# Patient Record
Sex: MALE | Race: BLACK OR AFRICAN AMERICAN
[De-identification: names, ages, dates, MRNs, and addresses within clinical notes are randomized per-mention and may not be internally consistent; named-entity substitution may affect disease eponyms.]

---

## 2020-10-04 ENCOUNTER — HOSPITAL ENCOUNTER (EMERGENCY)
Dept: HOSPITAL 97 - ER | Age: 75
Discharge: HOME | End: 2020-10-04
Payer: COMMERCIAL

## 2020-10-04 VITALS — OXYGEN SATURATION: 99 % | DIASTOLIC BLOOD PRESSURE: 120 MMHG | SYSTOLIC BLOOD PRESSURE: 169 MMHG

## 2020-10-04 VITALS — TEMPERATURE: 98.3 F
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
## 2020-10-04 DIAGNOSIS — I48.91: ICD-10-CM

## 2020-10-04 DIAGNOSIS — R42: Primary | ICD-10-CM
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
## 2020-10-04 DIAGNOSIS — I10: ICD-10-CM

## 2020-10-04 DIAGNOSIS — Z79.82: ICD-10-CM
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
## 2020-10-04 LAB
ALBUMIN SERPL BCP-MCNC: 3.5 G/DL (ref 3.4–5)
ALP SERPL-CCNC: 51 U/L (ref 45–117)
ALT SERPL W P-5'-P-CCNC: 22 U/L (ref 12–78)
AST SERPL W P-5'-P-CCNC: 19 U/L (ref 15–37)
BUN BLD-MCNC: 29 MG/DL (ref 7–18)
GLUCOSE SERPLBLD-MCNC: 115 MG/DL (ref 74–106)
HCT VFR BLD CALC: 33.2 % (ref 39.6–49)
INR BLD: 0.98
LYMPHOCYTES # SPEC AUTO: 1.1 K/UL (ref 0.7–4.9)
MAGNESIUM SERPL-MCNC: 2.1 MG/DL (ref 1.8–2.4)
NT-PROBNP SERPL-MCNC: 292 PG/ML (ref ?–125)
PMV BLD: 10.4 FL (ref 7.6–11.3)
POTASSIUM SERPL-SCNC: 4.1 MMOL/L (ref 3.5–5.1)
RBC # BLD: 3.55 M/UL (ref 4.33–5.43)
TROPONIN (EMERG DEPT USE ONLY): < 0.02 NG/ML (ref 0–0.04)

## 2020-10-04 PROCEDURE — 81003 URINALYSIS AUTO W/O SCOPE: CPT

## 2020-10-04 PROCEDURE — 80048 BASIC METABOLIC PNL TOTAL CA: CPT

## 2020-10-04 PROCEDURE — 83880 ASSAY OF NATRIURETIC PEPTIDE: CPT

## 2020-10-04 PROCEDURE — 85025 COMPLETE CBC W/AUTO DIFF WBC: CPT

## 2020-10-04 PROCEDURE — 83735 ASSAY OF MAGNESIUM: CPT

## 2020-10-04 PROCEDURE — 85610 PROTHROMBIN TIME: CPT

## 2020-10-04 PROCEDURE — 99284 EMERGENCY DEPT VISIT MOD MDM: CPT

## 2020-10-04 PROCEDURE — 96360 HYDRATION IV INFUSION INIT: CPT

## 2020-10-04 PROCEDURE — 93880 EXTRACRANIAL BILAT STUDY: CPT

## 2020-10-04 PROCEDURE — 36415 COLL VENOUS BLD VENIPUNCTURE: CPT

## 2020-10-04 PROCEDURE — 80076 HEPATIC FUNCTION PANEL: CPT

## 2020-10-04 PROCEDURE — 71045 X-RAY EXAM CHEST 1 VIEW: CPT

## 2020-10-04 PROCEDURE — 84484 ASSAY OF TROPONIN QUANT: CPT

## 2020-10-04 PROCEDURE — 70450 CT HEAD/BRAIN W/O DYE: CPT

## 2020-10-04 PROCEDURE — 93005 ELECTROCARDIOGRAM TRACING: CPT

## 2020-10-04 NOTE — RAD REPORT
EXAM DESCRIPTION:  CT - Head Brain Wo Cont - 10/4/2020 2:16 pm

 

CLINICAL HISTORY:  DIZZINESS

Headache, drowsiness, fall with head injury

 

COMPARISON:  No comparisons

 

TECHNIQUE:  All CT scans are performed using dose optimization technique as appropriate and may inclu
de automated exposure control or mA/KV adjustment according to patient size.

 

FINDINGS:  No intracranial hemorrhage, hydrocephalus or extra-axial fluid collection.Mild frontal lob
e atrophy.No areas of brain edema or evidence of midline shift.

 

The paranasal sinuses and mastoids are clear. The calvarium is intact.

 

IMPRESSION:  No acute intracranial abnormality.

## 2020-10-04 NOTE — EDPHYS
Physician Documentation                                                                           

 [facility]                                                                 

Name: Brendan Murray                                                                             

Age: 75 yrs                                                                                       

Sex: Male                                                                                         

: 1945                                                                                   

MRN: [id number]                                                                                   

Arrival Date: 10/04/2020                                                                          

Time: 12:47                                                                                       

Account#: [id number]                                                                            

Bed 4                                                                                             

Private MD: Julio Mckeon ED Physician Chris Azar                                                                      

HPI:                                                                                              

10/04                                                                                             

13:39 This 75 yrs old Black Male presents to ER via Wheelchair with complaints of Dizziness,  pm1 

      Fall Injury.                                                                                

13:39 The patient presents with sense of spinning, vertigo. Onset: The symptoms/episode       pm1 

      began/occurred 1 hour(s) ago. Context: occurred at home, occurred while the patient was     

      standing, just prior to the episode the patient experienced no apparent symptoms.           

      Modifying factors: The symptoms are alleviated by nothing, the symptoms are aggravated      

      by nothing. Associated signs and symptoms: Pertinent negatives: abdominal pain, chest       

      pain, focal weakness, head injury, headache, nausea, numbness, palpitations, shortness      

      of breath, vomiting. Severity of symptoms: in the emergency department the symptoms         

      have resolved Pain is currently a 0 / 10. Patient's baseline: Neuro: alert and fully        

      oriented, Motor: no deficits, Ambulation: walks without assistance, Speech: normal. The     

      patient has not experienced similar symptoms in the past. Patient was standing in the       

      kitchen with his hands on the counter and then he started feeling the room spinning. He     

      was able to control is fall down to the floor first landing on his knees. His head hit      

      his wife's knee on the way down. He had no difficulty getting up and he no longer           

      experiencing any dizziness. No headache, focal weakness, neck pain, LOC.                    

                                                                                                  

Historical:                                                                                       

- Allergies:                                                                                      

13:00 No Known Allergies;                                                                     jd3 

- Home Meds:                                                                                      

15:56 Benicar HCT 40-12.5 mg Oral tab 1 tab once daily [Active]; Toprol XL 50 mg Oral Tb24 1  jl7 

      tab once daily [Active]; allopurinol 300 mg Oral tab 1 tab once daily [Active]; Lasix       

      40 mg Oral tab [Active]; doxazosin 4 mg oral tab 1 tab once daily [Active]; omeprazole      

      40 mg Oral cpDR 1 cap once daily [Active]; flecainide 50 mg Oral tab 1 tab every 12         

      hours [Active]; aspirin 325 mg Oral tab 1 tab once daily [Active]; CoQ-10 100 mg Oral       

      cap daily [Active]; Centrum Silver Oral daily [Active]; Vitamin B-12 1,000 mcg Oral tab     

      daily [Active]; Vitamin C 1,000 mg Oral tab daily [Active];                                 

- PMHx:                                                                                           

13:00 GERD; Gout; Atrial Fib; Hypertension;                                                   jd3 

- PSHx:                                                                                           

13:00 Carpal Tunnel Repair;                                                                   jd3 

                                                                                                  

- Immunization history:: Adult Immunizations up to date.                                          

- Social history:: Smoking status: Patient denies any tobacco usage or history of.                

                                                                                                  

                                                                                                  

ROS:                                                                                              

13:39 Constitutional: Negative for fever, chills, and weight loss, Eyes: Negative for injury, pm1 

      pain, redness, and discharge, ENT: Negative for injury, pain, and discharge, Neck:          

      Negative for injury, pain, and swelling, Cardiovascular: Negative for chest pain,           

      palpitations, and edema, Respiratory: Negative for shortness of breath, cough,              

      wheezing, and pleuritic chest pain, Abdomen/GI: Negative for abdominal pain, nausea,        

      vomiting, diarrhea, and constipation, Back: Negative for injury and pain, MS/Extremity:     

      Negative for injury and deformity, Skin: Negative for injury, rash, and discoloration.      

13:39 Neuro: Positive for dizziness, Negative for numbness, tingling, weakness.                   

                                                                                                  

Exam:                                                                                             

13:39 Constitutional:  This is a well developed, well nourished patient who is awake, alert,  pm1 

      and in no acute distress. Head/Face:  Normocephalic, atraumatic. Eyes:  Pupils equal        

      round and reactive to light, extra-ocular motions intact.  Lids and lashes normal.          

      Conjunctiva and sclera are non-icteric and not injected.  Cornea within normal limits.      

      Periorbital areas with no swelling, redness, or edema. ENT:  Nares patent. No nasal         

      discharge, no septal abnormalities noted.  Tympanic membranes are normal and external       

      auditory canals are clear.  Oropharynx with no redness, swelling, or masses, exudates,      

      or evidence of obstruction, uvula midline.  Mucous membranes moist. Neck:  Trachea          

      midline, no thyromegaly or masses palpated, and no cervical lymphadenopathy.  Supple,       

      full range of motion without nuchal rigidity, or vertebral point tenderness.  No            

      Meningismus.                                                                                

13:39 Back:  No spinal tenderness.  No costovertebral tenderness.  Full range of motion.          

      Skin:  Warm, dry with normal turgor.  Normal color with no rashes, no lesions, and no       

      evidence of cellulitis. MS/ Extremity:  Pulses equal, no cyanosis.  Neurovascular           

      intact.  Full, normal range of motion.                                                      

13:39 Cardiovascular: Exam negative for  acute changes, Rate: normal, Rhythm: regular,            

      Pulses: no pulse deficits are appreciated.                                                  

13:39 Respiratory: Exam negative for  acute changes, respiratory distress, shortness of           

      breath.                                                                                     

13:39 Abdomen/GI: Exam negative for acute changes, Inspection: abdomen appears normal,            

      Palpation: abdomen is soft and non-tender, in all quadrants, mass, is not appreciated,      

      rebound tenderness, is not appreciated.                                                     

13:39 Neuro: Exam negative for acute changes, Orientation: is normal, Mentation: is normal,       

      Cranial nerves: CN II- XII are normal as tested, Cerebellar function: normal finger to      

      nose testing, Motor: is normal.                                                             

                                                                                                  

Vital Signs:                                                                                      

13:00  / 98; Pulse 74; Resp 17 S; Temp 98.3(O); Pulse Ox 98% on R/A; Weight 112.49 kg   jd3 

      (R); Height 6 ft. 1 in. (185.42 cm) (R); Pain 0/10;                                         

15:45  / 112; Pulse 59; Resp 17; Pulse Ox 100% ;                                        rb1 

15:51  / 120; Pulse 57; Resp 15; Pulse Ox 99% ; Pain 0/10;                              jl7 

13:00 Body Mass Index 32.72 (112.49 kg, 185.42 cm)                                            jd3 

                                                                                                  

MDM:                                                                                              

13:26 Patient medically screened.                                                             pm1 

16:35 Data reviewed: vital signs. Data interpreted: Pulse oximetry: on room air is 99 %.      pm1 

      Interpretation: normal. Counseling: I had a detailed discussion with the patient and/or     

      guardian regarding: the historical points, exam findings, and any diagnostic results        

      supporting the discharge/admit diagnosis, lab results, radiology results, the need for      

      outpatient follow up, Dr. Mckeon. Weight loss and blood pressure management, to return     

      to the emergency department if symptoms worsen or persist or if there are any questions     

      or concerns that arise at home.                                                             

                                                                                                  

10/04                                                                                             

13:38 Order name: Basic Metabolic Panel; Complete Time: 14:36                                 pm1 

10/04                                                                                             

13:38 Order name: CBC with Diff; Complete Time: 14:36                                         pm1 

10/04                                                                                             

13:38 Order name: LFT's; Complete Time: 14:36                                                 pm1 

10/04                                                                                             

13:38 Order name: Magnesium; Complete Time: 14:36                                             pm1 

10/04                                                                                             

13:38 Order name: NT PRO-BNP; Complete Time: 14:36                                            pm1 

10/04                                                                                             

13:38 Order name: PT-INR; Complete Time: 14:36                                                pm1 

10/04                                                                                             

13:38 Order name: Troponin (emerg Dept Use Only); Complete Time: 14:36                        pm1 

10/04                                                                                             

13:38 Order name: XRAY Chest (1 view); Complete Time: 14:47                                   pm1 

10/04                                                                                             

13:38 Order name: EKG; Complete Time: 13:39                                                   pm1 

10/04                                                                                             

13:38 Order name: Cardiac monitoring; Complete Time: 14:33                                    pm1 

10/04                                                                                             

13:38 Order name: CT Head Brain wo Cont; Complete Time: 14:36                                 pm1 

10/04                                                                                             

13:38 Order name: Carotid Artery Bilateral US; Complete Time: 15:16                           pm1 

10/04                                                                                             

14:16 Order name: Urine Dipstick--Ancillary (enter results); Complete Time: 14:36             eb  

10/04                                                                                             

13:38 Order name: EKG - Nurse/Tech; Complete Time: 14:33                                      pm1 

10/04                                                                                             

13:38 Order name: IV Saline Lock; Complete Time: 14:03                                        pm1 

10/04                                                                                             

13:38 Order name: Labs collected and sent; Complete Time: 14:03                               pm1 

10/04                                                                                             

13:38 Order name: O2 Per Protocol; Complete Time: 14:06                                       pm1 

10/04                                                                                             

13:38 Order name: O2 Sat Monitoring; Complete Time: 14:06                                     pm1 

10/04                                                                                             

13:38 Order name: Urine Dipstick-Ancillary (obtain specimen); Complete Time: 15:08            pm1 

                                                                                                  

Administered Medications:                                                                         

14:03 Drug: NS 0.9% 500 ml Route: IV; Rate: bolus; Site: right antecubital;                   rb1 

14:45 Follow up: Response: No adverse reaction; IV Status: Completed infusion; IV Intake:     jl7 

      500ml                                                                                       

                                                                                                  

                                                                                                  

Disposition:                                                                                      

10/05                                                                                             

07:14 Co-signature as Attending Physician, Chris Azar MD I agree with the assessment and  ramirez 

      plan of care.                                                                               

                                                                                                  

Disposition:                                                                                      

10/04/20 16:36 Discharged to Home. Impression: Dizziness and giddiness.                           

- Condition is Stable.                                                                            

- Discharge Instructions: Dizziness, Vertigo.                                                     

                                                                                                  

- Medication Reconciliation Form, Thank You Letter, Antibiotic Education, Prescription            

  Opioid Use form.                                                                                

- Follow up: Emergency Department; When: As needed; Reason: Worsening of condition.               

  Follow up: Julio Mckeon MD; When: 2 - 3 days; Reason: Recheck today's complaints,           

  Continuance of care, Re-evaluation by your physician.                                           

- Problem is new.                                                                                 

- Symptoms are resolved.                                                                          

                                                                                                  

                                                                                                  

                                                                                                  

Signatures:                                                                                       

Dispatcher [employer]                           EDMS                                                 

Chris Azar MD MD cha Barber, Rebecca, RN                     RN   rb1                                                  

Edward Pennington NP                    NP   pm1                                                  

Nir Thibodeaux RN                        RN   jl7                                                  

Bebeto Alicea RN                    RN   jd3                                                  

                                                                                                  

Corrections: (The following items were deleted from the chart)                                    

10/04                                                                                             

17:05 16:36 10/04/2020 16:36 Discharged to Home. Impression: Dizziness and giddiness.         rb1 

      Condition is Stable. Forms are Medication Reconciliation Form, Thank You Letter,            

      Antibiotic Education, Prescription Opioid Use. Follow up: Emergency Department; When:       

      As needed; Reason: Worsening of condition. Follow up: Julio Mckeon; When: 2 - 3 days;     

      Reason: Recheck today's complaints, Continuance of care, Re-evaluation by your              

      physician. Problem is new. Symptoms are resolved. pm1                                       

                                                                                                  

**************************************************************************************************

## 2020-10-04 NOTE — ER
Nurse's Notes                                                                                     

 [facility]                                                                 

Name: Brendan Murray                                                                             

Age: 75 yrs                                                                                       

Sex: Male                                                                                         

: 1945                                                                                   

MRN: [id number]                                                                                   

Arrival Date: 10/04/2020                                                                          

Time: 12:47                                                                                       

Account#: [id number]                                                                            

Bed 4                                                                                             

Private MD: Julio Mckeon                                                                       

Diagnosis: Dizziness and giddiness                                                                

                                                                                                  

Presentation:                                                                                     

10/04                                                                                             

12:57 Chief complaint: Spouse and/or significant other states: "he was standing in the        [facility] 

      kitchen, he all of a sudden, he felt dizzy then fell. he denies pain now.". Coronavirus     

      screen: At this time, the client does not indicate any symptoms associated with             

      coronavirus-19. Ebola Screen: Patient negative for fever greater than or equal to 101.5     

      degrees Fahrenheit, and additional compatible Ebola Virus Disease symptoms. Initial         

      Sepsis Screen: Does the patient meet any 2 criteria? No. Patient's initial sepsis           

      screen is negative. Does the patient have a suspected source of infection? No.              

      Patient's initial sepsis screen is negative. Risk Assessment: Do you want to hurt           

      yourself or someone else? Patient reports no desire to harm self or others. Onset of        

      symptoms was 2020.                                                              

12:57 Method Of Arrival: Wheelchair                                                           jd3 

12:57 Acuity: RAVEN 3                                                                           jd3 

                                                                                                  

Historical:                                                                                       

- Allergies:                                                                                      

13:00 No Known Allergies;                                                                     j 

- Home Meds:                                                                                      

15:56 Benicar HCT 40-12.5 mg Oral tab 1 tab once daily [Active]; Toprol XL 50 mg Oral Tb24 1  jl7 

      tab once daily [Active]; allopurinol 300 mg Oral tab 1 tab once daily [Active]; Lasix       

      40 mg Oral tab [Active]; doxazosin 4 mg oral tab 1 tab once daily [Active]; omeprazole      

      40 mg Oral cpDR 1 cap once daily [Active]; flecainide 50 mg Oral tab 1 tab every 12         

      hours [Active]; aspirin 325 mg Oral tab 1 tab once daily [Active]; CoQ-10 100 mg Oral       

      cap daily [Active]; Centrum Silver Oral daily [Active]; Vitamin B-12 1,000 mcg Oral tab     

      daily [Active]; Vitamin C 1,000 mg Oral tab daily [Active];                                 

- PMHx:                                                                                           

13:00 GERD; Gout; Atrial Fib; Hypertension;                                                   jd3 

- PSHx:                                                                                           

13:00 Carpal Tunnel Repair;                                                                   jd3 

                                                                                                  

- Immunization history:: Adult Immunizations up to date.                                          

- Social history:: Smoking status: Patient denies any tobacco usage or history of.                

                                                                                                  

                                                                                                  

Screenin:15 Abuse screen: Denies threats or abuse. Nutritional screening: No deficits noted.        rb1 

      Tuberculosis screening: No symptoms or risk factors identified. Fall Risk Fall in past      

      12 months (25 points). No secondary diagnosis (0 pts). IV access (20 points).               

      Ambulatory Aid- None/Bed Rest/Nurse Assist (0 pts). Gait- Normal/Bed Rest/Wheelchair (0     

      pts) Mental Status- Oriented to own ability (0 pts). Total Mckinnon Fall Scale indicates       

      High Risk Score (45 or more points). Fall prevention measures have been instituted.         

      Side Rails Up X 2 Placed Close to Nursing Station 1:1 Attendant Assigned Frequent           

      Obs/Assessments Occuring Family Present and informed to notify staff if the need to         

      leave the bedside As available patient and family educated on Fall Prevention Program       

      and Strategies.                                                                             

                                                                                                  

Assessment:                                                                                       

13:15 General: Appears in no apparent distress. comfortable, Behavior is calm, cooperative.   rb1 

      Pain: Denies pain. Neuro: Level of Consciousness is awake, alert, obeys commands,           

      Oriented to person, place, time, situation, Denies dizziness, LOC. Cardiovascular:          

      Capillary refill < 3 seconds. Respiratory: Airway is patent Respiratory effort is even,     

      unlabored, Respiratory pattern is regular, symmetrical. GI: No signs and/or symptoms        

      were reported involving the gastrointestinal system. : No signs and/or symptoms were      

      reported regarding the genitourinary system. Derm: Skin is dry, Skin is normal, Skin        

      temperature is warm.                                                                        

15:45 Reassessment: Patient appears in no apparent distress at this time. No changes from     jl7 

      previously documented assessment. Patient is alert, oriented x 3, equal unlabored           

      respirations, skin warm/dry/pink. ERP notified of BP, will be to bedside momentarily.       

      Patient denies pain at this time.                                                           

16:44 Reassessment: Patient appears in no apparent distress at this time. No changes from     rb1 

      previously documented assessment.                                                           

                                                                                                  

Vital Signs:                                                                                      

13:00  / 98; Pulse 74; Resp 17 S; Temp 98.3(O); Pulse Ox 98% on R/A; Weight 112.49 kg   jd3 

      (R); Height 6 ft. 1 in. (185.42 cm) (R); Pain 0/10;                                         

15:45  / 112; Pulse 59; Resp 17; Pulse Ox 100% ;                                        rb1 

15:51  / 120; Pulse 57; Resp 15; Pulse Ox 99% ; Pain 0/10;                              jl7 

13:00 Body Mass Index 32.72 (112.49 kg, 185.42 cm)                                            jd3 

                                                                                                  

ED Course:                                                                                        

12:47 Patient arrived in ED.                                                                  ag5 

12:47 Julio Mckeon MD is Private Physician.                                               ag5 

12:59 Triage completed.                                                                       jd3 

13:00 Arm band placed on.                                                                     jd3 

13:15 Patient has correct armband on for positive identification. Bed in low position. Call   rb1 

      light in reach. Side rails up X 1. Cardiac monitor on. Pulse ox on. NIBP on. Warm           

      blanket given.                                                                              

13:23 Edward Pennington, DHARMESH is PHCP.                                                           pm1 

13:24 Chris Azar MD is Attending Physician.                                             pm1 

13:35 Linda Bertrand, RN is Primary Nurse.                                                   rb1 

14:04 Initial lab(s) drawn, by me, sent to lab. Inserted saline lock: 20 gauge in right       em1 

      antecubital area, using aseptic technique. Blood collected.                                 

14:16 CT Head Brain wo Cont In Process Unspecified.                                           EDMS

14:29 EKG done, by ED staff, reviewed by Chris Azar MD.                                   dh4 

14:30 XRAY Chest (1 view) In Process Unspecified.                                             EDMS

14:58 Carotid Artery Bilateral US In Process Unspecified.                                     EDMS

14:59 Ultrasound completed. Patient tolerated well. Notified NP/EZEKIEL cotto.                   sg3 

16:36 Julio Mckeon MD is Referral Physician.                                              pm1 

17:04 No provider procedures requiring assistance completed. IV discontinued, intact,         rb1 

      bleeding controlled, No redness/swelling at site. Pressure dressing applied.                

                                                                                                  

Administered Medications:                                                                         

14:03 Drug: NS 0.9% 500 ml Route: IV; Rate: bolus; Site: right antecubital;                   rb1 

14:45 Follow up: Response: No adverse reaction; IV Status: Completed infusion; IV Intake:     jl7 

      500ml                                                                                       

                                                                                                  

                                                                                                  

Intake:                                                                                           

14:45 IV: 500ml; Total: 500ml.                                                                jl7 

                                                                                                  

Outcome:                                                                                          

16:36 Discharge ordered by MD.                                                                pm1 

17:04 Discharged to home ambulatory, with family.                                             rb1 

17:04 Condition: stable                                                                           

17:04 Discharge instructions given to patient, Instructed on discharge instructions, follow       

      up and referral plans. Demonstrated understanding of instructions, follow-up care,          

      Prescriptions given X none                                                                  

17:05 Patient left the ED.                                                                    rb1 

                                                                                                  

Signatures:                                                                                       

Dispatcher [employer]                           Lam Mccullough                               em1                                                  

Linda Bertrand, RN                     RN   rb1                                                  

Edward Pennington, DHARMESH                    NP   pm1                                                  

Nir Thibodeaux RN                        RN   jl7                                                  

Bebeto Alicea RN                    RN   [email or web address]                                                  

Susan Barrera                               3                                                  

Sameer Abdalla                                [facility]                                                  

Raheem Myers                                 4                                                  

                                                                                                  

**************************************************************************************************

## 2020-10-04 NOTE — RAD REPORT
EXAM DESCRIPTION:  RAD - Chest Single View - 10/4/2020 2:30 pm

 

CLINICAL HISTORY:  Dizziness

Chest pain.

 

COMPARISON:  Chest Pa And Lat (2 Views) dated 6/13/2019; Chest Pa And Lat (2 Views) dated 12/21/2018;
 CHEST PA AND LAT 2 VIEW dated 11/22/2011; CHEST SINGLE VIEW dated 11/1/2010

 

FINDINGS:  Portable technique limits examination quality.

 

Small calcified granuloma is seen right lung base. The lungs are otherwise clear. The heart is mildly
 prominent size with a tortuous thoracic aorta. No displaced fractures.

 

IMPRESSION:  No acute intrathoracic process suspected.

## 2020-10-04 NOTE — RAD REPORT
EXAM DESCRIPTION:   - CP - 10/4/2020 2:58 pm

 

CLINICAL HISTORY:  DIZZINESS

Headache, drowsiness

 

COMPARISON:  No comparisons

 

TECHNIQUE:  Real-time sonographic evaluation of both carotid systems was performed. Doppler interroga
tion was performed with waveform tracing bilaterally.

 

FINDINGS:  Normal high resistance waveforms are noted in both external carotid arteries. The common c
arotid arteries and internal carotid arteries show normal low resistance waveforms.

 

No significant plaque formation is seen. Peak systolic and end diastolic velocity values and the ICA/
CCA ratios are in the non-hemodynamically significant range.

 

Antegrade flow seen in both vertebral arteries.

 

IMPRESSION:  No significant atherosclerotic changes noted.

 

No evidence of a hemodynamically significant stenosis.

## 2020-10-08 NOTE — XMS REPORT
Continuity of Care Document

                           Created on:2020



Patient:BRENDAN FISHER

Sex:Male

:1945

External Reference #:[id number]





Demographics







                          Address                   [address]



                                                    [place] [address]

 

                          Home Phone                [phone]

 

                          Work Phone                [phone]

 

                          Mobile Phone              [phone]

 

                          Email Address             [email or web address]

 

                          Preferred Language        English

 

                          Marital Status            Unknown

 

                          Yarsanism Affiliation     Unknown

 

                          Race                      Unknown

 

                          Additional Race(s)        Black or 



                                                    Unavailable



                                                    Unavailable



                                                    White

 

                          Ethnic Group              Unknown









Author







                          Organization              [facility]

t

 

                          Address                   [address] [place] [address] Dr. Parikh [address] [place] [address]

 

                          Phone                     [phone]









Support







                Name            Relationship    Address         Phone

 

                Natalia         Spouse          [address] [phone]



                                                [place] [address] 

 

                Natalia         Son             Unavailable     [phone]

 

                NATALIA,  (POA) Unavailable     [address]K[place]          [phone]



                                                [place] [address] 

 

                NATALIA         Unavailable     [address] [place] [address]  [phone]



                                                [place] [address] 

 

                SPIVEY       Other           [address] [facility] [phone]



                                                [place] [address] 

 

                NATALIA         Unavailable     [address]     [phone]



                                                [place] [address] 

 

                (MPOA) Guajardo Child           Unavailable     [phone]









Care Team Providers







                    Name                Role                Phone

 

                    Linda DANG          Primary Care Physician [phone]

 

                    HAI Boyd DO        Attending Clinician [phone]

 

                    Valerie DANG,  Fatuma        Attending Clinician [phone]

 

                    Dante DANG,  N.   Attending Clinician [phone]

 

                    Trevin DANG,  Florentino     Attending Clinician [phone]

 

                    Ai DANG,  Casi Attending Clinician [phone]

 

                    Rafael DANG,  Casimiro  Attending Clinician [phone]

 

                    ZULEIMA Ramos DO    Attending Clinician [phone]

 

                    Noemi PAVON     Attending Clinician Unavailable

 

                    FELY Márquez    Attending Clinician [phone]

 

                    Jae Barbosa MD    Attending Clinician [phone]

 

                    JAE BARBOSA       Attending Clinician Unavailable

 

                    Joss PAVON          Attending Clinician Unavailable

 

                    Anmol PAVON           Attending Clinician Unavailable

 

                    Gamaliel BARROSO,  Mackenzie Attending Clinician [phone]

 

                    Ayleen DANG,  Jay Attending Clinician [phone]

 

                    Sameer DANG,  SCora      Attending Clinician [phone]

 

                    Bruce DANG,  Jose      Attending Clinician [phone]

 

                    Shai DANG,  Amanda Skelton Attending Clinician [phone]

[phone]

 

                    Linda DANG          Attending Clinician [phone]

 

                    Sujit Elizabeth PA-C Attending Clinician [phone]

 

                    VALERIE                 Admitting Clinician Unavailable

 

                    DANTE           Admitting Clinician Unavailable









Payers







           Payer Name Policy Type Policy     Effective  Expiration Source



                                 Number     Date       Date       

 

           [employer]                 [id number]   2020            [facility]                       00:00:00              Methodi

[facility]                                             



           [id number]                                             



           esentHMO                                               

 

           AET[employer] - MEDICARE MGD            xxxxxxxx                         [facility] 

[facility]



           CAREAETNA MEDICARE HMO                                             - 

Medical



           [facility]                                                    Center



           [phone]                                             



           [address] [address]                                             







Problems







       Condition Condition Condition Status Onset  Resolution Last   Treating Co

mments 

Source



       Name   Details Category        Date   Date   Treatment Clinician        



                                                 Date                 

 

       Acute  Acute  Disease Active                              [facility]



       upper GI upper GI                                              Method

i



       bleed  bleed                00:00:                             st



                                   00                                 

 

       Coffee Coffee Disease Active                       Overview: [place]

n



       ground ground                                       Added  Methodi



       emesis emesis               00:00:                      automatic st



                                   00                          ally from 



                                                               request 



                                                               for    



                                                               surgery 



                                                               [id number] 

 

       Lactic Lactic Disease Active                              [facility]



       acidosis acidosis               7-02                               Method

i



                                   00:00:                             st



                                   00                                 

 

       Syncope Syncope Disease Active                              [facility]



                                   2-                               Methodi



                                   00:00:                             st



                                   00                                 

 

       Chest pain Chest pain Disease Active                              [facility]

[place]



                                   1-06                               Methodi



                                   00:00:                             st



                                   00                                 

 

       Liver  Liver  Disease Active 2018                      Last   [facility]



       lesion lesion               2-06                        [facility]



                                   00:00:                      t & Plan: Medical



                                   00                          [facility]



                                                               non-contr 



                                                               ast MRI 



                                                               liver  



                                                               showed 



                                                               7x7 cm 



                                                               lesion on 



                                                               right  



                                                               lobe. We 



                                                               will   



                                                               review 



                                                               the    



                                                               imaging. 



                                                               He may 



                                                               need   



                                                               resection 



                                                               or US  



                                                               guided 



                                                               biopsy to 



                                                               confirm 



                                                               the    



                                                               diagnosis 



                                                               . Serum 



                                                               AFP 1.25. 

 

       Chronic Chronic Disease Active 2018                      Last   [facility]



       viral  viral                2-                        [facility] -



       hepatitis hepatitis               00:00:                      t & Plan: M

edical



       B without B without               00                          Diagnosed C

enter



       delta  delta                                            >30 yr 



       agent and agent and                                           ago,   



       without without                                           HBsAg  



       coma   coma                                             positive, 



                                                               HBeAg  



                                                               negative 



                                                               with low 



                                                               level  



                                                               viremia. 



                                                               Unclear 



                                                               mode of 



                                                               transmiss 



                                                               ion. He 



                                                               was never 



                                                               treated. 



                                                               No     



                                                               previous 



                                                               imaging 



                                                               for HCC 



                                                               surveilla 



                                                               nce. No 



                                                               evidence 



                                                               of     



                                                               advance 



                                                               fibrosis/ 



                                                               decompens 



                                                               ation.We 



                                                               will   



                                                               re-check 



                                                               HBV DNA, 



                                                               and will 



                                                               hold off 



                                                               anti-HBV 



                                                               therapy. 



                                                               Discussed 



                                                               the risk 



                                                               of     



                                                               re-activa 



                                                               tion on 



                                                               immunosup 



                                                               pression, 



                                                               screening 



                                                               of family 



                                                               members 



                                                               for HBV, 



                                                               and if 



                                                               negative, 



                                                               needs  



                                                               vaccinati 



                                                               on.    

 

       Obesity Obesity Disease Active 2018                      [facility]



                                   2                        [facility]



                                   00:00:                      t & Plan: Medical



                                   00                          Discussed Center



                                                               the need 



                                                               for    



                                                               weight 



                                                               loss with 



                                                               a target 



                                                               of 10% 



                                                               weight 



                                                               loss in 



                                                               next 6 



                                                               months 



                                                               through 



                                                               life-styl 



                                                               e      



                                                               modificat 



                                                               ion.   

 

       Stage 3 Stage 3 Disease Active 2018                             [facility]



       chronic chronic               -                               [facility]



       kidney kidney               00:00:                             Medical



       disease disease               00                                 Center

 

       Screening Screening Disease Active 2018                             [facility]



       for    for                                                 [facility]



       endocrine, endocrine,               00:00:                             Me

dical



       metabolic metabolic               00                                 Cent

er



       and    and                                                     



       immunity immunity                                                  



       disorder disorder                                                  

 

       COPD   COPD   Disease Active                              [facility]



       (chronic (chronic               9-15                               Method

i



       obstructiv obstructiv               00:00:                             st



       e      e                    00                                 



       pulmonary pulmonary                                                  



       disease) disease)                                                  

 

       Alcohol Alcohol Disease Active                              [facility]



       abuse  abuse                15                               Methodi



                                   00:00:                             st



                                   00                                 

 

       Acute  Acute  Disease Active                              [facility]



       pancreatit pancreatit               -13                               Me

thodi



       is     is                   00:00:                             st



                                   00                                 

 

       Disturbanc        Diagnosis Active               2020              

 [facility]



       e of skin                                    02:45:10               l



       sensation                                                         Fran



              Disturbanc                                                  



              e of skin                                                  



              sensation                                                  



                                                                      



                                                                      



              Active                                                  



                                                                      



                                                                      



                                                                      



                                                                      



              Diagnosis                                                  



                                                                      



                                                                      



              2020                                                  



                                                                      



                                                                      



                                                                      



                                                                      



                                                                      



              [id number]                                                  



              .[id number]                                                  



              [id number]                                                  



              4                                                       



                                                                      

 

       Gait          Diagnosis Active               2020               Mem

oria



       disorder                                    02:45:10               l



                Gait                                                  [place]



              disorder                                                  



                                                                      



                                                                      



              Active                                                  



                                                                      



                                                                      



                                                                      



                                                                      



              Diagnosis                                                  



                                                                      



                                                                      



              2020                                                  



                                                                      



                                                                      



                                                                      



                                                                      



                                                                      



              [id number]                                                  



              .[id number]                                                  



              [id number]                                                  



              4                                                       



                                                                      

 

       Essential        Problem Active               2020               Me

moria



       hypertensi                                    02:45:10               l



       on                                                             [place]



              Essential                                                  



              hypertensi                                                  



              on                                                      



                                                                      



                  Active                                                  



                                                                      



                                                                      



                                                                      



                                                                      



                 Problem                                                  



                                                                      



                                                                      



                                                                 



              0                                                       



                                                                      



                                                                      



                                                                      



                                                                      



              [id number]                                                  



              .[id number].                                                  



              [id number]                                                  



              4                                                       



                                                                      

 

       Lumbosacra        Problem Active               2020               M

emoria



       l                                         02:45:10               l



       radiculopa                                                         Thom

n



       thy    Lumbosacra                                                  



              l                                                       



              radiculopa                                                  



              thy                                                     



                                                                      



                                                                      



              Active                                                  



                                                                      



                                                                      



                                                                      



                                                                      



              Problem                                                  



                                                                      



                                                                      



              2020                                                  



                                                                      



                                                                      



                                                                      



                                                                      



                                                                      



              [id number]                                                  



              [id number]                                                  



              [id number]                                                  



              4                                                       



                                                                      

 

       CIDP          Problem Active               2020               [facility]

ia



       (chronic                                    02:45:10               l



       inflammato   CIDP                                                  Thom

n



       ry     (chronic                                                  



       demyelinat inflammato                                                  



       ing    ry                                                      



       polyneurop demyelinat                                                  



       athy)  ing                                                     



              polyneurop                                                  



              athy)                                                   



                                                                      



                                                                      



              Active                                                  



                                                                      



                                                                      



                                                                      



                                                                      



              Problem                                                  



                                                                      



                                                                      



              2020                                                  



                                                                      



                                                                      



                                                                      



                                                                      



                                                                      



              [id number]                                                  



              [id number]                                                  



              [id number]                                                  



              4                                                       



                                                                      

 

       Weakness        Problem Active               2020               [facility]

[facility]



                                                 02:45:10               l



                Weakness                                                  Thom

n



                                                                      



                                                                      



               Active                                                  



                                                                      



                                                                      



                                                                      



                                                                      



              Problem                                                  



                                                                      



                                                                      



              2020                                                  



                                                                      



                                                                      



                                                                      



                                                                      



                                                                      



              [id number]                                                  



              [id number]                                                  



              [id number]                                                  



              4                                                       



                                                                      

 

       Essential        Problem Active               2020               Me

[facility]ia



       tremor                                    02:45:10               l



                                                                      Fran



              Essential                                                  



              tremor                                                  



                                                                      



                                                                      



              Active                                                  



                                                                      



                                                                      



                                                                      



                                                                      



              Problem                                                  



                                                                      



                                                                      



              2020                                                  



                                                                      



                                                                      



                                                                      



                                                                      



                                                                      



              [id number]                                                  



              [id number]                                                  



              [id number]                                                  



              4                                                       



                                                                      







Allergies, Adverse Reactions, Alerts







       Allergy Allergy Status Severity Reaction(s) Onset  Inactive Treating Comm

ents 

Source



       Name   Type                        Date   Date   Clinician        

 

       N.K.D.A. N.K.D.A. Active        Info Not 2019-                      Mehul

sailaja



                                   Available 6-14                        l



                                          00:00:                      [place]



                                          00                          

 

       No Known DA     Active U                                   [facility]



       Drug                               2-26                        [place]



       Allergie                             00:00:                      e



       s                                                            [facility]

 

       No Known DA     Active U                                   [facility]



       Drug                               2-13                        [place]



       Allergie                             00:00:                      d



       s                                                            [facility]

 

       No Known DA     Active U             2018                      [facility]



       Allergie                             1-04                        [place]



       s                                  00:00:                      [place]



                                          00                          [facility]







Family History







           Family Member Diagnosis  Comments   Start Date Stop Date  Source

 

           Natural mother Hypertension                                  [facility]

 

           Natural mother Kidney disease                                  [facility]







Social History







           Social Habit Start Date Stop Date  Quantity   Comments   Source

 

           History of tobacco                       Cigarette Smoker            

[facility]



           use                                                    [facility]

 

           Sex Assigned At                                             [facility]



           Birth                                                  [facility]

 

           Cigarettes smoked 2020                       [facility]



           current (pack per 00:00:00   00:00:00                         [facility]

st



           day) - Reported                                             

 

           Tobacco use and 2020 Never used            [facility]



           exposure   00:00:00   00:00:00                         [facility]

 

           Alcohol intake 2020 Ex-drinker            [facility]



                      00:00:00   00:00:00   (finding)             [facility]

 

           Alcohol Comment 2020 3 years ago            [facility]



                      00:00:00   00:00:00                         [facility]









                Smoking Status  Start Date      Stop Date       Source

 

                Current every day smoker 2020 00:00:00                 [facility]

 

                Never smoker                                    [facility]







Medications







       Ordered Filled Start  Stop   Current Ordering Indication Dosage Frequency

 [facility]

                    Comments            Components          Source



     Medication Medication Date Date Medication? Clinician                (SIG) 

          



     Name Name                                                   

 

     aspirin      - 2020- No             81mg QD   Take 81 mg           [facility]

tressa



     (ECOTRIN)                                by mouth           [facility]

di



     81 MG      22:44: 00:00                          daily.           st



     enteric      54   :00                                          



     coated                                                        



     tablet                                                        

 

     pantoprazol      2020- No             40mg QD   Take 40 mg          

 [facility]



     e                                   by mouth           [facility]



     (PROTONIX)      22:44: 00:00                          daily.           st



     40 MG EC      54   :00                                          



     tablet                                                        

 

     albuterol      - 2020- No             2{puff} Q6H  Inhale 2           

[facility]



     (PROAIR                                puffs           [facility]



     HFA) 90      22:44: 00:00                          every 6           st



     mcg/actuati      54   :00                           (six)           



     on inhaler                                         hours as           



                                                  needed for           



                                                  wheezing.           

 

     metoprolol      - 2020- No             25mg Q.5D Take 25 mg           

[facility]



     tartrate                                by mouth 2           [facility]

[facility]



     (LOPRESSOR)      22:44: 00:00                          (two)           st



     25 mg      54   :00                           times a           



     tablet                                         day.           

 

     nitroglycer      -0      Yes            .4mg      Place 0.4           H

[place]



     in                                       mg under           [facility]



     (NITROSTAT)      22:44:                               the tongue           

st



     0.4 MG SL      52                                 every 5           



     tablet                                         (five)           



                                                  minutes as           



                                                  needed for           



                                                  chest           



                                                  pain.           

 

     atorvastati      -0      Yes            20mg QD   Take 20 mg           

[facility]



     n (LIPITOR)                                     by mouth           [facility]

[facility]



     20 mg      22:44:                               nightly.           st



     tablet      52                                 Default OP           



                                                  ins            

 

     citalopram      -0      Yes            20mg QD   Take 20 mg           [facility]

[place]



     (CeleXA) 20      -14                               by mouth           [facility]

[place]



     MG tablet      22:44:                               daily.           st



               52                                                

 

     thiamine       2020- Yes            100mg QD   1 tablet           [facility]

[facility]



     mononitrate      -14 10-14                          (100 mg           [facility]

[facility]



     , vit B1,      00:00: 23:59                          total) by           st



     (B-1) 100      00   :00                           Nasogastri           



     mg tablet                                         c route           



                                                  daily for           



                                                  30 days.           

 

     spironolact       2020- Yes            12.5mg QD   Take 0.5          

 [facility]



     one       -14 10-14                          tablets           [facility]



     (ALDACTONE)      00:00: 23:59                          (12.5 mg           s

t



     25 MG      00   :00                           total) by           



     tablet                                         mouth           



                                                  daily for           



                                                  30 days.           

 

     sodium       2020- Yes       Aspiration 4mL  Q.25D Take 4 mL         

  [facility]



     chloride 3      - 10-14           pneumonia,           by             Me

[facility]



     % nebulizer      00:00: 23:59           unspecified           nebulizati   

        st



     solution      00   :00            aspiration           on every 6          

 



                                   pneumonia           (six)           



                                   type,           hours for           



                                   unspecified           30 days.           



                                   laterality,                          



                                   unspecified                          



                                   part of                          



                                   lung (HCC)                          

 

     senna       2020- Yes            1{tbl} QD   Take 1           [facility]



     (SENOKOT)      - 10-14                          tablet by           [facility]

[facility]



     8.6 mg      00:00: 23:59                          mouth           st



     tablet      00   :00                           daily for           



                                                  30 days.           

 

     pantoprazol       2020- Yes            40mg Q.5D Infuse 10           

[facility]



     e 4 mg/mL      - 10-14                          mL (40 mg           [facility]

[facility]



     in sodium      00:00: 23:59                          total)           st



     chloride      00   :00                           into a           



     injection                                         venous           



                                                  catheter 2           



                                                  (two)           



                                                  times a           



                                                  day before           



                                                  meals for           



                                                  30 days.           

 

     naloxone       2020- Yes            .2mg      Infuse 0.5           [facility]

[place]



     (NARCAN)      -14 10-14                          mL (0.2 mg           [facility]

[place]



     0.4 mg/mL      00:00: 23:59                          total)           st



     injection      00   :00                           into a           



                                                  venous           



                                                  catheter           



                                                  once as           



                                                  needed for           



                                                  opioid           



                                                  reversal           



                                                  or             



                                                  respirator           



                                                  y              



                                                  depression           



                                                  (as needed           



                                                  for            



                                                  respirator           



                                                  y rate 8           



                                                  per minute           



                                                  or less OR           



                                                  patient           



                                                  sommnolent           



                                                  and            



                                                  difficult           



                                                  to arouse           



                                                  (POSS           



                                                  GREATER           



                                                  than 3).)           



                                                  for up to           



                                                  30 days.           

 

     multivitami      -0 2020- Yes            1{tbl} QD   Take 1           H

[place]



     n         -14 10-14                          tablet by           [facility]



     (THERAGRAN)      00:00: 23:59                          mouth           st



     tablet      00   :00                           daily for           



                                                  30 days.           

 

     losartan      - 2020- Yes            25mg QD   Take 1           [facility]

n



     (COZAAR) 25      9-14 10-                          tablet (25           M

[facility]



     MG tablet      00:00: 23:59                          mg total)           st



               00   :00                           by mouth           



                                                  daily for           



                                                  30 days.           

 

     ipratropium      2020- Yes            3mL  Q6H  Take 3 mL           

[facility]



     -albuteroL      9-14 10-14                          by             [facility]



     (DUO-NEB)      00:00: 23:59                          nebulizati           s

t



     0.5-2.5      00   :00                           on every 6           



     mg/3 mL                                         (six)           



     nebulizer                                         hours as           



                                                  needed for           



                                                  wheezing           



                                                  for up to           



                                                  30 days.           

 

     insulin      2020- Yes            0U   Q4H  Inject           [facility]



     lispro      9-14 10-14                          0-12 Units           Method

i



     (ADMELOG)      00:00: 23:59                          under the           st



     100 unit/mL      00   :00                           skin every           



     injection                                         4 (four)           



                                                  hours for           



                                                  30 days.           

 

     glucagon 1      2020- Yes            1mg       Inject 1           [facility]

[place]



     mg/mL recon       10                          mg into           [facility]

[facility]



     soln      00:00: 23:59                          the            st



               00   :00                           shoulder,           



                                                  thigh, or           



                                                  buttocks           



                                                  every 15           



                                                  (fifteen)           



                                                  minutes as           



                                                  needed (if           



                                                  patient           



                                                  NPO,           



                                                  unable to           



                                                  swallow           



                                                  safely           



                                                  with no IV           



                                                  access.)           



                                                  for up to           



                                                  30 days.           

 

     furosemide      2020- Yes            40mg QD   1 tablet           [facility]

[place]



     (LASIX) 40      9-14 10-14                          (40 mg           Method

i



     mg tablet      00:00: 23:59                          total) by           st



               00   :00                           Nasogastri           



                                                  c route           



                                                  daily for           



                                                  30 days.           

 

     folic acid      - 2020- Yes            1mg  QD   Take 1           [facility]

[place]



     (FOLVITE) 1      9-14 10-14                          tablet (1           Me

[facility]



     MG tablet      00:00: 23:59                          mg total)           st



               00   :00                           by mouth           



                                                  daily for           



                                                  30 days.           

 

     dextrose      - 2020- Yes            25g       Infuse 50           [facility]

[facility]



     50% syringe      9-14 10-14                          mL (25 g           Met

[facility]



               00:00: 23:59                          total)           st



               00   :00                           into a           



                                                  venous           



                                                  catheter           



                                                  every 20           



                                                  (twenty)           



                                                  minutes as           



                                                  needed (If           



                                                  blood           



                                                  glucose is           



                                                  40 mg/dL           



                                                  or LESS)           



                                                  for up to           



                                                  30 days.           

 

     dextrose      2020-0 2020- Yes            12.5g      Infuse 25           [facility]

[place]



     50% syringe      9-14 10-14                          mL (12.5 g           M

ethodi



               00:00: 23:59                          total)           st



               00   :00                           into a           



                                                  venous           



                                                  catheter           



                                                  every 20           



                                                  (twenty)           



                                                  minutes as           



                                                  needed (If           



                                                  blood           



                                                  glucose is           



                                                  between           



                                                  41-69           



                                                  mg/dL) for           



                                                  up to 30           



                                                  days.           

 

     dextrose 10      2020- Yes            40mL/h      Infuse 40         

  [facility]



     % infusion      9-14 10-14                          mL/hr into           Me

[facility]



               00:00: 23:59                          a venous           st



               00   :00                           catheter           



                                                  continuous           



                                                  ly as           



                                                  needed           



                                                  (for           



                                                  interrupti           



                                                  on in TPN           



                                                  or tube           



                                                  feeds) for           



                                                  up to 30           



                                                  days.           

 

     dextrose 10      2020- Yes            40mL/h      Infuse 40         

  [place]



     % infusion      9-14 10-14                          mL/hr into           Me

[facility]



               00:00: 23:59                          a venous           st



               00   :00                           catheter           



                                                  continuous           



                                                  ly as           



                                                  needed           



                                                  (For           



                                                  bedside           



                                                  glucose           



                                                  LESS than           



                                                  70 mg/dL)           



                                                  for up to           



                                                  30 days.           

 

     carvediloL      2020- Yes            3.125mg Q.5D Take 1           [facility]

[place]



     (COREG)      9-14 10-14                          tablet           [facility]



     3.125 MG      00:00: 23:59                          (3.125 mg           st



     tablet      00   :00                           total) by           



                                                  mouth 2           



                                                  (two)           



                                                  times a           



                                                  day for 30           



                                                  days.           

 

     amIODarone      2020- Yes            200mg Q.5D Take 1           [facility]

[facility]



     (PACERONE)      9-14 10-14                          tablet           Method

i



     200 MG      00:00: 23:59                          (200 mg           st



     tablet      00   :00                           total) by           



                                                  mouth 2           



                                                  (two)           



                                                  times a           



                                                  day for 30           



                                                  days.           

 

     acetaminoph      2020- Yes            650mg Q4H  20.3 mL           [facility]

[place]



     en        9-14 10-14                          (650 mg           [facility]



     (TYLENOL)      00:00: 23:59                          total) by           st



     160 mg/5 mL      00   :00                           Nasogastri           



     (5 mL)                                         c route           



     solution                                         every 4           



                                                  (four)           



                                                  hours as           



                                                  needed           



                                                  (Fever,           



                                                  mild pain           



                                                  or             



                                                  headache)           



                                                  for up to           



                                                  30 days.           

 

     traMADoL      2020- No        acute pain 25mg Q6H  0.5            [facility]

[place]



     (ULTRAM) 50       09-21                          tablets           [facility]

[place]



     mg tablet      00:00: 23:59                          (25 mg           st



               00   :00                           total) by           



                                                  Nasogastri           



                                                  c route           



                                                  every 6           



                                                  (six)           



                                                  hours as           



                                                  needed for           



                                                  moderate           



                                                  pain for           



                                                  up to 7           



                                                  days           



                                                  .acute           



                                                  pain.           

 

     sodium      2020-0 2020- No             500mL      Irrigate           [facility]

[facility]



     chloride                                with 500           [facility]

i



     0.9% for      00:00: 23:59                          mL as           st



     IRRIGATION      00   :00                           directed           



     (NS) 0.9 %                                         once for 1           



     irrigation                                         dose.           

 

     levoFLOXaci      2020-0 2020- No             500mg QD   Take 1           [facility]

[facility]



     n                                   tablet           [facility]



     (Levaquin)      00:00: 23:59                          (500 mg           st



     500 MG      00   :00                           total) by           



     tablet                                         mouth           



                                                  daily for           



                                                  8 days.           

 

     metroNIDAZO      2020-0 2020- No             500mg [id number] Take 1       

    [facility]



     LE (FlagyL)                           [id number] tablet          

 [facility]



     500 MG      00:00: 23:59                     3D   (500 mg           st



     tablet      00   :00                           total) by           



                                                  mouth 3           



                                                  (three)           



                                                  times a           



                                                  day for 8           



                                                  days.           

 

     codeine-gua      2020-0 2020- No        acute pain 5mL  Q.25D Take 5 mL    

       [facility]



     ifenesin                                by mouth 4           [facility]

[place]



     (GUAIFENESI      00:00: 23:59                          (four)           st



     N AC)      00   :00                           times a           



      mg/5                                         day as           



     mL liquid                                         needed for           



                                                  cough for           



                                                  up to 4           



                                                  days           



                                                  .acute           



                                                  pain.           

 

     Flecainide      2020-0      Yes  Amy                1 tablet           [facility]

[facility]



     Acetate      5-05           [place]                               l



               02:45:                                              [facility]



               10                                                

 

     Zantac      2020-0      Yes  Amy                1 tablet           [facility]



               5-05           [place]                at bedtime           l



               02:45:                                              [place]



               10                                                

 

     Sathish      2020-0      Yes  Amy                1 tablet           [facility]



     Aspirin      5-05           [place]                               l



               02:45:                                              [place]



               10                                                

 

     Metoprolol      2020-0      Yes  Amy                1 capsule           [facility]

[facility]



     Succinate      5-05           [place]                               l



               02:45:                                              [place]



               10                                                

 

     fluticasone      2020-0 2020- No                  QD   Inhale 1           [facility]

[place]



     furoate-rajeev      -10                          inhalation           M

ethodi



     anterol      00:00: 23:59                          s once           st



     (BREO      00   :00                           daily for           



     ELLIPTA)                                         30 days.           



     100-25                                                        



     mcg/dose                                                        



     blister                                                        



     with device                                                        



     powder for                                                        



     inhalation                                                        

 

     montelukast      2020-0 2020- No             10mg QD   Take 1           [facility]

[place]



     (SINGULAIR)                                tablet (10           M

[facility]



     10 mg      00:00: 23:59                          mg total)           st



     tablet      00   :00                           by mouth           



                                                  nightly           



                                                  for 30           



                                                  days.           

 

     atorvastati      -0 2020- No             20mg QD   Take 20 mg          

 [place]



     n (LIPITOR)                                by mouth           [facility]

[facility]



     20 MG      16:39: 00:00                          nightly.           st



     tablet      32   :00                           Default OP           



                                                  ins            

 

     fluticasone      - 2020- No                  QD   Inhale 1           [facility]

[place]



     -vilanterol                                inhalation           M

[facility]i



     (BREO      18:45: 00:00                          s once           st



     ELLIPTA)      58   :00                           daily.           



     200-25                                                        



     mcg/dose                                                        



     blister                                                        



     with device                                                        



     powder for                                                        



     inhalation                                                        

 

     albuterol      - 2020- No             2{puff} [id number] Inhale 2     

      [place]



     (PROAIR                           [id number] puffs 3           Me

[facility]



     HFA,PROVENT      18:45: 00:00                     3D   (three)           st



     IL        49   :00                           times a           



     HFA,VENTOLI                                         day.           



     N HFA) 90                                                        



     mcg/actuati                                                        



     on inhaler                                                        

 

     pantoprazol      2020- No             40mg QD   Take 1           [facility]

[place]



     e                                   tablet (40           [facility]



     (PROTONIX)      00:00: 00:00                          mg total)           s

t



     40 MG EC      00   :00                           by mouth           



     tablet                                         daily for           



                                                  30 days.           

 

     atorvastati      -0 2020- No             20mg QD   Take 1           [facility]

[facility]



     n (LIPITOR)                                tablet (20           M

[facility]



     20 MG      00:00: 00:00                          mg total)           st



     tablet      00   :00                           by mouth           



                                                  nightly           



                                                  for 30           



                                                  days.           

 

     metoprolol      0 - No             25mg Q.5D Take 1           [facility]

[place]



     tartrate                                tablet (25           [facility]

[place]



     (LOPRESSOR)      00:00: 00:00                          mg total)           

st



     25 mg      00   :00                           by mouth 2           



     tablet                                         (two)           



                                                  times a           



                                                  day for 30           



                                                  days.           

 

     nitroglycer      - 2020- No             .4mg      Place 1           [facility]

[place]



     in                                  tablet           [facility]



     (NITROSTAT)      00:00: 00:00                          (0.4 mg           st



     0.4 MG SL      00   :00                           total)           



     tablet                                         under the           



                                                  tongue           



                                                  every 5           



                                                  (five)           



                                                  minutes as           



                                                  needed for           



                                                  chest pain           



                                                  for up to           



                                                  30 days.           

 

     citalopram       No             10mg QD   Take 10 mg           

[place]



     (CeleXA) 10      07                          by mouth           [facility]

[facility]



     MG tablet      01:47: 00:00                          daily.           st



               06   :00                                          

 

     amLODIPine      2020- No             10mg QD   Take 10 mg           

[place]



     (NORVASC)                                by mouth           [facility]

[facility]



     10 mg      01:47: 00:00                          daily.           st



     tablet      04   :00                                          

 

     ranitidine      2019- No             150mg QD   Take 150           C

[facility]



     (ZANTAC)      2-06 12-06                          mg by           [facility]



     150 MG      09:25: 00:00                          mouth           [facility]



     tablet      40   :00                           nightly.           [place]

 

     ARNUITY      2019      Yes            1{puff} QD   Inhale 1           [facility]



     ELLIPTA 200      1-02                               puff by           [facility]

 -



     mcg/actuati      00:00:                               mouth via           [facility]



     on DsDv      00                                 inhaler           [facility]



                                                  daily.           

 

     olmesartan      2019      Yes            40mg QD   Take 40 mg           [facility]

[facility]



     (BENICAR)      0-26                               by mouth           [facility] 

-



     40 MG      00:00:                               daily.           [facility]



     tablet      00                                                [place]

 

     omeprazole      2019      Yes            40mg QD   Take 40 mg           [facility]

[facility]



     (PRILOSEC)      0-15                               by mouth           [facility]

 -



     40 MG      00:00:                               daily.           [facility]



     capsule      00                                                [place]

 

     doxazosin      2019      Yes            4mg  QD   Take 4 mg           [facility]



     (CARDURA) 4      0-07                               by mouth           [facility]

s -



     MG tablet      00:00:                               daily.           [facility]

l



               00                                                [place]

 

     furosemide      2019      Yes            40mg      Take 40 mg           [facility]

[facility]



     (LASIX) 40      0-07                               by mouth           [facility]

 -



     MG tablet      00:00:                               daily as           [facility]

[facility]



               00                                 needed.           [place]

 

     metoprolol      2019      Yes            50mg QD   Take 50 mg           [facility]

[facility]



     (TOPROL-XL)      0-05                               by mouth           [facility]

s -



     50 MG 24 hr      00:00:                               daily.           [facility]

[facility]



     tablet      00                                                [place]

 

     calcitriol      2018      Yes            .25ug QD   Take 0.25           [facility]

[facility]



     (ROCALTROL)      2-06                               mcg by           [facility] 

-



     0.25 MCG      09:11:                               mouth           [facility]



     capsule      14                                 daily.           [place]

 

     IRON,      2018      Yes            65mg QD   Take 65 mg           [facility]



     FERROUS      2-06                               by mouth           [facility]



     SULFATE,      09:10:                               daily .           [facility]

l



     ORAL      10                                                [place]

 

     aspirin      2018      Yes            325mg      Take 325           [facility]

t



     (SATHISH      2-06                               mg by           [facility] -



     ASPIRIN)      09:09:                               mouth.           [facility]



     325 MG      23                                                [facility]



     tablet                                                        

 

     red yeast      2018      Yes            2{tbl}      Take 2           [facility]



     rice 600 mg      2-06                               tablets by           [facility]

[facility] -



     Cap       09:09:                               mouth.           [facility]



               23                                                [place]

 

     OMEGA-3      2018      Yes                      Take by           [facility]



     FATTY      2-06                               mouth.           [facility]



     ACIDS-FISH      09:09:                                              [facility]



     OIL ORAL      23                                                [place]

 

     allopurinol      2018      Yes            300mg      Take 300           [facility]

[facility]



     (ZYLOPRIM)      2-06                               mg by           [facility]



     300 MG      09:03:                               mouth.           [facility]



     tablet      02                                                [place]

 

     olmesartan-            Yes            1{tbl} QD   Take 1           [facility]



     hydrochloro      2-22                               tablet by           [facility]

es -



     thiazide      12:04:                               mouth           [facility]



     (BENICAR      50                                 daily.           [place]



     H[facility])                                                        



     40-12.5 mg                                                        



     per tablet                                                        

 

     flecainide            Yes            50mg Q.5D Take 50 mg           [facility]

[facility]



     (TAMBOCOR)      2-22                               by mouth 2           [facility]

es -



     50 MG      12:04:                               (two)           [facility]



     tablet      50                                 times           Center



                                                  daily.           

 

     cyanocobala            Yes            1000ug QD   Take 1,000         

  [facility]



     min       2-22                               mcg by           [place] -



     (VITAMIN      12:04:                               mouth           [facility]



     B-12) 1000      50                                 daily.           [facility]



     [facility] tablet                                                        

 

     FOLIC            Yes                      Take by           [facility]



     ACID/MULTIV      2-22                               mouth.           [facility] 

-



     IT-MIN/LUTE      12:04:                                              [facility]

l



     IN (CENTRUM      50                                                Center



     SILVER                                                        



     ORAL)                                                        







Immunizations







           Ordered Immunization Filled Immunization Date       Status     Commen

ts   Source



           Name       Name                                        

 

           FLUCELVAX QUAD PF            2018-09-15 Completed             [facility]



                                 00:00:00                         [facility]

 

           Pneumococcal            2018-09-15 Completed             [facility]



           Conjugate 13-Valent            00:00:00                         [facility]

dist







Vital Signs







             Vital Name   Observation Time Observation Value Comments     Source

 

             Heart rate   2020 21:20:00 66 /min                   [facility] Marcel[facility]

 

             Respiratory rate 2020 21:20:00 18 /min                   Ev Marcelino

 

             Oxygen saturation in 2020 20:57:00 98 /min                   

Mike Marcelino



             Arterial blood by                                        



             Pulse oximetry                                        

 

             Systolic blood 2020 19:25:14 112 mm[Hg]                Macie

n [facility]



             pressure                                            

 

             Diastolic blood 2020 19:25:14 55 mm[Hg]                 Dashawn

on [facility]



             pressure                                            

 

             Body temperature 2020 19:25:14 36.44 Jacklyn                 Ev herrera [facility]

 

             Body weight  2020 05:34:00 62 kg                     Mike 

[facility]

 

             BMI          2020 05:34:00 20.18 kg/m2               Mike 

[facility]

 

             Body height  2020 01:43:00 175.3 cm                  [facility] 

[facility]

 

             Systolic blood 2019 09:11:00 149 mm[Hg]                [facility]

 

             Diastolic blood 2019 09:11:00 102 mm[Hg]                [facility]

 

             Heart rate   2019 09:10:00 62 /min                   [facility]

 

             Body temperature 2019 09:10:00 36.56 Jacklyn                 [facility]

 

             Respiratory rate 2019 09:10:00 16 /min                   [facility]

 

             Body height  2019 09:10:00 176.5 cm                  [facility]

 

             Body weight Measured 2019 09:10:00 112.628 kg                

[facility]

 

             BMI          2019 09:10:00 36.14 kg/m2               [facility]

 

             Oxygen saturation in 2019 09:10:00 97 /min                   

[facility]



             Arterial blood by                                        Medical Ce

nter



             Pulse oximetry                                        

 

             [facility]       2019 14:30:00                           [facility]

 [place]

 

             Height       2019 14:30:00                           [facility]







Procedures







                Procedure       Date / Time     Performing Clinician Source



                                Performed                       

 

                POC GLUCOSE     2020 16:02:00 Dex Howell

 

                POC GLUCOSE     2020 11:48:00 Dex Howell [facility]

 

                POC GLUCOSE     2020 09:15:00 Dex Howell [facility]

 

                POC GLUCOSE     2020 05:09:00 Dex Howell [facility]

 

                POC GLUCOSE     2020 01:18:00 Dex Howell [facility]

 

                POC GLUCOSE     2020 21:02:00 Dex Howell [facility]

 

                POC GLUCOSE     2020 16:03:00 Dex Howell [facility]

 

                POC GLUCOSE     2020 12:54:00 Dex Howell [facility]

 

                POC GLUCOSE     2020 08:27:00 Dex Howell [facility]

 

                CBC HEMOGRAM    2020 05:45:00 Christine Estrella Met

hodist

 

                PARTIAL THROMBOPLASTIN 2020 05:45:00 Regulo Ramos Ho

[place] [facility]



                TIME (PTT)                                      

 

                POC GLUCOSE     2020 04:39:00 Dex Howell [facility]

 

                POC GLUCOSE     2020 00:01:00 Dex Howell [facility]

 

                PARTIAL THROMBOPLASTIN 2020 22:15:00 Uppalapati, Tru Ho

[place] [facility]



                TIME (PTT)                                      

 

                POC GLUCOSE     2020 20:19:00 Dex Howell [facility]

 

                POC GLUCOSE     2020 15:27:00 Dex Howell [facility]

 

                PARTIAL THROMBOPLASTIN 2020 14:30:00 Dex Howell 

[facility]



                TIME (PTT)                                      

 

                POC GLUCOSE     2020 12:04:00 Dex Howell [facility]

 

                POC GLUCOSE     2020 08:46:00 Dex Howell [facility]

 

                CBC HEMOGRAM    2020 07:35:00 Christine Estrella Met

hodist

 

                PARTIAL THROMBOPLASTIN 2020 06:54:00 Regulo Ramos Ho

[place] [facility]



                TIME (PTT)                                      

 

                POC GLUCOSE     2020 04:15:00 Dex Howell [facility]

 

                POC GLUCOSE     2020 00:26:00 ShardaianDex [facility]

 

                PARTIAL THROMBOPLASTIN 2020 22:50:00 Uppalapati, Tru Ho

[place] [facility]



                TIME (PTT)                                      

 

                POC GLUCOSE     2020 20:46:00 Dex Howell [facility]

 

                POC GLUCOSE     2020 15:50:00 Dex Howell [facility]

 

                PARTIAL THROMBOPLASTIN 2020 15:15:00 Tru Mendoza [facility]



                TIME (PTT)                                      

 

                POC GLUCOSE     2020 11:35:00 Dxe Howell [facility]

 

                PARTIAL THROMBOPLASTIN 2020 07:45:00 Christine Estrella [facility]



                TIME (PTT)                                      

 

                POC GLUCOSE     2020 07:43:00 Dex Howell [facility]

 

                POC GLUCOSE     2020 04:39:00 Dex Howell [facility]

 

                BASIC METABOLIC PANEL 2020 04:30:00 Eduar Mckinley [facility]

 

                CBC WITH PLATELET AND 2020 04:30:00 Eduar Mckinley [facility]



                DIFFERENTIAL                                    

 

                MAGNESIUM LEVEL 2020 04:30:00 Eduar Mckinley Me

thodist

 

                PHOSPHORUS LEVEL 2020 04:30:00 Eduar Mckinley M

ethodist

 

                IONIZED CALCIUM 2020 04:30:00 Eduar Mckinley Me

thodist

 

                ESTIMATED GFR   2020 04:30:00 Christine Estrella Met

hodist

 

                MANUAL DIFFERENTIAL 2020 04:30:00 Christine Estrella

 [facility]

 

                POC GLUCOSE     2020 03:59:00 Dex Howell [facility]

 

                POC GLUCOSE     2020 00:25:00 Dex Howell [facility]

 

                POTASSIUM LEVEL 2020-09-10 22:22:00 Mannie Corrigan Meth

odist

 

                PARTIAL THROMBOPLASTIN 2020-09-10 22:22:00 Christine Estrella [facility]



                TIME (PTT)                                      

 

                POC GLUCOSE     2020-09-10 20:58:00 Dex Howell [facility]

 

                IONIZED CALCIUM 2020-09-10 17:30:00 Mannie Corrigan Meth

odist

 

                POC GLUCOSE     2020-09-10 16:10:00 Dex Howell [facility]

 

                PARTIAL THROMBOPLASTIN 2020-09-10 13:00:00 Dex Howell 

[facility]



                TIME (PTT)                                      

 

                POC GLUCOSE     2020-09-10 11:27:00 Dex Howell [facility]

 

                POC GLUCOSE     2020-09-10 08:07:00 Dex Howell [facility]

 

                XR CHEST 1 VW PORTABLE 2020-09-10 06:26:06 Ann Marie Rocha Fazal bustillos [facility]

 

                CBC WITH PLATELET AND 2020-09-10 05:00:00 Ann Marie Rocha Ev

ton [facility]



                DIFFERENTIAL                                    

 

                COMPREHENSIVE METABOLIC 2020-09-10 05:00:00 Ann Marie Rocha Ho

[place] [facility]



                PANEL                                           

 

                PHOSPHORUS LEVEL 2020-09-10 05:00:00 Ann Marie Rocha M

ethodist

 

                IONIZED CALCIUM 2020-09-10 05:00:00 Ann Marie Rocha Me

thodist

 

                ESTIMATED GFR   2020-09-10 05:00:00 Gloria Rochaa Sailaja Mckeon Me

thodist

 

                MAGNESIUM LEVEL 2020-09-10 05:00:00 Gloria Rochaa Sailaja Mckeon Me

thodist

 

                PARTIAL THROMBOPLASTIN 2020-09-10 05:00:00 Regulo Ramoston [facility]



                TIME (PTT)                                      

 

                MANUAL DIFFERENTIAL 2020-09-10 05:00:00 Ann Marie Rocha Sailaja lovett [facility]

 

                ARTERIAL BLOOD GAS 2020-09-10 04:17:00 Ann Marie Rocha

 [facility]

 

                POC GLUCOSE     2020-09-10 00:50:00 Dex Howell [facility]

 

                PARTIAL THROMBOPLASTIN 2020 22:40:00 Dex Howell 

[facility]



                TIME (PTT)                                      

 

                POC GLUCOSE     2020 20:19:00 Dex Howell [facility]

 

                IONIZED CALCIUM 2020 16:40:00 Jason Pandeygeovanny Gómezrachell Mckeon 

[facility]

 

                POC GLUCOSE     2020 15:45:00 Dex Howell [facility]

 

                XR CHEST 1 VW PORTABLE 2020 14:45:15 Tru Mendozaton [facility]

 

                HC CVL PICC INSERT 5 YRS 2020 14:28:50 Kristin Adrian [facility]



                OR > W/RS&I AND IMG GUID                                 

 

                HC CATH DUAL LUMEN PICC # 2020 14:28:50 Kristin Adrian

[place] [facility]



                [id number]                                          

 

                BRONCHOSCOPY    2020 12:20:56 Jose Alejandrojesus Tru Mckeon NILSA

ethodist

 

                XR CHEST 1 VW PORTABLE 2020 12:08:27 Randolph Shell

on [facility]

 

                POC GLUCOSE     2020 11:52:00 Dex Howell [facility]

 

                RESPIRATORY CULTURE 2020 11:05:00 Tru Mendoza

on [facility]

 

                GRAM STAIN      2020 11:05:00 Kelly Tru Mckeon NILSA

ethodist

 

                POC GLUCOSE     2020 09:10:00 Dex Howell

 

                PROTHROMBIN TIME WITH INR 2020 07:42:00 Tru Mendoza

 

                PARTIAL THROMBOPLASTIN 2020 07:42:00 Tru Mendozaton [facility]



                TIME (PTT)                                      

 

                POC GLUCOSE     2020 07:42:00 Dex Howell [facility]

 

                XR CHEST 1 VW PORTABLE 2020 06:17:07 Mena Pandey

 

                ARTERIAL BLOOD GAS 2020 04:34:00 Mena Pandey

on [facility]

 

                CBC HEMOGRAM    2020 04:15:00 Jose Alejandrodawnhayleysalvador Tru ASH

[facility]ist

 

                PARTIAL THROMBOPLASTIN 2020 04:15:00 Dex Howell



                TIME (PTT)                                      

 

                COMPREHENSIVE METABOLIC 2020 04:15:00 Mena Pandey



                PANEL                                           

 

                MAGNESIUM LEVEL 2020 04:15:00 Mena Pandey

 

                PHOSPHORUS LEVEL 2020 04:15:00 Mena Pandey

 

                ESTIMATED GFR   2020 04:15:00 Mena Pandey

 

                IONIZED CALCIUM 2020 04:15:00 Mena Pandey

 

                POC GLUCOSE     2020 01:03:00 Dex Howell [facility]

 

                POC GLUCOSE     2020 17:05:00 Dex Howell [facility]

 

                CBC WITH PLATELET AND 2020 17:00:00 Tru Mendoza

[place] [facility]



                DIFFERENTIAL                                    

 

                IONIZED CALCIUM 2020 17:00:00 Ev Corrigansam Mckeon Meth

odist

 

                MANUAL DIFFERENTIAL 2020 17:00:00 Tru Mendozat

on [facility]

 

                POC GLUCOSE     2020 15:51:00 Dex Howell [facility]

 

                POC GLUCOSE     2020 12:14:00 Dex Howell [facility]

 

                POC GLUCOSE     2020 08:29:00 Dex Howell [facility]

 

                PROTHROMBIN TIME WITH INR 2020 04:55:00 Clifford Hallman 

[facility]

 

                POC GLUCOSE     2020 04:15:00 Dex Howell [facility]

 

                PARTIAL THROMBOPLASTIN 2020 04:10:00 Dex Howell 

[facility]



                TIME (PTT)                                      

 

                CBC WITH PLATELET AND 2020 04:10:00 Gerard Escobar

on [facility]



                DIFFERENTIAL                    Saman          

 

                BASIC METABOLIC PANEL 2020 04:10:00 Gerard Escobar

on [facility]



                                                Saman          

 

                MAGNESIUM LEVEL 2020 04:10:00 Gerard Escobar Met

hodshanique



                                                Saman          

 

                ESTIMATED GFR   2020 04:10:00 Gerard Escobar Met

hodist



                                                Saman          

 

                IONIZED CALCIUM 2020 04:10:00 Mannie Corrigan Meth

odist

 

                MANUAL DIFFERENTIAL 2020 04:10:00 Gerard Escobar

 [facility]



                                                Saman          

 

                POC GLUCOSE     2020 20:08:00 Dex Howellto

rachell [facility]

 

                POC GLUCOSE     2020 16:08:00 Dex Howell [facility]

 

                POC GLUCOSE     2020 12:35:00 Dex Howell [facility]

 

                POC GLUCOSE     2020 09:57:00 Dex Howell [facility]

 

                POC GLUCOSE     2020 07:44:00 Dex Howell [facility]

 

                POC GLUCOSE     2020 05:43:00 Dex Howell [facility]

 

                ARTERIAL BLOOD GAS 2020 04:01:00 Ann Marie Rocha Sailaja Mckeon

 [facility]

 

                CBC HEMOGRAM    2020 03:45:00 Tru Mendoza M

ethodist

 

                PHOSPHORUS LEVEL 2020 03:45:00 Randolph Shell Met

hodist

 

                MAGNESIUM LEVEL 2020 03:45:00 PierreNell badillo 

[facility]



                                                Lenet           

 

                PARTIAL THROMBOPLASTIN 2020 03:45:00 Jadegondanahalli, Jai Houston 

[facility]



                TIME (PTT)                      Trey Flores    

 

                POTASSIUM LEVEL 2020 03:45:00 PierreNell talleyist



                                                [place]           

 

                IONIZED CALCIUM 2020 03:42:00 PierreNell talley 

[facility]



                                                [place]           

 

                POC GLUCOSE     2020 00:01:00 Dex Howell [facility]

 

                POC GLUCOSE     2020 20:33:00 Dex Howell [facility]

 

                PARTIAL THROMBOPLASTIN 2020 18:24:00 Tru Mendoza [facility]



                TIME (PTT)                                      

 

                POC GLUCOSE     2020 16:32:00 Dex Howell [facility]

 

                IONIZED CALCIUM 2020 15:20:00 Randolph Shell Meth

odist

 

                BASIC METABOLIC PANEL 2020 15:20:00 Nell Jay [facility]



                                                [place]           

 

                MAGNESIUM LEVEL 2020 15:20:00 PierreNell talley



                                                [place]           

 

                ESTIMATED GFR   2020 15:20:00 Nell Jay



                                                [place]           

 

                HC COMPLETE BLD COUNT 2020 14:23:00 Nell Jay [facility]



                W/AUTO DIFF                     [facility]           

 

                SMEAR REVIEW    2020 14:23:00 Nell Jay 

[facility]



                                                [place]           

 

                B NATRIURETIC PEPTIDE 2020 14:23:00 Sidney Ramos

on [facility]

 

                POC GLUCOSE     2020 13:21:00 Dex Howell [facility]

 

                POC GLUCOSE     2020 12:05:00 Dex Howell [facility]

 

                PARTIAL THROMBOPLASTIN 2020 10:05:00 Tru Mendoza [facility]



                TIME (PTT)                                      

 

                POC GLUCOSE     2020 09:06:00 Dex Howell [facility]

 

                TRANSFUSE RED BLOOD CELLS 2020 08:17:50 Aj Ann Marie Sailaja Mckeon 

[facility]

 

                HC COMPLETE BLD COUNT 2020 06:52:00 Nell Jay [facility]



                W/AUTO DIFF                     [facility]           

 

                SMEAR REVIEW    2020 06:52:00 Nell Jay



                                                [place]           

 

                XR CHEST 1 VW PORTABLE 2020 06:30:38 MicahriAnn Marie sauceda Fazal

[place] [facility]

 

                ARTERIAL BLOOD GAS 2020 04:40:00 Aj Ann Marie Menard Mckeon

 [facility]

 

                CBC HEMOGRAM    2020 03:15:00 Tru Mendoza

ethodist

 

                PARTIAL THROMBOPLASTIN 2020 03:15:00 Tru Mendoza [facility]



                TIME (PTT)                                      

 

                ANTI XA, UNFRACTIONATED 2020 03:15:00 Tru Mendoza [facility]

 

                COMPREHENSIVE METABOLIC 2020 03:15:00 Aj Ann Marie Sailaja tyler [facility]



                PANEL                                           

 

                MAGNESIUM LEVEL 2020 03:15:00 Aj Ann Marie Mckeon Me

thodist

 

                PHOSPHORUS LEVEL 2020 03:15:00 Aj Ann Marie Mckeon M

ethodist

 

                IONIZED CALCIUM 2020 03:15:00 MicahsailajaAnn Marie Mckeon Me

thodist

 

                ESTIMATED GFR   2020 03:15:00 Ann Marie oRcha Me

thodist

 

                MANUAL DIFFERENTIAL 2020 03:15:00 Tru Mendoza

on [facility]

 

                POC GLUCOSE     2020 01:03:00 Dex Howell [facility]

 

                HC COMPLETE BLD COUNT 2020 22:20:00 PierreNell talley

javier [facility]



                W/AUTO DIFF                     [facility]           

 

                SMEAR REVIEW    2020 22:20:00 Nell Jay 

[facility]



                                                Lenet           

 

                POC GLUCOSE     2020 20:45:00 Dex Howell [facility]

 

                ANTI XA, UNFRACTIONATED 2020 18:25:00 Tru Mendoza [facility]

 

                POTASSIUM LEVEL 2020 17:32:00 Vu Gil Meth

odist

 

                IONIZED CALCIUM 2020 17:32:00 Vu Gil Meth

odist

 

                POC GLUCOSE     2020 16:14:00 Dex Howell [facility]

 

                MRSA SCREEN CULTURE 2020 15:51:00 UcheZhane Mike

 [facility]



                                                Jolene           

 

                HC COMPLETE BLD COUNT 2020 14:10:00 PierreNell Ankit tyler [facility]



                W/AUTO DIFF                     [facility]           

 

                SMEAR REVIEW    2020 14:10:00 PierreNell 

[facility]



                                                Lenet           

 

                OCCULT BLOOD, STOOL 2020 11:09:00 Tru Mendoza [facility]

 

                POC GLUCOSE     2020 11:09:00 Dex Howell [facility]

 

                ANTI XA, UNFRACTIONATED 2020 09:45:00 Tru Mendoza [facility]

 

                AMMONIA LEVEL   2020 09:00:00 Tru Mendoza M

ethodist

 

                LACTIC ACID LEVEL 2020 09:00:00 Tru Mendoza

 [facility]

 

                POC GLUCOSE     2020 08:56:00 Dex Howell [facility]

 

                POC GLUCOSE     2020 08:18:00 Dex Howell [facility]

 

                TRANSFUSE RED BLOOD CELLS 2020 08:01:35 Aj Ann Marie Mckeon 

[facility]

 

                XR CHEST 1 VW PORTABLE 2020 06:42:15 JewelsAnn Marie sauceda Sailaja Dielh

storachell [facility]

 

                XR ABDOMEN 1 VW 2020 06:40:33 Jadegondanahalli, Jai Housto n [facility]



                                                Trey Oronau    

 

                TYPE AND SCREEN 2020 04:33:00 Ann Marie Rocha Me

thodist

 

                PREPARE RBC     2020 04:33:00 Ann Marie Rocha Me

thodist

 

                ARTERIAL BLOOD GAS 2020 03:56:00 Ann Marie Rocha

 [facility]

 

                CBC WITH PLATELET AND 2020 03:07:00 Ann Marie Rocha

[place] [facility]



                DIFFERENTIAL                                    

 

                COMPREHENSIVE METABOLIC 2020 03:07:00 Ann Marie Rocha

[place] [facility]



                PANEL                                           

 

                IONIZED CALCIUM 2020 03:07:00 Ann Marie Rocha Me

thodist

 

                MAGNESIUM LEVEL 2020 03:07:00 Ann Marie Rocha Me

thodist

 

                PHOSPHORUS LEVEL 2020 03:07:00 Ann Marie Rocha M

ethodist

 

                BILIRUBIN DIRECT 2020 03:07:00 Masoud Zhong 

[facility]

 

                ESTIMATED GFR   2020 03:07:00 Masoud Zhong M

ethodist

 

                MANUAL DIFFERENTIAL 2020 03:07:00 Masoud Zhong

on [facility]

 

                POC GLUCOSE     2020 02:13:00 Dex Howell [facility]

 

                ANTI XA, UNFRACTIONATED 2020 02:04:00 Tru Mendoza [facility]

 

                POC GLUCOSE     2020 22:44:00 Dex Howell [facility]

 

                ARTERIAL BLOOD GAS 2020 21:54:00 Ann Marie Rocha

 [facility]

 

                XR CHEST 1 VW PORTABLE 2020 21:33:04 Ann Marie Rocha Fazal

[place] [facility]

 

                BRONCHOSCOPY    2020 21:15:18 Chayo Calloway

 [facility]

 

                INTUBATION      2020 21:13:47 Chayo Calloway

 [facility]

 

                ANTI XA, UNFRACTIONATED 2020 18:30:00 Tru Mendoza [facility]

 

                POTASSIUM LEVEL 2020 16:48:00 Randolph Shell   [facility]

odist

 

                IONIZED CALCIUM 2020 16:48:00 Suleman [facility]

odist

 

                POC GLUCOSE     2020 16:04:00 Dex Howell [facility]

 

                POC GLUCOSE     2020 11:51:00 Dex Howell [facility]

 

                ANTI XA, UNFRACTIONATED 2020 10:10:00 Tru Mendoza

 

                PROTHROMBIN TIME WITH INR 2020 10:10:00 Tru Mendoza

 

                PARTIAL THROMBOPLASTIN 2020 10:10:00 Tru Mendoza [facility]



                TIME (PTT)                                      

 

                POC GLUCOSE     2020 07:48:00 Dex Howell [facility]

 

                POC GLUCOSE     2020 06:03:00 Dex Howell

 

                XR CHEST 1 VW PORTABLE 2020 05:37:21 Lagrikomal, Ayesa Sailaja Fazal

[place] [facility]

 

                POC GLUCOSE     2020 03:52:00 Dex Howell [facility]

 

                HC COMPLETE BLD COUNT 2020 03:49:00 Micahria, Ayesa Sailaja Ev herrera [facility]



                W/AUTO DIFF                                     

 

                COMPREHENSIVE METABOLIC 2020 03:49:00 Lagria, Ayesa Sailaja Ho

[place] [facility]



                PANEL                                           

 

                IONIZED CALCIUM 2020 03:49:00 Micahria, Ayesa [facility] Me

thodist

 

                MAGNESIUM LEVEL 2020 03:49:00 Lagria, Ayesa Sailaja [facility] Me

thodist

 

                PHOSPHORUS LEVEL 2020 03:49:00 Micahria, Gloriaa Sailaja [facility]

ethodist

 

                BILIRUBIN DIRECT 2020 03:49:00 Masoud Zhong

 

                ESTIMATED GFR   2020 03:49:00 Masoud Zhong M

ethodist

 

                SMEAR REVIEW    2020 03:49:00 Masoud Zhong M

ethodist

 

                POC GLUCOSE     2020 02:22:00 Dex Howell [facility]

 

                POC GLUCOSE     2020 22:48:00 Dex Howell [facility]

 

                POC GLUCOSE     2020 20:21:00 Dex Howell [facility]

 

                POC GLUCOSE     2020 18:36:00 Dex Howell [facility]

 

                IONIZED CALCIUM 2020 18:35:00 Jadegondanahalli, Jai Housto n [facility]



                                                Trey Flores    

 

                BASIC METABOLIC PANEL 2020 15:40:00 Tammy Mendozaa Fazal

[place] [facility]

 

                CBC WITH PLATELET AND 2020 15:40:00 Tammy Mendozaa Fazal

[place] [facility]



                DIFFERENTIAL                                    

 

                ESTIMATED GFR   2020 15:40:00 Tru Mendoza

ethodist

 

                HEPARIN PF4 ANTIBODY 2020 15:40:00 Tru Mendoza [facility]



                (IGG)                                           

 

                MANUAL DIFFERENTIAL 2020 15:40:00 Tru Mendoza

on [facility]

 

                ARTERIAL BLOOD GAS 2020 15:30:00 Tru Mendoza [facility]

 

                POC GLUCOSE     2020 15:19:00 Dex Howell [facility]

 

                BRONCHOSCOPY    2020 15:18:37 Tru Mendoza

ethodist

 

                POC GLUCOSE     2020 11:34:00 Dex Howell [facility]

 

                POC GLUCOSE     2020 09:30:00 Dex Howell [facility]

 

                RESPIRATORY CULTURE 2020 09:11:00 Tru Mendoza

on [facility]

 

                GRAM STAIN      2020 09:11:00 Tru Mendoza

ethodist

 

                RESPIRATORY CULTURE 2020 09:10:00 Tru Mendoza

on [facility]

 

                GRAM STAIN      2020 09:10:00 Tru Mendoza

ethodist

 

                XR CHEST 1 VW PORTABLE 2020 06:41:33 Ann Marie Rochau

[place] [facility]

 

                POC GLUCOSE     2020 06:27:00 Dex Howell [facility]

 

                POC GLUCOSE     2020 03:43:00 Dex Howell [facility]

 

                HEPATIC FUNCTION PANEL 2020 03:40:00 Masoud Zhong Ho

[place] [facility]

 

                PROTHROMBIN TIME WITH INR 2020 03:40:00 Masoud Zhong

 [place] 

[facility]

 

                CBC WITH PLATELET AND 2020 03:40:00 Lagria, Ayesa Sailaja Hous

[place] [facility]



                DIFFERENTIAL                                    

 

                COMPREHENSIVE METABOLIC 2020 03:40:00 Lagria, Ayesa Sailaja Ho

[place] [facility]



                PANEL                                           

 

                IONIZED CALCIUM 2020 03:40:00 Lagria, Ayesa Sailaja Mckeon Me

thodist

 

                MAGNESIUM LEVEL 2020 03:40:00 Lagria, Ayesa Sailaja Mckeon Me

thodist

 

                PHOSPHORUS LEVEL 2020 03:40:00 Lagria, Ayesa Sailaja Caliente M

ethodist

 

                ESTIMATED GFR   2020 03:40:00 Lagria, Ayesa Sailaja Mckeon Me

thodist

 

                ARTERIAL BLOOD GAS 2020 03:40:00 Lagria, Ayesa Sailaja Mckeon

 Taoist

 

                MANUAL DIFFERENTIAL 2020 03:40:00 Lagria, Ayesa Sailaja Evto

n Taoist

 

                POC GLUCOSE     2020 02:13:00 Dex Howell [facility]

 

                POC GLUCOSE     2020 22:22:00 Dex Howell [facility]

 

                POC GLUCOSE     2020 19:26:00 Dex Howell [facility]

 

                POTASSIUM LEVEL 2020 18:40:00 MorromicahMirza 

T[facility]ist

 

                POC GLUCOSE     2020 17:07:00 Dex Howell [facility]

 

                POC GLUCOSE     2020 15:16:00 Dex Howell [facility]

 

                MRI BRAIN WO CONTRAST 2020 15:05:28 Tru Mendoza [facility]

 

                POC GLUCOSE     2020 13:17:00 Dex Howell [facility]

 

                POC GLUCOSE     2020 12:09:00 Dex Howell [facility]

 

                HEPATIC FUNCTION PANEL 2020 10:43:00 Masoud Zhong

 

                PHOSPHORUS LEVEL 2020 10:43:00 Masoud Zhong 

[facility]

 

                POC GLUCOSE     2020 10:17:00 Dex Howell [facility]

 

                POC GLUCOSE     2020 09:26:00 Dex Howell [facility]

 

                VENOUS BLOOD GAS 2020 09:21:00 Tru Mendoza 

[facility]

 

                XR CHEST 1 VW PORTABLE 2020 08:16:25 Blew, Mizra andres [facility]

 

                POC GLUCOSE     2020 08:08:00 Dex Howell [facility]

 

                POC GLUCOSE     2020 04:21:00 Dex Howell [facility]

 

                ARTERIAL BLOOD GAS 2020 04:20:00 Chayo Calloway [facility]

 

                BASIC METABOLIC PANEL 2020 04:20:00 Blew, Mirza Marcelino

 

                CBC WITH PLATELET AND 2020 04:20:00 BlewMirza



                DIFFERENTIAL                                    

 

                MAGNESIUM LEVEL 2020 04:20:00 BlewMirza

 

                PHOSPHORUS LEVEL 2020 04:20:00 BlewMirza

 

                IONIZED CALCIUM 2020 04:20:00 MorrowMirza

 

                ESTIMATED GFR   2020 04:20:00 BlewMirza

 

                MANUAL DIFFERENTIAL 2020 04:20:00 MorrowMirza

 

                POC GLUCOSE     2020 02:03:00 Dex Howellto

rachell Taoist

 

                POC GLUCOSE     2020 22:04:00 Dex Howell [facility]

 

                POC GLUCOSE     2020 18:10:00 Dex Howellto

rachell Taoist

 

                POC GLUCOSE     2020 14:59:00 Dex Howell [facility]

 

                BASIC METABOLIC PANEL 2020 14:46:00 Tru Mendoza [facility]

 

                MAGNESIUM LEVEL 2020 14:46:00 Tru Mendoza

ethodist

 

                ESTIMATED GFR   2020 14:46:00 Tur Mendoza

ethodist

 

                ECG 12-LEAD     2020 09:53:49 Tru Mendoza

ethodist

 

                POC GLUCOSE     2020 09:23:00 Dex Howell [facility]

 

                VENOUS BLOOD GAS 2020 09:18:00 Tru Mendoza 

[facility]

 

                XR CHEST 1 VW PORTABLE 2020 06:32:31 Florentino Del Real [facility]



                                                Fadhil          

 

                CBC WITH PLATELET AND 2020 04:34:00 Florentino Del Real

on Taoist



                DIFFERENTIAL                    Fadhil          

 

                COMPREHENSIVE METABOLIC 2020 04:34:00 Florentino Del Real [facility]



                PANEL                           Fadh[place]          

 

                PROTHROMBIN TIME WITH INR 2020 04:34:00 Florentino Del Real [facility]



                                                Fadhil          

 

                MAGNESIUM LEVEL 2020 04:34:00 Chayo Calloway

 Taoist

 

                PHOSPHORUS LEVEL 2020 04:34:00 Chayo Calloway [facility]

 

                ESTIMATED GFR   2020 04:34:00 Chayo Claloway

 Taoist

 

                MANUAL DIFFERENTIAL 2020 04:34:00 Chayo Calloway [facility]

 

                ARTERIAL BLOOD GAS 2020 04:28:00 Chayo Calloway [facility]

 

                IONIZED CALCIUM 2020 04:28:00 Gerard Escobar Met

katy Davison          

 

                POC GLUCOSE     2020 04:26:00 Dex Howell [facility]

 

                POC GLUCOSE     2020 00:08:00 Dex Howell [facility]

 

                IONIZED CALCIUM 2020 22:04:00 Jadegondanahalli, Jai Housto n Taoist



                                                Trey Babu    

 

                POTASSIUM LEVEL 2020 22:04:00 Jadegondanahalli, Jai Housto n Taoist



                                                Trey Babu    

 

                POC GLUCOSE     2020 21:09:00 Dex Howell [facility]

 

                ECG 12-LEAD     2020 17:39:41 Christine Estrella SCora Mckeon Met

hodist

 

                POC GLUCOSE     2020 16:44:00 Dex Howell [facility]

 

                BASIC METABOLIC PANEL 2020 15:29:00 Florentino Del Real

on Taoist



                                                Fadhil          

 

                MAGNESIUM LEVEL 2020 15:29:00 Florentino Del Real Met

hodist



                                                Fadhil          

 

                PHOSPHORUS LEVEL 2020 15:29:00 Florentino Del Real Me

thodist



                                                Fadhil          

 

                ESTIMATED GFR   2020 15:29:00 Florentino Del Real Met

hodist



                                                Fadhil          

 

                US ABDOMEN COMPLETE 2020 09:56:21 Masoud Zhong

on Taoist

 

                POTASSIUM LEVEL 2020 08:54:00 Jadegondanahalli, Jai Housto n Taoist



                                                Trey Babu    

 

                IONIZED CALCIUM 2020 08:54:00 Jadegondanahalli, Jai Housto n Taoist



                                                Trey Babu    

 

                TTE COMPLETE, WO 2020 08:30:00 Chayo Calloway [facility]



                CONTRAST, W DOPPLER                                 



                ([id number])                                         

 

                POC GLUCOSE     2020 08:18:00 Dex Howell [facility]

 

                XR CHEST 1 VW PORTABLE 2020 06:13:49 Florentino Del Real [facility]



                                                Fadhil          

 

                CBC WITH PLATELET AND 2020 04:41:00 Florentino Del Real

on Taoist



                DIFFERENTIAL                    Fadhil          

 

                COMPREHENSIVE METABOLIC 2020 04:41:00 Florentino Del Real [facility]



                PANEL                           Fadhil          

 

                PROTHROMBIN TIME WITH INR 2020 04:41:00 Florentino Del Real Tao[facility]



                                                Fadhil          

 

                MAGNESIUM LEVEL 2020 04:41:00 Chayo Calloway

 Taoist

 

                PHOSPHORUS LEVEL 2020 04:41:00 Chayo Callowaychan lovett Taoist

 

                ARTERIAL BLOOD GAS 2020 04:41:00 Chayo Calloway Ev herrera Tao[facility]

 

                ESTIMATED GFR   2020 04:41:00 Florentino Del Real Met

hodist



                                                Fadhil          

 

                TROPONIN        2020 04:41:00 Chayo Calloway

 Taoist

 

                MANUAL DIFFERENTIAL 2020 04:41:00 Florentino Del Real

 Taoist



                                                Fadhil          

 

                POC GLUCOSE     2020 04:37:00 Dex Howell [facility]

 

                POC GLUCOSE     2020 00:40:00 Dex Howell [facility]

 

                TROPONIN        2020 00:22:00 Chayo Calloway [place]

 [facility]

 

                CBC WITH PLATELET AND 2020 00:22:00 Chayo Calloway

[place] [facility]



                DIFFERENTIAL                                    

 

                COMPREHENSIVE METABOLIC 2020 00:22:00 Chayo Calloway

 [place] 

[facility]



                PANEL                                           

 

                MAGNESIUM LEVEL 2020 00:22:00 Chayo Calloway [place]

 Tao[facility]

 

                PHOSPHORUS LEVEL 2020 00:22:00 Chayo Callowaychan lovett [facility]

 

                ESTIMATED GFR   2020 00:22:00 Chayo Calloway Mike

 Taoist

 

                MANUAL DIFFERENTIAL 2020 00:22:00 Chayo Calloway Fazal

[place] [facility]

 

                POC GLUCOSE     2020 22:07:00 Dex Howell [facility]

 

                TROPONIN        2020 21:07:00 Chayo Calloway Mike

 Taoist

 

                POC GLUCOSE     2020 21:05:00 Dex Howell [facility]

 

                POC GLUCOSE     2020 16:41:00 Dex Howell [facility]

 

                XR CHEST 1 VW PORTABLE 2020 15:54:03 Elli Chayo Perrychan [facility] 

[facility]

 

                HEART/LUNG RESUSCITATION 2020 15:52:26 Maj Ellichristina Bella Marcelino



                (CPR)                                           

 

                ECG 12-LEAD     2020 15:48:10 Chayo Callowaychan Mckeon

 Taoist

 

                COMPREHENSIVE METABOLIC 2020 15:38:00 Chayo Calloway

 Mike 

T[facility]ist



                PANEL                                           

 

                ESTIMATED GFR   2020 15:38:00 Chayo Calloway Mike

 Taoist

 

                THYROID STIMULATING 2020 15:38:00 Chayo Callowaychan bustillos Taoist



                HORMONE                                         

 

                BILIRUBIN DIRECT 2020 15:38:00 Chayo Calloway Wilmer lovett Tao[facility]

 

                MAGNESIUM LEVEL 2020 15:38:00 Chayo Callowaychan Mckeon

 Taoist

 

                PHOSPHORUS LEVEL 2020 15:38:00 Chayo Calloway Wilmer lovett [facility]

 

                ARTERIAL BLOOD GAS 2020 15:37:00 Chayo Calloway Wilmer herrera Tao[facility]

 

                CBC WITH PLATELET AND 2020 15:35:00 Chayo Calloway Wilmer andres Taoist



                DIFFERENTIAL                                    

 

                MANUAL DIFFERENTIAL 2020 15:35:00 Chayo Callowaychan bustillos Taoist

 

                POC GLUCOSE     2020 11:31:00 Dex Howell [facility]

 

                ARTERIAL BLOOD GAS 2020 10:39:00 Chayo Callowaychan herrera [facility]

 

                POC GLUCOSE     2020 08:17:00 Dex Howellto

rachell Tao[facility]

 

                XR CHEST 1 VW PORTABLE 2020 06:59:26 Florentino Del Real [facility]



                                                Fadhil          

 

                PHOSPHORUS LEVEL 2020 04:09:00 Eduar Mckinley M

ethodist

 

                CBC WITH PLATELET AND 2020 04:09:00 Florentino Del Real

on Taoist



                DIFFERENTIAL                    Fadhil          

 

                COMPREHENSIVE METABOLIC 2020 04:09:00 Florentino Del Real [facility]



                PANEL                           Fadhil          

 

                MAGNESIUM LEVEL 2020 04:09:00 Florentino Del Real Met

hodist



                                                Fadhil          

 

                PROTHROMBIN TIME WITH INR 2020 04:09:00 Florentino Del Real [facility]



                                                Fadhil          

 

                LIPASE LEVEL    2020 04:09:00 Florentino Del Real Met

hodist



                                                Fadhil          

 

                ESTIMATED GFR   2020 04:09:00 Eduar Mciknley Me

thodist

 

                IONIZED CALCIUM 2020 04:09:00 Eduar Mckinley Me

thodist

 

                LACTIC ACID LEVEL 2020 04:09:00 Eduar Mckinley 

T[facility]ist

 

                MANUAL DIFFERENTIAL 2020 04:09:00 Eduar Mckinley [facility]

 

                ARTERIAL BLOOD GAS 2020 04:08:00 Eduar Mckinley

 Tao[facility]

 

                POC GLUCOSE     2020 04:08:00 Dex Howell [facility]

 

                POC GLUCOSE     2020 00:16:00 Dex Howell [facility]

 

                LACTIC ACID LEVEL 2020 22:05:00 Eduar Mckinley 

T[facility]ist

 

                POC GLUCOSE     2020 20:53:00 Dex Howell [facility]

 

                LACTIC ACID LEVEL 2020 18:56:00 Florentino Del Real

ethodist



                                                Fadhil          

 

                BASIC METABOLIC PANEL 2020 18:56:00 Florentino Del Real

on Taoist



                                                Fadhil          

 

                HEMOGLOBIN & HEMATOCRIT 2020 18:56:00 Florentino Del Real [facility]



                                                Fadhil          

 

                ESTIMATED GFR   2020 18:56:00 Florentino Del Real Met

hodist



                                                Fadhil          

 

                VENOUS BLOOD GAS 2020 18:45:00 Florentino Del Real Me

thodist



                                                Fadhil          

 

                POC GLUCOSE     2020 18:41:00 Dex Howell [facility]

 

                XR ABDOMEN 1 VW PORTABLE 2020 15:14:46 Masoud Zhong 

Taoist

 

                LACTIC ACID LEVEL 2020 12:38:00 Florentino Del Real M

ethodist



                                                Fadhil          

 

                BASIC METABOLIC PANEL 2020 12:38:00 Florentino Del Real

on Taoist



                                                Fadhil          

 

                HEMOGLOBIN & HEMATOCRIT 2020 12:38:00 Florentino Del Real Fazal bustillos Taoist



                                                Fadhil          

 

                ESTIMATED GFR   2020 12:38:00 Florentino Del Real Met

hodist



                                                Fadhil          

 

                VENOUS BLOOD GAS 2020 12:35:00 Florentino Del Real Me

thodist



                                                Fadhil          

 

                SURGICAL PATHOLOGY 2020 12:14:00 Dex Howell [facility]



                REQUEST                                         

 

                EGD WITH BIOPSY 2020 11:35:00 ZhongMasoud pandya M

ethodist

 

                VENOUS BLOOD GAS 2020 10:00:00 Florentino Del Real Me

thodist



                                                Fadhil          

 

                LACTIC ACID LEVEL, SEPSIS 2020 08:25:00 Netta Boyd [facility]



                - NOW AND REPEAT 2X EVERY                                 



                3 HOURS                                         

 

                AMMONIA LEVEL   2020 08:25:00 Kimjuanitawi Florentino Mckeon Met

[facility]          

 

                POC GLUCOSE     2020 08:06:00 Dex Howell [facility]

 

                SD INSERT       2020 07:57:17 Eduar Mckinley Me

thodist



                CATH,ART,PERCUT,SHORTTERM                                 

 

                BASIC METABOLIC PANEL 2020 06:27:00 Eduar Mckinley [facility]

 

                CBC WITH PLATELET AND 2020 06:27:00 Eduar Mckinley [facility]



                DIFFERENTIAL                                    

 

                MAGNESIUM LEVEL 2020 06:27:00 Eduar Mckinley Me

thodist

 

                PHOSPHORUS LEVEL 2020 06:27:00 Eduar Mckinley 

ethodist

 

                IONIZED CALCIUM 2020 06:27:00 Eduar Mckinley Me

thodist

 

                ESTIMATED GFR   2020 06:27:00 Eduar Mckinley Me

thodist

 

                HEPATIC FUNCTION PANEL 2020 06:27:00 Eduar Mckinley [facility]

 

                MANUAL DIFFERENTIAL 2020 06:27:00 Eduar Mckinley [facility]

 

                SPUTUM CULTURE  2020 05:34:00 Eduar Mckinley Me

thodist

 

                GRAM STAIN      2020 05:34:00 Eduar Mckinley Me

thodist

 

                ARTERIAL BLOOD GAS 2020 04:35:00 Eduar Mckinley

 

                URINE CULTURE   2020 03:24:00 Nell Jay



                                                [place]           

 

                COVID-19 QUALITATIVE PCR 2020 03:05:00 Netta Boyd

 

                URINALYSIS SCREEN AND 2020 03:05:00 Nell Jay



                MICROSCOPY, WITH REFLEX                 [facility]           



                TO CULTURE                                      

 

                CONSULT TO SEPSIS 2020 02:55:55 Nell Jay



                RESPONSE TEAM                   [facility]           

 

                LACTIC ACID LEVEL, SEPSIS 2020 01:59:00 Netta Boyd



                - NOW AND REPEAT 2X EVERY                                 



                3 HOURS                                         

 

                BLOOD CULTURE, AEROBIC & 2020 01:54:00 Netta Boyd



                ANAEROBIC                                       

 

                TYPE AND SCREEN 2020 01:54:00 Netta Boyd

 

                CBC WITH PLATELET AND 2020 01:54:00 Netta Boyd



                DIFFERENTIAL                                    

 

                PROTHROMBIN TIME WITH INR 2020 01:54:00 Netta Boyd

 

                PARTIAL THROMBOPLASTIN 2020 01:54:00 Netta Boyd

on Taoist



                TIME (PTT)                                      

 

                LACTIC ACID LEVEL, SEPSIS 2020 01:54:00 Netta Boyd



                - NOW AND REPEAT 2X EVERY                                 



                3 HOURS                                         

 

                COMPREHENSIVE METABOLIC 2020 01:54:00 Netta Boyd [facility]



                PANEL                                           

 

                LIPASE LEVEL    2020 01:54:00 Netta Boyd Meth

odist

 

                ESTIMATED GFR   2020 01:54:00 Netta Boyd

odshanique

 

                ALCOHOL LEVEL, BLOOD 2020 01:54:00 Netta Boyd

 

                MANUAL DIFFERENTIAL 2020 01:54:00 Netta Boyd

 

                PREPARE RBC     2020 01:54:00 Netta Boyd Meth

odshanique

 

                XR CHEST 1 VW   2020 01:45:17 Netta Boyd Meth

odist

 

                SD CRITICAL CARE, E/M 2020 01:37:13 Netta Boyd

n Taoist



                30-74 MINUTES                                   

 

                INTUBATION      2020 01:37:13 Netta Boyd Meth

odist

 

                HC CVL NON-TUNNELED 2020 01:37:13 Netta Boyd



                INSERT 5YRS OR >                                 

 

                ECG ED PRELIMINARY 2020 01:37:13 Netta Boyd

ethodist



                INTERPRETATION                                  

 

                ARTERIAL BLOOD GAS 2020 01:15:00 Netta Boyd

ethodist

 

                BLOOD CULTURE, AEROBIC & 2020 01:10:00 Netta Boyd

storachell Marcelino



                ANAEROBIC                                       

 

                ECG 12-LEAD     2020 00:54:56 Netta Boyd Meth

odist

 

                LACTIC ACID LEVEL 2020 08:51:00 Dex Howell [facility]

 

                LACTIC ACID LEVEL 2020 17:05:00 Mofor, Rosaycemilia Tamez

on Taoist

 

                LACTIC ACID LEVEL 2020 13:43:00 Mofor, Rosayce Nailala Evt

on Taoist

 

                CT ANGIOGRAM ABDOMEN 2020 12:56:45 Mofor, Pk tyler [facility]ist



                PELVIS W AND OR WO                                 



                CONTRAST                                        

 

                ENTERIC VIRAL PANEL 2020 11:43:00 Dex Howell [facility]

 

                COMPREHENSIVE METABOLIC 2020 09:44:00 Mofor, Pk Mckeon 

Taoist



                PANEL                                           

 

                ESTIMATED GFR   2020 09:44:00 Mofor, Loyce Josef Mckeon

 Taoist

 

                LACTIC ACID LEVEL 2020 08:50:00 Dex Howell [facility]

 

                TTE COMPLETE, WO 2020 07:20:00 Dex Howell

on Taoist



                CONTRAST, W DOPPLER                                 



                ([id number])                                         

 

                LACTIC ACID LEVEL, SEPSIS 2020 01:29:00 Bari Ramos



                - NOW AND REPEAT 2X EVERY                 B.              



                3 HOURS                                         

 

                XR CHEST 1 VW PORTABLE 2020 00:59:54 Best Ramos              

 

                ECG 12-LEAD     2020 00:38:33 Best Ramos              

 

                CT ABDOMEN PELVIS W 2020 21:56:42 Best Ramos



                CONTRAST                        B.              

 

                SD CRITICAL CARE, E/M 2020 21:30:02 Best Ramos



                30-74 MINUTES                   B.              

 

                ECG ED PRELIMINARY 2020 21:30:02 Best Ramos

on Taoist



                INTERPRETATION                  B.              

 

                XR CHEST 1 VW PORTABLE 2020 20:49:04 Best Ramos              

 

                ECG 12-LEAD     2020 20:45:37 Best Ramos              

 

                HC COMPLETE BLD COUNT 2020 20:12:00 Best Ramos



                W/AUTO DIFF                     B.              

 

                COMPREHENSIVE METABOLIC 2020 20:12:00 Best Ramos



                PANEL                           B.              

 

                LACTIC ACID LEVEL, SEPSIS 2020 20:12:00 Bari Ramos



                - NOW AND REPEAT 2X EVERY                 B.              



                3 HOURS                                         

 

                LIPASE LEVEL    2020 20:12:00 Best Ramos              

 

                ESTIMATED GFR   2020 20:12:00 Best Ramos              

 

                SMEAR REVIEW    2020 20:12:00 Best Ramos              

 

                COVID-19 QUALITATIVE PCR 2020 20:09:00 Best Ramos              

 

                BASIC METABOLIC PANEL (7) 2020 09:51:00 Quoc Barbosa [facility]

 

                HEPATIC FUNCTION PANEL 2020 09:51:00 Quoc Barbosa

[facility]

 

                ALPHA FETOPROTEIN (AFP), 2020 09:51:00 Quoc Barbosa

 [facility]



                TUMOR MARKER                                    [facility]

 

                HEPATITIS B PCR, 2020 09:51:00 Quoc Barbosa [facility]

 

                CBC W/PLT COUNT & AUTO 2020 09:51:00 Quoc Barbosa

[facility]

 

                MR ABDOMEN WITH & WITHOUT 2020 09:12:00 Quoc Barbosa [facility]

 

                POCT-CREATININE 2020 08:29:00 Quoc Barbosa [facility]

 

                CV LEFT HEART CATH LV 2020 15:25:25 Christine Estrella

on Taoist



                GRAM WITH CORS                                  

 

                MAGNESIUM LEVEL 2020 08:50:00 Justyn Carranza Meth

odist



                                                Jonas         

 

                TROPONIN        2020 21:40:00 Ayleen Clifford Mike Mitchell

thodist Thompson            

 

                TROPONIN        2020 18:41:00 Ayleen Clifford Mckeon Me

andrey Thompson            

 

                XR RIBS W PA CHEST LEFT 2020 16:45:01 Clifford Abbasi            

 

                HC COMPLETE BLD COUNT 2020 15:43:00 Clifford Abbasi [facility]



                W/AUTO DIFF                     Jay            

 

                COMPREHENSIVE METABOLIC 2020 15:43:00 Clifford Abbasi [facility]



                PANEL                           Jay            

 

                TROPONIN        2020 15:43:00 Ayleen Clifford [place] Me

andrey Thompson            

 

                B NATRIURETIC PEPTIDE 2020 15:43:00 Clifford Abbasi            

 

                ESTIMATED GFR   2020 15:43:00 Ayleen Clifford Mckeon Me

andrey Thompson            

 

                ECG ED PRELIMINARY 2020 15:33:00 Clifford Abbasi

 Taoist



                INTERPRETATION                  Jay            

 

                ECG 12-LEAD     2020 15:30:28 Clifford Abbasi Me

andrey Thompson            

 

                CT ANGIOGRAM PE CHEST 2020 13:22:49 Justyn Carranza

n Chari Mcdaniel         

 

                US ABDOMEN COMPLETE 2020 09:30:00 Quoc Barbosa [facility]

 

                TROPONIN        2020 11:27:00 Ann Marie Rocha Me

thodist

 

                TTE COMPLETE, WO 2020 10:11:38 Keyana Garcia

 Taoist



                CONTRAST, W JOSHUA Newsome       



                ([id number])                                         

 

                TROPONIN        2020 05:48:00 Ann Marie Rocha Me

thodist

 

                LIPID PANEL     2020 05:48:00 Keyana Garcia Mike 

Taoist



                                                Mercedez       

 

                TROPONIN        2020 03:35:00 Ann Marie Rocha Me

thodist

 

                BASIC METABOLIC PANEL 2020 03:35:00 Ann Marie Rocha

ton Taoist

 

                IONIZED CALCIUM 2020 03:35:00 Ann Marie Rocha [place] [facility]

[facility]

 

                MAGNESIUM LEVEL 2020 03:35:00 Ann Marie Rocha [place] [facility]

thodist

 

                PHOSPHORUS LEVEL 2020 03:35:00 Ann Marie Rocha [place] [facility]

ethodist

 

                ESTIMATED GFR   2020 03:35:00 Ann Marie Rocha [place] [facility]

thodist

 

                ECG 12-LEAD     2020 03:29:25 Ann Marie Rocha [place] [facility]

thodist

 

                TROPONIN        2020 22:19:00 Jamar Cooper [facility]



                                                [place]             

 

                TROPONIN        2020 19:41:00 Jamar Cooper [facility]



                                                [place]             

 

                XR CHEST 2 VW   2020 18:37:26 Bruce Ljrobyn [facility]

odist

 

                ECG ED PRELIMINARY 2020 18:13:39 BruceLj felipe M

ethodist



                INTERPRETATION                                  

 

                HC COMPLETE BLD COUNT 2020 18:08:00 Jamar Cooper



                W/AUTO DIFF                     [place]             

 

                COMPREHENSIVE METABOLIC 2020 18:08:00 Yefri Cooper



                PANEL                           [place]             

 

                TROPONIN        2020 18:08:00 Jamar Cooper



                                                [place]             

 

                B NATRIURETIC PEPTIDE 2020 18:08:00 Jamar Cooper



                                                [place]             

 

                ESTIMATED GFR   2020 18:08:00 Jamar Cooper



                                                [place]             

 

                LIPASE LEVEL    2020 18:08:00 Lj Barrera Meth

odist

 

                ECG 12-LEAD     2020 17:05:18 Jamar Cooper             

 

                BASIC METABOLIC PANEL (7) 2019 10:06:00 Quoc Barbosa [facility]

 

                HEPATIC FUNCTION PANEL 2019 10:06:00 Quoc Barbosa

[facility]

 

                PROTHROMBIN TIME/INR 2019 10:06:00 [facility]oc Barbosa [facility]

 [facility]

 

                ALPHA FETOPROTEIN (AFP), 2019 10:06:00 Quoc Barbosa [facility]



                TUMOR MARKER                                    [facility]

 

                HEPATITIS B SURFACE 2019 10:06:00 Quoc Barbosa [facility] 

[facility]



                ANTIGEN                                         [facility]

 

                HEPATITIS B E ANTIGEN 2019 10:06:00 Quoc Barbosa [facility]

[facility]

 

                HEPATITIS B E ANTIBODY 2019 10:06:00 Quoc Barbosa

[facility]

 

                HEPATITIS B PCR, 2019 10:06:00 Quoc Barbosa [facility]

 

                HBSAG CONFIRMATION 2019 10:06:00 Quoc Barbosa [facility]

 

                CBC W/PLT COUNT & AUTO 2019 10:06:00 Quoc Barbosa

[facility]







Plan of Care







             Planned Activity Planned Date Details      Comments     Source

 

             Future Scheduled 2020   INFLUENZA VACCINE (#1)              C

[facility] -



             Test         00:00:00     [code = INFLUENZA              Medical Ce

nter



                                       VACCINE (#1)]              

 

             Future Scheduled 2020   INFLUENZA VACCINE              [place]

n [facility]



             Test         00:00:00     [code = INFLUENZA              



                                       VACCINE]                  

 

             Future Scheduled 2019-09-15   65+ PNEUMOCOCCAL              [place]

 [facility]



             Test         00:00:00     VACCINE (2 of 2 -              



                                       PPSV23) [code = 65+              



                                       PNEUMOCOCCAL VACCINE              



                                       (2 of 2 - PPSV23)]              

 

             Future Scheduled 2018   MEDICARE ANNUAL              [facility]

[facility] -



             Test         00:00:00     WELLNESS (YEAR 2 or              Medical 

Center



                                       FIRST YEAR if no IPPE)              



                                       [code = MEDICARE              



                                       ANNUAL WELLNESS (YEAR              



                                       2 or FIRST YEAR if no              



                                       IPPE)]                    

 

             Future Scheduled 2012   PNEUMOCOCCAL 65+              [facility]



             Test         00:00:00     LOW/MEDIUM RISK (1 of              Medica

 Center



                                       2 - PCV13) [code =              



                                       PNEUMOCOCCAL 65+              



                                       LOW/MEDIUM RISK (1 of              



                                       2 - PCV13)]               

 

             Future Scheduled 1997   COLONOSCOPY SCREENING              Ho

nayeli Tao[facility]



             Test         00:00:00     [code = COLONOSCOPY              



                                       SCREENING]                

 

             Future Scheduled 1997   SHINGLES VACCINES (#1)              H

dmitry Tao[facility]



             Test         00:00:00     [code = SHINGLES              



                                       VACCINES (#1)]              

 

             Future Scheduled 1947   Screening for              [facility]

es -



             Test         00:00:00     malignant neoplasm of              Medica

l Center



                                       colon (procedure)              



                                       [code = 691620566]              







Encounters







        Start   End     Encounter Admission Attending Care    Care    Encounter 

Source



        Date/Time Date/Time Type    Type    Clinicians Facility Department ID   

   

 

        2020 Inpatient         DAN[facility], [facility]     063     [id number]

[address] [place]



        00:00:00 00:00:00                 DEX                 424     Method

i



                                                                        

 

        2020 Office          ESTRELLA Hall     [id number]

[id number] 



        08:08:44 08:18:44 Visit           Chet   AMBULATOR 350.1.13.21         



                                        Tae-Jin Y       [id number]         



                                                        [phone]         



                                                        [facility] [place]             

 

        2020 Inpatient         DANTE, [facility]     064     [id number]

[address] [place]



        00:00:00 00:00:00                 DEX                 753     Method

i



                                                                        

 

        2020 Office          ESTRELLA Hall     [id number]

[id number] 



        08:50:00 09:54:02 Visit           Chet   AMBULATOR 350.1.13.21         



                                        Tae-Jin Y       [id number]         



                                                        [phone]         



                                                        [facility] [place]             

 

        2020 Outpatient         [facility]     [address]

[id number] [place]



        00:00:00 00:00:00                 DEX                 139     Method

i



                                                                        

 

        2020 Outpatient         [facility]     2100

[id number] [place]



        00:00:00 00:00:00                 DEX                 938     Method

i



                                                                        

 

        2019 Outpatient                 Neurology Neurology 300

461  [facility]



        09:30:00 09:30:00                         [facility]         

Annabelle tran [facility]            



                                                P[facility]                    







Results







           Test Description Test Time  Test Comments Results    Result Comments 

Source









                    POC glucose         2020 16:03:13 









                      Test Item  Value      Reference Range Interpretation Comme

nts









             POC glucose (test code = 94879-4) 112 mg/dL           H      

       Name: Steven IbarraDevice ID

: [id number]

 

             Lab Interpretation (test code = Abnormal                           

    



             24314-9)                                            



[facility], [id number] 06:42:25





             Test Item    Value        Reference Range Interpretation Comments

 

             PTT (test code = 53.3         23.0- 36.0 sec H            PTT thera

peutic range



             3173-2)                                             for unfractiona

geovanna



                                                                 heparin is61.0-

112.0



                                                                 seconds which



                                                                 corresponds to



                                                                 Anti-Xa0.3-0.7 

U/ml.

 

             Lab Interpretation Abnormal                               



             (test code = 95360-2)                                        



[facility] [id number] 06:25:26





             Test Item    Value        Reference Range Interpretation Comments

 

             WBC (test code = 88427-0) 14.2         4.2- 11.0 k/uL H            

 

             RBC (test code = 22914-2) 2.43 m/uL    4.04-5.86    L            

 

             HGB (test code = 718-7) 7.3 g/dL     13-17.3      L            

 

             HCT (test code = 4544-3) 23.2 %       34-45        L            

 

             MCV (test code = 787-2) 95.5 fL      80-98                     

 

             MCH (test code = 785-6) 30.0 pg      27-34                     

 

             MCHC (test code = 786-4) 31.5 g/dL    31.5-36.5                 

 

             RDW - SD (test code = 29564-6) 82.4 fL      37-51        H         

   

 

             MPV (test code = 69610-8) 12.3 fL      7.4-10.4     H            

 

             Platelet count (test code = 419          150- 400 k/uL H           

 



             34357-4)                                            

 

             Nucleated RBC (test code = 47577-0) 0.00         /100 WBC          

        

 

             Lab Interpretation (test code = Abnormal                           

    



             63621-4)                                            



[facility] [id number] 23:43:22Gram stain isolateOccasional WBC's
No organisms seen Comment: Specimen InformationSpecimen Source: Bronchial 
WashingSpecimen Site: Right and left lobes [facility] with platelet and lvcfkszhwlfp4869-85-94 06:45:10





             Test Item    Value        Reference Range Interpretation Comments

 

             WBC (test code = 47896-6) 13.4         4.2- 11.0 k/uL H            

 

             RBC (test code = 70951-9) 2.45 m/uL    4.04-5.86    L            

 

             HGB (test code = 718-7) 7.2 g/dL     13-17.3      L            

 

             HCT (test code = 4544-3) 23.5 %       34-45        L            

 

             MCV (test code = 787-2) 95.9 fL      80-98                     

 

             MCH (test code = 785-6) 29.4 pg      27-34                     

 

             MCHC (test code = 786-4) 30.6 g/dL    31.5-36.5    L            

 

             RDW - SD (test code = 35410-2) 84.7 fL      37-51        H         

   

 

             MPV (test code = 97476-0) 12.1 fL      7.4-10.4     H            

 

             Platelet count (test code = 345          150- 400 k/uL             

 



             56684-8)                                            

 

             Nucleated RBC (test code = 40773-4) 0.00         /100 WBC          

        

 

             Neutrophils (test code = 66248-1) 86.0 %       36-66        H      

      

 

             Lymphocytes (test code = 94163-9) 6.0 %        24-44        L      

      

 

             Monocytes (test code = 26485-3) 4.0 %        0-6                   

    

 

             Eosinophils (test code = 90557-3) 0.0 %        0-6                 

      

 

             Basophils (test code = 94828-4) 1.0 %        0-1.2                 

    

 

             Lab Interpretation (test code = Abnormal                           

    



             89420-5)                                            



[place] [facility] [id number] 06:45:10





             Test Item    Value        Reference Range Interpretation Comments

 

             Manual differential (test code = PERFORMED                         

     



             08629-4)                                            

 

             Neutrophils (test code = 86.0 %       36-66        H            



             80544-5)                                            

 

             Lymphocytes (test code = 6.0 %        24-44        L            



             31591-5)                                            

 

             Monocytes (test code = 26485-3) 4.0 %        0-6                   

    

 

             Eosinophils (test code = 0.0 %        0-6                       



             83376-6)                                            

 

             Basophils (test code = 67447-6) 1.0 %        0-1.2                 

    

 

             Metamyelocytes (test code = 0 %          0-1                       



             740-1)                                              

 

             Myelocytes (test code = 749-2) 1 %          0-1                    

   

 

             Promyelocytes (test code = 0 %          0-1                       



             783-1)                                              

 

             Reactive lymphocytes (test code 2.0                                

    



             = 733-6)                                            

 

             Platelet slide review (test code Zac[place] adequate                      

     



             = 62537-7)                                          

 

             Toxic granulation (test code = Slight                              

   



             803-7)                                              

 

             Neutrophils, vacuolated (test Slight                               

  



             code = 29344-9)                                        

 

             Anisocytosis (test code = 702-1) Moderate                          

     

 

             Polychromasia (test code = slight                                 



             11981-4)                                            

 

             Tear drop cells (test code = Occasional                            

 



             7791-7)                                             

 

             Schistocytes (test code = 800-3) Occasional                        

     

 

             Target cells (test code = few                                    



             03013-3)                                            

 

             Spherocytes (test code = 802-9) Occasional                         

    

 

             Ovalocytes (test code = 774-0) few                                 

   

 

             [place] cells (test code = 7790-9) Occasional                         

    

 

             Enlarged platelets (test code = Occasional                         

    



             07455-4)                                            

 

             Giant platelets (test code = Occasional                            

 



             5908-9)                                             

 

             Stomatocytes (test code = Occasional                             



             64175-1)                                            

 

             Lab Interpretation (test code = Abnormal                           

    



             29691-6)                                            



[place] [facility]Basic metabolic [id number] 05:22:30





             Test Item    Value        Reference Range Interpretation Comments

 

             Sodium (test code = 2951-2) 137          135- 150 mEq/L            

  

 

             Potassium (test code = 2823-3) 3.9          3.5- 5.0 mEq/L         

     

 

             Chloride (test code = 2075-0) 100          98- 112 mEq/L           

   

 

             CO2 (test code = -) 29 mmol/L    24-31                     

 

             Anion gap (test code = 33037-3) 8@ANIO       7- 15 mEq/L           

    

 

             BUN (test code = 3094-0) 17 mg/dL     7-18                      

 

             Creatinine (test code = 2160-0) 0.60 mg/dL   0.7-1.2      L        

    

 

             Glucose (test code = 2345-7) 148 mg/dL           H           

 

 

             Calcium (test code = 48188-5) 8.8 mg/dL    8.8-10.2                

  

 

             Lab Interpretation (test code = Abnormal                           

    



             60679-7)                                            



[place] MethodistMagnesium [id number] 05:22:30





             Test Item    Value        Reference Range Interpretation Comments

 

             Magnesium (test code = 90963-4) 2.20 mg/dL   1.6-2.4               

    



[place] MethodistPhosphorus [id number] 05:22:30





             Test Item    Value        Reference Range Interpretation Comments

 

             Phosphorus (test code = 2777-1) 2.6 mg/dL    2.4-4.5               

    



[place] MethodistEstimated [id number] 05:22:30





             Test Item    Value        Reference Range Interpretation Comments

 

             Estimated GFR (test >=90         mL/min/1.73 m2              Jaz sotelo  Units



             code = 5488)                                        InterpretationG

1



                                                                 >=90     Normal

 or highG2



                                                                       60-89    

Mildly



                                                                 xwjgjnufzS8v   

     45-59



                                                                  Mildly to mode

rately



                                                                 xtrghmiicZ1i   

     30-44



                                                                  Moderately to 

severely



                                                                 decreasedG4    

     15-29



                                                                  Severely decre

asedG5



                                                                   <15      Kidn

ey



                                                                 failureThe eGFR

 was



                                                                 calculated sohail melton the



                                                                 Chronic Kidney 

Disease



                                                                 Epidemiology Co

llaboration



                                                                 (CKD-EPI) equat

ion.



                                                                 Interpretation 

is based on



                                                                 recommendations

 of the



                                                                 National Kidney



                                                                 Foundation-Kidn

ey Disease



                                                                 Outcomes Qualit

y Initiative



                                                                 (NKF-KDOQI) pub

lished in



                                                                 2014.



[place] MethodistIonized [id number] 05:08:15





             Test Item    Value        Reference Range Interpretation Comments

 

             pH (test code = 2753-2) 7.45                                   

 

             Ionized calcium (test code = 1.23 mmol/L  1.11-1.32                

 



             )                                             



[place] MethodistPotassium level2020-09-10 23:02:36





             Test Item    Value        Reference Range Interpretation Comments

 

             Potassium (test code = 2823-3) 3.7          3.5- 5.0 mEq/L         

     



[place] MethodistECG 12 lead2020-09-10 14:19:11





             Test Item    Value        Reference Range Interpretation Comments

 

             Ventricular rate (test 91                                     



             code = 253)                                         

 

             Atrial rate (test code 91                                     



             = 255)                                              

 

             SD interval (test code 150                                    



             = 266)                                              

 

             QRSD interval (test 68                                     



             code = 260)                                         

 

             QT interval (test code 404                                    



             = 264)                                              

 

             QTC interval (test 496                                    



             code = 265)                                         

 

             P axis 1 (test code = 62                                     



             267)                                                

 

             QRS axis 1 (test code 73                                     



             = 268)                                              

 

             T wave axis (test code 72                                     



             = 270)                                              

 

             EKG impression (test Normal sinus                           



             code = 273)  rhythm-Normal ECG-In                           



                          automated comparison                           



                          with ECG of 31-AUG-2020                           



                          17:39,-Sinus rhythm has                           



                          replaced Atrial                           



                          fibrillation-Criteria                           



                          for Septal infarct are                           



                          no longer                              



                          present-Nonspecific T                           



                          wave abnormality,                           



                          improved in                            



                          Anterolateral                           



                          leads-Electronically                           



                          Signed By Christine Estrella MD ([id number]) on                           



                          9/10/2020 2:19:05 PM                           



Mike MarcelinoXR Chest 1 Vw Portable2020-09-10 07:39:21 Interface, 
Radiology Results  - 09/10/2020  7:42 AM CDTSINGLE VIEW CHEST, 
9/10/2020Clinical History: Stable ICU patient.Technique: Single, portable AP 
view chest.Comparison: 2020Impression:1.Tracheostomy, Dobbhoff and right 
PICC are unchanged.2.Decreasing left basilar predominant atelectasis. No new 
focal airspace disease. No cavitation. Lungs are otherwise clear.3.Normal heart 
size and mediastinal contour for technique.4.Normal pulmonary 
vasculature.5.Slightly decreased trace left pleural effusion. No right effusion.
No pneumothorax.6.Intact skeleton.[facility] MethodistComprehensive metabolic panel
2020-09-10 05:37:22





             Test Item    Value        Reference Range Interpretation Comments

 

             Sodium (test code = 2951-2) 142          135- 150 mEq/L            

  

 

             Potassium (test code = 2823-3) 3.4          3.5- 5.0 mEq/L L       

     

 

             Chloride (test code = 2075-0) 103          98- 112 mEq/L           

   

 

             CO2 (test code = -) 31 mmol/L    24-31                     

 

             Anion gap (test code = 33037-3) 8@ANIO       7- 15 mEq/L           

    

 

             BUN (test code = 3094-0) 18 mg/dL     7-18                      

 

             Creatinine (test code = 2160-0) 0.70 mg/dL   0.7-1.2               

    

 

             Glucose (test code = 2345-7) 128 mg/dL           H           

 

 

             Calcium (test code = 56910-7) 8.2 mg/dL    8.8-10.2     L          

  

 

             Protein (test code = 2885-2) 5.6 g/dL     6.3-8.3      L           

 

 

             Albumin (test code = 1751-7) 1.8 g/dL     3.5-5        L           

 

 

             A/G ratio (test code = 1759-0) 0.5          0.7-3.8      L         

   

 

             Alkaline phosphatase (test code = 89 U/L       0-129               

      



             6768-6)                                             

 

             AST (test code = 1920-8) 28 U/L       10-50                     

 

             ALT (test code = 1742-6) 20 U/L       5-50                      

 

             Total bilirubin (test code = 0.5 mg/dL    0.2-1.2                  

 



             -)                                             

 

             Lab Interpretation (test code = Abnormal                           

    



             01518-8)                                            



[facility] MethodistArterial blood gas2020-09-10 04:39:35





             Test Item    Value        Reference    Interpretation Comments



                                       Range                     

 

             pH, arterial (test 7.545        7.350- 7.450 H            



             code = 2744-1)              units                     

 

             pCO2, arterial (test 36.2         35.0- 45.0                



             code = 2019-8)              mmHg                      

 

             pO2, arterial (test 162.0        80.0- 90.0   H            



             code = 2703-7)              mmHg                      

 

             O2 saturation, >100.0                           



             arterial (test code =                                        



             93511-0)                                            

 

             Base excess, arterial 8.9          mEq/L                     



             (test code = 1925-7)                                        

 

             Bicarbonate (test code 31.3         21.0- 28.0   H            



             = 349)                    mEq/L                     

 

             O2 content (test code 11.1         VOL%                      



             = 1828)                                             

 

             FiO2, inspired O2% 40.0 %                                 



             (test code = 1389)                                        

 

             Carboxyhemoglobin 1.4 %        0-1.4                     Reference 

Ranges:



             (test code = 20563-3)                                        Carbox

yhemoglobinNon



                                                                 smoker:   0.0 -



                                                                 2.0%Smoker:    

   2.1 -



                                                                 5.0%Heavy smoke

r: 5.1 -



                                                                 9%

 

             Methemoglobin (test 0.4 %        0-1                       



             code = 2613-8)                                        

 

             Hemoglobin, blood gas 7.8 g/dL     14-18        LL           



             (test code = 37498-2)                                        

 

             pO2, A-a (test code = 84.2         mmHg                      



             )                                               

 

             Lab Interpretation Abnormal                               



             (test code = 42711-1)                                        



[facility] [id number] 14:28:50Kristin Adrian RN     2020
 2:38 PMPICC insertionDate/Time: 2020 2:29 PMPerformed by: Kristin Adrian, 
RNAuthorized by: Dex Howell MD  Consent:   Consent obtained:  Verbal
 Consent given by:  Healthcare agent  Risks discussed:  Arterial puncture, 
incorrect placement, bleeding, infection, superficial thrombus and deep vein 
thrombus  Alternatives discussed:  Delayed treatment and alternative 
treatmentUniversal protocol:   Procedure explained and questions answered to 
patient or proxy's satisfaction: yes    Relevant documents present and verified:
yes    Test results available and properly labeled: yes    Imaging studies 
available: yes    Required blood products, implants, devices, and special 
equipment available: yes    Site/side marked: yes    Immediately prior to 
procedure, a time out was called: yes    Patient identity confirmed:  Verbally 
with patient, arm band and hospital-assigned identification numberPre-procedure 
details:   Hand hygiene: Hand hygiene performed prior to insertion    Sterile 
barrier technique: All elements of maximal sterile technique followed    Skin 
preparation:  ChloraPrep  Skin preparation agent: Completely dried prior to 
procedure  Anesthesia (see MAR for exact dosages):   Anesthesia method:  Local 
infiltration  Local anesthetic:  Lidocaine 1% w/o epi  Route administered:  
SubcutaneousPICC Line Placement Details:   Extremity Circumference Upper (cm):  
29  Extremity Circumference Forearm (cm): 25  Extremity Circumference Site:  27 
Patient position:  Flat  Vessel Size (mm):  5  Indication:  Known long term IV 
therapy and vesicants  Location:  Right basilic  Site selection rationale:  
Largest vein in dominant arm   Device Type:  Valved  Catheter Lumens:  Triple 
lumen  Catheter size:  5 Fr  Catheter to vein ratio:  11%PICC Characteristics:  
Catheter Brand:  [employer] POWER PICC SOLO HF CATHETER WITH SHERLOCK 3 CG TIP 
POSITIONING SYSTEM  External Catheter Length (cm):  0  Internal Catheter Length 
(cm):  42  Total Catheter Length (cm):  42  Catheter Lot Number:  [id number]  
Catheter Expiration Date:  2021  Micro-Introducer Lot Number:  [id number]  M
icro-Introducer Expiration Date:  1Procedure details:   Landmarks 
identified: yes    Ultrasound guidance: yes    Sterile ultrasound techniques: 
Sterile gel and sterile probe covers were usedNumber of attempts:  1  Number of 
PICC kits used during procedure:  1  Extra guide wire required?: No    Purpose 
of procedure:  PICC Placement  Successful PICC Placement: yes    
Patency/Placement:  Flushes without difficulty, flushed with 10 mL normal 
saline, positive blood return, x-ray placement verified and ultrasound placement
verified  Dressing/Securement:  Catheter securement device and antimicrobial 
dressing applied  Blood Loss Amount:  Less than 20 mLPost-procedure details:   
Post-procedure: Dressing applied  Tip placement confirmed by: X-Ray    Name of 
provider who confirmed tip placement::  RADIOLOGIST. SEE CXR.   Patient 
tolerance of procedure:  Tolerated well, no immediate complicationsComments:    
Report to Aida Azar RN, primary ICU nurse.[facility] MethodistBronchoscopy
2020 12:20:56Tru Mendoza MD     2020 12:23 
PMBronchoscopyDate/Time: 2020 12:21 PMPerformed by: Tru Mendoza MDAuthorized by: Tru Mendoza MD  Consent:   Consent obtained:  Verbal  
Consent given by:  Healthcare agent  Alternatives discussed:  Referral, 
observation, alternative treatment, delayed treatment and no treatmentUniversal 
protocol:   Procedure explained and questions answeredto patient or proxy's 
satisfaction: yes    Relevant documents present and verified: yes    Test resul
ts available and properly labeled: yes    Imaging studies available: yes    
Required blood products,implants, devices, and special equipment available: yes 
  Site/side marked: yes    Immediately prior to procedure, a time out was 
called: yes    Patient identity confirmed:  Arm band and hospital-assigned 
identification numberPre-procedure details:   Indication:   Percutaneous 
traacheotomy , fever/ sepsis  Scope type:  Flexible bronchoscope  Airway:  
Intubated  Monitoring devices:  ECG, NIPB, arterial line and pulse 
oximetrySedation:   Sedation Type:  Anxiolysis and Narcotic  Anxiolysis used::  
Versed  Narcotic(s) used::  FentanylWashings:   Washings:  Right and left  
Sample sent for:  Gram stainPost-procedure details:   Patient tolerance of 
procedure: Patient tolerated the procedure well with noimmediate complications  
Comments:    Patient was sedated with Versed, fentanyl, rocuronium.  Broncho
scopy was inserted and bilateral secretions cleared, patient had significant 
secretions on the left lower lobe, suctioned and sent for cultures, subsequently
endotracheal tube was slowly withdrawn up to around 1718 cm, and assisted with 
percutaneous tracheostomy,  after trach inserted bronchoscopy wasinserted into 
the trachea through the tracheostomy and confirmed positionCaliente Taoist
Prothrombin time with [id number] 07:57:13





             Test Item    Value        Reference Range Interpretation Comments

 

             Prothrombin time 14.7         11.5- 14.5 sec H            



             (test code = 5902-2)                                        

 

             INR (test code = 1.15                                   For patient

s on



             72684-3)                                            anticoagulant t

herapy,



                                                                 reference range

s



                                                                 below:Indicatio

n:



                                                                                

     INR



                                                                 ValueTreatment 

of Venous



                                                                 Thrombosis,



                                                                 2.0-3.0pulmonar

y emboli,



                                                                 or prophylaxiso

f a venous



                                                                 thrombosis, or 

systemic



                                                                 emboli.High dos

e, high



                                                                 risk patients



                                                                 3.0-4.5with mec

hanical



                                                                 valves.NOTE:  I

NR values



                                                                 over 3.0 are so

metimes



                                                                 associated



                                                                 withgastrointes

tinal



                                                                 hemorrhage, khadar

ecially



                                                                 values over 4.0

.

 

             Lab Interpretation Abnormal                               



             (test code =                                        



             68754-3)                                            



[facility] TaoistMRSA screen [id number] 18:17:55





             Test Item    Value        Reference    Interpretation Comments



                                       Range                     

 

             MRSA screen  No Methicillin                           Specimen



             culture      Resistant                              InformationSpec

imen



             isolate (test Staphylococcus                           Source: [place]

sSpecimen



             code = 1754) aureus isolated.                           Site: Left



[facility] Centershanique natriuretic [id number] 17:50:46





             Test Item    Value        Reference Range Interpretation Comments

 

             BNP (test code = 80732-4) 463 pg/mL    0-100        H            

 

             Lab Interpretation (test code = Abnormal                           

    



             28796-0)                                            



[facility] Reddmear [id number] 15:09:51





             Test Item    Value        Reference Range Interpretation Comments

 

             Platelet slide review (test code Zacarias adequate                      

     



             = 23899-4)                                          

 

             Anisocytosis (test code = 702-1) Moderate                          

     

 

             Polychromasia (test code = SLIGHT                                 



             48974-2)                                            

 

             Tear drop cells (test code = OCCASIONAL                            

 



             7791-7)                                             

 

             Schistocytes (test code = 800-3) OCCASIONAL                        

     

 

             Target cells (test code = FEW                                    



             87035-2)                                            

 

             Spherocytes (test code = 802-9) OCCASIONAL                         

    

 

             Ovalocytes (test code = 774-0) FEW                                 

   

 

             Enlarged platelets (test code = FEW                                

    



             08140-7)                                            

 

             Toxic granulation (test code = SLIGHT                              

   



             803-7)                                              



[facility] TaoistPrepare RBC, 1 [id number] 04:41:00





             Test Item    Value        Reference Range Interpretation Comments

 

             Product name (test code Red Blood Cells AS-1,                      

     



             = 25)        Leukored                               

 

             Unit number (test code [id number]                           



             = 1902103)                                          

 

             Product code (test code X0732B93                               



             = 3092)                                             

 

             Dispense status (test Transfused                             



             code = 24)                                          

 

             Blood expiration date                            



             (test code = 302)                                        

 

             Blood type code (test 5100                                   



             code = 308)                                         

 

             Blood type (test code = O POSITIVE                             



             1314)                                               

 

             Compatibility (test Compatible                             



             code = 6400)                                        



[facility] MethodistAnti Xa, [id number] 03:46:08





             Test Item    Value        Reference Range Interpretation Comments

 

             Anti Xa, unfractionated 0.12 U/mL    0.3-0.7      L            Ther

apeutic Range:



             (test code = 3274-8)                                        0.30 - 

0.70 U/mL

 

             Lab Interpretation (test Abnormal                               



             code = 63810-4)                                        



[facility] TaoistOccult blood, [id number] 13:17:48





             Test Item    Value        Reference    Interpretation Comments



                                       Range                     

 

             Occult blood, stool Positive for              A            Specimen



             (test code = Occult blood                           InformationSpec

imen



             2334-1)                                             Source: StoolSp

ecimen



                                                                 Site: Nonpreser

denise

 

             Lab Interpretation Abnormal                               



             (test code =                                        



             17504-9)                                            



[facility] MethodistAmmonia [id number] 09:28:28





             Test Item    Value        Reference Range Interpretation Comments

 

             Ammonia (test code = 1841-6) 25 umol/L    16-60                    

 



[facility] MethodistLactic acid [id number] 09:09:50





             Test Item    Value        Reference Range Interpretation Comments

 

             Lactic acid (test code = 18995-2) 0.8 mmol/L   0.5-2.2             

      



[facility] MethodistXR Abdomen 1 [id number] 06:45:56Hm Interface, Radiology 
Results 2020  6:49 AM CDTEXAMINATION:  XR ABDOMEN 1 VWCLINICAL 
HISTORY:  DobbhoffCOMPARISON:  8/10/2020 IMPRESSION:Dobbhoff tube has its tip in
the distal thirdof the stomach.  Bowel gas pattern is nonobstructive. There is 
no gross intra-abdominal free air. There is bilateral perihilar infiltrate or 
edema as well as left basilar opacities and volume loss and possible effusion. 
[facility] MethodistType and [id number] 05:30:00





             Test Item    Value        Reference Range Interpretation Comments

 

             ABO grouping (test code = 883-9) O                                 

     

 

             Rh type (test code = 16965-7) POS                                  

  

 

             Antibody screen (gel) (test code = NEG                             

       



             890-4)                                              



[facility] MethodistBilirubin [id number] 03:39:31





             Test Item    Value        Reference Range Interpretation Comments

 

             Bilirubin direct (test code = 0.6 mg/dL    0-0.4        H          

  



             1968)                                             

 

             Lab Interpretation (test code = Abnormal                           

    



             40958-0)                                            



Mike CrygkybsjOnfyghvjnayu8901-03-13 21:15:18SafChayo slater MD     
2020  9:18 PMBronchoscopyDate/Time: 2020 9:15 PMPerformed by: Chayo Calloway MDAuthorized by: Chayo Calloway MD  Consent:   Consent 
obtained:  Writtenand verbal  Consent given by:  Guardian (Consent been obtained
from the patient daughter on the phone as the patient has altered mental status 
unable to give consent at this time)Universal protocol:   Procedure explained 
and questions answered to patient or proxy's satisfaction: yes    Relevant 
documents present and verified: yes    Test results available and properly 
labeled: yes    Imaging studies available: yes    Required blood products, 
implants, devices, and special equipment available: yesSite/side marked: yes    
Immediately prior to procedure, a time out was called: yes    Patient identity 
confirmed:  Verbally with patient, provided demographic data and arm bandPre-
procedure details:Indication:  Acute hypoxemic respiratory failure, copious 
secretion, possible mucous plugging  Scopetype:  Flexible bronchoscope  Airway: 
Intubated  Monitoring devices:  ECG, pulse oximetry and CVPSedation:   Sedation 
Type:  Systemic  Systemic used::  PrecidexFindings:   Findings:  Erythema 
(Extensive amount of copious secretions mainly in the left bronchus with mucous 
plugging of the left lower lobe and lingula)  Secretion consistency:  Thick  
Secretion amount:  LargePost-procedure details:   Patient tolerance of 
procedure: Patient tolerated the procedure well with no immediate complications 
Comments:    Immediately after intubating the patient and connected to 
mechanical ventilator I went through the ET tube using flexible bronchoscopy and
once to trachea which has some erythematous changes and then went to left main 
stem and left upper lobe had mucous secretions with some subsegmental plugging 
which I did suction out and then went to  right mainstem went down to bronchus 
intermedius and left right middle lobe right lower lobe has thick secretions 
which I suctioned out no mucous plugging. I went to the trachea again and then 
went to the left mainstem which has thick mucus secretions that I suctioned out 
also it looks purulent secretions and went down to left lower lobe which had 
mucousplugging that I suctioned out also went down to left upper lobe and 
lingula also had thick secretions but no plugging.[facility] MethodistIntubation
2020 21:13:47SaChayo cantor MD     2020  9:15 
PMIntubationDate/Time: 2020 9:13 PMPerformed by: Chayo Calloway MDAuthorized by: Chayo Calloway MD  Consent:   Consent obtained:  Verbal 
Consent given by:  Guardian (Sent was taken from the patient daughter on the 
phone as the patient is confused and not able to give consent at this time.)  
Risks discussed:  Aspiration, bleeding, death, brain injury, dental trauma, 
hypoxia, laryngeal injury and pneumothoraxUniversal protocol:   Procedure
explained and questions answered to patient or proxy's satisfaction: yes    
Relevant documents present and verified: yes    Test results available and 
properly labeled: yes    Imaging studies available: yes    Required blood 
products, implants, devices, and special equipment available: yes    Site/side 
marked: yes    Immediately prior to procedure, a time out was called: yes    
Patient identity confirmed:  Verbally with patient, arm band and provided 
demographic dataPre-procedure details:   Patient status:  Altered mental status 
Mallampati score:  1  Induction:  Etomidate  Paralytics:  RocuroniumProcedure 
details:   Preoxygenation:  BiPAP  Intubation method:  Oral  Technique:  Video 
laryngoscopyLaryngoscope blade:  Mac 4  Grade view:  2  Difficult airway?: No   
Tube size (mm):  8.0  Tube type:  Cuffed  Number of attempts:  1  Tube 
visualized through cords: yes  Placement assessment:   ETT tolip:  23  ETT to 
teeth:  22  Tube secured with:  Adhesive tape  Breath sounds:  Equal  Placement 
verification: chest rise, direct visualization, equal breath sounds, ETCO2 
detector and fiberoptic scope   CXR findings:  ETT in proper placePost-procedure
details:   Patient tolerance of procedure:  Tolerated well, no immediate 
complicationsComments:    Indication of procedure acute hypoxemic respiratory 
failure, copious secretions, mucous pluggingCaliente MethodistHeparin PF4 
antibody (IgG)2020 23:23:37





             Test Item    Value        Reference Range Interpretation Comments

 

             Heparin PF4 Ab OD reading (test code 0.084        0.000-0.399      

         



             = 1500)                                             

 

             Heparin PF4 Ab, IgG (test code = Negative     Negative             

     



             48335-3)                                            



[facility] OkrnvxvrbWmjoalhpvvik4783-62-84 15:18:37Tru Mendoza MD     
9/3/2020  3:20 PMBronchoscopyDate/Time: 9/3/2020 3:18 PMPerformed by: Tru García MDAuthorized by: Tru Mendoza MD  Consent:   Consent 
obtained:  Verbal  Consent given by:  Healthcare agent  Alternatives discussed: 
Referral, observation, alternative treatment, delayed treatment and no 
treatmentUniversal protocol:   Procedure explained and questions answered to 
patient or proxy's satisfaction: yes    Relevant documents present and verified:
yes    Test results available and properly labeled: yes    Imaging studies 
available: yes    Required blood products, implants, devices, and special 
equipment available: yes    Site/side marked: yes    Immediately priorto 
procedure, a time out was called: yes    Patient identity confirmed:  Arm band 
and hospital-assigned identification numberPre-procedure details:   Indication: 
Acute Hypoxemic Respiratory Failure, Atelectasis, Sepsis  Scope type:  Flexible 
bronchoscope  Airway:  Intubated  Monitoring devices:  ECG,NIPB, arterial line 
and pulse oximetrySedation:   Sedation Type:  Anxiolysis and Narcotic  
Anxiolysis used::  Versed  Narcotic(s) used::  FentanylFindings:   Location of 
endotracheal tube:  Pushed et tube by 2 cm   Secretion consistency:  Thick  
Secretion amount:  ModerateWashings:   Washings:  Right and left  Sample sent 
for:  Gram stainLavage:   Bronchoscopy Lavage: Laterality: rml.  Sample sent for
:  Gram stain and routine culturePost-procedure details:   Patient tolerance of 
procedure: Patient tolerated the procedure well with no immediate complications 
Comments:    Thick copious  mucous secretions , mucous plus bilaterally , 
mucosal edema and inflammation with friability right side- Bronch, bal - rml 
done and [facility], [facility] &amp; anaerobic
2020 09:33:04





             Test Item    Value        Reference Range Interpretation Comments

 

             Blood culture No growth                              Specimen



             isolate (test after 5 days                           InformationSpe

cimen



             code = 600-7) of                                     Source: BloodS

pecimen



                          incubation.                            [facility] [id number] 04:03:20





             Test Item    Value        Reference Range Interpretation Comments

 

             Albumin (test code = 1751-7) 1.8 g/dL     3.5-5        L           

 

 

             Total bilirubin (test code = 2.8 mg/dL    0.2-1.2      H           

 



             -2)                                             

 

             Bilirubin direct (test code = 2.0 mg/dL    0-0.4        H          

  



             -7)                                             

 

             Alkaline phosphatase (test code = 150 U/L      0-129        H      

      



             6768-6)                                             

 

             Protein (test code = 2885-2) 4.6 g/dL     6.3-8.3      L           

 

 

             ALT (test code = 1742-6) 96 U/L       5-50         H            

 

             AST (test code = 1920-8) 127 U/L      10-50        H            

 

             Lab Interpretation (test code = Abnormal                           

    



             85303-6)                                            



[id number]13:12 Interface, Radiology
Results Incoming - 2020  3:16 PM CDTEXAMINATION:  MRI BRAIN WO CONTRAST
CLINICAL HISTORY:  Altered level of consciousness (LOC)  unexplainedCOMPARISON: 
None.Findings:Mild diffusion restriction in the right posterior cerebellum 
measures 1.9 x 1 cm compatible with recent ischemia. No hemorrhage or mass 
effect. Questionable punctate focus of diffusion restriction in the left 
posterior temporal lobe.No intracranial hemorrhage, extra-axial fluid 
collections or parenchymal mass lesions. No hydrocephalus. No suspicious focal 
bone marrow lesions.Mild chronic ischemic small vessel white matter changes. 
Major flow voids are maintained. Mild chronic ischemic small vessel white matter
changes. Small chronic insult in the right occipital lobe.IMPRESSION:Small acute
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 Questionable punctate focus of recent 
ischemia in the left posterior temporal lobe.Findings were discussed with and 
acknowledged by REINALDO Dee at 2020 3:10 PM. [facility]-[id number] Taoist
Venous blood [id number] 09:29:11





             Test Item    Value        Reference    Interpretation Comments



                                       Range                     

 

             pH, venous (test code 7.428        7.320- 7.420 H            



             = 2746-6)                 units                     

 

             pCO2, venous (test 39.4         45.0- 51.0   L            



             code = 1-4)              mmHg                      

 

             pO2, venous (test code 42.4         25.0- 40.0   H            



             = 2705-2)                 mmHg                      

 

             O2 saturation, venous 77.5 %       40-70        H            



             (test code = 70391-6)                                        

 

             Base excess, venous 1.7          -2.0 - 2.0                



             (test code = 1927-3)              mEq-L                     

 

             Bicarbonate (test code 26.0         21.0- 28.0                



             = 349)                    mEq/L                     

 

             O2 content (test code 7.5          VOL%                      



             = 1828)                                             

 

             FiO2, inspired O2% 30.0 %                                 



             (test code = 1389)                                        

 

             Carboxyhemoglobin 1.7 %        0-1.4        H            Reference 

Ranges:



             (test code = 20563-3)                                        Carbox

yhemoglobinNon



                                                                 smoker:   0.0 -



                                                                 2.0%Smoker:    

   2.1 -



                                                                 5.0%Heavy smoke

r: 5.1 -



                                                                 9%

 

             Methemoglobin (test 1.3 %        0-1          H            



             code = 2613-8)                                        

 

             Hemoglobin, blood gas 7.0 g/dL     14-18        LL           NOT CR

ITICAL



             (test code = 49243-9)                                        

 

             Lab Interpretation Abnormal                               



             (test code = 62553-0)                                        



[facility] [id number] 14:33:23





             Test Item    Value        Reference Range Interpretation Comments

 

             Case number (test code = [id number]                           



             [id number])                                            

 

             Surgical pathology See link below for                           



             report (test code = PDF Lab Report                           



             6100)                                               

 

             Result status (test code This is Final Report                      

     



             = 7241184)   for [id number]                           



[facility], (w Contrast, Strain and 
3D if needed)2020 17:39:17





             Test Item    Value        Reference Range Interpretation Comments

 

             Ao Root Diameter (test 3.32 cm                                



             code = 6875391385)                                        

 

             AoV Area, Vmax (test 3.14 cm2                               



             code = 4951679854)                                        

 

             AoV Area, VTI (test 3.97 cm2                               



             code = 3604059658)                                        

 

             AoV Mean PG (test code 0.91         mmHg                      



             = 5660227829)                                        

 

             AoV Peak PG (test code 1.44         mmHg                      



             = 0085834840)                                        

 

             AoV Vmax (test code = 0.60 m/s                               



             8612703417)                                         

 

             AoV VTI (test code = 0.10 m                                 



             5226074184)                                         

 

             BSA Lorenzo (test code = 1.72 m2                                



             3760398723)                                         

 

             BSA (test code = 1.75 m2                                



             1265085145)                                         

 

             IVS,d (test code = 0.72 cm                                



             8857854331)                                         

 

             IVS/LVPW,2D (test code 0.90                                   



             = 5662812527)                                        

 

             Left Atrium Dimension 2.75 cm                                



             Anterior (test code =                                        



             5187609185)                                         

 

             LV,d (test code = 4.23 cm                                



             6981225842)                                         

 

             LV EF,2D (test code = 29.91 %                                



             4614756858)                                         

 

             LV EF,A4C (test code = 19.80 %                                



             3581395714)                                         

 

             Lobo Axis,d A4C (test 6.98 cm                                



             code = 4090310905)                                        

 

             Lobo Axis,s A4C (test 6.72 cm                                



             code = 4385432443)                                        

 

             LV,s (test code = 3.76 cm                                



             5471250295)                                         

 

             LV SV,A4C (test code = 15.49 %                                



             8663696631)                                         

 

             LV Vol,d A4C (test code 78.26 ml                               



             = 8667467518)                                        

 

             LV Vol,s A4C (test code 62.76 ml                               



             = 8617333027)                                        

 

             LVOT area (test code = 3.56 cm2                               



             9498518522)                                         

 

             LVOT Diam,S (test code 2.13 cm                                



             = 8217492504)                                        

 

             LVOT Vmax (test code = 0.53 m/s                               



             8866740836)                                         

 

             LVOT VTI (test code = 0.11 m                                 



             5833431199)                                         

 

             LVPWD,d (test code = 0.80 cm                                



             1615778260)                                         

 

             PV Pk Grad (test code = 0.71         mmHg                      



             2083415721)                                         

 

             PV VMAX (test code = 0.42 m/s                               



             5455073914)                                         

 

             MV E A ratio (test code 3.03                                   



             = 5494203975)                                        

 

             AoV area i VTI BSA 2.27 cm2/m2                            



             [place] (test code =                                        



             1709863009)                                         

 

             BMI (test code = 19.94 kg/m2                            



             4692978386)                                         

 

             E wave decelartion time 166.88       msec                      



             (test code =                                        



             4646411438)                                         

 

             MV Peak A Mark (test 0.24 m/s                               



             code = 5309889730)                                        

 

             MV valve area p 1/2 4.55 cm2                               



             method (test code =                                        



             6672798854)                                         

 

             MV Peak E Mark (test 0.74 m/s                               



             code = 1362811095)                                        

 

             MV stenosis pressure 48.40 ms                               



             1/2 time (test code =                                        



             3245839757)                                         

 

             AV LVOT peak gradient 1.13         mmHg                      



             (test code =                                        



             2997475853)                                         

 

             Ascending aorta (test 3.23 cm                                



             code = 8584214108)                                        

 

             Ao Root Diameter (test 3.32 cm                                



             code = 5000807101)                                        

 

             LV SYS VOL (test code = 60.31 ml                               



             3323267477)                                         

 

             LV RANGEL VOL (test code 79.91 ml                               



             = 1374510962)                                        

 

             LA area s A4C (test 15.46 cm2                              



             code = 3919943116)                                        

 

             LV SI Teich 2D (test 11.22 ml/m2                            



             code = 2150623733)                                        

 

             LV SV Teich 2D (test 19.60 ml                               



             code = 6601293934)                                        

 

             LV Vol s Teich PSAX 60.31 ml                               



             (test code =                                        



             5645486043)                                         

 

             LVOT CI (test code = 1.65 l/min/m2                           



             2252954969)                                         

 

             LVOT CO (test code = 2.88 l/min                             



             7144217275)                                         

 

             LVOT HR for LVOT CO 75.88        bpm                       



             (test code =                                        



             5674011775)                                         

 

             LVOT SI (test code = 21.72 ml/m2                            



             1710551017)                                         

 

             BSA [place] (test code 1.72 m2                                



             = 2809107972)                                        

 

             AoV Vmn (test code = 0.46                                   



             4688092246)                                         

 

             IVS s 2D (test code = 0.92                                   



             6978422640)                                         

 

             LV FS Teich 2D (test 11.17                                  



             code = 2549510947)                                        

 

             MV AE ratio (test code 0.33                                   



             = 5776117959)                                        

 

             LV FS Cube 2D (test 11.17                                  



             code = 2179411326)                                        

 

             LVOT Vmn (test code = 0.34                                   



             6682920450)                                         

 

             Pt Size (test code = 175.26                                 



             5328055258)                                         

 

             Pt Wt (test code = 61.23                                  



             0261695683)                                         

 

             Aov area Vmn (test code 2.61 cm2                               



             = 0185062657)                                        

 

             LA A_P score P (test 0.17                                   



             code = 9109627479)                                        

 

             LVOT mean grad (test 0.53         mmHg                      



             code = 3546292948)                                        

 

             MAX Pred HR (test code 147.03                                 



             = 2384493393)                                        

 

             85 of MPHR (test code = 124.98                                 



             3318294002)                                         

 

             AoV area I VMN bsa 1.49 cm2/m2                            



             (test code =                                        



             4881467546)                                         

 

             Calc MPHR (test code = 147.03       bpm                       



             2144680263)                                         

 

             IVS pct thck PLAX (test 27.59 %                                



             code = 0345525364)                                        

 

             LV SI Cube 2D (test 12.95 ml/m2                            



             code = 0374058077)                                        

 

             LV SV Cube 2D (test 22.64 ml                               



             code = 3466421746)                                        

 

             LV vol d cube 2D (test 75.69 ml                               



             code = 1691610689)                                        

 

             LV vol s cube 2D (test 53.05 ml                               



             code = 7836733069)                                        

 

             LVPW pct thck PLAX 32.78 %                                



             (test code =                                        



             0945180324)                                         

 

             LVPW s PLAX (test code 1.06 cm                                



             = 5335876994)                                        

 

             MV Decel slope (test 4.41 m/s2                              



             code = 0690993295)                                        

 

             Pred Exer Dur R1 (test 6.72                                   



             code = 9689941659)                                        

 

             Pred METS R1 (test code 7.05                                   



             = 6890715271)                                        

 

             LA Vol MOD A4C (test 35.48 ml                               



             code = 4927863712)                                        

 

             Velocity Ratio (V1/V2) 0.88 m/s                               



             (test code = 4689)                                        

 

             EF (test code = 24.53 %                                



             5364301052)                                         

 

             E/A ratio (test code = 3.08                                   



             6218203669)                                         

 

             CARLOS (test code = CARLOS)  The left                            



                          ventricular chamber                           



                          size is mildly                           



                          enlarged. Left                           



                          ventricular systolic                           



                          function is severely                           



                          impaired. Left                           



                          Ventricular ejection                           



                          fraction is 25 - 30%.                           



                          There is mild to                           



                          moderate Severely                           



                          Hypokinetic in the                           



                          anterior septal                           



                          segment of the wall.                           



                          Right Ventricle:                           



Mike Villanueva Abdomen [id number] 10:42:26Hm Interface, Radiology 
Results 2020 10:45 AM CDTEXAM: US ABDOMEN COMPLETECLINICAL D
ALEX:  Assess for signs of cirrhosis  liver nodularity  splenomegaly  signs of 
portal hypertensionCOMPARISON: NONE.FINDINGS: PANCREAS: The  head and body of 
the pancreas are unremarkable. The tail of the  is not well seen on the current 
study.LIVER:  The liver demonstrates normal echogenicity without focal mass or 
intrahepatic biliary ductal dilatation.MPV:  Doppler evaluation of the portal 
vein demonstrates normal hepatopedal flow. CBD:  0.72 cm within normal 
limits.GALLBLADDER:  The gallbladder is without evidence of calculi. The 
gallbladder wall is not thickened thickened. There is some minimal pe
richolecystic fluid. Mild sludge is present within the gallbladder.RIGHT KIDNEY:
 The right kidney is normal in size and echogenicity. There is no evidence of 
mass, calculi, or hydronephrosis.  The right kidney measures 12.30 cm LEFT 
KIDNEY:  The left kidney is normal in size and echogenicity. There is no 
evidence of mass, calculi, or hydronephrosis. The left kidney measures  11.27 cm
 .SPLEEN:  The spleen is homogeneous and not enlarged measuring  8.54 cm AORTA: 
The visualized upper abdominal aorta demonstrates no evidence of ectasia or 
aneurysm.IVC:  The visualized portions of the inferior vena cava are 
unremarkable.IMPRESSION:1.  The gallbladder demonstrates sludge to be present. 
There is someminimal pericholecystic fluid. There are no stones identified.2. 
Common bile duct is not dilated.3. Hepatopedal flow is seen within the portal 
vein. The spleen is not enlarged.4.There are no signs of portal 
hypertension.[id number] SnkiodbhoZfekdqij0354-84-19 07:49:24





             Test Item    Value        Reference Range Interpretation Comments

 

             Troponin (test code = 0.453 ng/mL  0-0.04       H            In [facility]

ients



             02586-5)                                            suspected of ha

ving



                                                                 a myocardial



                                                                 infarction, neal

ng



                                                                 with all other



                                                                 appropriate cli

nical



                                                                 measures and ac

tions



                                                                 including ECG a

nd



                                                                 other diagnosti

cs as



                                                                 appropriate, me

asure



                                                                 Ultra TnI at 0 

hrs



                                                                 and at 3



                                                                 hrs.Myocardial



                                                                 infarction VERY



                                                                 LIKELYThe 0 hr 

TnI



                                                                 level is > 0.10



                                                                 ng/mL----------

-----



                                                                 ---------------

-----



                                                                 ---------------

-----



                                                                 ---------------

--Sam



                                                                 cardial infarct

ion



                                                                 LIKELYThe 0 hr 

TnI



                                                                 level is > 0.04



                                                                 ng/mL and 3 hr 

level



                                                                 is increased or



                                                                 decreased by at



                                                                 least 0.020 ng/

mL



                                                                 ---------------

-----



                                                                 ---------------

-----



                                                                 ---------------

-----



                                                                 ------------Sam

cardi



                                                                 al infarction V

RAISSA



                                                                 UNLIKELYBoth th

e 0



                                                                 hr and 3 hr TnI



                                                                 levels <= 0.04



                                                                 ng/mL(within no

rmal



                                                                 limits) OR 0 hr

 is >



                                                                 0.04 ng/mL and 

3 hr



                                                                 is increased OR



                                                                 decreased by le

ss



                                                                 than 0.020 ng/m

L

 

             Lab Interpretation Abnormal                               



             (test code = 65294-6)                                        



[place] MethodistThyroid stimulating [id number] 16:45:17





             Test Item    Value        Reference Range Interpretation Comments

 

             TSH (test code = 3016-3) 0.63         0.27- 4.20 uIU/mL            

  



[facility] MethodistHEART/LUNG RESUSCITATION (CPR)2020 15:52:26SChayo campos MD     2020  3:58 PM Date of Service: 2020 Code Leader 
(Attending Provider):  Chayo Calloway MDDiagnosis: Cardiopulmonary 
arrestProcedure: Cardiopulmonary resuscitation - CPT code: 69447 Initial events:
Brendan Fisher a 74 y.o. male was found pulseless at 15:29.Comments:  
Description of events: Compressions: Initiated (20 1529). 15:29Upon my 
arrival at the bedside, patient's rhythm was ventricular tachycardia.  The 
initial rhythm reported to me by the code narrator nurse was ventricular 
fibrillation.CPR Stop Time: Compressions: Stopped for Pulse Check (20 
1535). 15:34CPR Outcome: Code Termination Due to: Return of spontaneous 
circulation (20 1535). ROSC      Airway:  Already intubated  
Defibrillation/Cardioversion during CPR: Yes   Defibrilated @200JxDisposition: 
remained in ICU following ROSC.Comments: The patient had went into ventricular 
tachycardia at the ventricular fibrillation he was shocked twice given 1 amp of 
epinephrine CPR was started at 1529 we got a pulse back after giving him 1 amp 
of calcium chloride and 1 amp of bicarband re-defibrillated the patient second 
time.I did order electrolytes and give the patient 2 g of magnesium and also did
consult cardiology started on amiodarone we are going to do EKG which shows 
supraventricular tachycardia rate of 160s to 170.  Repeat EKG and will again 
order echocardiogram also wewill get a trend troponin and start on amiodarone 
drip.  We will get a try to replace electrolytes.Did update the patient sister 
on the phone Nusrat Spivey and I did try to update the daughter Beronica Guajardo on the phone but she was not available.  Electronically Signed By: 
Florentino Draper MD20 3:52 PMCaliente Taoist
Urine [id number] 09:57:23





             Test Item    Value        Reference Range Interpretation Comments

 

             Urine culture No growth                              Specimen



             isolate (test after 24                               InformationSpe

[facility]



             code = 59406-9) hours                                  Source: Urin

eSpecimen



                                                                 Site: Clean cat

[facility] MethodistLipase [id number] 05:52:01





             Test Item    Value        Reference Range Interpretation Comments

 

             Lipase (test code = 3040-3) 8 U/L        13-60        L            

 

             Lab Interpretation (test code = Abnormal                           

    



             69797-9)                                            



Mike MethodistHemoglobin &amp; brhhnxfjjr2213-89-51 19:02:45





             Test Item    Value        Reference Range Interpretation Comments

 

             HGB (test code = 718-7) 8.4 g/dL     13-17.3      L            

 

             HCT (test code = 4544-3) 25.0 %       34-45        L            

 

             Lab Interpretation (test code = Abnormal                           

    



             26607-8)                                            



[facility] MethodistXR Abdomen 1  [id number] 15:41:02Hm Interface, 
Radiology Results 2020  3:44 PM CDTEXAMINATION:  XR ABDOMEN 1 
 PORTABLECLINICAL HISTORY:  feeding tube insertionCOMPARISON:  None 
available.FINDINGS: A Dobbhoff tube terminates within the gastric 
body.Nonobstructive bowel gas patternNo acute osseous abnormality.IMPRESSI
ON:Consider advancement of Dobbhoff tube into the proximal small 
bowel.1D2RAD_PS01HouCranberry Specialty Hospital MethodistCOVID-19 qualitative [id number] 09:54:15





             Test Item    Value        Reference Range Interpretation Comments

 

             Interpretation (test Negative results do                           



             code = 7841655) not preclude                           



                          2019-nCoV infection                           



                          and should not be                           



                          used as the sole                           



                          basis for treatment                           



                          or other patient                           



                          management decisions.                           



                          Negative results must                           



                          be combined with                           



                          clinical                               



                          observations, patient                           



                          history, and                           



                          epidemiological                           



                          information.                           

 

             COVID-19 qualitative Not-Detected Not-Detected              



             PCR result (test                                        



             code = 45499-7)                                        

 

             COVID-19 qualitative See link below for                           C

ase Number:



             PCR (test code = PDF Lab Report                           [id number]

[id number]



             7070)                                               



[facility] MethodistLactic acid level, SEPSIS - Now and repeat 2x every 3 hours
2020 08:50:36





             Test Item    Value        Reference Range Interpretation Comments

 

             Lactic acid (test code = 4.7 mmol/L   0.5-2.2                 Res

ults called to



             35345-0)                                            and read back b

geovanny



                                                                 _ADRIANA FALL NP

 AND



                                                                 [address]

AL



                                                                 (name/location)

 at



                                                                 _ICU(date/time)

 by



                                                                 __LSL. 20

20



                                                                 08:48

 

             Lab Interpretation (test Abnormal                               



             code = 24965-7)                                        



[facility] MethodistArterial Line [id number] 07:57:17Eduar Mckinley NP
    2020  7:57 AMArterial Line InsertionDate/Time: 2020 7:57 AMPerfor
med by: Eduar Mckinley NPAuthorized by: Eduar Mckinley NP  Consent:   
Consent obtained:  Emergent situationIndications:   Indications: hemodynamic 
monitoring and multiple ABGs  Pre-procedure details:   Skin preparation:  2% 
Chlorhexidine  Preparation: Patient was prepped and draped in sterile fashion  
Anesthesia (see MAR for exact dosages):   Anesthesia method:  NoneProcedure 
details:   Location:  R radial  Needle gauge:  20 G  Ultrasound guidance: yes   
Number of attempts:  1  Transducer: waveform confirmed  Post-procedure details: 
 Post-procedure:  Secured with tape, sterile dressing applied, sutured and wrist
guard applied  CMS:  Normal  Patient tolerance of procedure:  Tolerated well, no
immediate complicationsCaliente MethodistUrinalysis screen and microscopy, with 
reflex to [id number] 03:29:01





             Test Item    Value        Reference Range Interpretation Comments

 

             Specimen site (test code = Clean catch                            



             [id number])                                            

 

             Color, UA (test code = Yellow                                 



             5778-6)                                             

 

             Appearance, UA (test code = Slightly-Cloudy                        

   



             5767-9)                                             

 

             Specific gravity, UA (test 1.015        1.001-1.035               



             code = 5811-5)                                        

 

             pH, UA (test code = 5803-2) 6.0          5.0-8.5                   

 

             Protein, UA (test code = 2+           Negative     A            



             66636-6)                                            

 

             Glucose, UA (test code = 1+           Negative     A            



             85034-8)                                            

 

             Ketones, UA (test code = 1+           Negative     A            



             2514-8)                                             

 

             Bilirubin, UA (test code = Negative     Negative                  



             5770-3)                                             

 

             Blood, UA (test code = Large        Negative     A            



             5794-3)                                             

 

             Nitrite, UA (test code = Negative     Negative                  



             5802-4)                                             

 

             Urobilinogen, UA (test code = Negative     <2.0                    

  



             48880-1)                                            

 

             Leukocyte esterase, UA (test Negative     Negative                 

 



             code = 5799-2)                                        

 

             Epithelial cells, UA (test Many         /HPF                      



             code = 5787-7)                                        

 

             WBC, UA (test code = 5821-4) 19           0- 1 /HPF    H           

 

 

             RBC, UA (test code = 01734-0) 10           0- 5 /HPF    A          

  

 

             Bacteria, UA (test code = Few          None seen                 



             96607-4)                                            

 

             Yeast, UA (test code = None seen                              



             49845-9)                                            

 

             Yeast with pseudohyphae, UA None seen                              



             (test code = 25882-8)                                        

 

             Hyaline casts, UA (test code 81           /LPF                     

 



             = 5796-8)                                           

 

             Lab Interpretation (test code Abnormal                             

  



             = 70855-6)                                          



[facility] MethodistAlcohol level, ckxrt7050-45-70 03:05:21





             Test Item    Value        Reference Range Interpretation Comments

 

             Alcohol percent 0.060 %                                Normal



             (test code = 5643-2)                                           None

 DetectedLegal



                                                                 Intoxication in

 [place]   80



                                                                 mg/dL (0.08%)To

xic



                                                                 Concentration



                                                                 200 mg/dL



                                                                 (0.2%)Potential

ly Fatal



                                                                          350-50

0 mg/dL



                                                                 (0.35%-0.5%)



[facility] MethodistSepsis Clinical [id number] 02:55:55FoNell pandya NP     2020  3:27 AM74 y.o. male with PMH of COPD, CAD, 
HTN, PE, pancreatitis, ETOH abuse who presented to the ED following consuming an
entire bottle of whiskey. Per records, pt became minimally responsive so family 
called EMS. Pt became unresponsive in route and subsequently intubated in ED. 
Lab called to report lactic acid 13.2. Sepsis score 4, SIRS criteria 2. CXR n
egative. Blood cx pending.  Received Rocephin in ED. Discussed with ED physician
-- reported that ptreceived 1L fluid bolus per EMS and an additional 1L fluid 
bolus in ED (adequate for 30mL/kg fluid resuscitation). MD states she will 
broaden abx coverage with Zosyn. Elevated lactic acid likely due inpart to ETOH 
abuse and GI bleed. Transfused 2 units PRBCs in ED. MD reports concern for 
active bleed/possible esophageal varices. UA/cx added. Trend lactic acid.  Pt 
being admitted to ICU. Further management per ICU team.   Sepsis Clinical 
AssessmentPerformed by: Nell Jay NPAuthorized by: Nell Jay NP  Sepsis Clinical Assessment  General Assessment 
InformationCurrent sepsis score:  4 On comfort care?: No If score does not 
worsen, snooze alerts until:  2020 14:56 CDT SIRS Criteria   Heart rate 
&gt; 90 bpm due to acute condition WBC &gt; 12 K/mcL due to acute condition   
Organ Dysfunction    Lactate &gt; 2.0 mmol/L due to acute condition     Sepsis 
AssessmentClinical suspicion of infection? YesTime of suspicion of infection:  
2020 2:56 AMClinical suspicion of sepsis?: YesSepsis staging:  Severe 
sepsisSevere Sepsis T0:  2020 2:56 AM Sepsis protocol started?  YesWhere 
did the protocol start?:  ED Started  Suspected Type/Source of 
InfectionSuspected Type of Infection: BacterialSuspected Source of Infection: 
Source unknown       Sepsis Related VitalsHeart rate: 130Temperature:  
Respiratory rate: 20Blood pressure: 130/75Altered mental status:  WBC (k/uL)    
Date                     Value               2020               27.6 (HH) 
         2020               9.2            ---------- Weight-Based Fluid 
Bolus CalculationThe recommended weight-based bolus volume: 1,842 mL (dosing 
weight)Please refer to the MAR for actual med/fluid administrations.[facility] 
MethodistXR Chest 1 [id number] 02:02:04Hm Interface, Radiology Results 
Incoming - 2020  2:05 AM CDTExamination:  XR CHEST 1 VWClinical History:  
ETTComparison: 2020Technique: Single frontal view of the chest is 
obtained.Findings:The lungs are free of infiltrate.The heart size is normal.No 
pleural effusion is seen.Tip of the left IJ line is at the mid to distal SVC. 
Tip of the ET tube is 4.8 cm above the antony. Mild bibasilar atelectasis is 
seen. No pneumothorax is seen.Impression:Mild bibasilar atelectasis but 
otherwise no acute infiltrate.1D2RAD_PS01HouThe Hospitals of [facility] ED Preliminary 
Interpretation - Not an [id number] 01:37:13





             Test Item    Value        Reference Range Interpretation Comments

 

             CARLOS (test code = CARLOS) Netta Boyd DO                           



                          2020 11:31 AM[facility] [place]                           



                          ED Preliminary                           



                          Interpretation - Not                           



                          an OrderPerformed by:                           



                          Netta Boyd                           



                          DOAuthorized by:                           



                          Netta Boyd DO                           



                          ECG reviewed by ED                           



                          Physician in the                           



                          absence of a                           



                          cardiologist: yes                           



                          Interpretation:                           



                          Interpretation:                           



                          abnormal  Rate:   ECG                           



                          rate:  131  ECG rate                           



                          assessment:                            



                          tachycardic  Rhythm:                           



                          Rhythm: sinus                           



                          tachycardia  Ectopy:                           



                          Ectopy: none  QRS:                           



                          QRS axis:  Normal  QRS                           



                          intervals:                             



                          NormalConduction:                           



                          Conduction: normal  ST                           



                          segments:   ST                           



                          segments:                              



                          Non-specific (changes                           



                          in lateral leads)T                           



                          waves:   T waves:                           



                          normal                                 

 

             Lab Interpretation Abnormal                               



             (test code = 45843-0)                                        



[facility] MethodistFairchild [id number] 01:37:13Netta Boyd DO     2020
11:31 AMCentral LinePerformed by: Netta Boyd DOAuthorized by: Netta Boyd DO  Consent:   Consent obtained:  Emergent situation  Alternatives discussed:
 No treatmentUniversal protocol:   Patient identity confirmed:  Hospital-
assigned identification number and arm bandPre-procedure details:   Hand 
hygiene: Hand hygiene performed prior to insertion    Sterile barrier technique:
All elements of maximal sterile technique followed    Skin preparation:  2% 
chlorhexidine  Skin preparation agent: Skin preparation agent completely dried 
prior to procedure  Sedation:  Sedation Type:  Systemic  Systemic used::  
PropofolProcedure details:   Catheter type:  Triple lumen  Catheter size:  7 Fr 
Catheter length (cm):  20  Catheter site: internal jugular vein    CatheterSite 
Laterality:  Left  Patient position:  Flat  Landmarks identified: yes    
Ultrasound guidance: yes    Sterile ultrasound techniques: Sterile gel and 
sterile probe covers were used    Number of attempts:  1  Successful placement: 
yes  Post-procedure details:   Post-procedure:  Dressing applied and line 
sutured  Assessment:  Blood return through all ports, no pneumothorax on x-ray, 
free fluid flow and placement verified by x-ray  Patient tolerance of procedure:
 Tolerated well, no immediate complicationsCaliente RirsqoqbdDsjtihjlsv0514-61-25
01:37:Netta Mckinley DO     2020 11:31 AMIntubationPerformed by: Netta Boyd DOAuthorized by: Netta Boyd DO  Consent:   Consent obtained:  
Emergent situation  Alternatives discussed:  No treatmentUniversal protocol:   
Patient identity confirmed:  Anonymous protocol, patient vented/unresponsivePre-
procedure details:   Patient status:  Unresponsive  Induction:  Etomidate  
Paralytics:  RocuroniumProcedure details:   Preoxygenation:  Bag valve mask  CPR
in progress: no    Intubation method:  Oral  Technique:  Video laryngoscopy  
Laryngoscope blade:  Mac 3  Grade view:  1  Difficult airway?: No   Tube size 
(mm):  7.5  Tube type:  Cuffed  Number of attempts:  1  Cricoid pressure: yes   
Tube visualized through cords: yes  Placement assessment:   ETT to teeth:  21  
Tube secured with:  ETT tripp  Breath sounds:  Equal and absent over the 
epigastrium  Placement verification: chest rise, condensation, CXR verification 
and ETCO2 detector    CXR findings:  ETT in proper placePost-procedure details: 
 Patient tolerance of procedure:  Tolerated well, no immediate complications
[facility] [id number] 01:37:Netta Mckinley DO     
2020 11:31 AMCritical CarePerformed by: Netta Boyd DOAuthorized by:
Netta Boyd DO  Critical care provider statement:   Critical care time 
(minutes):  60  Critical care time was exclusive of:  Separately billable 
procedures and treating other patients  Critical care was necessary to treat or 
prevent imminent or life-threatening deterioration of the following conditions: 
Respiratory failure, shock and hepatic failure  Critical care was time spent 
personally by meon the following activities:  Re-evaluation of patient's 
condition, review of old charts, discussions with consultants, evaluation of 
patient's response to treatment, examination of patient, gastric intubation, 
ordering and performing treatments and interventions, ordering and review of 
laboratory studies, ordering and review of radiographic studies, ventilator 
management and vascular access procedures  Gama 'yes' if you are taking over 
critical care for this patient from another provider.: [facility]
Enteric viral [id number] 12:56:29Enteric viral panelNegative for all 
pathogens tested:Negative for Adenovirus F 40/41Negative for AstrovirusNegative 
for Norovirus GI/GIINegative for Rotavirus ANegative for SapovirusThis real-time
PCR assay detects the presence of nucleic acids (RNA or DNA) for the 
gastrointestinal pathogens listed.A result of "Not-detected" does not exclude 
the possibility of the presence of one or more pathogens atconcentrations less 
than the detectable limits of the assay. Comment: Specimen InformationSpecimen 
Source: StoolSpecimen Site: NonpresMatagorda Regional Medical CenterTransthoracic Echocardiogram Complete, (w Contrast, Strain and 3D if 
needed)2020 17:49:16





             Test Item    Value        Reference Range Interpretation Comments

 

             Ao Root Diameter (test 3.54 cm                                



             code = [id number])                                        

 

             AoV Area, Vmax (test 4.53 cm2                               



             code = 4252744028)                                        

 

             AoV Area, VTI (test 5.02 cm2                               



             code = 7161626006)                                        

 

             AoV Mean PG (test code 2.03         mmHg                      



             = [id number])                                        

 

             AoV Peak PG (test code 3.13         mmHg                      



             = 1443931550)                                        

 

             AoV Vmax (test code = 0.89 m/s                               



             0516865212)                                         

 

             AoV VTI (test code = 0.13 m                                 



             7600685900)                                         

 

             BSA Lorenzo (test code = 1.69 m2                                



             4886970294)                                         

 

             BSA (test code = 1.74 m2                                



             0040398112)                                         

 

             IVS,d (test code = 1.09 cm                                



             1474064127)                                         

 

             IVS/LVPW,2D (test code 0.90                                   



             = 7902820108)                                        

 

             Left Atrium Dimension 3.78 cm                                



             Anterior (test code =                                        



             3814076794)                                         

 

             LV,d (test code = 3.63 cm                                



             1678353947)                                         

 

             LV EF,2D (test code = 62.15 %                                



             8211174566)                                         

 

             LV,s (test code = 2.63 cm                                



             8641018948)                                         

 

             LVOT area (test code = 4.79 cm2                               



             5862136175)                                         

 

             LVOT Diam,S (test code 2.47 cm                                



             = 1150252500)                                        

 

             LVOT Vmax (test code = 0.87 m/s                               



             4079374200)                                         

 

             LVOT VTI (test code = 0.15 m                                 



             1984196303)                                         

 

             LVPWD,d (test code = 1.22 cm                                



             1103070066)                                         

 

             PV Pk Grad (test code = 1.91         mmHg                      



             4343309844)                                         

 

             PV VMAX (test code = 0.69 m/s                               



             3032988223)                                         

 

             MV E A ratio (test code 0.69                                   



             = 1856196505)                                        

 

             AoV area i VTI BSA 2.89 cm2/m2                            



             [place] (test code =                                        



             5849992652)                                         

 

             BMI (test code = 18.65 kg/m2                            



             2956691595)                                         

 

             E wave decelartion time 219.04       msec                      



             (test code =                                        



             9449335130)                                         

 

             MV Peak A Mark (test 0.83 m/s                               



             code = 8615356177)                                        

 

             MV valve area p 1/2 3.46 cm2                               



             method (test code =                                        



             5320596859)                                         

 

             MV Peak E Mark (test 0.57 m/s                               



             code = 2908087946)                                        

 

             MV stenosis pressure 63.52 ms                               



             1/2 time (test code =                                        



             3403798770)                                         

 

             AV LVOT peak gradient 2.78         mmHg                      



             (test code =                                        



             3732448802)                                         

 

             Ascending aorta (test 3.32 cm                                



             code = 0837366566)                                        

 

             Ao Root Diameter (test 3.54 cm                                



             code = 2539537422)                                        

 

             LV SYS VOL (test code = 25.20 ml                               



             7697772384)                                         

 

             LV RANGEL VOL (test code 55.49 ml                               



             = 2257331966)                                        

 

             LA area s A4C (test 14.07 cm2                              



             code = 4618308901)                                        

 

             LV SI Teich 2D (test 17.43 ml/m2                            



             code = 7858795625)                                        

 

             LV SV Teich 2D (test 30.29 ml                               



             code = 0549560854)                                        

 

             LV Vol s Teich PSAX 25.20 ml                               



             (test code =                                        



             5213069065)                                         

 

             LVOT CI (test code = 4.37 l/min/m2                           



             7051259658)                                         

 

             LVOT CO (test code = 7.60 l/min                             



             1705163176)                                         

 

             LVOT HR for LVOT .49       bpm                       



             (test code =                                        



             8431161876)                                         

 

             LVOT SI (test code = 37.88 ml/m2                            



             0893412239)                                         

 

             BSA [place] (test code 1.69 m2                                



             = 4491683974)                                        

 

             AoV Vmn (test code = 0.68                                   



             5406678945)                                         

 

             IVS s 2D (test code = 1.24                                   



             4175804865)                                         

 

             LV FS Teich 2D (test 27.66                                  



             code = 1872219986)                                        

 

             MV AE ratio (test code 1.46                                   



             = 0949927262)                                        

 

             LV FS Cube 2D (test 27.66                                  



             code = 0361611742)                                        

 

             LVOT Vmn (test code = 0.62                                   



             9359965962)                                         

 

             Pt Size (test code = 177.80                                 



             6440337941)                                         

 

             Pt Wt (test code = 58.97                                  



             3248397594)                                         

 

             Aov area Vmn (test code 4.14 cm2                               



             = 5865154084)                                        

 

             LA A_P score P (test 2.47                                   



             code = 4321572696)                                        

 

             LVOT mean grad (test 1.67         mmHg                      



             code = 4349439179)                                        

 

             MAX Pred HR (test code 147.20                                 



             = 9425389623)                                        

 

             85 of MPHR (test code = 125.12                                 



             3173225160)                                         

 

             AoV area I VMN bsa 2.38 cm2/m2                            



             (test code =                                        



             4986397398)                                         

 

             Calc MPHR (test code = 147.20       bpm                       



             9030389005)                                         

 

             IVS pct thck PLAX (test 13.18 %                                



             code = 8277414389)                                        

 

             LV SI Cube 2D (test 17.09 ml/m2                            



             code = 6232222437)                                        

 

             LV SV Cube 2D (test 29.71 ml                               



             code = 9327285576)                                        

 

             LV vol d cube 2D (test 47.80 ml                               



             code = 6828475358)                                        

 

             LV vol s cube 2D (test 18.09 ml                               



             code = 7802808820)                                        

 

             LVPW pct thck PLAX -8.83 %                                



             (test code =                                        



             5744380137)                                         

 

             LVPW s PLAX (test code 1.11 cm                                



             = 0826139843)                                        

 

             MV Decel slope (test 2.61 m/s2                              



             code = 3012149237)                                        

 

             Pred Exer Dur R1 (test 6.74                                   



             code = 9813803149)                                        

 

             Pred METS R1 (test code 7.08                                   



             = 1314439071)                                        

 

             LA Vol MOD A4C (test 34.15 ml                               



             code = 7585901300)                                        

 

             Velocity Ratio (V1/V2) 0.98 m/s                               



             (test code = 4689)                                        

 

             EF (test code = 54.59 %                                



             9615988987)                                         

 

             E/A ratio (test code = 0.69                                   



             6419585694)                                         

 

             CARLOS (test code = CARLOS)  There is borderline                       

    



                          left ventricular                           



                          concentric                             



                          hypertrophy. Left                           



                          Ventricular ejection                           



                          fraction is 50 -                           



                          55%. Spectral                           



                          Doppler shows impaired                           



                          relaxation pattern of                           



                          left ventricular                           



                          diastolic filling.                           



                          Left atrium size is                           



                          mildly dilated.                           



[facility] Wo [id number] 16:29:21Hm 
Interface, Radiology Results 2020  4:32 PM CDTEXAMINATION:  CT 
ANGIOGRAM ABDOMENPELVIS W AND OR WO CONTRASTCLINICAL HISTORY:  lactic acidosis  
suspect ischemicTECHNIQUE: Multiple CT angiographic images of the abdomen and 
pelvis were obtained during intravenous administration of contrast. Multiple 
computerized reformatted images as well as 3-D volume rendered images were also 
obtained.CT imaging was performed with iterative reconstruction techniques 
and/or automated exposure control to reduce radiation dose.  Precontrast images 
of the abdomen were also obtained.COMPARISON:  None.
FINDINGS:FINDINGS:Vasculature:Abdominal aorta demonstrates a normal course and 
caliber. There are moderate atherosclerotic changes of the abdominal aorta. No 
evidence of aortic aneurysm, dissection or other acute aortic syndrome. Moderate
approximately 50% stenosis in the proximal celiac at the ostium. SMA is patent 
with minimal disease which is nonobstructive. Mild stenosis at the ostium of the
right renal artery with mixed calcified and noncalcified plaque, approximately 
25% stenosis. The left renal artery is patent. Patent GRAHAM.Aortic Measurements 
are as follows:Abdominal aorta at the diaphragmatic carisa: 25 mmAbdominal aorta 
at the renal arteries: 16 mmInfrarenal abdominal aorta: 20 mmAbdominal aorta 
just proximal to the bifurcation: 12.7 mmRight pelvic and runoff vessels:Mild 
disease throughout the right common iliac, external iliac and CFA, without 
significant stenosis.Left pelvic and runoffvessels:Mild disease throughout the 
left common iliac, external iliac and CFA, without significant st
enosis.Chest:Lung bases are unremarkable.Abdomen: Liver parenchyma is diffusely 
and markedly low density, compatible with severe steatosis. The spleen, 
pancreas, gallbladder, and adrenal glands are unremarkable. Small hiatal hernia 
stable from prior. The kidneys are unremarkable. Colonic diverticuli noted. 
Unchanged pancolonic wall thickening compared with prior study and mild 
surrounding inflammatory changes in the mesentery.Pelvis: Bladder is 
unremarkable. The orthopedic hardware in the sacrum is in 
place.IMPRESSION:1.Patent mesenteric vasculature.2.Unchanged pancolonic mild 
wall thickening, favored infectious or inflammatory.3.Severe hepatic 
steatosis.[facility]-[id number] and approved by radiology resident/fellow: 
STAN Silva, Arpan Corbin MD, personally reviewed the images and res
ident's/fellow's findings and agree with the final report.[facility] 
Abdomen Pelvis W [id number] 22:27:31Hm Interface, Radiology Results 
2020 10:30 PM CDTExamination:  CT ABDOMEN PELVIS W C
ONTRASTClinical History: Abd pain  gastroenteritis or colitis 
suspectedComparison: None.Findings:CT scans are performed using radiation dose 
reduction techniques.  Technical factors are evaluated and adjusted to ensure 
appropriate moderation of exposure.  Automated dose management technology is 
applied to adjust radiation exposure while achieving a diagnostic quality image.
CT imaging was performed with iterative reconstruction techniques and/or 
automated exposure control to reduce radiation dose.CTscan of the abdomen and 
pelvis was performed after intravenous contrast.The liver is diffusely low in 
density. The spleen, pancreas, gallbladder, and adrenal glands are 
unremarkable.The kidneys are within normal limits without hydronephrosis or 
urinary calculus.The appendix is nonvisualized. There is mild bowel wall 
prominence or thickening noted of the sigmoid colon but is not well-distended. 
No fatstranding is seen. Scattered colonic diverticuli are noted. No bowel 
dilatation is seen.No free air or fluid is seen. Urinary bladder is 
unremarkable.The visualized lung bases show small hiatal hernia.IMPRESSION:1. 
Fatty liver.2 small hiatal hernia.3. No bowel wall prominence with thickening 
noted of the sigmoid colon may represent incomplete distention versus mild 
inflammation.[facility]-[id number] MethodistCRITICAL [id number] 21:30:02
Best Ramos DO     2020  1:35 AMCritical CarePerformed by: 
Best Ramos, Authorized by: Best Ramos DO  Critical 
care provider statement:   Critical care time (minutes):  65  Critical care time
was exclusive of:  Separately billable procedures and treating otherpatients and
teaching time  Critical care was necessary to treat or prevent imminent or 
life-threatening deterioration of the following conditions:  Cardiac failure and
circulatory failure  Critical care was time spent personally by me on the 
following activities:  Blood draw for specimens, development of treatment plan 
with patient or surrogate, discussions with consultants, discussions with 
primaryprovider, evaluation of patient's response to treatment, examination of 
patient, interpretation of cardiac output measurements, obtaining history from 
patient or surrogate, vascular access procedures, re-evaluation of patient's 
condition, pulse oximetry, ordering and review of radiographic studies, ordering
and review of laboratory studies, ordering and performing treatments and 
interventions and review of old charts  Gama 'yes' if you are taking over 
critical care for this patient from another provider.: Indigo Marcelino
Hepatitis B Viral DNA, quant [id number] 13:49:00





             Test Item    Value        Reference Range Interpretation Comments

 

             HBV PCR, Quantitative 22           <20 IU/mL    H            



             (test code = 01283-3)                                        

 

             CARLOS (test code = CARLOS) This test uses a                           



                          Real-Time Polymerase                           



                          Chain Reaction                           



                          (RT-PCR) methodology                           



                          and was performed                           



                          using JEROD                           



                          AmpliPrep/JEROD                           



                          TaqMan HBV Test,                           



                          v2.0 (Roche [employer]                           



                          Systems, Inc.).                           



                          Reportable range for                           



                          this assay is 20 -                           



                          170,000,000 IU per mL                           



                          (1.30 - 8.23 Log                           



                          IU/mL).                                

 

             Lab Interpretation (test Abnormal                               



             code = 49200-2)                                        



[facility] CenterHEPATITIS [facility] PCR, [id number] 13:49:00





             Test Item    Value        Reference Range Interpretation Comments

 

             HBV NUMERIC RESULT (BEAKER) (test 22 IU/mL     <20          H      

      



             code = 2702)                                        



This test uses a Real-Time Polymerase Chain Reaction (RT-PCR) methodology and 
was performed using JEROD  AmpliPrep/JEROD  TaqMan  HBV Test, v2.0 (Roche 
[employer] Systems, Inc.).Reportable range for this assay is 20 - 170,000,000 IU 
per mL (1.30 - 8.23 Log IU/mL).MR, ABDOMEN, [id number] 13:54:00Referring: 
Dr. Mart GARCIA, assess for HCC, liver lesionHepatitis B, assess 
for HCC, liver lesionFINAL REPORT PATIENT ID:   [id number] TECHNIQUE: MRI of the 
abdomen WITHOUT and WITH intravenous contrast. INDICATION: Hepatitis B screening
for HCC. COMPARISON: 2019.  FINDINGS: LOWER THORAX: The heart is enlarged..
LIVER: No hepatic signal abnormality. Mixed T2 signal intensity lesion in the 
domeof the right hepatic lobe measuring 7.6 x 7 cm, not significant change since
2018. The lesion demonstrates intrinsic T1 hyperintense signal 
enhance on the postcontrast images. There are multiple additional T2 
hyperintense foci in the bilateral hepatic lobes which do not postcontrast 
images consistent with multiple simple cysts.. BILIARY: Gallbladder is 
unremarkable. No biliary ductal dilatation or filling defect.SPLEEN: No 
splenomegaly.PANCREAS: No focal masses or ductal dilatation. ADRENALS: No 
adrenal nodules.KIDNEYS/URETERS: No hydronephrosis or solid mass lesions. 
Multiple bilateral renal cysts. The largest measures 4.3 cm in the lower pole of
the right kidney. PERITONEUM/RETROPERITONEUM: No free fluid.LYMPH NODES: No 
lymphadenopathy.VESSELS: Unremarkable. GI TRACT: No distentionor wall 
thickening. There is colonic diverticulosis. BONES AND SOFT TISSUES: Multilevel 
degenerativechanges in the spine..  IMPRESSION:Stable hemorrhagic cyst in the 
dome of the right hepatic lobe.  No suspicious hepatic lesions. Signed: 
Kaila Horne MDReport Verified Date/Time:  202013:54:01 Reading 
Location: [address] Radiology Reading Room   Electronically signed by: 
KAILA HORNE MD on 2020 01:54 PMMR abdomen with/without IV 
[id number] 13:54:00Interface, External Ris In - 2020  1:56 PM 
CDTFINAL REPORT PATIENT ID:   [id number] TECHNIQUE: MRI of the abdomen WITHOUT and
WITH intravenous contrast. INDICATION: Hepatitis B screening for HCC. [place]
MPARISON: 2019.  FINDINGS: LOWER THORAX: The heart is enlarged.. LIVER: No 
hepatic signal abnormality. Mixed T2 signal intensity lesion in the dome of the 
right hepatic lobe measuring 7.6 x 7 cm, not significant change since 2018. The lesion demonstrates intrinsic T1 hyperintense signal enhance on 
the postcontrast images. There are multiple additional T2 hyperintense foci in 
the bilateral hepatic lobes which do not postcontrast images consistent with 
multiple simple cysts.. BILIARY: Gallbladder is unremarkable. No biliary ductal 
dilatation or filling defect.SPLEEN: No splenomegaly.PANCREAS: No focal masses 
or ductal dilatation. ADRENALS: No adrenal nodules.KIDNEYS/URETERS: No hydr
onephrosis or solid mass lesions. Multiple bilateral renal cysts. The largest 
measures 4.3 cm in thelower pole of the right kidney. 
PERITONEUM/RETROPERITONEUM: No free fluid.LYMPH NODES: No lymphadenop
athy.VESSELS: Unremarkable. GI TRACT: No distention or wall thickening. There is
colonic diverticulosis. BONES AND SOFT TISSUES: Multilevel degenerative changes 
in the spine..  IMPRESSION:Stable hemorrhagic cyst in the dome of the right 
hepatic lobe.  No suspicious hepatic lesions. Signed: Kaila Horne 
MDReport Verified Date/Time:  2020 13:54:01 Reading Location: [address] Radiology Reading Room   Electronically signed by: KAILA HORNE MD
on 2020 01:54 [facility]Alpha [facility] 
tumor [id number] 10:52:00





             Test Item    Value        Reference Range Interpretation Comments

 

             Alpha-Fetoprotein (test <2.0         <10.0 ng/mL               



             code = 1834-1)                                        

 

             CARLOS (test code = CARLOS)  ID Cortney FRANCO                          

 



                          L                                      

 

             Lab Interpretation (test Normal                                 



             code = 04125-6)                                        



[facility] CenterALPHA FETOPROTEIN (AFP), TUMOR [id number] 
10:52:00





             Test Item    Value        Reference Range Interpretation Comments

 

             ALPHA-FETOPROTEIN (BEAKER) (test code < ng/mL      <10.0           

          



             = 1094)                                             



 ADALID SOLERasic Metabolic [id number] 10:43:00





             Test Item    Value        Reference Range Interpretation Comments

 

             Sodium (test code = 139 meq/L    136-145                   



             2951-2)                                             

 

             Potassium (test code = 4.9 meq/L    3.5-5.1                   



             2823-3)                                             

 

             Chloride (test code = 105 meq/L                        



             2075-0)                                             

 

             CO2 (test code = 28 meq/L     22-29                     



             2028-9)                                             

 

             BUN (test code = 30 mg/dL     7-21         H            



             3094-0)                                             

 

             Creatinine (test code 2.36 mg/dL   0.57-1.25    H            



             = 2160-0)                                           

 

             Glucose (test code = 90 mg/dL                         



             2345-7)                                             

 

             Calcium (test code = 9.6 mg/dL    8.4-10.2                  



             54512-3)                                            

 

             EGFR (test code = 33           mL/min/1.73 sq m              JORDAN SOARES GFR IS



             33914-3)                                            NOT AS ACCURATE



                                                                 AS CREATININE



                                                                 CLEARANCE IN



                                                                 PREDICTING



                                                                 GLOMERULAR



                                                                 FILTRATION RATE

.



                                                                 ESTIMATED GFR I

S



                                                                 NOT APPLICABLE



                                                                 FOR DIALYSIS



                                                                 PATIENTS.

 

             CARLOS (test code = CARLOS)  ID -                           



                          ARMIN C                                  

 

             Lab Interpretation Abnormal                               



             (test code = 15160-3)                                        



[id number] [facility]





             Test Item    Value        Reference Range Interpretation Comments

 

             Protein, Total (test code 7.4          6.0- 8.3 gm/dL              



             = 2885-2)                                           

 

             Albumin (test code = 3.8 g/dL     3.5-5                     



             40768-6)                                            

 

             Total Bilirubin (test code 0.4 mg/dL    0.2-1.2                   



             = 1975-2)                                           

 

             Bilirubin, Direct (test 0.1 mg/dL    0.1-0.5                   



             code = 1968-7)                                        

 

             Alkaline Phosphatase (test 48 U/L                           



             code = 6768-6)                                        

 

             AST (test code = 1920-8) 16 U/L       5-34                      

 

             ALT (test code = 1742-6) 14 U/L       6-55                      

 

             CARLOS (test code = CARLOS)  ID - ARMIN C                          

 

 

             Lab Interpretation (test Normal                                 



             code = 74756-1)                                        



[facility] [id number] 10:43:00





             Test Item    Value        Reference Range Interpretation Comments

 

             TOTAL PROTEIN (BEAKER) (test code = 7.4 gm/dL    6.0-8.3           

        



             770)                                                

 

             ALBUMIN (BEAKER) (test code = 1145) 3.8 g/dL     3.5-5.0           

        

 

             BILIRUBIN TOTAL (BEAKER) (test code 0.4 mg/dL    0.2-1.2           

        



             = 377)                                              

 

             BILIRUBIN DIRECT (BEAKER) (test 0.1 mg/dL    0.1-0.5               

    



             code = 706)                                         

 

             ALKALINE PHOSPHATASE (BEAKER) (test 48 U/L                   

        



             code = 346)                                         

 

             AST (SGOT) (BEAKER) (test code = 16 U/L       5-34                 

     



             353)                                                

 

             ALT (SGPT) (BEAKER) (test code = 14 U/L       6-55                 

     



             347)                                                



 ID -  CBASIC METABOLIC [id number] 10:43:00





             Test Item    Value        Reference Range Interpretation Comments

 

             SODIUM (BEAKER) 139 meq/L    136-145                   



             (test code = 381)                                        

 

             POTASSIUM (BEAKER) 4.9 meq/L    3.5-5.1                   



             (test code = 379)                                        

 

             CHLORIDE (BEAKER) 105 meq/L                        



             (test code = 382)                                        

 

             CO2 (BEAKER) (test 28 meq/L     22-29                     



             code = 355)                                         

 

             BLOOD UREA NITROGEN 30 mg/dL     7-21         H            



             (BEAKER) (test code                                        



             = 354)                                              

 

             CREATININE (BEAKER) 2.36 mg/dL   0.57-1.25    H            



             (test code = 358)                                        

 

             GLUCOSE RANDOM 90 mg/dL                         



             (BEAKER) (test code                                        



             = 652)                                              

 

             CALCIUM (BEAKER) 9.6 mg/dL    8.4-10.2                  



             (test code = 697)                                        

 

             EGFR (BEAKER) (test 33 mL/min/1.73                           ESTIMA

GEOVANNA GFR IS



             code = 1092) sq m                                   NOT AS ACCURATE

 AS



                                                                 CREATININE



                                                                 CLEARANCE IN



                                                                 PREDICTING



                                                                 GLOMERULAR



                                                                 FILTRATION RATE

.



                                                                 ESTIMATED GFR I

S



                                                                 NOT APPLICABLE 

FOR



                                                                 DIALYSIS PATIEN

TS.



 ID -  CCBC with platelet count + automated [id number] 10:15:00





             Test Item    Value        Reference Range Interpretation Comments

 

             WBC (test code = 6690-2) 4.7          3.5- 10.5 K/L              

 

             RBC (test code = 789-8) 3.60         4.63- 6.08 M/L L            

 

             MCHC (test code = 786-4) 32.1         32.3- 36.5 GM/DL L           

 

 

             Hematocrit (test code = 4544-3) 34.3 %       40.1-51      L        

    

 

             MCV (test code = 787-2) 95.3 fL      79-92.2      H            

 

             MCH (test code = 785-6) 30.6 pg      25.7-32.2                 

 

             RDW (test code = 788-0) 14.9 %       11.6-14.4    H            

 

             Platelets (test code = 777-3) 150          150- 450 K/CU MM        

      

 

             MPV (test code = 53609-3) 11.0 fL      9.4-12.4                  

 

             nRBC (test code = 413) 0            0- 0 /100 WBC              

 

             % Neutros (test code = 429) 52 %                                   

 

             % Lymphs (test code = 430) 32 %                                   

 

             % Monos (test code = 431) 10 %                                   

 

             % Eos (test code = 432) 5 %                                    

 

             % Baso (test code = 437) 1 %                                    

 

             # Neutros (test code = 670) 2.44         1.78- 5.38 K/L          

    

 

             # Lymphs (test code = 414) 1.52         1.32- 3.57 K/L           

   

 

             # Monos (test code = 415) 0.47         0.30- 0.82 K/L            

  

 

             # Eos (test code = 416) 0.21         0.04- 0.54 K/L              

 

             # Baso (test code = 417) 0.05         0.01- 0.08 K/L             

 

 

             Immature Granulocytes-Relative 0 %          0-1                    

   



             (test code = 2801)                                        

 

             Lab Interpretation (test code = Abnormal                           

    



             87312-2)                                            



[facility] W/PLT COUNT &amp; AUTO [id number] 
10:15:00





             Test Item    Value        Reference Range Interpretation Comments

 

             WHITE BLOOD CELL COUNT (BEAKER) 4.7 K/ L     3.5-10.5              

    



             (test code = 775)                                        

 

             RED BLOOD CELL COUNT (BEAKER) 3.60 M/ L    4.63-6.08    L          

  



             (test code = 761)                                        

 

             HEMOGLOBIN (BEAKER) (test code = 11.0 GM/DL   13.7-17.5    L       

     



             410)                                                

 

             HEMATOCRIT (BEAKER) (test code = 34.3 %       40.1-51.0    L       

     



             411)                                                

 

             MEAN CORPUSCULAR VOLUME (BEAKER) 95.3 fL      79.0-92.2    H       

     



             (test code = 753)                                        

 

             MEAN CORPUSCULAR HEMOGLOBIN 30.6 pg      25.7-32.2                 



             (BEAKER) (test code = 751)                                        

 

             MEAN CORPUSCULAR HEMOGLOBIN CONC 32.1 GM/DL   32.3-36.5    L       

     



             (BEAKER) (test code = 752)                                        

 

             RED CELL DISTRIBUTION WIDTH 14.9 %       11.6-14.4    H            



             (BEAKER) (test code = 412)                                        

 

             PLATELET COUNT (BEAKER) (test 150 K/CU MM  150-450                 

  



             code = 756)                                         

 

             MEAN PLATELET VOLUME (BEAKER) 11.0 fL      9.4-12.4                

  



             (test code = 754)                                        

 

             NUCLEATED RED BLOOD CELLS 0 /100 WBC   0-0                       



             (BEAKER) (test code = 413)                                        

 

             NEUTROPHILS RELATIVE PERCENT 52 %                                  

 



             (BEAKER) (test code = 429)                                        

 

             LYMPHOCYTES RELATIVE PERCENT 32 %                                  

 



             (BEAKER) (test code = 430)                                        

 

             MONOCYTES RELATIVE PERCENT 10 %                                   



             (BEAKER) (test code = 431)                                        

 

             EOSINOPHILS RELATIVE PERCENT 5 %                                   

 



             (BEAKER) (test code = 432)                                        

 

             BASOPHILS RELATIVE PERCENT 1 %                                    



             (BEAKER) (test code = 437)                                        

 

             NEUTROPHILS ABSOLUTE COUNT 2.44 K/ L    1.78-5.38                 



             (BEAKER) (test code = 670)                                        

 

             LYMPHOCYTES ABSOLUTE COUNT 1.52 K/ L    1.32-3.57                 



             (BEAKER) (test code = 414)                                        

 

             MONOCYTES ABSOLUTE COUNT (BEAKER) 0.47 K/ L    0.30-0.82           

      



             (test code = 415)                                        

 

             EOSINOPHILS ABSOLUTE COUNT 0.21 K/ L    0.04-0.54                 



             (BEAKER) (test code = 416)                                        

 

             BASOPHILS ABSOLUTE COUNT (BEAKER) 0.05 K/ L    0.01-0.08           

      



             (test code = 417)                                        

 

             IMMATURE GRANULOCYTES-RELATIVE 0 %          0-1                    

   



             PERCENT (BEAKER) (test code =                                      

  



             2801)                                               



[id number] 08:43:00





             Test Item    Value        Reference Range Interpretation Comments

 

             POC-Creatinine (test code 2.4 mg/dL    0.6-1.3      H            : 

TESTED AT [facility]



             = 1859)                                             [address] [facility] [place], [address]

30:



                                                                 /Techni

kathy



                                                                 ID = [id number] for



                                                                 Chary Fuentes

 

             POC-EGFR (test code = 32           mL/min/1.73M2              



             1860)                                               

 

             Lab Interpretation (test Abnormal                               



             code = 59039-5)                                        



[id number] 08:43:00





             Test Item    Value        Reference Range Interpretation Comments

 

             POC-CREATININE 2.4 mg/dL    0.6-1.3      H            : TESTED AT [facility]



             (BEAKER) (test                                        [address] [place] [address]



             code = 1859)                                        [place], [address]:



                                                                 /Techni

kathy



                                                                 ID = [id number] for



                                                                 Izzy-Daniel S

yaima

 

             POC-EGFR (BEAKER) 32 mL/min/1.73M2                           



             (test code =                                        



             1860)                                               



COMPREHENSIVE METABOLIC [id number] 04:32:00





             Test Item    Value        Reference Range Interpretation Comments

 

             SODIUM (test code = 133 mmol/L   136-145      L            



             NA)                                                 

 

             POTASSIUM (test code = 3.6 mmol/L   3.5-5.1      N            



             K)                                                  

 

             CHLORIDE (test code = 102.0 mmol/L        N            



             CL)                                                 

 

             CARBON DIOXIDE (test 23.0 mmol/L  21-32        N            



             code = CO2)                                         

 

             ANION GAP (test code = 11.6         10-20        N            



             GAP)                                                

 

             GLUCOSE (test code = 95 mg/dL            N            



             GLU)                                                

 

             BLOOD UREA NITROGEN 7 mg/dL      7-18         N            



             (test code = BUN)                                        

 

             GLOMERULAR FILTRATION > 60 mL/min  >=60                      Estima

geovanna GFR by



             RATE (test code = GFR)                                        using

 Modified MDRD



                                                                 formula.Chronic



                                                                 kidney disease 

is



                                                                 defined as [facility]

er



                                                                 kidney damageor

 GFR



                                                                 <60 mL/min/1.73

 m2



                                                                 for >3 months.

 

             CREATININE (test code 0.60 mg/dL   0.7-1.3      L            



             = CREAT)                                            

 

             BUN/CREATININE RATIO 11.7         10-20        N            



             (test code = BUN/CREA)                                        

 

             TOTAL PROTEIN (test 5.5 gram/dL  6.4-8.2      L            



             code = PROT)                                        

 

             ALBUMIN (test code = 2.4 g/dL     3.4-5.0      L            



             ALB)                                                

 

             GLOBULIN (test code = 3.1 gram/dL  2.7-4.2      N            



             GLOB)                                               

 

             ALBUMIN/GLOBULIN RATIO 0.8          0.75-1.50    N            



             (test code = A/G)                                        

 

             CALCIUM (test code = 8.0 mg/dL    8.5-10.1     L            



             CA)                                                 

 

             BILIRUBIN TOTAL (test 0.90 mg/dL   0.0-1.0      N            



             code = BILT)                                        

 

             SGOT/AST (test code = 60 IUnit/L   15-37        H            



             AST)                                                

 

             SGPT/ALT (test code = 68 IUnit/L   12-78        N            



             ALT)                                                

 

             ALKALINE PHOSPHATASE 121 IUnit/L         H            **Note 

change in



             TOTAL (test code =                                        reference

 range due



             ALKP)                                               to change in



                                                                 reagent.**



[id number] 04:32:00





             Test Item    Value        Reference Range Interpretation Comments

 

             LIPASE (test code = LIP) 1170 U/L     73.0-393.0   H            



CBC W/AUTO [id number] 04:29:00





             Test Item    Value        Reference Range Interpretation Comments

 

             WHITE BLOOD CELL (test 11.4 K/mm3   4.5-12.5     N            



             code = WBC)                                         

 

             RED BLOOD CELL (test code 3.88 mill/mm3 4.0-5.8      L            



             = RBC)                                              

 

             HEMOGLOBIN (test code = 10.1 gram/dL 13.0-17.5    L            



             HGB)                                                

 

             HEMATOCRIT (test code = 32.8 %       42.0-52.0    L            



             HCT)                                                

 

             MEAN CELL VOLUME (test 84.5 fL      80-98        N            



             code = MCV)                                         

 

             MEAN CELL HGB (test code 26.0 picogram 27.0-33.0    L            



             = MCH)                                              

 

             MEAN CELL HGB 30.8 gram/dL 33.0-36.0    L            



             CONCETRATION (test code =                                        



             MCHC)                                               

 

             RED CELL DISTRIBUTION 24.0 %       11.6-16.2    H            



             WIDTH (test code = RDW)                                        

 

             RED CELL DISTRIBUTION 64.3 fL      37.0-51.0    H            



             WIDTH SD (test code =                                        



             RDW-SD)                                             

 

             PLATELET COUNT (test code 268 K/mm3    150-450      N            



             = PLT)                                              

 

             MEAN PLATELET VOLUME 10.4 fL      6.7-11.0     N            



             (test code = MPV)                                        

 

             NEUTROPHIL % (test code = 76.9 %       39.0-69.0    H            



             NT%)                                                

 

             IMMATURE GRANULOCYTE % 1.9 %        0.0-5.0      N            



             (test code = IG%)                                        

 

             LYMPHOCYTE % (test code = 8.3 %        25.0-55.0    L            



             LY%)                                                

 

             MONOCYTE % (test code = 11.8 %       0.0-10.0     H            



             MO%)                                                

 

             EOSINOPHIL % (test code = 0.6 %        0.0-5.0      N            



             EO%)                                                

 

             BASOPHIL % (test code = 0.5 %        0.0-1.0      N            



             BA%)                                                

 

             NUCLEATED RBC % (test 0.0 %        0-0          N            



             code = NRBC%)                                        

 

             NEUTROPHIL # (test code = 8.78 K/mm3   1.8-7.7      H            



             NT#)                                                

 

             IMMATURE GRANULOCYTE # 0.22 x10 3/uL 0-0.03       H            



             (test code = IG#)                                        

 

             LYMPHOCYTE # (test code = 0.95 K/mm3   1.0-5.0      L            



             LY#)                                                

 

             MONOCYTE # (test code = 1.35 K/mm3   0-0.8        H            



             MO#)                                                

 

             EOSINOPHIL # (test code = 0.07 K/mm3   0.0-0.5      N            



             EO#)                                                

 

             BASOPHIL # (test code = 0.06 K/mm3   0.0-0.2      N            



             BA#)                                                

 

             NUCLEATED RBC # (test 0.00 K/mm3   0.0-0.1      N            



             code = NRBC#)                                        

 

             MANUAL DIFF REQUIRED NO, ONLY SCAN NEEDED                          

 



             (test code = MDIFF)                                        



DIFFERENTIAL [id number] 04:29:00





             Test Item    Value        Reference Range Interpretation Comments

 

             STAIN ACCEPTABILITY (test STAIN ACCEPTABLE                         

  



             code = STN ACCEPTABLE)                                        

 

             POLYCHROMASIA (test code = 1+                                     



             POLC)                                               

 

             POIKILOCYTOSIS (test code 2+                                     



             = POIK)                                             

 

             ANISOCYTOSIS (test code = 2+                                     



             ANISO)                                              

 

             MICROCYTOSIS (test code = 1+                                     



             MICR)                                               

 

             TARGET CELLS (test code = 1+                                     



             TGT)                                                

 

             AMELIA CELLS (test code = 1+           NONE                      



             AMELIA)                                               

 

             ACANTHOCYTES (test code = 1+           NONE                      



             ACAN)                                               

 

             MORPHOLOGY COMMENT (test TEST NOT PERFORMED                        

   



             code = MOC)                                         

 

             PLATELET ESTIMATE (test ADEQUATE                               



             code = PLTEST)                                        

 

             PLATELET MORPHOLOGY (test NORMAL                                 



             code = PLTMORPH)                                        



COMPREHENSIVE METABOLIC [id number] 04:24:00





             Test Item    Value        Reference Range Interpretation Comments

 

             SODIUM (test code = NA) 133 mmol/L   136-145      L            

 

             POTASSIUM (test code = K) 3.6 mmol/L   3.5-5.1      N            

 

             CHLORIDE (test code = CL) 102.0 mmol/L        N            

 

             CARBON DIOXIDE (test code = CO2)  mmol/L      21-32                

     

 

             ANION GAP (test code = GAP)              10-20                     

 

             GLUCOSE (test code = GLU)  mg/dL                           

 

             BLOOD UREA NITROGEN (test code =  mg/dL       7-18                 

     



             BUN)                                                

 

             GLOMERULAR FILTRATION RATE (test  mL/min      >=60                 

     



             code = GFR)                                         

 

             CREATININE (test code = CREAT)  mg/dL       0.7-1.3                

   

 

             BUN/CREATININE RATIO (test code              10-20                 

    



             = BUN/CREA)                                         

 

             TOTAL PROTEIN (test code = PROT)  gram/dL     6.4-8.2              

     

 

             ALBUMIN (test code = ALB)  g/dL        3.4-5.0                   

 

             GLOBULIN (test code = GLOB)  gram/dL     2.7-4.2                   

 

             ALBUMIN/GLOBULIN RATIO (test              0.75-1.50                

 



             code = A/G)                                         

 

             CALCIUM (test code = CA)  mg/dL       8.5-10.1                  

 

             BILIRUBIN TOTAL (test code =  mg/dL       0.0-1.0                  

 



             BILT)                                               

 

             SGOT/AST (test code = AST)  IUnit/L     15-37                     

 

             SGPT/ALT (test code = ALT)  IUnit/L     12-78                     

 

             ALKALINE PHOSPHATASE TOTAL (test  IUnit/L                    

     



             code = ALKP)                                        



[id number] 04:24:00





             Test Item    Value        Reference Range Interpretation Comments

 

             LIPASE (test code = LIP)  U/L         73.0-393.0                



CBC W/AUTO [id number] 04:06:00





             Test Item    Value        Reference Range Interpretation Comments

 

             WHITE BLOOD CELL (test 11.4 K/mm3   4.5-12.5     N            



             code = WBC)                                         

 

             RED BLOOD CELL (test code 3.88 mill/mm3 4.0-5.8      L            



             = RBC)                                              

 

             HEMOGLOBIN (test code = 10.1 gram/dL 13.0-17.5    L            



             HGB)                                                

 

             HEMATOCRIT (test code = 32.8 %       42.0-52.0    L            



             HCT)                                                

 

             MEAN CELL VOLUME (test 84.5 fL      80-98        N            



             code = MCV)                                         

 

             MEAN CELL HGB (test code 26.0 picogram 27.0-33.0    L            



             = MCH)                                              

 

             MEAN CELL HGB 30.8 gram/dL 33.0-36.0    L            



             CONCETRATION (test code =                                        



             MCHC)                                               

 

             RED CELL DISTRIBUTION 24.0 %       11.6-16.2    H            



             WIDTH (test code = RDW)                                        

 

             RED CELL DISTRIBUTION 64.3 fL      37.0-51.0    H            



             WIDTH SD (test code =                                        



             RDW-SD)                                             

 

             PLATELET COUNT (test code 268 K/mm3    150-450      N            



             = PLT)                                              

 

             MEAN PLATELET VOLUME 10.4 fL      6.7-11.0     N            



             (test code = MPV)                                        

 

             NEUTROPHIL % (test code = 76.9 %       39.0-69.0    H            



             NT%)                                                

 

             IMMATURE GRANULOCYTE % 1.9 %        0.0-5.0      N            



             (test code = IG%)                                        

 

             LYMPHOCYTE % (test code = 8.3 %        25.0-55.0    L            



             LY%)                                                

 

             MONOCYTE % (test code = 11.8 %       0.0-10.0     H            



             MO%)                                                

 

             EOSINOPHIL % (test code = 0.6 %        0.0-5.0      N            



             EO%)                                                

 

             BASOPHIL % (test code = 0.5 %        0.0-1.0      N            



             BA%)                                                

 

             NUCLEATED RBC % (test 0.0 %        0-0          N            



             code = NRBC%)                                        

 

             NEUTROPHIL # (test code = 8.78 K/mm3   1.8-7.7      H            



             NT#)                                                

 

             IMMATURE GRANULOCYTE # 0.22 x10 3/uL 0-0.03       H            



             (test code = IG#)                                        

 

             LYMPHOCYTE # (test code = 0.95 K/mm3   1.0-5.0      L            



             LY#)                                                

 

             MONOCYTE # (test code = 1.35 K/mm3   0-0.8        H            



             MO#)                                                

 

             EOSINOPHIL # (test code = 0.07 K/mm3   0.0-0.5      N            



             EO#)                                                

 

             BASOPHIL # (test code = 0.06 K/mm3   0.0-0.2      N            



             BA#)                                                

 

             NUCLEATED RBC # (test 0.00 K/mm3   0.0-0.1      N            



             code = NRBC#)                                        

 

             MANUAL DIFF REQUIRED NO, ONLY SCAN NEEDED                          

 



             (test code = MDIFF)                                        



DIFFERENTIAL [id number] 04:06:00





             Test Item    Value        Reference Range Interpretation Comments

 

             STAIN ACCEPTABILITY (test code = STN                               

         



             ACCEPTABLE)                                         

 

             CABOT RINGS (test code = CAB)                                      

  

 

             MORPHOLOGY COMMENT (test code = MOC)                               

         

 

             PLATELET ESTIMATE (test code = PLTEST)                             

           

 

             PLATELET MORPHOLOGY (test code =                                   

     



             PLTMORPH)                                           



CBC W/AUTO [id number] 04:06:00





             Test Item    Value        Reference Range Interpretation Comments

 

             WHITE BLOOD CELL (test 11.4 K/mm3   4.5-12.5     N            



             code = WBC)                                         

 

             RED BLOOD CELL (test code 3.88 mill/mm3 4.0-5.8      L            



             = RBC)                                              

 

             HEMOGLOBIN (test code = 10.1 gram/dL 13.0-17.5    L            



             HGB)                                                

 

             HEMATOCRIT (test code = 32.8 %       42.0-52.0    L            



             HCT)                                                

 

             MEAN CELL VOLUME (test 84.5 fL      80-98        N            



             code = MCV)                                         

 

             MEAN CELL HGB (test code 26.0 picogram 27.0-33.0    L            



             = MCH)                                              

 

             MEAN CELL HGB 30.8 gram/dL 33.0-36.0    L            



             CONCETRATION (test code =                                        



             MCHC)                                               

 

             RED CELL DISTRIBUTION 24.0 %       11.6-16.2    H            



             WIDTH (test code = RDW)                                        

 

             RED CELL DISTRIBUTION 64.3 fL      37.0-51.0    H            



             WIDTH SD (test code =                                        



             RDW-SD)                                             

 

             PLATELET COUNT (test code 268 K/mm3    150-450      N            



             = PLT)                                              

 

             MEAN PLATELET VOLUME 10.4 fL      6.7-11.0     N            



             (test code = MPV)                                        

 

             NEUTROPHIL % (test code = 76.9 %       39.0-69.0    H            



             NT%)                                                

 

             IMMATURE GRANULOCYTE % 1.9 %        0.0-5.0      N            



             (test code = IG%)                                        

 

             LYMPHOCYTE % (test code = 8.3 %        25.0-55.0    L            



             LY%)                                                

 

             MONOCYTE % (test code = 11.8 %       0.0-10.0     H            



             MO%)                                                

 

             EOSINOPHIL % (test code = 0.6 %        0.0-5.0      N            



             EO%)                                                

 

             BASOPHIL % (test code = 0.5 %        0.0-1.0      N            



             BA%)                                                

 

             NUCLEATED RBC % (test 0.0 %        0-0          N            



             code = NRBC%)                                        

 

             NEUTROPHIL # (test code = 8.78 K/mm3   1.8-7.7      H            



             NT#)                                                

 

             IMMATURE GRANULOCYTE # 0.22 x10 3/uL 0-0.03       H            



             (test code = IG#)                                        

 

             LYMPHOCYTE # (test code = 0.95 K/mm3   1.0-5.0      L            



             LY#)                                                

 

             MONOCYTE # (test code = 1.35 K/mm3   0-0.8        H            



             MO#)                                                

 

             EOSINOPHIL # (test code = 0.07 K/mm3   0.0-0.5      N            



             EO#)                                                

 

             BASOPHIL # (test code = 0.06 K/mm3   0.0-0.2      N            



             BA#)                                                

 

             NUCLEATED RBC # (test 0.00 K/mm3   0.0-0.1      N            



             code = NRBC#)                                        

 

             MANUAL DIFF REQUIRED NO, ONLY SCAN NEEDED                          

 



             (test code = MDIFF)                                        



DIFFERENTIAL [id number] 04:06:00





             Test Item    Value        Reference Range Interpretation Comments

 

             STAIN ACCEPTABILITY (test code = STN                               

         



             ACCEPTABLE)                                         

 

             CABOT RINGS (test code = CAB)                                      

  

 

             MORPHOLOGY COMMENT (test code = MOC)                               

         

 

             PLATELET ESTIMATE (test code = PLTEST)                             

           

 

             PLATELET MORPHOLOGY (test code =                                   

     



             PLTMORPH)                                           



CBC W/AUTO [id number] 04:06:00





             Test Item    Value        Reference Range Interpretation Comments

 

             WHITE BLOOD CELL (test 11.4 K/mm3   4.5-12.5     N            



             code = WBC)                                         

 

             RED BLOOD CELL (test code 3.88 mill/mm3 4.0-5.8      L            



             = RBC)                                              

 

             HEMOGLOBIN (test code = 10.1 gram/dL 13.0-17.5    L            



             HGB)                                                

 

             HEMATOCRIT (test code = 32.8 %       42.0-52.0    L            



             HCT)                                                

 

             MEAN CELL VOLUME (test 84.5 fL      80-98        N            



             code = MCV)                                         

 

             MEAN CELL HGB (test code 26.0 picogram 27.0-33.0    L            



             = MCH)                                              

 

             MEAN CELL HGB 30.8 gram/dL 33.0-36.0    L            



             CONCETRATION (test code =                                        



             MCHC)                                               

 

             RED CELL DISTRIBUTION 24.0 %       11.6-16.2    H            



             WIDTH (test code = RDW)                                        

 

             RED CELL DISTRIBUTION 64.3 fL      37.0-51.0    H            



             WIDTH SD (test code =                                        



             RDW-SD)                                             

 

             PLATELET COUNT (test code 268 K/mm3    150-450      N            



             = PLT)                                              

 

             MEAN PLATELET VOLUME 10.4 fL      6.7-11.0     N            



             (test code = MPV)                                        

 

             NEUTROPHIL % (test code = 76.9 %       39.0-69.0    H            



             NT%)                                                

 

             IMMATURE GRANULOCYTE % 1.9 %        0.0-5.0      N            



             (test code = IG%)                                        

 

             LYMPHOCYTE % (test code = 8.3 %        25.0-55.0    L            



             LY%)                                                

 

             MONOCYTE % (test code = 11.8 %       0.0-10.0     H            



             MO%)                                                

 

             EOSINOPHIL % (test code = 0.6 %        0.0-5.0      N            



             EO%)                                                

 

             BASOPHIL % (test code = 0.5 %        0.0-1.0      N            



             BA%)                                                

 

             NUCLEATED RBC % (test 0.0 %        0-0          N            



             code = NRBC%)                                        

 

             NEUTROPHIL # (test code = 8.78 K/mm3   1.8-7.7      H            



             NT#)                                                

 

             IMMATURE GRANULOCYTE # 0.22 x10 3/uL 0-0.03       H            



             (test code = IG#)                                        

 

             LYMPHOCYTE # (test code = 0.95 K/mm3   1.0-5.0      L            



             LY#)                                                

 

             MONOCYTE # (test code = 1.35 K/mm3   0-0.8        H            



             MO#)                                                

 

             EOSINOPHIL # (test code = 0.07 K/mm3   0.0-0.5      N            



             EO#)                                                

 

             BASOPHIL # (test code = 0.06 K/mm3   0.0-0.2      N            



             BA#)                                                

 

             NUCLEATED RBC # (test 0.00 K/mm3   0.0-0.1      N            



             code = NRBC#)                                        

 

             MANUAL DIFF REQUIRED NO, ONLY SCAN NEEDED                          

 



             (test code = MDIFF)                                        



DIFFERENTIAL [id number] 04:06:00





             Test Item    Value        Reference Range Interpretation Comments

 

             STAIN ACCEPTABILITY (test code = STN                               

         



             ACCEPTABLE)                                         

 

             MORPHOLOGY COMMENT (test code = MOC)                               

         

 

             PLATELET ESTIMATE (test code = PLTEST)                             

           

 

             PLATELET MORPHOLOGY (test code =                                   

     



             PLTMORPH)                                           



CBC W/AUTO [id number] 04:06:00





             Test Item    Value        Reference Range Interpretation Comments

 

             WHITE BLOOD CELL (test 11.4 K/mm3   4.5-12.5     N            



             code = WBC)                                         

 

             RED BLOOD CELL (test code 3.88 mill/mm3 4.0-5.8      L            



             = RBC)                                              

 

             HEMOGLOBIN (test code = 10.1 gram/dL 13.0-17.5    L            



             HGB)                                                

 

             HEMATOCRIT (test code = 32.8 %       42.0-52.0    L            



             HCT)                                                

 

             MEAN CELL VOLUME (test 84.5 fL      80-98        N            



             code = MCV)                                         

 

             MEAN CELL HGB (test code 26.0 picogram 27.0-33.0    L            



             = MCH)                                              

 

             MEAN CELL HGB 30.8 gram/dL 33.0-36.0    L            



             CONCETRATION (test code =                                        



             MCHC)                                               

 

             RED CELL DISTRIBUTION 24.0 %       11.6-16.2    H            



             WIDTH (test code = RDW)                                        

 

             RED CELL DISTRIBUTION 64.3 fL      37.0-51.0    H            



             WIDTH SD (test code =                                        



             RDW-SD)                                             

 

             PLATELET COUNT (test code 268 K/mm3    150-450      N            



             = PLT)                                              

 

             MEAN PLATELET VOLUME 10.4 fL      6.7-11.0     N            



             (test code = MPV)                                        

 

             NEUTROPHIL % (test code = 76.9 %       39.0-69.0    H            



             NT%)                                                

 

             IMMATURE GRANULOCYTE % 1.9 %        0.0-5.0      N            



             (test code = IG%)                                        

 

             LYMPHOCYTE % (test code = 8.3 %        25.0-55.0    L            



             LY%)                                                

 

             MONOCYTE % (test code = 11.8 %       0.0-10.0     H            



             MO%)                                                

 

             EOSINOPHIL % (test code = 0.6 %        0.0-5.0      N            



             EO%)                                                

 

             BASOPHIL % (test code = 0.5 %        0.0-1.0      N            



             BA%)                                                

 

             NUCLEATED RBC % (test 0.0 %        0-0          N            



             code = NRBC%)                                        

 

             NEUTROPHIL # (test code = 8.78 K/mm3   1.8-7.7      H            



             NT#)                                                

 

             IMMATURE GRANULOCYTE # 0.22 x10 3/uL 0-0.03       H            



             (test code = IG#)                                        

 

             LYMPHOCYTE # (test code = 0.95 K/mm3   1.0-5.0      L            



             LY#)                                                

 

             MONOCYTE # (test code = 1.35 K/mm3   0-0.8        H            



             MO#)                                                

 

             EOSINOPHIL # (test code = 0.07 K/mm3   0.0-0.5      N            



             EO#)                                                

 

             BASOPHIL # (test code = 0.06 K/mm3   0.0-0.2      N            



             BA#)                                                

 

             NUCLEATED RBC # (test 0.00 K/mm3   0.0-0.1      N            



             code = NRBC#)                                        

 

             MANUAL DIFF REQUIRED NO, ONLY SCAN NEEDED                          

 



             (test code = MDIFF)                                        



DIFFERENTIAL [id number] 04:06:00





             Test Item    Value        Reference Range Interpretation Comments

 

             STAIN ACCEPTABILITY (test code = STN                               

         



             ACCEPTABLE)                                         

 

             CABOT RINGS (test code = CAB)                                      

  

 

             MORPHOLOGY COMMENT (test code = MOC)                               

         

 

             PLATELET ESTIMATE (test code = PLTEST)                             

           

 

             PLATELET MORPHOLOGY (test code =                                   

     



             PLTMORPH)                                           



[id number] 12:04:00
--------------------------------------------------------------------------------

------------RUN DATE: 20                         [facility]           
              PAGE 1   RUN TIME: 1205                        Specimen Inquiry   
                RUN USER: INTERFACE                                     
----------------------------------------------------------------
----------------------------PATIENT: BRENDAN FISHER               ACCT #: 
[id number] LOC:  MARCO A    U #: [id number]                                     
 AGE/SX: 74/M         ROOM:      [id number] DR:  Earl Woods MD   
         :    49     BED:  B          DIS:                             
      STATUS: ADM IN       TLOC:           ----------------------------
---------------------------------------------------------------- SPEC #: 
[id number]     RECD:      STATUS:  KEYLA MALDONADO #: 
[id number]                           DOROTEO: 20-    SUBM DR: Rod Alexandra MD   
             ENTERED:      SP TYPE: STOMACH        OTHR DR:        
                      ORDERED:  GROSS                         PROCEDURES: GROSS 
(20-) TISSUES:           1. DUODENUM, NOS - BX       2. GASTRIC CORPUS
- BX         CLINICAL HISTORY    COLLECTION DATE: 20       SEVERE ANEMIA, 
MELENA         FINAL DIAGNOSIS    Duodenal bulb, biopsy:        DUODENAL MUCOSA,
NO PATHOLOGIC ALTERATION           Gastric biopsy:        FOCAL REACTIVE 
REGENERATIVE EPITHELIAL CHANGES WITH ASSOCIATED PATCHY          MILD CHRONIC 
INFLAMMATION        NO INTESTINAL METAPLASIA SEEN        NEGATIVE FOR
HELICOBACTER PYLORI BY GIEMSA STAIN        NEGATIVE FOR MALIGNANCY           
DMW/sm   D   [id number], [id number]           MACROSCOPIC    The first specimen is 
received in formalin, labeled with the patient's name,   and identified as 
"duodenal bulb bx", and consists of light pink-tan biopsy   tissue measuring 0.3
cm in aggregate, submitted as (1).       The second specimen is received in 
formalin, labeled with the patient's name,   and identified as "gastric", and 
consists of tan-light pink biopsy tissuemeasuring 0.3 cm in aggregate, submitted
as (2).               GROSS PERFORMED AT [facility]       
                        ** CONTINUED ON NEXT PAGE ** ---------------------------
-----------------------------------------------------------------RUN DATE: 
20       [facility]                          PAGE 2   RUN TIME: 1205  
                         Specimen Inquiry                    RUN USER: INTERFACE
         
--------------------------------------------------------------------------------

------------SPEC #: [id number]    PATIENT: BRENDAN FISHER                
#[id number]  (Continued)--------
--------------------------------------------------------------------------------

----           MACROSCOPIC             (Continued)    [facility]   [address] [place] [address] [phone])[phone]       
   MICROSCOPIC    All of the stains, including any controls performed, stain   
appropriately.       MICROSCOPIC PERFORMED AT [facility]   [address] [place] [address] (p)[phone]
        PERFORMING SITE    Diagnosis performed at:        [facility]        [address] [place] [address] [phone]--------------------------
------------------------------------------------------------------ Signed 
SIGNATURE ON FILE              Renetta Mejia MD 20 1204 
----------------------------------------------------
----------------------------------------                                        
              ** END OF REPORT **COMPREHENSIVE METABOLIC [id number] 
05:21:00





             Test Item    Value        Reference Range Interpretation Comments

 

             SODIUM (test code = 133 mmol/L   136-145      L            



             NA)                                                 

 

             POTASSIUM (test code = 3.6 mmol/L   3.5-5.1      N            



             K)                                                  

 

             CHLORIDE (test code = 102.0 mmol/L        N            



             CL)                                                 

 

             CARBON DIOXIDE (test 21.0 mmol/L  21-32        N            



             code = CO2)                                         

 

             ANION GAP (test code = 13.6         10-20        N            



             GAP)                                                

 

             GLUCOSE (test code = 82 mg/dL            N            



             GLU)                                                

 

             BLOOD UREA NITROGEN 5 mg/dL      7-18         L            



             (test code = BUN)                                        

 

             GLOMERULAR FILTRATION > 60 mL/min  >=60                      Estima

geovanna GFR by



             RATE (test code = GFR)                                        using

 Modified MDRD



                                                                 formula.Chronic



                                                                 kidney disease 

is



                                                                 defined as [facility]

er



                                                                 kidney damageor

 GFR



                                                                 <60 mL/min/1.73

 m2



                                                                 for >3 months.

 

             CREATININE (test code 0.60 mg/dL   0.7-1.3      L            



             = CREAT)                                            

 

             BUN/CREATININE RATIO 8.3          10-20        L            



             (test code = BUN/CREA)                                        

 

             TOTAL PROTEIN (test 6.0 gram/dL  6.4-8.2      L            



             code = PROT)                                        

 

             ALBUMIN (test code = 2.5 g/dL     3.4-5.0      L            



             ALB)                                                

 

             GLOBULIN (test code = 3.5 gram/dL  2.7-4.2      N            



             GLOB)                                               

 

             ALBUMIN/GLOBULIN RATIO 0.7          0.75-1.50    L            



             (test code = A/G)                                        

 

             CALCIUM (test code = 8.0 mg/dL    8.5-10.1     L            



             CA)                                                 

 

             BILIRUBIN TOTAL (test 1.00 mg/dL   0.0-1.0      N            



             code = BILT)                                        

 

             SGOT/AST (test code = 89 IUnit/L   15-37        H            



             AST)                                                

 

             SGPT/ALT (test code = 83 IUnit/L   12-78        H            



             ALT)                                                

 

             ALKALINE PHOSPHATASE 127 IUnit/L         H            **Note 

change in



             TOTAL (test code =                                        reference

 range due



             ALKP)                                               to change in



                                                                 reagent.**



CBC W/AUTO [id number] 05:15:00





             Test Item    Value        Reference Range Interpretation Comments

 

             WHITE BLOOD CELL (test 14.1 K/mm3   4.5-12.5     H            



             code = WBC)                                         

 

             RED BLOOD CELL (test code 3.52 mill/mm3 4.0-5.8      L            



             = RBC)                                              

 

             HEMOGLOBIN (test code = 9.2 gram/dL  13.0-17.5    L            



             HGB)                                                

 

             HEMATOCRIT (test code = 29.3 %       42.0-52.0    L            



             HCT)                                                

 

             MEAN CELL VOLUME (test 83.2 fL      80-98        N            



             code = MCV)                                         

 

             MEAN CELL HGB (test code 26.1 picogram 27.0-33.0    L            



             = MCH)                                              

 

             MEAN CELL HGB 31.4 gram/dL 33.0-36.0    L            



             CONCETRATION (test code =                                        



             MCHC)                                               

 

             RED CELL DISTRIBUTION 22.5 %       11.6-16.2    H            



             WIDTH (test code = RDW)                                        

 

             RED CELL DISTRIBUTION 60.1 fL      37.0-51.0    H            



             WIDTH SD (test code =                                        



             RDW-SD)                                             

 

             PLATELET COUNT (test code 256 K/mm3    150-450      N            



             = PLT)                                              

 

             MEAN PLATELET VOLUME 10.4 fL      6.7-11.0     N            



             (test code = MPV)                                        

 

             NEUTROPHIL % (test code = 86.7 %       39.0-69.0    H            



             NT%)                                                

 

             IMMATURE GRANULOCYTE % 1.9 %        0.0-5.0      N            



             (test code = IG%)                                        

 

             LYMPHOCYTE % (test code = 4.5 %        25.0-55.0    L            



             LY%)                                                

 

             MONOCYTE % (test code = 6.4 %        0.0-10.0     N            



             MO%)                                                

 

             EOSINOPHIL % (test code = 0.1 %        0.0-5.0      N            



             EO%)                                                

 

             BASOPHIL % (test code = 0.4 %        0.0-1.0      N            



             BA%)                                                

 

             NUCLEATED RBC % (test 0.0 %        0-0          N            



             code = NRBC%)                                        

 

             NEUTROPHIL # (test code = 12.23 K/mm3  1.8-7.7      H            



             NT#)                                                

 

             IMMATURE GRANULOCYTE # 0.27 x10 3/uL 0-0.03       H            



             (test code = IG#)                                        

 

             LYMPHOCYTE # (test code = 0.64 K/mm3   1.0-5.0      L            



             LY#)                                                

 

             MONOCYTE # (test code = 0.90 K/mm3   0-0.8        H            



             MO#)                                                

 

             EOSINOPHIL # (test code = 0.01 K/mm3   0.0-0.5      N            



             EO#)                                                

 

             BASOPHIL # (test code = 0.05 K/mm3   0.0-0.2      N            



             BA#)                                                

 

             NUCLEATED RBC # (test 0.00 K/mm3   0.0-0.1      N            



             code = NRBC#)                                        

 

             MANUAL DIFF REQUIRED NO, ONLY SCAN NEEDED                          

 



             (test code = MDIFF)                                        



DIFFERENTIAL [id number] 05:15:00





             Test Item    Value        Reference Range Interpretation Comments

 

             STAIN ACCEPTABILITY (test STAIN ACCEPTABLE                         

  



             code = STN ACCEPTABLE)                                        

 

             POLYCHROMASIA (test code = 2+                                     



             POLC)                                               

 

             POIKILOCYTOSIS (test code 2+                                     



             = POIK)                                             

 

             ANISOCYTOSIS (test code = 2+                                     



             ANISO)                                              

 

             MACROCYTOSIS (test code = 1+                                     



             MACR)                                               

 

             TARGET CELLS (test code = 1+                                     



             TGT)                                                

 

             AMELIA CELLS (test code = 1+           NONE                      



             AMELIA)                                               

 

             OVALOCYTES (test code = 1+                                     



             OVAL)                                               

 

             MORPHOLOGY COMMENT (test TEST NOT PERFORMED                        

   



             code = MOC)                                         

 

             PLATELET ESTIMATE (test ADEQUATE                               



             code = PLTEST)                                        

 

             PLATELET MORPHOLOGY (test NORMAL                                 



             code = PLTMORPH)                                        



COMPREHENSIVE METABOLIC [id number] 05:11:00





             Test Item    Value        Reference Range Interpretation Comments

 

             SODIUM (test code = NA) 133 mmol/L   136-145      L            

 

             POTASSIUM (test code = K) 3.6 mmol/L   3.5-5.1      N            

 

             CHLORIDE (test code = CL) 102.0 mmol/L        N            

 

             CARBON DIOXIDE (test code = CO2)  mmol/L      21-32                

     

 

             ANION GAP (test code = GAP)              10-20                     

 

             GLUCOSE (test code = GLU)  mg/dL                           

 

             BLOOD UREA NITROGEN (test code =  mg/dL       7-18                 

     



             BUN)                                                

 

             GLOMERULAR FILTRATION RATE (test  mL/min      >=60                 

     



             code = GFR)                                         

 

             CREATININE (test code = CREAT)  mg/dL       0.7-1.3                

   

 

             BUN/CREATININE RATIO (test code              10-20                 

    



             = BUN/CREA)                                         

 

             TOTAL PROTEIN (test code = PROT)  gram/dL     6.4-8.2              

     

 

             ALBUMIN (test code = ALB)  g/dL        3.4-5.0                   

 

             GLOBULIN (test code = GLOB)  gram/dL     2.7-4.2                   

 

             ALBUMIN/GLOBULIN RATIO (test              0.75-1.50                

 



             code = A/G)                                         

 

             CALCIUM (test code = CA)  mg/dL       8.5-10.1                  

 

             BILIRUBIN TOTAL (test code =  mg/dL       0.0-1.0                  

 



             BILT)                                               

 

             SGOT/AST (test code = AST)  IUnit/L     15-37                     

 

             SGPT/ALT (test code = ALT)  IUnit/L     12-78                     

 

             ALKALINE PHOSPHATASE TOTAL (test  IUnit/L                    

     



             code = ALKP)                                        



CBC W/AUTO [id number] 04:43:00





             Test Item    Value        Reference Range Interpretation Comments

 

             WHITE BLOOD CELL (test 14.1 K/mm3   4.5-12.5     H            



             code = WBC)                                         

 

             RED BLOOD CELL (test code 3.52 mill/mm3 4.0-5.8      L            



             = RBC)                                              

 

             HEMOGLOBIN (test code = 9.2 gram/dL  13.0-17.5    L            



             HGB)                                                

 

             HEMATOCRIT (test code = 29.3 %       42.0-52.0    L            



             HCT)                                                

 

             MEAN CELL VOLUME (test 83.2 fL      80-98        N            



             code = MCV)                                         

 

             MEAN CELL HGB (test code 26.1 picogram 27.0-33.0    L            



             = MCH)                                              

 

             MEAN CELL HGB 31.4 gram/dL 33.0-36.0    L            



             CONCETRATION (test code =                                        



             MCHC)                                               

 

             RED CELL DISTRIBUTION 22.5 %       11.6-16.2    H            



             WIDTH (test code = RDW)                                        

 

             RED CELL DISTRIBUTION 60.1 fL      37.0-51.0    H            



             WIDTH SD (test code =                                        



             RDW-SD)                                             

 

             PLATELET COUNT (test code 256 K/mm3    150-450      N            



             = PLT)                                              

 

             MEAN PLATELET VOLUME 10.4 fL      6.7-11.0     N            



             (test code = MPV)                                        

 

             NEUTROPHIL % (test code = 86.7 %       39.0-69.0    H            



             NT%)                                                

 

             IMMATURE GRANULOCYTE % 1.9 %        0.0-5.0      N            



             (test code = IG%)                                        

 

             LYMPHOCYTE % (test code = 4.5 %        25.0-55.0    L            



             LY%)                                                

 

             MONOCYTE % (test code = 6.4 %        0.0-10.0     N            



             MO%)                                                

 

             EOSINOPHIL % (test code = 0.1 %        0.0-5.0      N            



             EO%)                                                

 

             BASOPHIL % (test code = 0.4 %        0.0-1.0      N            



             BA%)                                                

 

             NUCLEATED RBC % (test 0.0 %        0-0          N            



             code = NRBC%)                                        

 

             NEUTROPHIL # (test code = 12.23 K/mm3  1.8-7.7      H            



             NT#)                                                

 

             IMMATURE GRANULOCYTE # 0.27 x10 3/uL 0-0.03       H            



             (test code = IG#)                                        

 

             LYMPHOCYTE # (test code = 0.64 K/mm3   1.0-5.0      L            



             LY#)                                                

 

             MONOCYTE # (test code = 0.90 K/mm3   0-0.8        H            



             MO#)                                                

 

             EOSINOPHIL # (test code = 0.01 K/mm3   0.0-0.5      N            



             EO#)                                                

 

             BASOPHIL # (test code = 0.05 K/mm3   0.0-0.2      N            



             BA#)                                                

 

             NUCLEATED RBC # (test 0.00 K/mm3   0.0-0.1      N            



             code = NRBC#)                                        

 

             MANUAL DIFF REQUIRED NO, ONLY SCAN NEEDED                          

 



             (test code = MDIFF)                                        



DIFFERENTIAL [id number] 04:43:00





             Test Item    Value        Reference Range Interpretation Comments

 

             STAIN ACCEPTABILITY (test code = STN                               

         



             ACCEPTABLE)                                         

 

             CABOT RINGS (test code = CAB)                                      

  

 

             MORPHOLOGY COMMENT (test code = MOC)                               

         

 

             PLATELET ESTIMATE (test code = PLTEST)                             

           

 

             PLATELET MORPHOLOGY (test code =                                   

     



             PLTMORPH)                                           



CBC W/AUTO [id number] 04:43:00





             Test Item    Value        Reference Range Interpretation Comments

 

             WHITE BLOOD CELL (test 14.1 K/mm3   4.5-12.5     H            



             code = WBC)                                         

 

             RED BLOOD CELL (test code 3.52 mill/mm3 4.0-5.8      L            



             = RBC)                                              

 

             HEMOGLOBIN (test code = 9.2 gram/dL  13.0-17.5    L            



             HGB)                                                

 

             HEMATOCRIT (test code = 29.3 %       42.0-52.0    L            



             HCT)                                                

 

             MEAN CELL VOLUME (test 83.2 fL      80-98        N            



             code = MCV)                                         

 

             MEAN CELL HGB (test code 26.1 picogram 27.0-33.0    L            



             = MCH)                                              

 

             MEAN CELL HGB 31.4 gram/dL 33.0-36.0    L            



             CONCETRATION (test code =                                        



             MCHC)                                               

 

             RED CELL DISTRIBUTION 22.5 %       11.6-16.2    H            



             WIDTH (test code = RDW)                                        

 

             RED CELL DISTRIBUTION 60.1 fL      37.0-51.0    H            



             WIDTH SD (test code =                                        



             RDW-SD)                                             

 

             PLATELET COUNT (test code 256 K/mm3    150-450      N            



             = PLT)                                              

 

             MEAN PLATELET VOLUME 10.4 fL      6.7-11.0     N            



             (test code = MPV)                                        

 

             NEUTROPHIL % (test code = 86.7 %       39.0-69.0    H            



             NT%)                                                

 

             IMMATURE GRANULOCYTE % 1.9 %        0.0-5.0      N            



             (test code = IG%)                                        

 

             LYMPHOCYTE % (test code = 4.5 %        25.0-55.0    L            



             LY%)                                                

 

             MONOCYTE % (test code = 6.4 %        0.0-10.0     N            



             MO%)                                                

 

             EOSINOPHIL % (test code = 0.1 %        0.0-5.0      N            



             EO%)                                                

 

             BASOPHIL % (test code = 0.4 %        0.0-1.0      N            



             BA%)                                                

 

             NUCLEATED RBC % (test 0.0 %        0-0          N            



             code = NRBC%)                                        

 

             NEUTROPHIL # (test code = 12.23 K/mm3  1.8-7.7      H            



             NT#)                                                

 

             IMMATURE GRANULOCYTE # 0.27 x10 3/uL 0-0.03       H            



             (test code = IG#)                                        

 

             LYMPHOCYTE # (test code = 0.64 K/mm3   1.0-5.0      L            



             LY#)                                                

 

             MONOCYTE # (test code = 0.90 K/mm3   0-0.8        H            



             MO#)                                                

 

             EOSINOPHIL # (test code = 0.01 K/mm3   0.0-0.5      N            



             EO#)                                                

 

             BASOPHIL # (test code = 0.05 K/mm3   0.0-0.2      N            



             BA#)                                                

 

             NUCLEATED RBC # (test 0.00 K/mm3   0.0-0.1      N            



             code = NRBC#)                                        

 

             MANUAL DIFF REQUIRED NO, ONLY SCAN NEEDED                          

 



             (test code = MDIFF)                                        



DIFFERENTIAL [id number] 04:43:00





             Test Item    Value        Reference Range Interpretation Comments

 

             STAIN ACCEPTABILITY (test code = STN                               

         



             ACCEPTABLE)                                         

 

             MORPHOLOGY COMMENT (test code = MOC)                               

         

 

             PLATELET ESTIMATE (test code = PLTEST)                             

           

 

             PLATELET MORPHOLOGY (test code =                                   

     



             PLTMORPH)                                           



CBC W/AUTO [id number] 04:42:00





             Test Item    Value        Reference Range Interpretation Comments

 

             WHITE BLOOD CELL (test 14.1 K/mm3   4.5-12.5     H            



             code = WBC)                                         

 

             RED BLOOD CELL (test code 3.52 mill/mm3 4.0-5.8      L            



             = RBC)                                              

 

             HEMOGLOBIN (test code = 9.2 gram/dL  13.0-17.5    L            



             HGB)                                                

 

             HEMATOCRIT (test code = 29.3 %       42.0-52.0    L            



             HCT)                                                

 

             MEAN CELL VOLUME (test 83.2 fL      80-98        N            



             code = MCV)                                         

 

             MEAN CELL HGB (test code 26.1 picogram 27.0-33.0    L            



             = MCH)                                              

 

             MEAN CELL HGB 31.4 gram/dL 33.0-36.0    L            



             CONCETRATION (test code =                                        



             MCHC)                                               

 

             RED CELL DISTRIBUTION 22.5 %       11.6-16.2    H            



             WIDTH (test code = RDW)                                        

 

             RED CELL DISTRIBUTION 60.1 fL      37.0-51.0    H            



             WIDTH SD (test code =                                        



             RDW-SD)                                             

 

             PLATELET COUNT (test code 256 K/mm3    150-450      N            



             = PLT)                                              

 

             MEAN PLATELET VOLUME 10.4 fL      6.7-11.0     N            



             (test code = MPV)                                        

 

             NEUTROPHIL % (test code = 86.7 %       39.0-69.0    H            



             NT%)                                                

 

             IMMATURE GRANULOCYTE % 1.9 %        0.0-5.0      N            



             (test code = IG%)                                        

 

             LYMPHOCYTE % (test code = 4.5 %        25.0-55.0    L            



             LY%)                                                

 

             MONOCYTE % (test code = 6.4 %        0.0-10.0     N            



             MO%)                                                

 

             EOSINOPHIL % (test code = 0.1 %        0.0-5.0      N            



             EO%)                                                

 

             BASOPHIL % (test code = 0.4 %        0.0-1.0      N            



             BA%)                                                

 

             NUCLEATED RBC % (test 0.0 %        0-0          N            



             code = NRBC%)                                        

 

             NEUTROPHIL # (test code = 12.23 K/mm3  1.8-7.7      H            



             NT#)                                                

 

             IMMATURE GRANULOCYTE # 0.27 x10 3/uL 0-0.03       H            



             (test code = IG#)                                        

 

             LYMPHOCYTE # (test code = 0.64 K/mm3   1.0-5.0      L            



             LY#)                                                

 

             MONOCYTE # (test code = 0.90 K/mm3   0-0.8        H            



             MO#)                                                

 

             EOSINOPHIL # (test code = 0.01 K/mm3   0.0-0.5      N            



             EO#)                                                

 

             BASOPHIL # (test code = 0.05 K/mm3   0.0-0.2      N            



             BA#)                                                

 

             NUCLEATED RBC # (test 0.00 K/mm3   0.0-0.1      N            



             code = NRBC#)                                        

 

             MANUAL DIFF REQUIRED NO, ONLY SCAN NEEDED                          

 



             (test code = MDIFF)                                        



DIFFERENTIAL [id number] 04:42:00





             Test Item    Value        Reference Range Interpretation Comments

 

             STAIN ACCEPTABILITY (test code = STN                               

         



             ACCEPTABLE)                                         

 

             CABOT RINGS (test code = CAB)                                      

  

 

             MORPHOLOGY COMMENT (test code = MOC)                               

         

 

             PLATELET ESTIMATE (test code = PLTEST)                             

           

 

             PLATELET MORPHOLOGY (test code =                                   

     



             PLTMORPH)                                           



CBC W/AUTO [id number] 04:42:00





             Test Item    Value        Reference Range Interpretation Comments

 

             WHITE BLOOD CELL (test 14.1 K/mm3   4.5-12.5     H            



             code = WBC)                                         

 

             RED BLOOD CELL (test code 3.52 mill/mm3 4.0-5.8      L            



             = RBC)                                              

 

             HEMOGLOBIN (test code = 9.2 gram/dL  13.0-17.5    L            



             HGB)                                                

 

             HEMATOCRIT (test code = 29.3 %       42.0-52.0    L            



             HCT)                                                

 

             MEAN CELL VOLUME (test 83.2 fL      80-98        N            



             code = MCV)                                         

 

             MEAN CELL HGB (test code 26.1 picogram 27.0-33.0    L            



             = MCH)                                              

 

             MEAN CELL HGB 31.4 gram/dL 33.0-36.0    L            



             CONCETRATION (test code =                                        



             MCHC)                                               

 

             RED CELL DISTRIBUTION 22.5 %       11.6-16.2    H            



             WIDTH (test code = RDW)                                        

 

             RED CELL DISTRIBUTION 60.1 fL      37.0-51.0    H            



             WIDTH SD (test code =                                        



             RDW-SD)                                             

 

             PLATELET COUNT (test code 256 K/mm3    150-450      N            



             = PLT)                                              

 

             MEAN PLATELET VOLUME 10.4 fL      6.7-11.0     N            



             (test code = MPV)                                        

 

             NEUTROPHIL % (test code = 86.7 %       39.0-69.0    H            



             NT%)                                                

 

             IMMATURE GRANULOCYTE % 1.9 %        0.0-5.0      N            



             (test code = IG%)                                        

 

             LYMPHOCYTE % (test code = 4.5 %        25.0-55.0    L            



             LY%)                                                

 

             MONOCYTE % (test code = 6.4 %        0.0-10.0     N            



             MO%)                                                

 

             EOSINOPHIL % (test code = 0.1 %        0.0-5.0      N            



             EO%)                                                

 

             BASOPHIL % (test code = 0.4 %        0.0-1.0      N            



             BA%)                                                

 

             NUCLEATED RBC % (test 0.0 %        0-0          N            



             code = NRBC%)                                        

 

             NEUTROPHIL # (test code = 12.23 K/mm3  1.8-7.7      H            



             NT#)                                                

 

             IMMATURE GRANULOCYTE # 0.27 x10 3/uL 0-0.03       H            



             (test code = IG#)                                        

 

             LYMPHOCYTE # (test code = 0.64 K/mm3   1.0-5.0      L            



             LY#)                                                

 

             MONOCYTE # (test code = 0.90 K/mm3   0-0.8        H            



             MO#)                                                

 

             EOSINOPHIL # (test code = 0.01 K/mm3   0.0-0.5      N            



             EO#)                                                

 

             BASOPHIL # (test code = 0.05 K/mm3   0.0-0.2      N            



             BA#)                                                

 

             NUCLEATED RBC # (test 0.00 K/mm3   0.0-0.1      N            



             code = NRBC#)                                        

 

             MANUAL DIFF REQUIRED NO, ONLY SCAN NEEDED                          

 



             (test code = MDIFF)                                        



DIFFERENTIAL [id number] 04:42:00





             Test Item    Value        Reference Range Interpretation Comments

 

             STAIN ACCEPTABILITY (test code = STN                               

         



             ACCEPTABLE)                                         

 

             CABOT RINGS (test code = CAB)                                      

  

 

             MORPHOLOGY COMMENT (test code = MOC)                               

         

 

             PLATELET ESTIMATE (test code = PLTEST)                             

           

 

             PLATELET MORPHOLOGY (test code =                                   

     



             PLTMORPH)                                           



- MRI ABDOMEN W/O [id number] 12:37:00 FAX:         Earl Woods MD    
[phone]    [place]:    St: ADM-------------------------------
------------------------------------------------  Name:   BRENDAN FISHER       
          [facility]                     : 1945  Age/S: 74/M       
   [address] [facility]                Unit#: [id number]      Loc: [address]       
[place]  [address]              Phys: Earl Woods MD                       
                    Acct: [id number] Dis Date:               Status: ADM IN   
                             PHONE #: [phone]     Exam Date:     2020
    1229           FAX #: [phone]     Reason: elevated lipase, r/o 
obstruction                   EXAMS:                                            
 CPT CODE:      [id number] MRI ABDOMEN W/O CONT             16140                
   HISTORY: Elevated lipase.               COMPARISON: Ultrasoundfrom previous 
day and CT scan from April 3,       2020.               Location: [facility].          
    MRCP: 3-D images.               CBD is normal at 5.7 mm. Smooth taper into 
the ampulla. Study slightly      limited due to motion. No filling defects to 
suggest CBD stones.       Common hepatic and the right and left hepatic ducts 
are normal as       well. Well-distended gallbladder. Gallstones. Pancreatic 
duct is not       dilated. No ascites.                 IMPRESSION:              
    Normal CBD at 5.7 mm without filling defects. Gallstones.          ** 
Electronically Signed by LEI Mcleod 2020 at 1237 **              
       Reported and signed by: Bill Blackburn M.D.         CC: Earl Woods MD 
                                              Technologist: COMFORT BERRIOS,RT - 
MRI                Trnscrd Date/Time/By: 2020 (0302) : By: [email or web address]      
    Orig Print D/T: S:2020 (5691)                         PAGE  1         
             Signed ReportACUTE HEPATITIS [id number] 07:09:00





             Test Item    Value        Reference Range Interpretation Comments

 

             AB HEPATITIS A IGM Negative     Negative                  



             (test code = HAVMAB)                                        

 

             AG HEPAT B SURF (test Negative     Negative                  



             code = HBSAG)                                        

 

             HEPATITIS B CORE Negative     Negative                  



             ANTIBODY,IGM (test                                        



             code = HBCMAB)                                        

 

             AB HEPATITIS C (test <0.1         0.0-0.9                   INFCE R

esult Units: s/co



             code = HCVAB)                                        ratio



                                                                               N

egative:



                                                                    < 0.8



                                                                 



                                                                 Indeterminate: 

0.8 - 0.9



                                                                 



                                                                         Positiv

e:     >



                                                                 0.9 The CDC rec

ommends



                                                                 that a positive

 HCV



                                                                 antibody result

 be



                                                                 followed up wit

h a HCV



                                                                 Nucleic Acid



                                                                 Amplification t

est



                                                                 (188005).Perfor

med At:



                                                                  [facility]

[id number]



                                                                 [place], [place] [id number]

richard ZUNIGA MD Ph:[phone]

8



COMPREHENSIVE METABOLIC [id number] 05:01:00





             Test Item    Value        Reference Range Interpretation Comments

 

             SODIUM (test code = 130 mmol/L   136-145      L            



             NA)                                                 

 

             POTASSIUM (test code = 4.5 mmol/L   3.5-5.1      N            



             K)                                                  

 

             CHLORIDE (test code = 100.0 mmol/L        N            



             CL)                                                 

 

             CARBON DIOXIDE (test 23.0 mmol/L  21-32        N            



             code = CO2)                                         

 

             ANION GAP (test code = 11.5         10-20        N            



             GAP)                                                

 

             GLUCOSE (test code = 79 mg/dL            N            



             GLU)                                                

 

             BLOOD UREA NITROGEN 3 mg/dL      7-18         L            



             (test code = BUN)                                        

 

             GLOMERULAR FILTRATION > 60 mL/min  >=60                      Estima

geovanna GFR by



             RATE (test code = GFR)                                        using

 Modified MDRD



                                                                 formula.Chronic



                                                                 kidney disease 

is



                                                                 defined as eith

er



                                                                 kidney damageor

 GFR



                                                                 <60 mL/min/1.73

 m2



                                                                 for >3 months.

 

             CREATININE (test code 0.70 mg/dL   0.7-1.3      N            



             = CREAT)                                            

 

             BUN/CREATININE RATIO 4.3          10-20        L            



             (test code = BUN/CREA)                                        

 

             TOTAL PROTEIN (test 6.1 gram/dL  6.4-8.2      L            



             code = PROT)                                        

 

             ALBUMIN (test code = 2.9 g/dL     3.4-5.0      L            



             ALB)                                                

 

             GLOBULIN (test code = 3.2 gram/dL  2.7-4.2      N            



             GLOB)                                               

 

             ALBUMIN/GLOBULIN RATIO 0.9          0.75-1.50    N            



             (test code = A/G)                                        

 

             CALCIUM (test code = 8.2 mg/dL    8.5-10.1     L            



             CA)                                                 

 

             BILIRUBIN TOTAL (test 1.40 mg/dL   0.0-1.0      H            



             code = BILT)                                        

 

             SGOT/AST (test code = 154 IUnit/L  15-37        H            



             AST)                                                

 

             SGPT/ALT (test code = 99 IUnit/L   12-78        H            



             ALT)                                                

 

             ALKALINE PHOSPHATASE 147 IUnit/L         H            **Note 

change in



             TOTAL (test code =                                        reference

 range due



             ALKP)                                               to change in



                                                                 reagent.**



CBC W/AUTO [id number] 04:56:00





             Test Item    Value        Reference Range Interpretation Comments

 

             WHITE BLOOD CELL (test 13.0 K/mm3   4.5-12.5     H            



             code = WBC)                                         

 

             RED BLOOD CELL (test code 3.87 mill/mm3 4.0-5.8      L            



             = RBC)                                              

 

             HEMOGLOBIN (test code = 9.9 gram/dL  13.0-17.5    L            



             HGB)                                                

 

             HEMATOCRIT (test code = 31.6 %       42.0-52.0    L            



             HCT)                                                

 

             MEAN CELL VOLUME (test 81.7 fL      80-98        N            



             code = MCV)                                         

 

             MEAN CELL HGB (test code 25.6 picogram 27.0-33.0    L            



             = MCH)                                              

 

             MEAN CELL HGB 31.3 gram/dL 33.0-36.0    L            



             CONCETRATION (test code =                                        



             MCHC)                                               

 

             RED CELL DISTRIBUTION 21.4 %       11.6-16.2    H            



             WIDTH (test code = RDW)                                        

 

             RED CELL DISTRIBUTION 57.3 fL      37.0-51.0    H            



             WIDTH SD (test code =                                        



             RDW-SD)                                             

 

             PLATELET COUNT (test code 263 K/mm3    150-450      N            



             = PLT)                                              

 

             MEAN PLATELET VOLUME 10.3 fL      6.7-11.0     N            



             (test code = MPV)                                        

 

             NEUTROPHIL % (test code = 83.7 %       39.0-69.0    H            



             NT%)                                                

 

             IMMATURE GRANULOCYTE % 2.3 %        0.0-5.0      N            



             (test code = IG%)                                        

 

             LYMPHOCYTE % (test code = 7.1 %        25.0-55.0    L            



             LY%)                                                

 

             MONOCYTE % (test code = 6.1 %        0.0-10.0     N            



             MO%)                                                

 

             EOSINOPHIL % (test code = 0.4 %        0.0-5.0      N            



             EO%)                                                

 

             BASOPHIL % (test code = 0.4 %        0.0-1.0      N            



             BA%)                                                

 

             NUCLEATED RBC % (test 0.2 %        0-0          H            



             code = NRBC%)                                        

 

             NEUTROPHIL # (test code = 10.91 K/mm3  1.8-7.7      H            



             NT#)                                                

 

             IMMATURE GRANULOCYTE # 0.30 x10 3/uL 0-0.03       H            



             (test code = IG#)                                        

 

             LYMPHOCYTE # (test code = 0.92 K/mm3   1.0-5.0      L            



             LY#)                                                

 

             MONOCYTE # (test code = 0.79 K/mm3   0-0.8        N            



             MO#)                                                

 

             EOSINOPHIL # (test code = 0.05 K/mm3   0.0-0.5      N            



             EO#)                                                

 

             BASOPHIL # (test code = 0.05 K/mm3   0.0-0.2      N            



             BA#)                                                

 

             NUCLEATED RBC # (test 0.02 K/mm3   0.0-0.1      N            



             code = NRBC#)                                        

 

             MANUAL DIFF REQUIRED NO, ONLY SCAN NEEDED                          

 



             (test code = MDIFF)                                        



DIFFERENTIAL [id number] 04:56:00





             Test Item    Value        Reference Range Interpretation Comments

 

             STAIN ACCEPTABILITY (test STAIN ACCEPTABLE                         

  



             code = STN ACCEPTABLE)                                        

 

             POLYCHROMASIA (test code = 2+                                     



             POLC)                                               

 

             HYPOCHROMIA (test code = 1+                                     



             HYPO)                                               

 

             ANISOCYTOSIS (test code = 2+                                     



             ANISO)                                              

 

             MACROCYTOSIS (test code = 1+                                     



             MACR)                                               

 

             MORPHOLOGY COMMENT (test TEST NOT PERFORMED                        

   



             code = MOC)                                         

 

             PLATELET ESTIMATE (test ADEQUATE                               



             code = PLTEST)                                        

 

             PLATELET MORPHOLOGY (test NORMAL                                 



             code = PLTMORPH)                                        



COMPREHENSIVE METABOLIC [id number] 04:53:00





             Test Item    Value        Reference Range Interpretation Comments

 

             SODIUM (test code = NA) 130 mmol/L   136-145      L            

 

             POTASSIUM (test code = K) 4.5 mmol/L   3.5-5.1      N            

 

             CHLORIDE (test code = CL) 100.0 mmol/L        N            

 

             CARBON DIOXIDE (test code = CO2)  mmol/L      21-32                

     

 

             ANION GAP (test code = GAP)              10-20                     

 

             GLUCOSE (test code = GLU)  mg/dL                           

 

             BLOOD UREA NITROGEN (test code =  mg/dL       7-18                 

     



             BUN)                                                

 

             GLOMERULAR FILTRATION RATE (test  mL/min      >=60                 

     



             code = GFR)                                         

 

             CREATININE (test code = CREAT)  mg/dL       0.7-1.3                

   

 

             BUN/CREATININE RATIO (test code              10-20                 

    



             = BUN/CREA)                                         

 

             TOTAL PROTEIN (test code = PROT)  gram/dL     6.4-8.2              

     

 

             ALBUMIN (test code = ALB)  g/dL        3.4-5.0                   

 

             GLOBULIN (test code = GLOB)  gram/dL     2.7-4.2                   

 

             ALBUMIN/GLOBULIN RATIO (test              0.75-1.50                

 



             code = A/G)                                         

 

             CALCIUM (test code = CA)  mg/dL       8.5-10.1                  

 

             BILIRUBIN TOTAL (test code =  mg/dL       0.0-1.0                  

 



             BILT)                                               

 

             SGOT/AST (test code = AST)  IUnit/L     15-37                     

 

             SGPT/ALT (test code = ALT)  IUnit/L     12-78                     

 

             ALKALINE PHOSPHATASE TOTAL (test  IUnit/L                    

     



             code = ALKP)                                        



[id number] 04:53:00





             Test Item    Value        Reference Range Interpretation Comments

 

             LIPASE (test code = LIP) 1329 U/L     73.0-393.0   H            



CBC W/AUTO [id number] 04:33:00





             Test Item    Value        Reference Range Interpretation Comments

 

             WHITE BLOOD CELL (test 13.0 K/mm3   4.5-12.5     H            



             code = WBC)                                         

 

             RED BLOOD CELL (test code 3.87 mill/mm3 4.0-5.8      L            



             = RBC)                                              

 

             HEMOGLOBIN (test code = 9.9 gram/dL  13.0-17.5    L            



             HGB)                                                

 

             HEMATOCRIT (test code = 31.6 %       42.0-52.0    L            



             HCT)                                                

 

             MEAN CELL VOLUME (test 81.7 fL      80-98        N            



             code = MCV)                                         

 

             MEAN CELL HGB (test code 25.6 picogram 27.0-33.0    L            



             = MCH)                                              

 

             MEAN CELL HGB 31.3 gram/dL 33.0-36.0    L            



             CONCETRATION (test code =                                        



             MCHC)                                               

 

             RED CELL DISTRIBUTION 21.4 %       11.6-16.2    H            



             WIDTH (test code = RDW)                                        

 

             RED CELL DISTRIBUTION 57.3 fL      37.0-51.0    H            



             WIDTH SD (test code =                                        



             RDW-SD)                                             

 

             PLATELET COUNT (test code 263 K/mm3    150-450      N            



             = PLT)                                              

 

             MEAN PLATELET VOLUME 10.3 fL      6.7-11.0     N            



             (test code = MPV)                                        

 

             NEUTROPHIL % (test code = 83.7 %       39.0-69.0    H            



             NT%)                                                

 

             IMMATURE GRANULOCYTE % 2.3 %        0.0-5.0      N            



             (test code = IG%)                                        

 

             LYMPHOCYTE % (test code = 7.1 %        25.0-55.0    L            



             LY%)                                                

 

             MONOCYTE % (test code = 6.1 %        0.0-10.0     N            



             MO%)                                                

 

             EOSINOPHIL % (test code = 0.4 %        0.0-5.0      N            



             EO%)                                                

 

             BASOPHIL % (test code = 0.4 %        0.0-1.0      N            



             BA%)                                                

 

             NUCLEATED RBC % (test 0.2 %        0-0          H            



             code = NRBC%)                                        

 

             NEUTROPHIL # (test code = 10.91 K/mm3  1.8-7.7      H            



             NT#)                                                

 

             IMMATURE GRANULOCYTE # 0.30 x10 3/uL 0-0.03       H            



             (test code = IG#)                                        

 

             LYMPHOCYTE # (test code = 0.92 K/mm3   1.0-5.0      L            



             LY#)                                                

 

             MONOCYTE # (test code = 0.79 K/mm3   0-0.8        N            



             MO#)                                                

 

             EOSINOPHIL # (test code = 0.05 K/mm3   0.0-0.5      N            



             EO#)                                                

 

             BASOPHIL # (test code = 0.05 K/mm3   0.0-0.2      N            



             BA#)                                                

 

             NUCLEATED RBC # (test 0.02 K/mm3   0.0-0.1      N            



             code = NRBC#)                                        

 

             MANUAL DIFF REQUIRED NO, ONLY SCAN NEEDED                          

 



             (test code = MDIFF)                                        



DIFFERENTIAL [id number] 04:33:00





             Test Item    Value        Reference Range Interpretation Comments

 

             STAIN ACCEPTABILITY (test code = STN                               

         



             ACCEPTABLE)                                         

 

             CABOT RINGS (test code = CAB)                                      

  

 

             MORPHOLOGY COMMENT (test code = MOC)                               

         

 

             PLATELET ESTIMATE (test code = PLTEST)                             

           

 

             PLATELET MORPHOLOGY (test code =                                   

     



             PLTMORPH)                                           



CBC W/AUTO [id number] 04:33:00





             Test Item    Value        Reference Range Interpretation Comments

 

             WHITE BLOOD CELL (test 13.0 K/mm3   4.5-12.5     H            



             code = WBC)                                         

 

             RED BLOOD CELL (test code 3.87 mill/mm3 4.0-5.8      L            



             = RBC)                                              

 

             HEMOGLOBIN (test code = 9.9 gram/dL  13.0-17.5    L            



             HGB)                                                

 

             HEMATOCRIT (test code = 31.6 %       42.0-52.0    L            



             HCT)                                                

 

             MEAN CELL VOLUME (test 81.7 fL      80-98        N            



             code = MCV)                                         

 

             MEAN CELL HGB (test code 25.6 picogram 27.0-33.0    L            



             = MCH)                                              

 

             MEAN CELL HGB 31.3 gram/dL 33.0-36.0    L            



             CONCETRATION (test code =                                        



             MCHC)                                               

 

             RED CELL DISTRIBUTION 21.4 %       11.6-16.2    H            



             WIDTH (test code = RDW)                                        

 

             RED CELL DISTRIBUTION 57.3 fL      37.0-51.0    H            



             WIDTH SD (test code =                                        



             RDW-SD)                                             

 

             PLATELET COUNT (test code 263 K/mm3    150-450      N            



             = PLT)                                              

 

             MEAN PLATELET VOLUME 10.3 fL      6.7-11.0     N            



             (test code = MPV)                                        

 

             NEUTROPHIL % (test code = 83.7 %       39.0-69.0    H            



             NT%)                                                

 

             IMMATURE GRANULOCYTE % 2.3 %        0.0-5.0      N            



             (test code = IG%)                                        

 

             LYMPHOCYTE % (test code = 7.1 %        25.0-55.0    L            



             LY%)                                                

 

             MONOCYTE % (test code = 6.1 %        0.0-10.0     N            



             MO%)                                                

 

             EOSINOPHIL % (test code = 0.4 %        0.0-5.0      N            



             EO%)                                                

 

             BASOPHIL % (test code = 0.4 %        0.0-1.0      N            



             BA%)                                                

 

             NUCLEATED RBC % (test 0.2 %        0-0          H            



             code = NRBC%)                                        

 

             NEUTROPHIL # (test code = 10.91 K/mm3  1.8-7.7      H            



             NT#)                                                

 

             IMMATURE GRANULOCYTE # 0.30 x10 3/uL 0-0.03       H            



             (test code = IG#)                                        

 

             LYMPHOCYTE # (test code = 0.92 K/mm3   1.0-5.0      L            



             LY#)                                                

 

             MONOCYTE # (test code = 0.79 K/mm3   0-0.8        N            



             MO#)                                                

 

             EOSINOPHIL # (test code = 0.05 K/mm3   0.0-0.5      N            



             EO#)                                                

 

             BASOPHIL # (test code = 0.05 K/mm3   0.0-0.2      N            



             BA#)                                                

 

             NUCLEATED RBC # (test 0.02 K/mm3   0.0-0.1      N            



             code = NRBC#)                                        

 

             MANUAL DIFF REQUIRED NO, ONLY SCAN NEEDED                          

 



             (test code = MDIFF)                                        



DIFFERENTIAL [id number] 04:33:00





             Test Item    Value        Reference Range Interpretation Comments

 

             STAIN ACCEPTABILITY (test code = STN                               

         



             ACCEPTABLE)                                         

 

             MORPHOLOGY COMMENT (test code = MOC)                               

         

 

             PLATELET ESTIMATE (test code = PLTEST)                             

           

 

             PLATELET MORPHOLOGY (test code =                                   

     



             PLTMORPH)                                           



CBC W/AUTO [id number] 04:32:00





             Test Item    Value        Reference Range Interpretation Comments

 

             WHITE BLOOD CELL (test 13.0 K/mm3   4.5-12.5     H            



             code = WBC)                                         

 

             RED BLOOD CELL (test code 3.87 mill/mm3 4.0-5.8      L            



             = RBC)                                              

 

             HEMOGLOBIN (test code = 9.9 gram/dL  13.0-17.5    L            



             HGB)                                                

 

             HEMATOCRIT (test code = 31.6 %       42.0-52.0    L            



             HCT)                                                

 

             MEAN CELL VOLUME (test 81.7 fL      80-98        N            



             code = MCV)                                         

 

             MEAN CELL HGB (test code 25.6 picogram 27.0-33.0    L            



             = MCH)                                              

 

             MEAN CELL HGB 31.3 gram/dL 33.0-36.0    L            



             CONCETRATION (test code =                                        



             MCHC)                                               

 

             RED CELL DISTRIBUTION 21.4 %       11.6-16.2    H            



             WIDTH (test code = RDW)                                        

 

             RED CELL DISTRIBUTION 57.3 fL      37.0-51.0    H            



             WIDTH SD (test code =                                        



             RDW-SD)                                             

 

             PLATELET COUNT (test code 263 K/mm3    150-450      N            



             = PLT)                                              

 

             MEAN PLATELET VOLUME 10.3 fL      6.7-11.0     N            



             (test code = MPV)                                        

 

             NEUTROPHIL % (test code = 83.7 %       39.0-69.0    H            



             NT%)                                                

 

             IMMATURE GRANULOCYTE % 2.3 %        0.0-5.0      N            



             (test code = IG%)                                        

 

             LYMPHOCYTE % (test code = 7.1 %        25.0-55.0    L            



             LY%)                                                

 

             MONOCYTE % (test code = 6.1 %        0.0-10.0     N            



             MO%)                                                

 

             EOSINOPHIL % (test code = 0.4 %        0.0-5.0      N            



             EO%)                                                

 

             BASOPHIL % (test code = 0.4 %        0.0-1.0      N            



             BA%)                                                

 

             NUCLEATED RBC % (test 0.2 %        0-0          H            



             code = NRBC%)                                        

 

             NEUTROPHIL # (test code = 10.91 K/mm3  1.8-7.7      H            



             NT#)                                                

 

             IMMATURE GRANULOCYTE # 0.30 x10 3/uL 0-0.03       H            



             (test code = IG#)                                        

 

             LYMPHOCYTE # (test code = 0.92 K/mm3   1.0-5.0      L            



             LY#)                                                

 

             MONOCYTE # (test code = 0.79 K/mm3   0-0.8        N            



             MO#)                                                

 

             EOSINOPHIL # (test code = 0.05 K/mm3   0.0-0.5      N            



             EO#)                                                

 

             BASOPHIL # (test code = 0.05 K/mm3   0.0-0.2      N            



             BA#)                                                

 

             NUCLEATED RBC # (test 0.02 K/mm3   0.0-0.1      N            



             code = NRBC#)                                        

 

             MANUAL DIFF REQUIRED NO, ONLY SCAN NEEDED                          

 



             (test code = MDIFF)                                        



DIFFERENTIAL [id number] 04:32:00





             Test Item    Value        Reference Range Interpretation Comments

 

             STAIN ACCEPTABILITY (test code = STN                               

         



             ACCEPTABLE)                                         

 

             CABOT RINGS (test code = CAB)                                      

  

 

             MORPHOLOGY COMMENT (test code = MOC)                               

         

 

             PLATELET ESTIMATE (test code = PLTEST)                             

           

 

             PLATELET MORPHOLOGY (test code =                                   

     



             PLTMORPH)                                           



CBC W/AUTO [id number] 04:32:00





             Test Item    Value        Reference Range Interpretation Comments

 

             WHITE BLOOD CELL (test 13.0 K/mm3   4.5-12.5     H            



             code = WBC)                                         

 

             RED BLOOD CELL (test code 3.87 mill/mm3 4.0-5.8      L            



             = RBC)                                              

 

             HEMOGLOBIN (test code = 9.9 gram/dL  13.0-17.5    L            



             HGB)                                                

 

             HEMATOCRIT (test code = 31.6 %       42.0-52.0    L            



             HCT)                                                

 

             MEAN CELL VOLUME (test 81.7 fL      80-98        N            



             code = MCV)                                         

 

             MEAN CELL HGB (test code 25.6 picogram 27.0-33.0    L            



             = MCH)                                              

 

             MEAN CELL HGB 31.3 gram/dL 33.0-36.0    L            



             CONCETRATION (test code =                                        



             MCHC)                                               

 

             RED CELL DISTRIBUTION 21.4 %       11.6-16.2    H            



             WIDTH (test code = RDW)                                        

 

             RED CELL DISTRIBUTION 57.3 fL      37.0-51.0    H            



             WIDTH SD (test code =                                        



             RDW-SD)                                             

 

             PLATELET COUNT (test code 263 K/mm3    150-450      N            



             = PLT)                                              

 

             MEAN PLATELET VOLUME 10.3 fL      6.7-11.0     N            



             (test code = MPV)                                        

 

             NEUTROPHIL % (test code = 83.7 %       39.0-69.0    H            



             NT%)                                                

 

             IMMATURE GRANULOCYTE % 2.3 %        0.0-5.0      N            



             (test code = IG%)                                        

 

             LYMPHOCYTE % (test code = 7.1 %        25.0-55.0    L            



             LY%)                                                

 

             MONOCYTE % (test code = 6.1 %        0.0-10.0     N            



             MO%)                                                

 

             EOSINOPHIL % (test code = 0.4 %        0.0-5.0      N            



             EO%)                                                

 

             BASOPHIL % (test code = 0.4 %        0.0-1.0      N            



             BA%)                                                

 

             NUCLEATED RBC % (test 0.2 %        0-0          H            



             code = NRBC%)                                        

 

             NEUTROPHIL # (test code = 10.91 K/mm3  1.8-7.7      H            



             NT#)                                                

 

             IMMATURE GRANULOCYTE # 0.30 x10 3/uL 0-0.03       H            



             (test code = IG#)                                        

 

             LYMPHOCYTE # (test code = 0.92 K/mm3   1.0-5.0      L            



             LY#)                                                

 

             MONOCYTE # (test code = 0.79 K/mm3   0-0.8        N            



             MO#)                                                

 

             EOSINOPHIL # (test code = 0.05 K/mm3   0.0-0.5      N            



             EO#)                                                

 

             BASOPHIL # (test code = 0.05 K/mm3   0.0-0.2      N            



             BA#)                                                

 

             NUCLEATED RBC # (test 0.02 K/mm3   0.0-0.1      N            



             code = NRBC#)                                        

 

             MANUAL DIFF REQUIRED NO, ONLY SCAN NEEDED                          

 



             (test code = MDIFF)                                        



DIFFERENTIAL [id number] 04:32:00





             Test Item    Value        Reference Range Interpretation Comments

 

             STAIN ACCEPTABILITY (test code = STN                               

         



             ACCEPTABLE)                                         

 

             CABOT RINGS (test code = CAB)                                      

  

 

             MORPHOLOGY COMMENT (test code = MOC)                               

         

 

             PLATELET ESTIMATE (test code = PLTEST)                             

           

 

             PLATELET MORPHOLOGY (test code =                                   

     



             PLTMORPH)                                           



- US ABDOMEN [id number] 16:48:00  Name: BRENDAN FISHER                   
[facility]                     : 1945 Age/S: 74  / M         [address] [facility]                Unit #: [id number]     Loc:               [place]  [address]              Phys: Chen Spivey                               
                Acct: [id number]  Dis Date:               Status: ADM IN      
                           PHONE #: [phone]     Exam Date: 2020
                    FAX #: [phone]      Reason: ABN LFTS                   
                        EXAMS:                                               CPT
CODE:      [id number] US ABDOMEN LTD                             [id number]           
                REASON FOR EXAM: ABN LFTS               EXAM ORDER DATE: 
2020 2:40 PM               Attending MLEONILA: EZEKIEL Delgado             
 PROCEDURE:  - US ABDOMEN LTD               Technique: Grayscale and color 
Doppler images of the right-upper       quadrant of the abdomen.               
Comparison: CTof the abdomen and pelvis April 3, 2020               FINDINGS:   
            Aorta and IVC: Patentand grossly normal in caliber.               
Liver:        Size: 16.7 cm craniocaudally       Parenchyma and contour: 
Diffusely increased echogenicity of the       parenchyma       Cysts and/or 
masses:None.               Intrahepatic bile ducts: No intrahepatic biliary 
ductal dilation               Common bile duct: 3.5 mm in diameter.  No 
echogenic filling defects in       visualized duct.     Gallbladder:        
Stones/sludge: Small intraluminal stones are present       Wall: 1.3 mm in t
hickness.  No discontinuity.  No polyps.  No       pericholecystic fluid.  No 
hyperemia.       Sonographic Escalera's sign: Negative               Portal vein: 
Portal vein caliber is within normal limits.  Portal vein       is patent with 
hepatopetal flow.               Pancreas: Incompletely visualized. However the 
visualized portions are       grossly within normal limits.               Right 
kidney:      parenchyma echogenicity: Normal echogenicity       size: 11.4 x 5.3
x 5.1 cm        stones: none     PAGE  1                       Signed Report    
               (CONTINUED)   Name: BRENDAN FISHER                   [facility]                     : 1945 Age/S: 74  / M         [address]
[facility]                Unit #: [id number]     Loc:               [place]  
[address]  Phys: Chen Spivey                                               
Acct: [id number]  Dis Date:               Status: ADM IN                      
           PHONE #: [phone]     Exam Date: 2020  161                
    FAX #: [phone]      Reason: ABN LFTS                        EXAMS:     
                                         CPT CODE:      [id number] US ABDOMEN LTD
                            [id number]               &lt;Continued&gt;        
cysts/masses: none       hydronephrosis: none                Ascites/pleural 
effusions: None                 IMPRESSION:          Cholelithiasis without 
sonographic evidence of acute cholecystitis.         Hepaticsteatosis.          
        Location: [facility]          ** Electronically Signed by Victor Manuel Olivier MD on 2020 at 1648 **                      Reported and signed by: Victor Manuel Olivier MD  
   CC: Earl Woods MD; Chen Spivey                                  
          Technologist: Harriet Lyons RDMS             Trnscb Date/Time: 
2020 () [id number]                       Orig Print D/T: S:2020 
(084)     Probe:                       PAGE  2                       Signed 
ReportCBC W/AUTO [id number] 06:06:00





             Test Item    Value        Reference Range Interpretation Comments

 

             WHITE BLOOD CELL (test 9.7 K/mm3    4.5-12.5     N            



             code = WBC)                                         

 

             RED BLOOD CELL (test code 3.50 mill/mm3 4.0-5.8      L            



             = RBC)                                              

 

             HEMOGLOBIN (test code = 9.0 gram/dL  13.0-17.5    L            



             HGB)                                                

 

             HEMATOCRIT (test code = 28.6 %       42.0-52.0    L            



             HCT)                                                

 

             MEAN CELL VOLUME (test 81.7 fL      80-98        N            



             code = MCV)                                         

 

             MEAN CELL HGB (test code 25.7 picogram 27.0-33.0    L            



             = MCH)                                              

 

             MEAN CELL HGB 31.5 gram/dL 33.0-36.0    L            



             CONCETRATION (test code =                                        



             MCHC)                                               

 

             RED CELL DISTRIBUTION 20.6 %       11.6-16.2    H            



             WIDTH (test code = RDW)                                        

 

             RED CELL DISTRIBUTION 59.0 fL      37.0-51.0    H            



             WIDTH SD (test code =                                        



             RDW-SD)                                             

 

             PLATELET COUNT (test code 247 K/mm3    150-450      N            



             = PLT)                                              

 

             MEAN PLATELET VOLUME 10.6 fL      6.7-11.0     N            



             (test code = MPV)                                        

 

             NEUTROPHIL % (test code = 77.7 %       39.0-69.0    H            



             NT%)                                                

 

             IMMATURE GRANULOCYTE % 2.9 %        0.0-5.0      N            



             (test code = IG%)                                        

 

             LYMPHOCYTE % (test code = 10.6 %       25.0-55.0    L            



             LY%)                                                

 

             MONOCYTE % (test code = 8.3 %        0.0-10.0     N            



             MO%)                                                

 

             EOSINOPHIL % (test code = 0.3 %        0.0-5.0      N            



             EO%)                                                

 

             BASOPHIL % (test code = 0.2 %        0.0-1.0      N            



             BA%)                                                

 

             NUCLEATED RBC % (test 0.4 %        0-0          H            



             code = NRBC%)                                        

 

             NEUTROPHIL # (test code = 7.57 K/mm3   1.8-7.7      N            



             NT#)                                                

 

             IMMATURE GRANULOCYTE # 0.28 x10 3/uL 0-0.03       H            



             (test code = IG#)                                        

 

             LYMPHOCYTE # (test code = 1.03 K/mm3   1.0-5.0      N            



             LY#)                                                

 

             MONOCYTE # (test code = 0.81 K/mm3   0-0.8        H            



             MO#)                                                

 

             EOSINOPHIL # (test code = 0.03 K/mm3   0.0-0.5      N            



             EO#)                                                

 

             BASOPHIL # (test code = 0.02 K/mm3   0.0-0.2      N            



             BA#)                                                

 

             NUCLEATED RBC # (test 0.04 K/mm3   0.0-0.1      N            



             code = NRBC#)                                        

 

             MANUAL DIFF REQUIRED NO, ONLY SCAN NEEDED                          

 



             (test code = MDIFF)                                        



DIFFERENTIAL [id number] 06:06:00





             Test Item    Value        Reference Range Interpretation Comments

 

             STAIN ACCEPTABILITY (test STAIN ACCEPTABLE                         

  



             code = STN ACCEPTABLE)                                        

 

             POLYCHROMASIA (test code = 1+                                     



             POLC)                                               

 

             HYPOCHROMIA (test code = 1+                                     



             HYPO)                                               

 

             ANISOCYTOSIS (test code = 1+                                     



             ANISO)                                              

 

             MICROCYTOSIS (test code = 1+                                     



             MICR)                                               

 

             MORPHOLOGY COMMENT (test TEST NOT PERFORMED                        

   



             code = MOC)                                         

 

             PLATELET ESTIMATE (test ADEQUATE                               



             code = PLTEST)                                        

 

             PLATELET MORPHOLOGY (test NORMAL                                 



             code = PLTMORPH)                                        



COMPREHENSIVE METABOLIC [id number] 05:58:00





             Test Item    Value        Reference Range Interpretation Comments

 

             SODIUM (test code = 131 mmol/L   136-145      L            



             NA)                                                 

 

             POTASSIUM (test code = 3.2 mmol/L   3.5-5.1      L            



             K)                                                  

 

             CHLORIDE (test code = 98.0 mmol/L         N            



             CL)                                                 

 

             CARBON DIOXIDE (test 25.0 mmol/L  21-32        N            



             code = CO2)                                         

 

             ANION GAP (test code = 11.2         10-20        N            



             GAP)                                                

 

             GLUCOSE (test code = 95 mg/dL            N            



             GLU)                                                

 

             BLOOD UREA NITROGEN 4 mg/dL      7-18         L            



             (test code = BUN)                                        

 

             GLOMERULAR FILTRATION > 60 mL/min  >=60                      Estima

geovanna GFR by



             RATE (test code = GFR)                                        using

 Modified MDRD



                                                                 formula.Chronic



                                                                 kidney disease 

is



                                                                 defined as ei

er



                                                                 kidney damageor

 GFR



                                                                 <60 mL/min/1.73

 m2



                                                                 for >3 months.

 

             CREATININE (test code 0.60 mg/dL   0.7-1.3      L            



             = CREAT)                                            

 

             BUN/CREATININE RATIO 6.7          10-20        L            



             (test code = BUN/CREA)                                        

 

             TOTAL PROTEIN (test 5.9 gram/dL  6.4-8.2      L            



             code = PROT)                                        

 

             ALBUMIN (test code = 2.5 g/dL     3.4-5.0      L            



             ALB)                                                

 

             GLOBULIN (test code = 3.4 gram/dL  2.7-4.2      N            



             GLOB)                                               

 

             ALBUMIN/GLOBULIN RATIO 0.7          0.75-1.50    L            



             (test code = A/G)                                        

 

             CALCIUM (test code = 7.8 mg/dL    8.5-10.1     L            



             CA)                                                 

 

             BILIRUBIN TOTAL (test 1.20 mg/dL   0.0-1.0      H            



             code = BILT)                                        

 

             SGOT/AST (test code = 154 IUnit/L  15-37        H            



             AST)                                                

 

             SGPT/ALT (test code = 87 IUnit/L   12-78        H            



             ALT)                                                

 

             ALKALINE PHOSPHATASE 142 IUnit/L         H            **Note 

change in



             TOTAL (test code =                                        reference

 range due



             ALKP)                                               to change in



                                                                 reagent.**



[id number] 05:58:00





             Test Item    Value        Reference Range Interpretation Comments

 

             LIPASE (test code = LIP) 2477 U/L     73.0-393.0   H            



COMPREHENSIVE METABOLIC [id number] 05:43:00





             Test Item    Value        Reference Range Interpretation Comments

 

             SODIUM (test code = NA) 131 mmol/L   136-145      L            

 

             POTASSIUM (test code = K) 3.2 mmol/L   3.5-5.1      L            

 

             CHLORIDE (test code = CL) 98.0 mmol/L         N            

 

             CARBON DIOXIDE (test code = CO2)  mmol/L      21-32                

     

 

             ANION GAP (test code = GAP)              10-20                     

 

             GLUCOSE (test code = GLU)  mg/dL                           

 

             BLOOD UREA NITROGEN (test code =  mg/dL       7-18                 

     



             BUN)                                                

 

             GLOMERULAR FILTRATION RATE (test  mL/min      >=60                 

     



             code = GFR)                                         

 

             CREATININE (test code = CREAT)  mg/dL       0.7-1.3                

   

 

             BUN/CREATININE RATIO (test code =              10-20               

      



             BUN/CREA)                                           

 

             TOTAL PROTEIN (test code = PROT)  gram/dL     6.4-8.2              

     

 

             ALBUMIN (test code = ALB)  g/dL        3.4-5.0                   

 

             GLOBULIN (test code = GLOB)  gram/dL     2.7-4.2                   

 

             ALBUMIN/GLOBULIN RATIO (test code              0.75-1.50           

      



             = A/G)                                              

 

             CALCIUM (test code = CA)  mg/dL       8.5-10.1                  

 

             BILIRUBIN TOTAL (test code =  mg/dL       0.0-1.0                  

 



             BILT)                                               

 

             SGOT/AST (test code = AST)  IUnit/L     15-37                     

 

             SGPT/ALT (test code = ALT)  IUnit/L     12-78                     

 

             ALKALINE PHOSPHATASE TOTAL (test  IUnit/L                    

     



             code = ALKP)                                        



[id number] 05:43:00





             Test Item    Value        Reference Range Interpretation Comments

 

             LIPASE (test code = LIP)  U/L         73.0-393.0                



CBC W/AUTO [id number] 05:29:00





             Test Item    Value        Reference Range Interpretation Comments

 

             WHITE BLOOD CELL (test 9.7 K/mm3    4.5-12.5     N            



             code = WBC)                                         

 

             RED BLOOD CELL (test code 3.50 mill/mm3 4.0-5.8      L            



             = RBC)                                              

 

             HEMOGLOBIN (test code = 9.0 gram/dL  13.0-17.5    L            



             HGB)                                                

 

             HEMATOCRIT (test code = 28.6 %       42.0-52.0    L            



             HCT)                                                

 

             MEAN CELL VOLUME (test 81.7 fL      80-98        N            



             code = MCV)                                         

 

             MEAN CELL HGB (test code 25.7 picogram 27.0-33.0    L            



             = MCH)                                              

 

             MEAN CELL HGB 31.5 gram/dL 33.0-36.0    L            



             CONCETRATION (test code =                                        



             MCHC)                                               

 

             RED CELL DISTRIBUTION 20.6 %       11.6-16.2    H            



             WIDTH (test code = RDW)                                        

 

             RED CELL DISTRIBUTION 59.0 fL      37.0-51.0    H            



             WIDTH SD (test code =                                        



             RDW-SD)                                             

 

             PLATELET COUNT (test code 247 K/mm3    150-450      N            



             = PLT)                                              

 

             MEAN PLATELET VOLUME 10.6 fL      6.7-11.0     N            



             (test code = MPV)                                        

 

             NEUTROPHIL % (test code = 77.7 %       39.0-69.0    H            



             NT%)                                                

 

             IMMATURE GRANULOCYTE % 2.9 %        0.0-5.0      N            



             (test code = IG%)                                        

 

             LYMPHOCYTE % (test code = 10.6 %       25.0-55.0    L            



             LY%)                                                

 

             MONOCYTE % (test code = 8.3 %        0.0-10.0     N            



             MO%)                                                

 

             EOSINOPHIL % (test code = 0.3 %        0.0-5.0      N            



             EO%)                                                

 

             BASOPHIL % (test code = 0.2 %        0.0-1.0      N            



             BA%)                                                

 

             NUCLEATED RBC % (test 0.4 %        0-0          H            



             code = NRBC%)                                        

 

             NEUTROPHIL # (test code = 7.57 K/mm3   1.8-7.7      N            



             NT#)                                                

 

             IMMATURE GRANULOCYTE # 0.28 x10 3/uL 0-0.03       H            



             (test code = IG#)                                        

 

             LYMPHOCYTE # (test code = 1.03 K/mm3   1.0-5.0      N            



             LY#)                                                

 

             MONOCYTE # (test code = 0.81 K/mm3   0-0.8        H            



             MO#)                                                

 

             EOSINOPHIL # (test code = 0.03 K/mm3   0.0-0.5      N            



             EO#)                                                

 

             BASOPHIL # (test code = 0.02 K/mm3   0.0-0.2      N            



             BA#)                                                

 

             NUCLEATED RBC # (test 0.04 K/mm3   0.0-0.1      N            



             code = NRBC#)                                        

 

             MANUAL DIFF REQUIRED NO, ONLY SCAN NEEDED                          

 



             (test code = MDIFF)                                        



DIFFERENTIAL [id number] 05:29:00





             Test Item    Value        Reference Range Interpretation Comments

 

             STAIN ACCEPTABILITY (test code = STN                               

         



             ACCEPTABLE)                                         

 

             CABOT RINGS (test code = CAB)                                      

  

 

             MORPHOLOGY COMMENT (test code = MOC)                               

         

 

             PLATELET ESTIMATE (test code = PLTEST)                             

           

 

             PLATELET MORPHOLOGY (test code =                                   

     



             PLTMORPH)                                           



CBC W/AUTO [id number] 05:29:00





             Test Item    Value        Reference Range Interpretation Comments

 

             WHITE BLOOD CELL (test 9.7 K/mm3    4.5-12.5     N            



             code = WBC)                                         

 

             RED BLOOD CELL (test code 3.50 mill/mm3 4.0-5.8      L            



             = RBC)                                              

 

             HEMOGLOBIN (test code = 9.0 gram/dL  13.0-17.5    L            



             HGB)                                                

 

             HEMATOCRIT (test code = 28.6 %       42.0-52.0    L            



             HCT)                                                

 

             MEAN CELL VOLUME (test 81.7 fL      80-98        N            



             code = MCV)                                         

 

             MEAN CELL HGB (test code 25.7 picogram 27.0-33.0    L            



             = MCH)                                              

 

             MEAN CELL HGB 31.5 gram/dL 33.0-36.0    L            



             CONCETRATION (test code =                                        



             MCHC)                                               

 

             RED CELL DISTRIBUTION 20.6 %       11.6-16.2    H            



             WIDTH (test code = RDW)                                        

 

             RED CELL DISTRIBUTION 59.0 fL      37.0-51.0    H            



             WIDTH SD (test code =                                        



             RDW-SD)                                             

 

             PLATELET COUNT (test code 247 K/mm3    150-450      N            



             = PLT)                                              

 

             MEAN PLATELET VOLUME 10.6 fL      6.7-11.0     N            



             (test code = MPV)                                        

 

             NEUTROPHIL % (test code = 77.7 %       39.0-69.0    H            



             NT%)                                                

 

             IMMATURE GRANULOCYTE % 2.9 %        0.0-5.0      N            



             (test code = IG%)                                        

 

             LYMPHOCYTE % (test code = 10.6 %       25.0-55.0    L            



             LY%)                                                

 

             MONOCYTE % (test code = 8.3 %        0.0-10.0     N            



             MO%)                                                

 

             EOSINOPHIL % (test code = 0.3 %        0.0-5.0      N            



             EO%)                                                

 

             BASOPHIL % (test code = 0.2 %        0.0-1.0      N            



             BA%)                                                

 

             NUCLEATED RBC % (test 0.4 %        0-0          H            



             code = NRBC%)                                        

 

             NEUTROPHIL # (test code = 7.57 K/mm3   1.8-7.7      N            



             NT#)                                                

 

             IMMATURE GRANULOCYTE # 0.28 x10 3/uL 0-0.03       H            



             (test code = IG#)                                        

 

             LYMPHOCYTE # (test code = 1.03 K/mm3   1.0-5.0      N            



             LY#)                                                

 

             MONOCYTE # (test code = 0.81 K/mm3   0-0.8        H            



             MO#)                                                

 

             EOSINOPHIL # (test code = 0.03 K/mm3   0.0-0.5      N            



             EO#)                                                

 

             BASOPHIL # (test code = 0.02 K/mm3   0.0-0.2      N            



             BA#)                                                

 

             NUCLEATED RBC # (test 0.04 K/mm3   0.0-0.1      N            



             code = NRBC#)                                        

 

             MANUAL DIFF REQUIRED NO, ONLY SCAN NEEDED                          

 



             (test code = MDIFF)                                        



DIFFERENTIAL [id number] 05:29:00





             Test Item    Value        Reference Range Interpretation Comments

 

             STAIN ACCEPTABILITY (test code = STN                               

         



             ACCEPTABLE)                                         

 

             CABOT RINGS (test code = CAB)                                      

  

 

             MORPHOLOGY COMMENT (test code = MOC)                               

         

 

             PLATELET ESTIMATE (test code = PLTEST)                             

           

 

             PLATELET MORPHOLOGY (test code =                                   

     



             PLTMORPH)                                           



CBC W/AUTO [id number] 05:29:00





             Test Item    Value        Reference Range Interpretation Comments

 

             WHITE BLOOD CELL (test 9.7 K/mm3    4.5-12.5     N            



             code = WBC)                                         

 

             RED BLOOD CELL (test code 3.50 mill/mm3 4.0-5.8      L            



             = RBC)                                              

 

             HEMOGLOBIN (test code = 9.0 gram/dL  13.0-17.5    L            



             HGB)                                                

 

             HEMATOCRIT (test code = 28.6 %       42.0-52.0    L            



             HCT)                                                

 

             MEAN CELL VOLUME (test 81.7 fL      80-98        N            



             code = MCV)                                         

 

             MEAN CELL HGB (test code 25.7 picogram 27.0-33.0    L            



             = MCH)                                              

 

             MEAN CELL HGB 31.5 gram/dL 33.0-36.0    L            



             CONCETRATION (test code =                                        



             MCHC)                                               

 

             RED CELL DISTRIBUTION 20.6 %       11.6-16.2    H            



             WIDTH (test code = RDW)                                        

 

             RED CELL DISTRIBUTION 59.0 fL      37.0-51.0    H            



             WIDTH SD (test code =                                        



             RDW-SD)                                             

 

             PLATELET COUNT (test code 247 K/mm3    150-450      N            



             = PLT)                                              

 

             MEAN PLATELET VOLUME 10.6 fL      6.7-11.0     N            



             (test code = MPV)                                        

 

             NEUTROPHIL % (test code = 77.7 %       39.0-69.0    H            



             NT%)                                                

 

             IMMATURE GRANULOCYTE % 2.9 %        0.0-5.0      N            



             (test code = IG%)                                        

 

             LYMPHOCYTE % (test code = 10.6 %       25.0-55.0    L            



             LY%)                                                

 

             MONOCYTE % (test code = 8.3 %        0.0-10.0     N            



             MO%)                                                

 

             EOSINOPHIL % (test code = 0.3 %        0.0-5.0      N            



             EO%)                                                

 

             BASOPHIL % (test code = 0.2 %        0.0-1.0      N            



             BA%)                                                

 

             NUCLEATED RBC % (test 0.4 %        0-0          H            



             code = NRBC%)                                        

 

             NEUTROPHIL # (test code = 7.57 K/mm3   1.8-7.7      N            



             NT#)                                                

 

             IMMATURE GRANULOCYTE # 0.28 x10 3/uL 0-0.03       H            



             (test code = IG#)                                        

 

             LYMPHOCYTE # (test code = 1.03 K/mm3   1.0-5.0      N            



             LY#)                                                

 

             MONOCYTE # (test code = 0.81 K/mm3   0-0.8        H            



             MO#)                                                

 

             EOSINOPHIL # (test code = 0.03 K/mm3   0.0-0.5      N            



             EO#)                                                

 

             BASOPHIL # (test code = 0.02 K/mm3   0.0-0.2      N            



             BA#)                                                

 

             NUCLEATED RBC # (test 0.04 K/mm3   0.0-0.1      N            



             code = NRBC#)                                        

 

             MANUAL DIFF REQUIRED NO, ONLY SCAN NEEDED                          

 



             (test code = MDIFF)                                        



DIFFERENTIAL [id number] 05:29:00





             Test Item    Value        Reference Range Interpretation Comments

 

             STAIN ACCEPTABILITY (test code = STN                               

         



             ACCEPTABLE)                                         

 

             MORPHOLOGY COMMENT (test code = MOC)                               

         

 

             PLATELET ESTIMATE (test code = PLTEST)                             

           

 

             PLATELET MORPHOLOGY (test code =                                   

     



             PLTMORPH)                                           



CBC W/AUTO [id number] 05:29:00





             Test Item    Value        Reference Range Interpretation Comments

 

             WHITE BLOOD CELL (test 9.7 K/mm3    4.5-12.5     N            



             code = WBC)                                         

 

             RED BLOOD CELL (test code 3.50 mill/mm3 4.0-5.8      L            



             = RBC)                                              

 

             HEMOGLOBIN (test code = 9.0 gram/dL  13.0-17.5    L            



             HGB)                                                

 

             HEMATOCRIT (test code = 28.6 %       42.0-52.0    L            



             HCT)                                                

 

             MEAN CELL VOLUME (test 81.7 fL      80-98        N            



             code = MCV)                                         

 

             MEAN CELL HGB (test code 25.7 picogram 27.0-33.0    L            



             = MCH)                                              

 

             MEAN CELL HGB 31.5 gram/dL 33.0-36.0    L            



             CONCETRATION (test code =                                        



             MCHC)                                               

 

             RED CELL DISTRIBUTION 20.6 %       11.6-16.2    H            



             WIDTH (test code = RDW)                                        

 

             RED CELL DISTRIBUTION 59.0 fL      37.0-51.0    H            



             WIDTH SD (test code =                                        



             RDW-SD)                                             

 

             PLATELET COUNT (test code 247 K/mm3    150-450      N            



             = PLT)                                              

 

             MEAN PLATELET VOLUME 10.6 fL      6.7-11.0     N            



             (test code = MPV)                                        

 

             NEUTROPHIL % (test code = 77.7 %       39.0-69.0    H            



             NT%)                                                

 

             IMMATURE GRANULOCYTE % 2.9 %        0.0-5.0      N            



             (test code = IG%)                                        

 

             LYMPHOCYTE % (test code = 10.6 %       25.0-55.0    L            



             LY%)                                                

 

             MONOCYTE % (test code = 8.3 %        0.0-10.0     N            



             MO%)                                                

 

             EOSINOPHIL % (test code = 0.3 %        0.0-5.0      N            



             EO%)                                                

 

             BASOPHIL % (test code = 0.2 %        0.0-1.0      N            



             BA%)                                                

 

             NUCLEATED RBC % (test 0.4 %        0-0          H            



             code = NRBC%)                                        

 

             NEUTROPHIL # (test code = 7.57 K/mm3   1.8-7.7      N            



             NT#)                                                

 

             IMMATURE GRANULOCYTE # 0.28 x10 3/uL 0-0.03       H            



             (test code = IG#)                                        

 

             LYMPHOCYTE # (test code = 1.03 K/mm3   1.0-5.0      N            



             LY#)                                                

 

             MONOCYTE # (test code = 0.81 K/mm3   0-0.8        H            



             MO#)                                                

 

             EOSINOPHIL # (test code = 0.03 K/mm3   0.0-0.5      N            



             EO#)                                                

 

             BASOPHIL # (test code = 0.02 K/mm3   0.0-0.2      N            



             BA#)                                                

 

             NUCLEATED RBC # (test 0.04 K/mm3   0.0-0.1      N            



             code = NRBC#)                                        

 

             MANUAL DIFF REQUIRED NO, ONLY SCAN NEEDED                          

 



             (test code = MDIFF)                                        



DIFFERENTIAL [id number] 05:29:00





             Test Item    Value        Reference Range Interpretation Comments

 

             STAIN ACCEPTABILITY (test code = STN                               

         



             ACCEPTABLE)                                         

 

             CABOT RINGS (test code = CAB)                                      

  

 

             MORPHOLOGY COMMENT (test code = MOC)                               

         

 

             PLATELET ESTIMATE (test code = PLTEST)                             

           

 

             PLATELET MORPHOLOGY (test code =                                   

     



             PLTMORPH)                                           



COMPREHENSIVE METABOLIC [id number] 06:34:00





             Test Item    Value        Reference Range Interpretation Comments

 

             SODIUM (test code = 129 mmol/L   136-145      L            



             NA)                                                 

 

             POTASSIUM (test code = 3.0 mmol/L   3.5-5.1      L            



             K)                                                  

 

             CHLORIDE (test code = 94.0 mmol/L         L            



             CL)                                                 

 

             CARBON DIOXIDE (test 24.0 mmol/L  21-32        N            



             code = CO2)                                         

 

             ANION GAP (test code = 14.0         10-20        N            



             GAP)                                                

 

             GLUCOSE (test code = 80 mg/dL            N            



             GLU)                                                

 

             BLOOD UREA NITROGEN 7 mg/dL      7-18         N            



             (test code = BUN)                                        

 

             GLOMERULAR FILTRATION > 60 mL/min  >=60                      Estima

geovanna GFR by



             RATE (test code = GFR)                                        using

 Modified MDRD



                                                                 formula.Chronic



                                                                 kidney disease 

is



                                                                 defined as eith

er



                                                                 kidney damageor

 GFR



                                                                 <60 mL/min/1.73

 m2



                                                                 for >3 months.

 

             CREATININE (test code 0.60 mg/dL   0.7-1.3      L            



             = CREAT)                                            

 

             BUN/CREATININE RATIO 11.7         10-20        N            



             (test code = BUN/CREA)                                        

 

             TOTAL PROTEIN (test 6.0 gram/dL  6.4-8.2      L            



             code = PROT)                                        

 

             ALBUMIN (test code = 2.5 g/dL     3.4-5.0      L            



             ALB)                                                

 

             GLOBULIN (test code = 3.5 gram/dL  2.7-4.2      N            



             GLOB)                                               

 

             ALBUMIN/GLOBULIN RATIO 0.7          0.75-1.50    L            



             (test code = A/G)                                        

 

             CALCIUM (test code = 7.6 mg/dL    8.5-10.1     L            



             CA)                                                 

 

             BILIRUBIN TOTAL (test 1.60 mg/dL   0.0-1.0      H            



             code = BILT)                                        

 

             SGOT/AST (test code = 149 IUnit/L  15-37        H            



             AST)                                                

 

             SGPT/ALT (test code = 84 IUnit/L   12-78        H            



             ALT)                                                

 

             ALKALINE PHOSPHATASE 141 IUnit/L         H            **Note 

change in



             TOTAL (test code =                                        reference

 range due



             ALKP)                                               to change in



                                                                 reagent.**



[id number] 06:34:00





             Test Item    Value        Reference Range Interpretation Comments

 

             LIPASE (test code = LIP) 2027 U/L     73.0-393.0   H            



[id number] 06:34:00





             Test Item    Value        Reference Range Interpretation Comments

 

             TROPONIN-I (test code = TROPI) 0.026 ng/mL  0-0.045      N         

   



CBC W/AUTO [id number] 05:55:00





             Test Item    Value        Reference Range Interpretation Comments

 

             WHITE BLOOD CELL (test 10.3 K/mm3   4.5-12.5     N            



             code = WBC)                                         

 

             RED BLOOD CELL (test code 3.55 mill/mm3 4.0-5.8      L            



             = RBC)                                              

 

             HEMOGLOBIN (test code = 9.0 gram/dL  13.0-17.5    L            



             HGB)                                                

 

             HEMATOCRIT (test code = 28.6 %       42.0-52.0    L            



             HCT)                                                

 

             MEAN CELL VOLUME (test 80.6 fL      80-98        N            



             code = MCV)                                         

 

             MEAN CELL HGB (test code 25.4 picogram 27.0-33.0    L            



             = MCH)                                              

 

             MEAN CELL HGB 31.5 gram/dL 33.0-36.0    L            



             CONCETRATION (test code =                                        



             MCHC)                                               

 

             RED CELL DISTRIBUTION 20.6 %       11.6-16.2    H            



             WIDTH (test code = RDW)                                        

 

             RED CELL DISTRIBUTION 59.2 fL      37.0-51.0    H            



             WIDTH SD (test code =                                        



             RDW-SD)                                             

 

             PLATELET COUNT (test code 235 K/mm3    150-450      N            



             = PLT)                                              

 

             MEAN PLATELET VOLUME 10.3 fL      6.7-11.0     N            



             (test code = MPV)                                        

 

             NEUTROPHIL % (test code = 76.8 %       39.0-69.0    H            



             NT%)                                                

 

             IMMATURE GRANULOCYTE % 2.6 %        0.0-5.0      N            



             (test code = IG%)                                        

 

             LYMPHOCYTE % (test code = 10.9 %       25.0-55.0    L            



             LY%)                                                

 

             MONOCYTE % (test code = 9.2 %        0.0-10.0     N            



             MO%)                                                

 

             EOSINOPHIL % (test code = 0.3 %        0.0-5.0      N            



             EO%)                                                

 

             BASOPHIL % (test code = 0.2 %        0.0-1.0      N            



             BA%)                                                

 

             NUCLEATED RBC % (test 0.5 %        0-0          H            



             code = NRBC%)                                        

 

             NEUTROPHIL # (test code = 7.93 K/mm3   1.8-7.7      H            



             NT#)                                                

 

             IMMATURE GRANULOCYTE # 0.27 x10 3/uL 0-0.03       H            



             (test code = IG#)                                        

 

             LYMPHOCYTE # (test code = 1.13 K/mm3   1.0-5.0      N            



             LY#)                                                

 

             MONOCYTE # (test code = 0.95 K/mm3   0-0.8        H            



             MO#)                                                

 

             EOSINOPHIL # (test code = 0.03 K/mm3   0.0-0.5      N            



             EO#)                                                

 

             BASOPHIL # (test code = 0.02 K/mm3   0.0-0.2      N            



             BA#)                                                

 

             NUCLEATED RBC # (test 0.05 K/mm3   0.0-0.1      N            



             code = NRBC#)                                        

 

             MANUAL DIFF REQUIRED NO, ONLY SCAN NEEDED                          

 



             (test code = MDIFF)                                        



DIFFERENTIAL [id number] 05:55:00





             Test Item    Value        Reference Range Interpretation Comments

 

             STAIN ACCEPTABILITY (test STAIN ACCEPTABLE                         

  



             code = STN ACCEPTABLE)                                        

 

             POLYCHROMASIA (test code = 1+                                     



             POLC)                                               

 

             HYPOCHROMIA (test code = 2+                                     



             HYPO)                                               

 

             ANISOCYTOSIS (test code = 1+                                     



             ANISO)                                              

 

             MACROCYTOSIS (test code = 1+                                     



             MACR)                                               

 

             TARGET CELLS (test code = 1+                                     



             TGT)                                                

 

             CRENATED CELLS (test code = 1+                                     



             CREN)                                               

 

             PLATELET ESTIMATE (test code ADEQUATE                              

 



             = PLTEST)                                           

 

             PLATELET MORPHOLOGY (test NORMAL                                 



             code = PLTMORPH)                                        



COMPREHENSIVE METABOLIC [id number] 05:53:00





             Test Item    Value        Reference Range Interpretation Comments

 

             SODIUM (test code = 129 mmol/L   136-145      L            



             NA)                                                 

 

             POTASSIUM (test code = 3.0 mmol/L   3.5-5.1      L            



             K)                                                  

 

             CHLORIDE (test code = 94.0 mmol/L         L            



             CL)                                                 

 

             CARBON DIOXIDE (test 24.0 mmol/L  21-32        N            



             code = CO2)                                         

 

             ANION GAP (test code = 14.0         10-20        N            



             GAP)                                                

 

             GLUCOSE (test code = 80 mg/dL            N            



             GLU)                                                

 

             BLOOD UREA NITROGEN 7 mg/dL      7-18         N            



             (test code = BUN)                                        

 

             GLOMERULAR FILTRATION > 60 mL/min  >=60                      Estima

geovanna GFR by



             RATE (test code = GFR)                                        using

 Modified MDRD



                                                                 formula.Chronic



                                                                 kidney disease 

is



                                                                 defined as [facility]

er



                                                                 kidney damageor

 GFR



                                                                 <60 mL/min/1.73

 m2



                                                                 for >3 months.

 

             CREATININE (test code 0.60 mg/dL   0.7-1.3      L            



             = CREAT)                                            

 

             BUN/CREATININE RATIO 11.7         10-20        N            



             (test code = BUN/CREA)                                        

 

             TOTAL PROTEIN (test 6.0 gram/dL  6.4-8.2      L            



             code = PROT)                                        

 

             ALBUMIN (test code = 2.5 g/dL     3.4-5.0      L            



             ALB)                                                

 

             GLOBULIN (test code = 3.5 gram/dL  2.7-4.2      N            



             GLOB)                                               

 

             ALBUMIN/GLOBULIN RATIO 0.7          0.75-1.50    L            



             (test code = A/G)                                        

 

             CALCIUM (test code = 7.6 mg/dL    8.5-10.1     L            



             CA)                                                 

 

             BILIRUBIN TOTAL (test 1.60 mg/dL   0.0-1.0      H            



             code = BILT)                                        

 

             SGOT/AST (test code = 149 IUnit/L  15-37        H            



             AST)                                                

 

             SGPT/ALT (test code = 84 IUnit/L   12-78        H            



             ALT)                                                

 

             ALKALINE PHOSPHATASE 141 IUnit/L         H            **Note 

change in



             TOTAL (test code =                                        reference

 range due



             ALKP)                                               to change in



                                                                 reagent.**



[id number] 05:53:00





             Test Item    Value        Reference Range Interpretation Comments

 

             LIPASE (test code = LIP)  U/L         73.0-393.0                



[id number] 05:53:00





             Test Item    Value        Reference Range Interpretation Comments

 

             TROPONIN-I (test code = TROPI) 0.026 ng/mL  0-0.045      N         

   



COMPREHENSIVE METABOLIC [id number] 05:41:00





             Test Item    Value        Reference Range Interpretation Comments

 

             SODIUM (test code = NA) 129 mmol/L   136-145      L            

 

             POTASSIUM (test code = K) 3.0 mmol/L   3.5-5.1      L            

 

             CHLORIDE (test code = CL) 94.0 mmol/L         L            

 

             CARBON DIOXIDE (test code = CO2)  mmol/L      21-32                

     

 

             ANION GAP (test code = GAP)              10-20                     

 

             GLUCOSE (test code = GLU)  mg/dL                           

 

             BLOOD UREA NITROGEN (test code =  mg/dL       7-18                 

     



             BUN)                                                

 

             GLOMERULAR FILTRATION RATE (test  mL/min      >=60                 

     



             code = GFR)                                         

 

             CREATININE (test code = CREAT)  mg/dL       0.7-1.3                

   

 

             BUN/CREATININE RATIO (test code =              10-20               

      



             BUN/CREA)                                           

 

             TOTAL PROTEIN (test code = PROT)  gram/dL     6.4-8.2              

     

 

             ALBUMIN (test code = ALB)  g/dL        3.4-5.0                   

 

             GLOBULIN (test code = GLOB)  gram/dL     2.7-4.2                   

 

             ALBUMIN/GLOBULIN RATIO (test code              0.75-1.50           

      



             = A/G)                                              

 

             CALCIUM (test code = CA)  mg/dL       8.5-10.1                  

 

             BILIRUBIN TOTAL (test code =  mg/dL       0.0-1.0                  

 



             BILT)                                               

 

             SGOT/AST (test code = AST)  IUnit/L     15-37                     

 

             SGPT/ALT (test code = ALT)  IUnit/L     12-78                     

 

             ALKALINE PHOSPHATASE TOTAL (test  IUnit/L                    

     



             code = ALKP)                                        



[id number] 05:41:00





             Test Item    Value        Reference Range Interpretation Comments

 

             LIPASE (test code = LIP)  U/L         73.0-393.0                



[id number] 05:41:00





             Test Item    Value        Reference Range Interpretation Comments

 

             TROPONIN-I (test code = TROPI)  ng/mL       0-0.045                

   



CBC W/AUTO [id number] 05:18:00





             Test Item    Value        Reference Range Interpretation Comments

 

             WHITE BLOOD CELL (test 10.3 K/mm3   4.5-12.5     N            



             code = WBC)                                         

 

             RED BLOOD CELL (test code 3.55 mill/mm3 4.0-5.8      L            



             = RBC)                                              

 

             HEMOGLOBIN (test code = 9.0 gram/dL  13.0-17.5    L            



             HGB)                                                

 

             HEMATOCRIT (test code = 28.6 %       42.0-52.0    L            



             HCT)                                                

 

             MEAN CELL VOLUME (test 80.6 fL      80-98        N            



             code = MCV)                                         

 

             MEAN CELL HGB (test code 25.4 picogram 27.0-33.0    L            



             = MCH)                                              

 

             MEAN CELL HGB 31.5 gram/dL 33.0-36.0    L            



             CONCETRATION (test code =                                        



             MCHC)                                               

 

             RED CELL DISTRIBUTION 20.6 %       11.6-16.2    H            



             WIDTH (test code = RDW)                                        

 

             RED CELL DISTRIBUTION 59.2 fL      37.0-51.0    H            



             WIDTH SD (test code =                                        



             RDW-SD)                                             

 

             PLATELET COUNT (test code 235 K/mm3    150-450      N            



             = PLT)                                              

 

             MEAN PLATELET VOLUME 10.3 fL      6.7-11.0     N            



             (test code = MPV)                                        

 

             NEUTROPHIL % (test code = 76.8 %       39.0-69.0    H            



             NT%)                                                

 

             IMMATURE GRANULOCYTE % 2.6 %        0.0-5.0      N            



             (test code = IG%)                                        

 

             LYMPHOCYTE % (test code = 10.9 %       25.0-55.0    L            



             LY%)                                                

 

             MONOCYTE % (test code = 9.2 %        0.0-10.0     N            



             MO%)                                                

 

             EOSINOPHIL % (test code = 0.3 %        0.0-5.0      N            



             EO%)                                                

 

             BASOPHIL % (test code = 0.2 %        0.0-1.0      N            



             BA%)                                                

 

             NUCLEATED RBC % (test 0.5 %        0-0          H            



             code = NRBC%)                                        

 

             NEUTROPHIL # (test code = 7.93 K/mm3   1.8-7.7      H            



             NT#)                                                

 

             IMMATURE GRANULOCYTE # 0.27 x10 3/uL 0-0.03       H            



             (test code = IG#)                                        

 

             LYMPHOCYTE # (test code = 1.13 K/mm3   1.0-5.0      N            



             LY#)                                                

 

             MONOCYTE # (test code = 0.95 K/mm3   0-0.8        H            



             MO#)                                                

 

             EOSINOPHIL # (test code = 0.03 K/mm3   0.0-0.5      N            



             EO#)                                                

 

             BASOPHIL # (test code = 0.02 K/mm3   0.0-0.2      N            



             BA#)                                                

 

             NUCLEATED RBC # (test 0.05 K/mm3   0.0-0.1      N            



             code = NRBC#)                                        

 

             MANUAL DIFF REQUIRED NO, ONLY SCAN NEEDED                          

 



             (test code = MDIFF)                                        



DIFFERENTIAL [id number] 05:18:00





             Test Item    Value        Reference Range Interpretation Comments

 

             STAIN ACCEPTABILITY (test code = STN                               

         



             ACCEPTABLE)                                         

 

             CABOT RINGS (test code = CAB)                                      

  

 

             MORPHOLOGY COMMENT (test code = MOC)                               

         

 

             PLATELET ESTIMATE (test code = PLTEST)                             

           

 

             PLATELET MORPHOLOGY (test code =                                   

     



             PLTMORPH)                                           



CBC W/AUTO [id number] 05:18:00





             Test Item    Value        Reference Range Interpretation Comments

 

             WHITE BLOOD CELL (test 10.3 K/mm3   4.5-12.5     N            



             code = WBC)                                         

 

             RED BLOOD CELL (test code 3.55 mill/mm3 4.0-5.8      L            



             = RBC)                                              

 

             HEMOGLOBIN (test code = 9.0 gram/dL  13.0-17.5    L            



             HGB)                                                

 

             HEMATOCRIT (test code = 28.6 %       42.0-52.0    L            



             HCT)                                                

 

             MEAN CELL VOLUME (test 80.6 fL      80-98        N            



             code = MCV)                                         

 

             MEAN CELL HGB (test code 25.4 picogram 27.0-33.0    L            



             = MCH)                                              

 

             MEAN CELL HGB 31.5 gram/dL 33.0-36.0    L            



             CONCETRATION (test code =                                        



             MCHC)                                               

 

             RED CELL DISTRIBUTION 20.6 %       11.6-16.2    H            



             WIDTH (test code = RDW)                                        

 

             RED CELL DISTRIBUTION 59.2 fL      37.0-51.0    H            



             WIDTH SD (test code =                                        



             RDW-SD)                                             

 

             PLATELET COUNT (test code 235 K/mm3    150-450      N            



             = PLT)                                              

 

             MEAN PLATELET VOLUME 10.3 fL      6.7-11.0     N            



             (test code = MPV)                                        

 

             NEUTROPHIL % (test code = 76.8 %       39.0-69.0    H            



             NT%)                                                

 

             IMMATURE GRANULOCYTE % 2.6 %        0.0-5.0      N            



             (test code = IG%)                                        

 

             LYMPHOCYTE % (test code = 10.9 %       25.0-55.0    L            



             LY%)                                                

 

             MONOCYTE % (test code = 9.2 %        0.0-10.0     N            



             MO%)                                                

 

             EOSINOPHIL % (test code = 0.3 %        0.0-5.0      N            



             EO%)                                                

 

             BASOPHIL % (test code = 0.2 %        0.0-1.0      N            



             BA%)                                                

 

             NUCLEATED RBC % (test 0.5 %        0-0          H            



             code = NRBC%)                                        

 

             NEUTROPHIL # (test code = 7.93 K/mm3   1.8-7.7      H            



             NT#)                                                

 

             IMMATURE GRANULOCYTE # 0.27 x10 3/uL 0-0.03       H            



             (test code = IG#)                                        

 

             LYMPHOCYTE # (test code = 1.13 K/mm3   1.0-5.0      N            



             LY#)                                                

 

             MONOCYTE # (test code = 0.95 K/mm3   0-0.8        H            



             MO#)                                                

 

             EOSINOPHIL # (test code = 0.03 K/mm3   0.0-0.5      N            



             EO#)                                                

 

             BASOPHIL # (test code = 0.02 K/mm3   0.0-0.2      N            



             BA#)                                                

 

             NUCLEATED RBC # (test 0.05 K/mm3   0.0-0.1      N            



             code = NRBC#)                                        

 

             MANUAL DIFF REQUIRED NO, ONLY SCAN NEEDED                          

 



             (test code = MDIFF)                                        



DIFFERENTIAL [id number] 05:18:00





             Test Item    Value        Reference Range Interpretation Comments

 

             STAIN ACCEPTABILITY (test code = STN                               

         



             ACCEPTABLE)                                         

 

             CABOT RINGS (test code = CAB)                                      

  

 

             MORPHOLOGY COMMENT (test code = MOC)                               

         

 

             PLATELET ESTIMATE (test code = PLTEST)                             

           

 

             PLATELET MORPHOLOGY (test code =                                   

     



             PLTMORPH)                                           



CBC W/AUTO [id number] 05:18:00





             Test Item    Value        Reference Range Interpretation Comments

 

             WHITE BLOOD CELL (test 10.3 K/mm3   4.5-12.5     N            



             code = WBC)                                         

 

             RED BLOOD CELL (test code 3.55 mill/mm3 4.0-5.8      L            



             = RBC)                                              

 

             HEMOGLOBIN (test code = 9.0 gram/dL  13.0-17.5    L            



             HGB)                                                

 

             HEMATOCRIT (test code = 28.6 %       42.0-52.0    L            



             HCT)                                                

 

             MEAN CELL VOLUME (test 80.6 fL      80-98        N            



             code = MCV)                                         

 

             MEAN CELL HGB (test code 25.4 picogram 27.0-33.0    L            



             = MCH)                                              

 

             MEAN CELL HGB 31.5 gram/dL 33.0-36.0    L            



             CONCETRATION (test code =                                        



             MCHC)                                               

 

             RED CELL DISTRIBUTION 20.6 %       11.6-16.2    H            



             WIDTH (test code = RDW)                                        

 

             RED CELL DISTRIBUTION 59.2 fL      37.0-51.0    H            



             WIDTH SD (test code =                                        



             RDW-SD)                                             

 

             PLATELET COUNT (test code 235 K/mm3    150-450      N            



             = PLT)                                              

 

             MEAN PLATELET VOLUME 10.3 fL      6.7-11.0     N            



             (test code = MPV)                                        

 

             NEUTROPHIL % (test code = 76.8 %       39.0-69.0    H            



             NT%)                                                

 

             IMMATURE GRANULOCYTE % 2.6 %        0.0-5.0      N            



             (test code = IG%)                                        

 

             LYMPHOCYTE % (test code = 10.9 %       25.0-55.0    L            



             LY%)                                                

 

             MONOCYTE % (test code = 9.2 %        0.0-10.0     N            



             MO%)                                                

 

             EOSINOPHIL % (test code = 0.3 %        0.0-5.0      N            



             EO%)                                                

 

             BASOPHIL % (test code = 0.2 %        0.0-1.0      N            



             BA%)                                                

 

             NUCLEATED RBC % (test 0.5 %        0-0          H            



             code = NRBC%)                                        

 

             NEUTROPHIL # (test code = 7.93 K/mm3   1.8-7.7      H            



             NT#)                                                

 

             IMMATURE GRANULOCYTE # 0.27 x10 3/uL 0-0.03       H            



             (test code = IG#)                                        

 

             LYMPHOCYTE # (test code = 1.13 K/mm3   1.0-5.0      N            



             LY#)                                                

 

             MONOCYTE # (test code = 0.95 K/mm3   0-0.8        H            



             MO#)                                                

 

             EOSINOPHIL # (test code = 0.03 K/mm3   0.0-0.5      N            



             EO#)                                                

 

             BASOPHIL # (test code = 0.02 K/mm3   0.0-0.2      N            



             BA#)                                                

 

             NUCLEATED RBC # (test 0.05 K/mm3   0.0-0.1      N            



             code = NRBC#)                                        

 

             MANUAL DIFF REQUIRED NO, ONLY SCAN NEEDED                          

 



             (test code = MDIFF)                                        



DIFFERENTIAL [id number] 05:18:00





             Test Item    Value        Reference Range Interpretation Comments

 

             STAIN ACCEPTABILITY (test code = STN                               

         



             ACCEPTABLE)                                         

 

             MORPHOLOGY COMMENT (test code = MOC)                               

         

 

             PLATELET ESTIMATE (test code = PLTEST)                             

           

 

             PLATELET MORPHOLOGY (test code =                                   

     



             PLTMORPH)                                           



CBC W/AUTO [id number] 05:18:00





             Test Item    Value        Reference Range Interpretation Comments

 

             WHITE BLOOD CELL (test 10.3 K/mm3   4.5-12.5     N            



             code = WBC)                                         

 

             RED BLOOD CELL (test code 3.55 mill/mm3 4.0-5.8      L            



             = RBC)                                              

 

             HEMOGLOBIN (test code = 9.0 gram/dL  13.0-17.5    L            



             HGB)                                                

 

             HEMATOCRIT (test code = 28.6 %       42.0-52.0    L            



             HCT)                                                

 

             MEAN CELL VOLUME (test 80.6 fL      80-98        N            



             code = MCV)                                         

 

             MEAN CELL HGB (test code 25.4 picogram 27.0-33.0    L            



             = MCH)                                              

 

             MEAN CELL HGB 31.5 gram/dL 33.0-36.0    L            



             CONCETRATION (test code =                                        



             MCHC)                                               

 

             RED CELL DISTRIBUTION 20.6 %       11.6-16.2    H            



             WIDTH (test code = RDW)                                        

 

             RED CELL DISTRIBUTION 59.2 fL      37.0-51.0    H            



             WIDTH SD (test code =                                        



             RDW-SD)                                             

 

             PLATELET COUNT (test code 235 K/mm3    150-450      N            



             = PLT)                                              

 

             MEAN PLATELET VOLUME 10.3 fL      6.7-11.0     N            



             (test code = MPV)                                        

 

             NEUTROPHIL % (test code = 76.8 %       39.0-69.0    H            



             NT%)                                                

 

             IMMATURE GRANULOCYTE % 2.6 %        0.0-5.0      N            



             (test code = IG%)                                        

 

             LYMPHOCYTE % (test code = 10.9 %       25.0-55.0    L            



             LY%)                                                

 

             MONOCYTE % (test code = 9.2 %        0.0-10.0     N            



             MO%)                                                

 

             EOSINOPHIL % (test code = 0.3 %        0.0-5.0      N            



             EO%)                                                

 

             BASOPHIL % (test code = 0.2 %        0.0-1.0      N            



             BA%)                                                

 

             NUCLEATED RBC % (test 0.5 %        0-0          H            



             code = NRBC%)                                        

 

             NEUTROPHIL # (test code = 7.93 K/mm3   1.8-7.7      H            



             NT#)                                                

 

             IMMATURE GRANULOCYTE # 0.27 x10 3/uL 0-0.03       H            



             (test code = IG#)                                        

 

             LYMPHOCYTE # (test code = 1.13 K/mm3   1.0-5.0      N            



             LY#)                                                

 

             MONOCYTE # (test code = 0.95 K/mm3   0-0.8        H            



             MO#)                                                

 

             EOSINOPHIL # (test code = 0.03 K/mm3   0.0-0.5      N            



             EO#)                                                

 

             BASOPHIL # (test code = 0.02 K/mm3   0.0-0.2      N            



             BA#)                                                

 

             NUCLEATED RBC # (test 0.05 K/mm3   0.0-0.1      N            



             code = NRBC#)                                        

 

             MANUAL DIFF REQUIRED NO, ONLY SCAN NEEDED                          

 



             (test code = MDIFF)                                        



DIFFERENTIAL [id number] 05:18:00





             Test Item    Value        Reference Range Interpretation Comments

 

             STAIN ACCEPTABILITY (test code = STN                               

         



             ACCEPTABLE)                                         

 

             CABOT RINGS (test code = CAB)                                      

  

 

             MORPHOLOGY COMMENT (test code = MOC)                               

         

 

             PLATELET ESTIMATE (test code = PLTEST)                             

           

 

             PLATELET MORPHOLOGY (test code =                                   

     



             PLTMORPH)                                           



[id number] 01:30:00





             Test Item    Value        Reference Range Interpretation Comments

 

             TROPONIN-I (test code = TROPI) 0.022 ng/mL  0-0.045      N         

   



[id number] 17:38:00





             Test Item    Value        Reference Range Interpretation Comments

 

             POTASSIUM (test code = 2.9 mmol/L   3.5-5.1      L            Resul

ts called to



             K)                                                  [id number] by TANO PETERSEN



                                                                 [id number]

itical



                                                                 results verifie

d and



                                                                 read back by Nu

rse? Y



PROTHROMBIN [id number] 13:01:00





             Test Item    Value        Reference Range Interpretation Comments

 

             PROTHROMBIN TIME 11.7 seconds 9.0-14.0     N            



             PATIENT (test code =                                        



             PTP)                                                

 

             INTERNATIONAL NORMAL 1.0          0.8-1.2      N            The the

rapeutic range



             RATIO (test code =                                        for oral



             INR)                                                anticoagulant t

herapy



                                                                 formost indicat

ions



                                                                 is an internati

onal



                                                                 normalized rati

o



                                                                 (INR)of between

 2.0



                                                                 and 3.0.  The



                                                                 recommended



                                                                 therapeutic INR

range



                                                                 for various cli

nical



                                                                 situations is l

isted



                                                                 below:_________

______



                                                                 _______________

______



                                                                 _______________

______



                                                                 Clinical Situat

ion



                                                                 



                                                                 INR



                                                                 range__________

______



                                                                 _______________

______



                                                                 _______________

_____



                                                                 Pulmonary embol

ism



                                                                 treatment



                                                                  (2.0-3.0)Venou

s



                                                                 thrombosis



                                                                 treatmentVenous



                                                                 thrombosis



                                                                 prophylaxis (hi

gh



                                                                 risk



                                                                 surgery)Prevent

ion of



                                                                 systemic emboli

sm



                                                                 from:         A

cute



                                                                 myocardial infa

rction



                                                                         Valvula

r



                                                                 heart disease



                                                                 Atrial fibrilla

tion



                                                                 Mechanical pros

thetic



                                                                 heart valves



                                                                 (2.5-3.5)



IS PATIENT ON ANTICOAGULANTS? NCBC W/AUTO [id number] 10:31:00





             Test Item    Value        Reference Range Interpretation Comments

 

             WHITE BLOOD CELL 10.6 K/mm3   4.5-12.5     N            



             (test code = WBC)                                        

 

             RED BLOOD CELL (test 3.04 mill/mm3 4.0-5.8      L            



             code = RBC)                                         

 

             HEMOGLOBIN (test 7.4 gram/dL  13.0-17.5    L            



             code = HGB)                                         

 

             HEMATOCRIT (test 24.1 %       42.0-52.0    L            



             code = HCT)                                         

 

             MEAN CELL VOLUME 79.3 fL      80-98        L            



             (test code = MCV)                                        

 

             MEAN CELL HGB (test 24.3 picogram 27.0-33.0    L            



             code = MCH)                                         

 

             MEAN CELL HGB 30.7 gram/dL 33.0-36.0    L            



             CONCETRATION (test                                        



             code = MCHC)                                        

 

             RED CELL     21.5 %       11.6-16.2    H            



             DISTRIBUTION WIDTH                                        



             (test code = RDW)                                        

 

             RED CELL     60.6 fL      37.0-51.0    H            



             DISTRIBUTION WIDTH                                        



             SD (test code =                                        



             RDW-SD)                                             

 

             PLATELET COUNT (test 240 K/mm3    150-450                   RESULT 

VERIFIED BY



             code = PLT)                                         REPEAT ANALYSIS

 

             MEAN PLATELET VOLUME 10.3 fL      6.7-11.0     N            



             (test code = MPV)                                        

 

             NEUTROPHIL % (test 86.8 %       39.0-69.0    H            Previousl

y



             code = NT%)                                         reported result

:



                                                                 80.1 %Edited by

:



                                                                 V.LABSWATHI on



                                                                 20:1030

 

             LYMPHOCYTE % (test 6.1 %        25.0-55.0    L            



             code = LY%)                                         

 

             MONOCYTE % (test 7.0 %        0.0-10.0     N            Previously



             code = MO%)                                         reported result

:



                                                                 12.6 %Edited by

:



                                                                 V.LABSWATHI on



                                                                 20:1030

 

             EOSINOPHIL % (test 0.0 %        0.0-5.0      N            



             code = EO%)                                         

 

             BASOPHIL % (test 0.1 %        0.0-1.0      N            



             code = BA%)                                         

 

             NUCLEATED RBC % 0.8 %        0-0          H            



             (test code = NRBC%)                                        

 

             NEUTROPHIL # (test 8.45 K/mm3   1.8-7.7      H            



             code = NT#)                                         

 

             IMMATURE GRANULOCYTE 0.12 x10 3/uL 0-0.03       H            



             # (test code = IG#)                                        

 

             LYMPHOCYTE # (test 0.64 K/mm3   1.0-5.0      L            



             code = LY#)                                         

 

             MONOCYTE # (test 1.33 K/mm3   0-0.8        H            



             code = MO#)                                         

 

             EOSINOPHIL # (test 0.00 K/mm3   0.0-0.5      N            



             code = EO#)                                         

 

             BASOPHIL # (test 0.01 K/mm3   0.0-0.2      N            



             code = BA#)                                         

 

             NUCLEATED RBC # 0.08 K/mm3   0.0-0.1      N            



             (test code = NRBC#)                                        

 

             MANUAL DIFF REQUIRED NO, ONLY SCAN                           



             (test code = MDIFF) NEEDED                                 



Patient Receiving BloodDIFFERENTIAL [id number] 10:31:00





             Test Item    Value        Reference Range Interpretation Comments

 

             STAIN ACCEPTABILITY (test STAIN ACCEPTABLE                         

  



             code = STN ACCEPTABLE)                                        

 

             POLYCHROMASIA (test code = 2+                                     



             POLC)                                               

 

             HYPOCHROMIA (test code = 1+                                     



             HYPO)                                               

 

             ANISOCYTOSIS (test code = 2+                                     



             ANISO)                                              

 

             MACROCYTOSIS (test code = 2+                                     



             MACR)                                               

 

             TARGET CELLS (test code = 1+                                     



             TGT)                                                

 

             ELLIPTOCYTES (test code = 1+                                     



             ELL)                                                

 

             PLATELET ESTIMATE (test code ADEQUATE                              

 



             = PLTEST)                                           

 

             PLATELET MORPHOLOGY (test SIZE VARIABLE                           



             code = PLTMORPH)                                        



Patient Receiving BloodCBC W/AUTO [id number] 10:29:00





             Test Item    Value        Reference Range Interpretation Comments

 

             WHITE BLOOD CELL 10.6 K/mm3   4.5-12.5     N            



             (test code = WBC)                                        

 

             RED BLOOD CELL (test 3.04 mill/mm3 4.0-5.8      L            



             code = RBC)                                         

 

             HEMOGLOBIN (test code 7.4 gram/dL  13.0-17.5    L            



             = HGB)                                              

 

             HEMATOCRIT (test code 24.1 %       42.0-52.0    L            



             = HCT)                                              

 

             MEAN CELL VOLUME 79.3 fL      80-98        L            



             (test code = MCV)                                        

 

             MEAN CELL HGB (test 24.3 picogram 27.0-33.0    L            



             code = MCH)                                         

 

             MEAN CELL HGB 30.7 gram/dL 33.0-36.0    L            



             CONCETRATION (test                                        



             code = MCHC)                                        

 

             RED CELL DISTRIBUTION 21.5 %       11.6-16.2    H            



             WIDTH (test code =                                        



             RDW)                                                

 

             RED CELL DISTRIBUTION 60.6 fL      37.0-51.0    H            



             WIDTH SD (test code =                                        



             RDW-SD)                                             

 

             PLATELET COUNT (test 240 K/mm3    150-450                   RESULT 

VERIFIED



             code = PLT)                                         BY REPEAT



                                                                 ANALYSIS

 

             MEAN PLATELET VOLUME 10.3 fL      6.7-11.0     N            



             (test code = MPV)                                        

 

             NEUTROPHIL % (test 80.1 %       39.0-69.0    H            



             code = NT%)                                         

 

             IMMATURE GRANULOCYTE 1.1 %        0.0-5.0      N            



             % (test code = IG%)                                        

 

             LYMPHOCYTE % (test 6.1 %        25.0-55.0    L            



             code = LY%)                                         

 

             MONOCYTE % (test code 12.6 %       0.0-10.0     H            



             = MO%)                                              

 

             EOSINOPHIL % (test 0.0 %        0.0-5.0      N            



             code = EO%)                                         

 

             BASOPHIL % (test code 0.1 %        0.0-1.0      N            



             = BA%)                                              

 

             NUCLEATED RBC % (test 0.8 %        0-0          H            



             code = NRBC%)                                        

 

             NEUTROPHIL # (test 8.45 K/mm3   1.8-7.7      H            



             code = NT#)                                         

 

             IMMATURE GRANULOCYTE 0.12 x10 3/uL 0-0.03       H            



             # (test code = IG#)                                        

 

             LYMPHOCYTE # (test 0.64 K/mm3   1.0-5.0      L            



             code = LY#)                                         

 

             MONOCYTE # (test code 1.33 K/mm3   0-0.8        H            



             = MO#)                                              

 

             EOSINOPHIL # (test 0.00 K/mm3   0.0-0.5      N            



             code = EO#)                                         

 

             BASOPHIL # (test code 0.01 K/mm3   0.0-0.2      N            



             = BA#)                                              

 

             NUCLEATED RBC # (test 0.08 K/mm3   0.0-0.1      N            



             code = NRBC#)                                        

 

             MANUAL DIFF REQUIRED NO, ONLY SCAN                           



             (test code = MDIFF) NEEDED                                 



Patient Receiving BloodDIFFERENTIAL [id number] 10:29:00





             Test Item    Value        Reference Range Interpretation Comments

 

             STAIN ACCEPTABILITY (test STAIN ACCEPTABLE                         

  



             code = STN ACCEPTABLE)                                        

 

             POLYCHROMASIA (test code = 2+                                     



             POLC)                                               

 

             HYPOCHROMIA (test code = 1+                                     



             HYPO)                                               

 

             ANISOCYTOSIS (test code = 2+                                     



             ANISO)                                              

 

             MACROCYTOSIS (test code = 2+                                     



             MACR)                                               

 

             TARGET CELLS (test code = 1+                                     



             TGT)                                                

 

             ELLIPTOCYTES (test code = 1+                                     



             ELL)                                                

 

             PLATELET ESTIMATE (test code ADEQUATE                              

 



             = PLTEST)                                           

 

             PLATELET MORPHOLOGY (test SIZE VARIABLE                           



             code = PLTMORPH)                                        



Patient Receiving BloodCOMPREHENSIVE METABOLIC [id number] 08:49:00





             Test Item    Value        Reference Range Interpretation Comments

 

             SODIUM (test code = 129 mmol/L   136-145      L            RESULT V

ERIFIED BY



             NA)                                                 REPEAT ANALYSIS

 

             POTASSIUM (test code = 2.6 mmol/L   3.5-5.1      LL           Resul

ts called to



             K)                                                  [id number] by TANO LEPE



                                                                 20



                                                                 [id number] re

sults



                                                                 verified and re

ad



                                                                 back by Nurse? 

Y

 

             CHLORIDE (test code = 92.0 mmol/L         L            



             CL)                                                 

 

             CARBON DIOXIDE (test 27.0 mmol/L  21-32        N            



             code = CO2)                                         

 

             ANION GAP (test code = 12.6         10-20        N            



             GAP)                                                

 

             GLUCOSE (test code = 87 mg/dL            N            



             GLU)                                                

 

             BLOOD UREA NITROGEN 13 mg/dL     7-18         N            



             (test code = BUN)                                        

 

             GLOMERULAR FILTRATION > 60 mL/min  >=60                      Estima

geovanna GFR by



             RATE (test code = GFR)                                        using

 Modified MDRD



                                                                 formula.Chronic



                                                                 kidney disease 

is



                                                                 defined as eith

er



                                                                 kidney damageor

 GFR



                                                                 <60 mL/min/1.73

 m2



                                                                 for >3 months.

 

             CREATININE (test code 0.70 mg/dL   0.7-1.3      N            



             = CREAT)                                            

 

             BUN/CREATININE RATIO 18.6         10-20        N            



             (test code = BUN/CREA)                                        

 

             TOTAL PROTEIN (test 5.4 gram/dL  6.4-8.2      L            



             code = PROT)                                        

 

             ALBUMIN (test code = 2.5 g/dL     3.4-5.0      L            



             ALB)                                                

 

             GLOBULIN (test code = 2.9 gram/dL  2.7-4.2      N            



             GLOB)                                               

 

             ALBUMIN/GLOBULIN RATIO 0.9          0.75-1.50    N            



             (test code = A/G)                                        

 

             CALCIUM (test code = 7.1 mg/dL    8.5-10.1     L            



             CA)                                                 

 

             BILIRUBIN TOTAL (test 1.70 mg/dL   0.0-1.0      H            



             code = BILT)                                        

 

             SGOT/AST (test code = 162 IUnit/L  15-37        H            



             AST)                                                

 

             SGPT/ALT (test code = 84 IUnit/L   12-78        H            



             ALT)                                                

 

             ALKALINE PHOSPHATASE 139 IUnit/L         H            **Note 

change in



             TOTAL (test code =                                        reference

 range due



             ALKP)                                               to change in



                                                                 reagent.**



Patient Receiving BloodCBC W/AUTO [id number] 08:48:00





             Test Item    Value        Reference Range Interpretation Comments

 

             WHITE BLOOD CELL 10.6 K/mm3   4.5-12.5     N            



             (test code = WBC)                                        

 

             RED BLOOD CELL (test 3.04 mill/mm3 4.0-5.8      L            



             code = RBC)                                         

 

             HEMOGLOBIN (test code 7.4 gram/dL  13.0-17.5    L            



             = HGB)                                              

 

             HEMATOCRIT (test code 24.1 %       42.0-52.0    L            



             = HCT)                                              

 

             MEAN CELL VOLUME 79.3 fL      80-98        L            



             (test code = MCV)                                        

 

             MEAN CELL HGB (test 24.3 picogram 27.0-33.0    L            



             code = MCH)                                         

 

             MEAN CELL HGB 30.7 gram/dL 33.0-36.0    L            



             CONCETRATION (test                                        



             code = MCHC)                                        

 

             RED CELL DISTRIBUTION 21.5 %       11.6-16.2    H            



             WIDTH (test code =                                        



             RDW)                                                

 

             RED CELL DISTRIBUTION 60.6 fL      37.0-51.0    H            



             WIDTH SD (test code =                                        



             RDW-SD)                                             

 

             PLATELET COUNT (test 240 K/mm3    150-450                   RESULT 

VERIFIED



             code = PLT)                                         BY REPEAT



                                                                 ANALYSIS

 

             MEAN PLATELET VOLUME 10.3 fL      6.7-11.0     N            



             (test code = MPV)                                        

 

             NEUTROPHIL % (test 80.1 %       39.0-69.0    H            



             code = NT%)                                         

 

             IMMATURE GRANULOCYTE 1.1 %        0.0-5.0      N            



             % (test code = IG%)                                        

 

             LYMPHOCYTE % (test 6.1 %        25.0-55.0    L            



             code = LY%)                                         

 

             MONOCYTE % (test code 12.6 %       0.0-10.0     H            



             = MO%)                                              

 

             EOSINOPHIL % (test 0.0 %        0.0-5.0      N            



             code = EO%)                                         

 

             BASOPHIL % (test code 0.1 %        0.0-1.0      N            



             = BA%)                                              

 

             NUCLEATED RBC % (test 0.8 %        0-0          H            



             code = NRBC%)                                        

 

             NEUTROPHIL # (test 8.45 K/mm3   1.8-7.7      H            



             code = NT#)                                         

 

             IMMATURE GRANULOCYTE 0.12 x10 3/uL 0-0.03       H            



             # (test code = IG#)                                        

 

             LYMPHOCYTE # (test 0.64 K/mm3   1.0-5.0      L            



             code = LY#)                                         

 

             MONOCYTE # (test code 1.33 K/mm3   0-0.8        H            



             = MO#)                                              

 

             EOSINOPHIL # (test 0.00 K/mm3   0.0-0.5      N            



             code = EO#)                                         

 

             BASOPHIL # (test code 0.01 K/mm3   0.0-0.2      N            



             = BA#)                                              

 

             NUCLEATED RBC # (test 0.08 K/mm3   0.0-0.1      N            



             code = NRBC#)                                        

 

             MANUAL DIFF REQUIRED NO, ONLY SCAN                           



             (test code = MDIFF) NEEDED                                 



Patient Receiving BloodDIFFERENTIAL [id number] 08:48:00





             Test Item    Value        Reference Range Interpretation Comments

 

             STAIN ACCEPTABILITY (test code = STN                               

         



             ACCEPTABLE)                                         

 

             CABOT RINGS (test code = CAB)                                      

  

 

             MORPHOLOGY COMMENT (test code = MOC)                               

         

 

             PLATELET ESTIMATE (test code = PLTEST)                             

           

 

             PLATELET MORPHOLOGY (test code =                                   

     



             PLTMORPH)                                           



Patient Receiving BloodCBC W/AUTO [id number] 08:48:00





             Test Item    Value        Reference Range Interpretation Comments

 

             WHITE BLOOD CELL 10.6 K/mm3   4.5-12.5     N            



             (test code = WBC)                                        

 

             RED BLOOD CELL (test 3.04 mill/mm3 4.0-5.8      L            



             code = RBC)                                         

 

             HEMOGLOBIN (test code 7.4 gram/dL  13.0-17.5    L            



             = HGB)                                              

 

             HEMATOCRIT (test code 24.1 %       42.0-52.0    L            



             = HCT)                                              

 

             MEAN CELL VOLUME 79.3 fL      80-98        L            



             (test code = MCV)                                        

 

             MEAN CELL HGB (test 24.3 picogram 27.0-33.0    L            



             code = MCH)                                         

 

             MEAN CELL HGB 30.7 gram/dL 33.0-36.0    L            



             CONCETRATION (test                                        



             code = MCHC)                                        

 

             RED CELL DISTRIBUTION 21.5 %       11.6-16.2    H            



             WIDTH (test code =                                        



             RDW)                                                

 

             RED CELL DISTRIBUTION 60.6 fL      37.0-51.0    H            



             WIDTH SD (test code =                                        



             RDW-SD)                                             

 

             PLATELET COUNT (test 240 K/mm3    150-450                   RESULT 

VERIFIED



             code = PLT)                                         BY REPEAT



                                                                 ANALYSIS

 

             MEAN PLATELET VOLUME 10.3 fL      6.7-11.0     N            



             (test code = MPV)                                        

 

             NEUTROPHIL % (test 80.1 %       39.0-69.0    H            



             code = NT%)                                         

 

             IMMATURE GRANULOCYTE 1.1 %        0.0-5.0      N            



             % (test code = IG%)                                        

 

             LYMPHOCYTE % (test 6.1 %        25.0-55.0    L            



             code = LY%)                                         

 

             MONOCYTE % (test code 12.6 %       0.0-10.0     H            



             = MO%)                                              

 

             EOSINOPHIL % (test 0.0 %        0.0-5.0      N            



             code = EO%)                                         

 

             BASOPHIL % (test code 0.1 %        0.0-1.0      N            



             = BA%)                                              

 

             NUCLEATED RBC % (test 0.8 %        0-0          H            



             code = NRBC%)                                        

 

             NEUTROPHIL # (test 8.45 K/mm3   1.8-7.7      H            



             code = NT#)                                         

 

             IMMATURE GRANULOCYTE 0.12 x10 3/uL 0-0.03       H            



             # (test code = IG#)                                        

 

             LYMPHOCYTE # (test 0.64 K/mm3   1.0-5.0      L            



             code = LY#)                                         

 

             MONOCYTE # (test code 1.33 K/mm3   0-0.8        H            



             = MO#)                                              

 

             EOSINOPHIL # (test 0.00 K/mm3   0.0-0.5      N            



             code = EO#)                                         

 

             BASOPHIL # (test code 0.01 K/mm3   0.0-0.2      N            



             = BA#)                                              

 

             NUCLEATED RBC # (test 0.08 K/mm3   0.0-0.1      N            



             code = NRBC#)                                        

 

             MANUAL DIFF REQUIRED NO, ONLY SCAN                           



             (test code = MDIFF) NEEDED                                 



Patient Receiving BloodDIFFERENTIAL [id number] 08:48:00





             Test Item    Value        Reference Range Interpretation Comments

 

             STAIN ACCEPTABILITY (test code = STN                               

         



             ACCEPTABLE)                                         

 

             CABOT RINGS (test code = CAB)                                      

  

 

             MORPHOLOGY COMMENT (test code = MOC)                               

         

 

             PLATELET ESTIMATE (test code = PLTEST)                             

           

 

             PLATELET MORPHOLOGY (test code =                                   

     



             PLTMORPH)                                           



Patient Receiving BloodCBC W/AUTO [id number] 08:48:00





             Test Item    Value        Reference Range Interpretation Comments

 

             WHITE BLOOD CELL 10.6 K/mm3   4.5-12.5     N            



             (test code = WBC)                                        

 

             RED BLOOD CELL (test 3.04 mill/mm3 4.0-5.8      L            



             code = RBC)                                         

 

             HEMOGLOBIN (test code 7.4 gram/dL  13.0-17.5    L            



             = HGB)                                              

 

             HEMATOCRIT (test code 24.1 %       42.0-52.0    L            



             = HCT)                                              

 

             MEAN CELL VOLUME 79.3 fL      80-98        L            



             (test code = MCV)                                        

 

             MEAN CELL HGB (test 24.3 picogram 27.0-33.0    L            



             code = MCH)                                         

 

             MEAN CELL HGB 30.7 gram/dL 33.0-36.0    L            



             CONCETRATION (test                                        



             code = MCHC)                                        

 

             RED CELL DISTRIBUTION 21.5 %       11.6-16.2    H            



             WIDTH (test code =                                        



             RDW)                                                

 

             RED CELL DISTRIBUTION 60.6 fL      37.0-51.0    H            



             WIDTH SD (test code =                                        



             RDW-SD)                                             

 

             PLATELET COUNT (test 240 K/mm3    150-450                   RESULT 

VERIFIED



             code = PLT)                                         BY REPEAT



                                                                 ANALYSIS

 

             MEAN PLATELET VOLUME 10.3 fL      6.7-11.0     N            



             (test code = MPV)                                        

 

             NEUTROPHIL % (test 80.1 %       39.0-69.0    H            



             code = NT%)                                         

 

             IMMATURE GRANULOCYTE 1.1 %        0.0-5.0      N            



             % (test code = IG%)                                        

 

             LYMPHOCYTE % (test 6.1 %        25.0-55.0    L            



             code = LY%)                                         

 

             MONOCYTE % (test code 12.6 %       0.0-10.0     H            



             = MO%)                                              

 

             EOSINOPHIL % (test 0.0 %        0.0-5.0      N            



             code = EO%)                                         

 

             BASOPHIL % (test code 0.1 %        0.0-1.0      N            



             = BA%)                                              

 

             NUCLEATED RBC % (test 0.8 %        0-0          H            



             code = NRBC%)                                        

 

             NEUTROPHIL # (test 8.45 K/mm3   1.8-7.7      H            



             code = NT#)                                         

 

             IMMATURE GRANULOCYTE 0.12 x10 3/uL 0-0.03       H            



             # (test code = IG#)                                        

 

             LYMPHOCYTE # (test 0.64 K/mm3   1.0-5.0      L            



             code = LY#)                                         

 

             MONOCYTE # (test code 1.33 K/mm3   0-0.8        H            



             = MO#)                                              

 

             EOSINOPHIL # (test 0.00 K/mm3   0.0-0.5      N            



             code = EO#)                                         

 

             BASOPHIL # (test code 0.01 K/mm3   0.0-0.2      N            



             = BA#)                                              

 

             NUCLEATED RBC # (test 0.08 K/mm3   0.0-0.1      N            



             code = NRBC#)                                        

 

             MANUAL DIFF REQUIRED NO, ONLY SCAN                           



             (test code = MDIFF) NEEDED                                 



Patient Receiving BloodDIFFERENTIAL [id number] 08:48:00





             Test Item    Value        Reference Range Interpretation Comments

 

             STAIN ACCEPTABILITY (test code = STN                               

         



             ACCEPTABLE)                                         

 

             MORPHOLOGY COMMENT (test code = MOC)                               

         

 

             PLATELET ESTIMATE (test code = PLTEST)                             

           

 

             PLATELET MORPHOLOGY (test code =                                   

     



             PLTMORPH)                                           



Patient Receiving BloodCBC W/AUTO [id number] 08:48:00





             Test Item    Value        Reference Range Interpretation Comments

 

             WHITE BLOOD CELL 10.6 K/mm3   4.5-12.5     N            



             (test code = WBC)                                        

 

             RED BLOOD CELL (test 3.04 mill/mm3 4.0-5.8      L            



             code = RBC)                                         

 

             HEMOGLOBIN (test code 7.4 gram/dL  13.0-17.5    L            



             = HGB)                                              

 

             HEMATOCRIT (test code 24.1 %       42.0-52.0    L            



             = HCT)                                              

 

             MEAN CELL VOLUME 79.3 fL      80-98        L            



             (test code = MCV)                                        

 

             MEAN CELL HGB (test 24.3 picogram 27.0-33.0    L            



             code = MCH)                                         

 

             MEAN CELL HGB 30.7 gram/dL 33.0-36.0    L            



             CONCETRATION (test                                        



             code = MCHC)                                        

 

             RED CELL DISTRIBUTION 21.5 %       11.6-16.2    H            



             WIDTH (test code =                                        



             RDW)                                                

 

             RED CELL DISTRIBUTION 60.6 fL      37.0-51.0    H            



             WIDTH SD (test code =                                        



             RDW-SD)                                             

 

             PLATELET COUNT (test 240 K/mm3    150-450                   RESULT 

VERIFIED



             code = PLT)                                         BY REPEAT



                                                                 ANALYSIS

 

             MEAN PLATELET VOLUME 10.3 fL      6.7-11.0     N            



             (test code = MPV)                                        

 

             NEUTROPHIL % (test 80.1 %       39.0-69.0    H            



             code = NT%)                                         

 

             IMMATURE GRANULOCYTE 1.1 %        0.0-5.0      N            



             % (test code = IG%)                                        

 

             LYMPHOCYTE % (test 6.1 %        25.0-55.0    L            



             code = LY%)                                         

 

             MONOCYTE % (test code 12.6 %       0.0-10.0     H            



             = MO%)                                              

 

             EOSINOPHIL % (test 0.0 %        0.0-5.0      N            



             code = EO%)                                         

 

             BASOPHIL % (test code 0.1 %        0.0-1.0      N            



             = BA%)                                              

 

             NUCLEATED RBC % (test 0.8 %        0-0          H            



             code = NRBC%)                                        

 

             NEUTROPHIL # (test 8.45 K/mm3   1.8-7.7      H            



             code = NT#)                                         

 

             IMMATURE GRANULOCYTE 0.12 x10 3/uL 0-0.03       H            



             # (test code = IG#)                                        

 

             LYMPHOCYTE # (test 0.64 K/mm3   1.0-5.0      L            



             code = LY#)                                         

 

             MONOCYTE # (test code 1.33 K/mm3   0-0.8        H            



             = MO#)                                              

 

             EOSINOPHIL # (test 0.00 K/mm3   0.0-0.5      N            



             code = EO#)                                         

 

             BASOPHIL # (test code 0.01 K/mm3   0.0-0.2      N            



             = BA#)                                              

 

             NUCLEATED RBC # (test 0.08 K/mm3   0.0-0.1      N            



             code = NRBC#)                                        

 

             MANUAL DIFF REQUIRED NO, ONLY SCAN                           



             (test code = MDIFF) NEEDED                                 



Patient Receiving BloodDIFFERENTIAL [id number] 08:48:00





             Test Item    Value        Reference Range Interpretation Comments

 

             STAIN ACCEPTABILITY (test code = STN                               

         



             ACCEPTABLE)                                         

 

             CABOT RINGS (test code = CAB)                                      

  

 

             MORPHOLOGY COMMENT (test code = MOC)                               

         

 

             PLATELET ESTIMATE (test code = PLTEST)                             

           

 

             PLATELET MORPHOLOGY (test code =                                   

     



             PLTMORPH)                                           



Patient Receiving [id number] 08:40:00





             Test Item    Value        Reference Range Interpretation Comments

 

             AMMONIA (test code = AMM) 47 umol/L    11-32        H            



Patient Receiving Blood- CT ABD PELVIS [id number] 08:13:00  Name: 
BRENDAN FISHER                   [facility]                     : 
1945 Age/S: 74  / M         [address] [facility]                Unit #: 
[id number]     Loc:               [facility]  [place]  [address]              Phys: 
Earl Woods MD                                                   Acct: 
[id number]  Dis Date:               Status: ADM IN                            
     PHONE #: [phone]     Exam Date: 2020                     
FAX #: [phone]      Reason: ELEVATED LIPASE                                
    EXAMS:                                               CPTCODE:      [id number]
CT ABD PELVIS W/CONT                       [id number]                    HISTORY:   E
LEVATED LIPASE                TECHNIQUE:  Immediate and delayed 5 mm axial CT 
images were obtained    through the abdomen and pelvis after IV administration 
of 100 mL of       Isovue-370 contrast. Sagittal and coronal reformatted images 
were       generated.               Automated exposure reduction (Auto mA/Smart 
mA) was utilized in       compliance with ACR Image [employer]isely with DLP of 748 
mGy-cm.             COMPARISON: None               FINDINGS:               
THORACIC: Included images of thelower chest demonstrate no       abnormalities. 
     HEPATOBILIARY: Hepatomegaly with diffuse fattyinfiltration. Tiny       
gallstones. No gallbladder wall thickening or pericholecystic fluid.       No 
biliary dilation.       PANCREAS: Homogeneous pancreas. Punctate calcifications 
of the       pancreatic head. Fluid and fat stranding about the pancreatic head.
No       organized peripancreatic fluid collection.       SPLEEN: Normal.       
ADRENALS: Normal.       GASTROINTESTINAL: Limited evaluation the GI tract 
without oral       contrast. Stomach, small bowel, and colon are unremarkable. 
Appendix       is not visualized.       GENITOURINARY: Kidneys are normal. No 
hydronephrosis. Urinary bladder       is unremarkable. Seminal vesicles and 
prostate gland are unremarkable.       VASCULAR: Aortoiliac atherosclerotic 
vascular calcification without       aneurysm.        LYMPHATICS: No enlarged ly
mph nodes by CT size criteria.       PERITONEUM/OTHER: No intraperitoneal free 
air. No intraperitoneal free       fluid.       BONES/SOFT TISSUES: Chronic 
anterior left rib fracture deformities.       L5-S1 fusion with posterior 
decompression. Degenerative changes of the       spine, sacroiliac joints, and 
hips.                         IMPRESSION:                   Findings compatible 
with acute pancreatitis. No evidence of pancreatic         necrosis. No 
organized fluid collection to suggest abscess or         pseudocyst.            
  PAGE  1                       Signed Report(CONTINUED)   Name: BRENDAN FISHER [facility]                     : 1945 Age/S: 74  
/ M         [address] [facility]                Unit #: [id number]     Loc:[place]  [address]              Phys: Earl Woods MD       Acct: [id number]  Dis 
Date:               Status: ADM IN                                  PHONE #: 
[phone]     Exam Date: 2020  2210                     FAX #: 
[phone]      Reason: ELEVATED LIPASE                                     
EXAMS:        CPT CODE:      [id number] CT ABD PELVIS W/CONT                     
 [address]               &lt;Continued&gt;          Tiny gallstones. No biliary 
dilation. Hepatomegaly with diffuse fatty         infiltration.                 
                     LOCATION: [facility]                     ** Electronically Signed 
by Marisol Ricks D.O. on 2020 at 0813 **                      Reported and 
signed by: Debra JEFFERS                               CC: Earl Woods MD    
                                                                          
Technologist:Jj Guallpa, RT(R)(CT); May  CTDI:        DLP:        Trnscb 
Date/Time: 2020 (813) [email or web address]                Orig Print D/T: S: 
2020 (16)      PAGE  2                       Signed Report- CT 
HEAD/BRAIN W/O [id number] 07:46:00  Name: BRENDAN FISHER                   
[facility]                     : 1945 Age/S: 74  / M         [address] 
[facility]                Unit #: [id number]     Loc:               [place]  [address]              Phys: Earl Woods MD                                  
                Acct: [id number]  Dis Date:               Status: ADM IN      
                           PHONE #: [phone]     Exam Date: 2020
                    FAX #: [phone]      Reason: AMS                        
                   EXAMS:                                               CPTCODE:
     [id number] CT HEAD/BRAIN W/O CONT                     [id number]                 
  HISTORY:  Altered mental status               TECHNIQUE: Noncontrast 2.5 mm 
axial CT of the head. Examination   acquired within 24 hours of arrival. 
Automated exposure control for       dose reduction; DLP:  874 mGy-cm.          
    COMPARISON: None               FINDINGS:               No acute hemorrhage. 
No CT evidence of acute infarct. Mild       periventricular chronic 
microvascular ischemic changes. No       intracranial mass or mass effect. 
Moderate parenchymal atrophy. No       hydrocephalus. No extra-axial fluid 
collection. Atherosclerotic       vascular calcification of the carotid siphons.
        Visualized paranasal sinuses are clear. Mastoid air cells and middle    
  ear cavities areclear. Orbital contents are unremarkable. Calvarium       and 
skull base are intact.        IMPRESSION:                   No acute 
intracranial process.      LOCATION: [facility]                               ** 
Electronically Signed by Marisol Ricks D.O. on 2020 at 0746 **                
     Reported and signed by: Marisol Ricks D.O.         CC: Earl Woods MD       
              Technologist:Jj Guallpa, RT(R)(CT); May  CTDI:        DLP:    
   Trnscb Date/Time: 2020 (0746) [id number]                       Orig 
Print D/T: S: 2020 (0749) PAGE  1                       Signed Report
[id number] 22:07:00





             Test Item    Value        Reference Range Interpretation Comments

 

             GLYCOSYLATED < 3.5 % HbA1                           SUGGESTED



             HEMOGLOBIN (HA1C)                                        DIAGNOSIS:

    HbA1C



             (test code = GLYHGB)                                        (%)----

------------



                                                                 ------



                                                                 -----------Diab

etic



                                                                 



                                                                 >6.4Prediabetes



                                                                          5.7 -



                                                                 6.4Normal



                                                                        <5.7

 

             ESTIMATED AVERAGE 54 MG/DL                               



             GLUCOSE (test code =                                        



             EAG)                                                



LIPID PROFILE (CORONARY RISK)2020 21:55:00





             Test Item    Value        Reference Range Interpretation Comments

 

             TRIGLYCERIDES (test 47 mg/dL            N            



             code = TRIG)                                        

 

             CHOLESTEROL (test code 82 mg/dL     0-200        N            



             = CHOL)                                             

 

             CHOLESTEROL/HDL RATIO 2.0 RATIO    0-4.9        N            RISK A

SSOCIATED WITH



             (test code = CHOLHDL)                                        CHOL/H

DL RATIOS:



                                                                 Risk          M

vince



                                                                   Female1/[address] AVE

RAGE



                                                                     3.43



                                                                 3.27AVERAGE



                                                                 4.97        4.4

42X



                                                                 AVERAGE        

 9.55



                                                                      [address]

RAGE



                                                                      23.39     

 11.04



                                                                 REFERENCE VALUE

 IS



                                                                 RELATED TO RISK

 LEVELS



                                                                 ASRECOMMENDED B

Y THE



                                                                 [place]. HEART, MELLO

G, AND



                                                                 BLOOD INST.

 

             HDL CHOLESTEROL (test 41 mg/dL     40-60        N            



             code = HDL)                                         

 

             LIPOPROTEIN LDL (test 18 mg/dL     100-129      L            ======

================



             code = LDL)                                         ===============

=======



                                                                 ===============

Referen



                                                                 ce Interval:



                                                                 mg/dL



                                                                 mmol/L---------

-------



                                                                 ---------------

-------



                                                                 ---------------

------O



                                                                 ptimal



                                                                      <100



                                                                 <2.6Near/above 

optimal



                                                                          100-12

9



                                                                  2.6-3.3Borderl

ine



                                                                 High



                                                                 130-159



                                                                 3.4-4.1High



                                                                             160

-189



                                                                     4.1-4.9Very

 High



                                                                 



                                                                 &gt;=190



                                                                 >=4.9========= 

This



                                                                 LDL result is a

 direct



                                                                 measurement.===

======



THYROID STIMULATING [id number] 21:55:00





             Test Item    Value        Reference Range Interpretation Comments

 

             THYROID STIMULATING 0.649 uIU/mL 0.36-3.74    N            TSH REFE

RENCE



             HORMONE (test code =                                        RANGES:

  EUTHYROID:



             TSH)                                                    0.35 - 4.3



                                                                 mIU/mL



                                                                          HYPO  

   :



                                                                     > 5.5



                                                                 mIU/mL



                                                                          HYPER 

   :



                                                                     < 0.35



                                                                 mIU/mL



BASIC METABOLIC [id number] 21:50:00





             Test Item    Value        Reference Range Interpretation Comments

 

             SODIUM (test code = 135 mmol/L   136-145      L            



             NA)                                                 

 

             POTASSIUM (test code = 2.2 mmol/L   3.5-5.1      LL           Resul

ts called to



             K)                                                  [id number]  by VCoraL

AB.GA



                                                                 20



                                                                 2148Critical re

sults



                                                                 verified and re

ad



                                                                 back by Nurse? 

Y

 

             CHLORIDE (test code = 93.0 mmol/L         L            



             CL)                                                 

 

             CARBON DIOXIDE (test 27.0 mmol/L  21-32        N            



             code = CO2)                                         

 

             ANION GAP (test code = 17.2         10-20        N            



             GAP)                                                

 

             GLUCOSE (test code = 115 mg/dL           H            



             GLU)                                                

 

             BLOOD UREA NITROGEN 16 mg/dL     7-18         N            



             (test code = BUN)                                        

 

             GLOMERULAR FILTRATION > 60 mL/min  >=60                      Estima

geovanna GFR by



             RATE (test code = GFR)                                        using

 Modified MDRD



                                                                 formula.Chronic



                                                                 kidney disease 

is



                                                                 defined as eith

er



                                                                 kidney damageor

 GFR



                                                                 <60 mL/min/1.73

 m2



                                                                 for >3 months.

 

             CREATININE (test code 0.90 mg/dL   0.7-1.3      N            



             = CREAT)                                            

 

             BUN/CREATININE RATIO 17.8         10-20        N            



             (test code = BUN/CREA)                                        

 

             CALCIUM (test code = 7.2 mg/dL    8.5-10.1     L            



             CA)                                                 



BASIC METABOLIC [id number] 16:39:00





             Test Item    Value        Reference Range Interpretation Comments

 

             SODIUM (test code = 128 mmol/L   128-145      N            



             NA)                                                 

 

             POTASSIUM (test code = 1.8 mmol/L   3.5-5.1      LL           Resul

ts called to



             K)                                                  Mahendra CHAVARRIA [facility]

.PG



                                                                 20



                                                                 1637Critical re

sults



                                                                 verified and re

ad



                                                                 back by Nurse? 

Y

 

             CHLORIDE (test code = 86.0 mmol/L         L            



             CL)                                                 

 

             CARBON DIOXIDE (test 28.2 mmol/L  22-29        N            



             code = CO2)                                         

 

             ANION GAP (test code = 16 mmol/L    10-20        N            



             GAP)                                                

 

             GLUCOSE (test code = 107 mg/dL           N            



             GLU)                                                

 

             BLOOD UREA NITROGEN 19 mg/dL     7-22         N            



             (test code = BUN)                                        

 

             GLOMERULAR FILTRATION > 60 mL/min  >=60                      Estima

geovanna GFR by



             RATE (test code = GFR)                                        using

 Modified MDRD



                                                                 formula.Chronic



                                                                 kidney disease 

is



                                                                 defined as eith

er



                                                                 kidney damageor

 GFR



                                                                 <60 mL/min/1.73

 m2



                                                                 for >3 months.

 

             CREATININE (test code 1.16 mg/dL   0.55-1.3     N            



             = CREAT)                                            

 

             BUN/CREATININE RATIO 16.4         10-20        N            



             (test code = BUN/CREA)                                        

 

             CALCIUM (test code = 8.1 mg/dL    8.0-10.5     N            



             CA)                                                 



HEPATIC FUNCTION [id number] 16:39:00





             Test Item    Value        Reference Range Interpretation Comments

 

             TOTAL PROTEIN (test code = PROT) 7.9 gram/dL  6.1-7.8      H       

     

 

             ALBUMIN (test code = ALB) 3.5 g/dL     3.3-4.4      N            

 

             GLOBULIN (test code = GLOB) 4.4 G/DL     1-10         N            

 

             ALBUMIN/GLOBULIN RATIO (test code 0.8          0.75-1.50    N      

      



             = A/G)                                              

 

             BILIRUBIN TOTAL (test code = 1.10 mg/dL   0.2-1.2      N           

 



             BILT)                                               

 

             BILIRUBIN DIRECT (test code = 0.70 mg/dL   0.0-0.30     H          

  



             BILD)                                               

 

             SGOT/AST (test code = AST) 234 U/L      10-39        H            

 

             SGPT/ALT (test code = ALT) 123 U/L      10-69        H            

 

             ALKALINE PHOSPHATASE TOTAL (test 162 U/L             H       

     



             code = ALKP)                                        



CREATINE KINASE (CK)2020 16:39:00





             Test Item    Value        Reference Range Interpretation Comments

 

             CREATINE KINASE (CK) (test code = CK) 521 U/L             H  

          



[id number] 16:39:00





             Test Item    Value        Reference Range Interpretation Comments

 

             LIPASE (test code = LIP) 2563 Unit/L  144-286      H            



[id number] 16:39:00





             Test Item    Value        Reference Range Interpretation Comments

 

             MAGNESIUM (test code = MAG) 2.3 mg/dL    1.4-2.2      H            



THYROID STIMULATING [id number] 16:39:00





             Test Item    Value        Reference Range Interpretation Comments

 

             THYROID STIMULATING 0.87 uIU/mL  0.36-3.74    N            TSH REFE

RENCE



             HORMONE (test code =                                        RANGES:

  EUTHYROID:



             TSH)                                                0.4 - 4.3



                                                                            HYPO



                                                                 : >7.6



                                                                         HYPER  

  :



                                                                 <0.1



[id number] 16:39:00





             Test Item    Value        Reference Range Interpretation Comments

 

             TROPONIN-I (test 0.07 ng/mL   0.00-0.056   HH           Results barbara

led to 



             code = [place]



                                                                 V.LAB.PG 



                                                                 1638Critical re

sults



                                                                 verified and re

ad back



                                                                 by Nurse? Y



[id number] 16:39:00





             Test Item    Value        Reference Range Interpretation Comments

 

             ALCOHOL (test code 191 mg/dL    0.0-3.0      H            ---------

--------INTERPRE



             = ALC)                                              TIVE DATA



                                                                 NOTE:----------

----------



                                                                 POSITIVE SCREEN

ING



                                                                 RESULTS SHOULD 

BE



                                                                 CONSIDERED



                                                                 PRESUMPTIVE.WHE

N



                                                                 COLLECTED FOR M

EDICAL



                                                                 PURPOSES ONLY. 

 SPECIMEN



                                                                 WILL NOTBE DOROTEO

ECTED BY



                                                                 CHAIN OF CUSTOD

Y.IF A



                                                                 CONFIRMATION OF

 POSITIVE



                                                                 RESULTS IS TING

RED,



                                                                 ACONFIRMATION T

EST MUST



                                                                 BE REQUESTED BY

 THE



                                                                 PHYSICIAN AT AN

ADDITIONAL



                                                                 CHARGE [facility]



B-TYPE NATRIURETIC [id number] 16:26:00





             Test Item    Value        Reference Range Interpretation Comments

 

             B-TYPE NATRIURETIC PEPTIDE (test 99.9 pg/mL   0-100        N       

     



             code = BNP)                                         



B-TYPE NATRIURETIC [id number] 16:26:00





             Test Item    Value        Reference Range Interpretation Comments

 

             B-TYPE NATRIURETIC PEPTIDE (test 99.9 pg/mL   0-100        N       

     



             code = BNP)                                         



- XR CHEST 1 -04-53 16:25:00 FAX: Sukhwinder Hahn [phone]
   [place]: [place]  St: REG-------------------------------
------------------------------------------------  Name:   BRENDAN FISHER       
          [facility]                 : 1945  Age/S: 74/M       
   [address]     Unit#: [id number]      Loc: [place]       
[address]                           Phys: Sukhwinder Pathak  [place], [place] [address]              Acct: [id number] Dis Date:               Status: REG 
ER                                 PHONE #:                  Exam Date:     
2020     0056           FAX #:                  Reason: WEAKNESS          
                                EXAMS:                                          
   CPT CODE:      [id number] XR CHEST 1 V             36182                      
     REASON FOR EXAM: WEAKNESS               Exam Order Date: 4/3/2020 3:57 PM  
            Ordering MLEONILA: Sukhwinder Pathak DO               PROCEDURE:  - XR C
HEST 1 V               COMPARISON: Chest x-ray 2019               
FINDINGS:         Thelungs are clear other than a small calcified granuloma in 
the       right upper lung.  There is no pleural effusion or pneumothorax.      
Pulmonary vascularity is within normal limits.               Cardiomediastinal 
silhouette is normal in size for technique. The       mediastinal contours are 
within normal limits. Calcified mediastinal       lymph nodes and coronary stent
are unchanged from the prior exam.               There are degenerative changes 
in the bilateral shoulders.               The visualized upper abdomen is within
normal limits.                         IMPRESSION:         No acute 
cardiopulmonary process.         Prior granulomatous disease.                   
Location: [facility]   ** Electronically Signed by Victor Manuel Olivier MD on 2020 at 6429
**                      Reportedand signed by: Victor Manuel Olivier MD          CC: 
Sukhwinder Pathak DO                                                            
                 Technologist: Omar Morin                                   
     Trnscrd Date/Time/By: 2020 (5735) : By: [email or web address]          Orig 
Print D/T: S: 2020 (4570)                         PAGE  1 Signed ReportCBC
W/O [id number] 16:13:00





             Test Item    Value        Reference Range Interpretation Comments

 

             WHITE BLOOD CELL (test code = 8.6 K/mm3    4.5-12.5     N          

  



             WBC)                                                

 

             RED BLOOD CELL (test code = 2.92 mill/mm3 4.0-5.8      L           

 



             RBC)                                                

 

             HEMOGLOBIN (test code = HGB) 6.4 gram/dL  13.0-17.5    L           

 

 

             HEMATOCRIT (test code = HCT) 22.7 %       42.0-52.0    L           

 

 

             MEAN CELL VOLUME (test code = 77.7 fL      80-98        L          

  



             MCV)                                                

 

             MEAN CELL HGB (test code = MCH) 21.9 picogram 27.0-33.0    L       

     

 

             MEAN CELL HGB CONCETRATION 28.2 gram/dL 33.0-36.0    L            



             (test code = MCHC)                                        

 

             RED CELL DISTRIBUTION WIDTH 24.3 %       11.6-16.2    H            



             (test code = RDW)                                        

 

             RED CELL DISTRIBUTION WIDTH SD 66.9 fL      37.0-51.0    H         

   



             (test code = RDW-SD)                                        

 

             PLATELET COUNT (test code = 332 K/mm3    150-450      N            



             PLT)                                                

 

             MEAN PLATELET VOLUME (test code 10.1 fL      6.7-11.0     N        

    



             = MPV)                                              



XR Ribs W Pa Chest [id number] 16:58:56Hm Interface, Radiology Results 
Incoming - 2020  5:02 PM CSTProcedure: XR RIBS W PA CHEST LEFTREFERRING 
PHYSICIAN: CLIFFORD WALDROPANIHISTORY: fallCOMPARISON:NoneFINDINGS:No 
radiographic evidence of acute left rib fracture. 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 
is seen along the posterior aspect of theleft 8th and 10th ribs. There is old 
nonsolid union fracture deformity of along the posterior aspectof the left ninth
rib The lungs are clear. Right upper lobe calcified granuloma is noted. The 
heart is normal in size. Pulmonary vasculature is within normal limits.XR RIBS W
PA CHEST LEFT acquiredIMPRESSION: No radiographic evidence of acute left rib 
fracture.[facility] CityJ-[id number] MethodistCT Angiogram Pe [id number] 
13:48:32Hm Interface, Radiology Results Incoming - 2020  1:51 PM 
CSTEXAMINATION:CT ANGIOGRAM PE CHESTCLINICAL HISTORY:sinus tach  sobTECHNIQUE:CT
angiographic images of the chest were obtained during intravenous administration
of iodinated contrast. Computerized reformatted images and 3-D MIP images were 
also obtained and archived (CT pulmonary embolus protocol).DOSE REDUCTION: CT 
imaging was performedwith iterative reconstruction technique and/or automated 
exposure control to reduce radiation dose.COMPARISON:2 view chest from 2020 
and CT abdomen and pelvis with contrast from 2018FINDINGS:1.Pulmonary 
artery opacification is good. There is no evidence of pulmonary embolism. The 
pulmonary arteries are normal in caliber. No radiographic evidence of right 
heart strain.2.The thoracic aorta is normal in course and caliber with a minimal
amount of calcified atherosclerotic disease. The cardiac size is normal. No 
pericardial effusion.3.Centrilobular emphysematous change is noted bilaterally 
thebiapical pleural-parenchymal scarring. A calcified granuloma seen within the 
right upper lobe.4.No noncalcified pulmonary nodules. Bibasilar atelectasis. No 
focal consolidation or pleural effusion. No pleural thickening.5.The trachea 
main bronchi are clear. No bronchiectasis. Mild central peribronchial thickening
is noted.6.No mediastinal, hilar, or axillary lymphadenopathy. Calcified lymph 
nodes areseen within the mediastinum.7.The thyroid is unremarkable.8.The bones 
of the chest are within normallimits.9.Questionable filling defect seen within 
the right internal jugular vein which may representa thrombus versus a filling 
defect. Recommend further evaluation with ultrasound.10.Limited evaluation of 
the upper abdomen is unremarkable. Nonspecific bilateral perinephric stranding 
is noted. Small hiatal hernia.IMPRESSION:1.There is no evidence of pulmonary 
embolism.2.Mild reactive airway disease without evidence of pneumonia or 
bronchiolitis. However, the patient has a small hiatal hernia, correlate for 
history of gastroesophageal reflux.3.Questionable filling defect within the 
right internal jugular vein. Findings may represent an internal thrombus versus 
mixing artifact. This can be better evaluated with ultrasound as clinically 
indicated.[facility]-[id number] MethodistU/S, ABDOMINAL, [id number] 
10:11:00Referring: Dr. Mart Sotomayor for Exam:-&gt;hepatitis B, liver 
lesion, screening for HCCFINAL REPORT PATIENT ID:   [id number] Abdominal 
ultrasound dated 2020 Clinical information:hepatitis B, liver lesion, 
screening for HCC Comment:  Real-time transabdominal ultrasound was performed. 
Liver is normal in size and measures 16.2 cm in length. The echogenicity of the 
liver is increased. A 0.29 x 1.0 cm cyst is seen in the liver. A 4.6 x 5.1 x 5.1
cm echogenic mass is seen in the liver. Spleen is normal in size without focal 
abnormality. Gallbladder is contracted. No gallstone is present. No biliary 
dilatation is seen. Common bile duct measures 5 mm in diameter.  Main portal 
vein measures8 mm in diameter. Pancreas is visualized and unremarkable. Right 
kidney measures 12 x 4.7 x 5.2 cm.Left kidney measures 10.3 x 4.4 x 4.5 cm.  
Echogenicity of both kidney is increased.  No hydronephrosis or solid mass seen 
in either kidney.  A 3.3 x 3.4 x 2.4 cm and 1.1 x 1.0 x 0.9 cm cysts are seen in
the right kidney. A 1.1 x 0.9 x 1.2 cm cyst  is seen in the left kidney. No 
ascites is present in the abdomen. Abdominal aorta is normal in caliber. IVC and
Hepatic veins are patent.  Impression: 1. Echogenic kidneys suggestive of 
medical renal disease with bilateral renal cysts.2. Hyperechoic mass in the 
liver. Please correlate with prior MR examination. Signed: Nicole Artisort 
Verified Date/Time:  2020 10:11:17 Reading Location: [address] 
Radiology Reading Room      Electronicallysigned by: NICOLE ARTIS M.D. on 
2020 10:11 AMUS abdomen [id number] 10:11:00Interface, External Ris
In - 2020 10:13 AM CSTFINAL REPORT PATIENT ID:   [id number] Abdominal ult
rasound dated 2020 Clinical information:hepatitis B, liver lesion, screening
for HCC Comment:  Real-time transabdominal ultrasound was performed. Liver is 
normal in size and measures 16.2 cm in length. The echogenicity of the liver is 
increased. A 0.29 x 1.0 cm cyst is seen in the liver. A 4.6 x 5.1 x 5.1 cm 
echogenic mass is seen in the liver. Spleen is normal in size without focal 
abnormality. Gallbladder is contracted. No gallstone is present. No biliary 
dilatation is seen. Common bile duct measures 5 mm in diameter.  Main portal 
vein measures 8 mm in diameter. Pancreas is visualized and unremarkable. Right 
kidney measures 12 x 4.7 x 5.2 cm.  Left kidney measures 10.3 x 4.4 x 4.5 cm.  
Echogenicity of both kidney is increased.  No hydronephrosis or solid mass seen 
in either kidney.  A 3.3 x3.4 x 2.4 cm and 1.1 x 1.0 x 0.9 cm cysts are seen in 
the right kidney. A 1.1 x 0.9 x 1.2 cm cyst  is seen in the left kidney. No 
ascites is present in the abdomen. Abdominal aorta is normal in caliber. IVC and
Hepatic veins are patent.  Impression: 1. Echogenic kidneys suggestive of 
medical renal disease with bilateral renal cysts.2. Hyperechoic mass in the 
liver. Please correlate with prior MR examination. Signed: Nicole Artisort 
Verified Date/Time:  2020 10:11:17 Reading Location: [address] 
Radiology Reading Room      Electronically signed by: NICOLE ARTIS M.D. on 
2020 10:11 [facility] and 3D if [id number] 18:48:05





             Test Item    Value        Reference Range Interpretation Comments

 

             Ao Root Diameter (test 3.41 cm                                



             code = 5682763851)                                        

 

             AoV Area, Vmax (test 2.32 cm2                               



             code = 6617526411)                                        

 

             AoV Area, VTI (test 2.20 cm2                               



             code = 0076231205)                                        

 

             AoV Mean PG (test code 3.23         mmHg                      



             = 0394587715)                                        

 

             AoV Peak PG (test code 4.58         mmHg                      



             = 4812854541)                                        

 

             AoV Vmax (test code = 1.12 m/s                               



             4681655594)                                         

 

             AoV VTI (test code = 0.25 m                                 



             4327743284)                                         

 

             BSA Lorenzo (test code = 1.74 m2                                



             7758249537)                                         

 

             BSA (test code = 1.74 m2                                



             8441338335)                                         

 

             IVS,d (test code = 0.94 cm                                



             7753401418)                                         

 

             IVS/LVPW,2D (test code 0.89                                   



             = 4457943038)                                        

 

             Left Atrium Dimension 3.25 cm                                



             Anterior (test code =                                        



             6547073152)                                         

 

             LV,d (test code = 4.52 cm                                



             4850710959)                                         

 

             LV EF,2D (test code = 59.95 %                                



             0186703507)                                         

 

             LV,s (test code = 3.34 cm                                



             0239600274)                                         

 

             LVOT area (test code = 2.89 cm2                               



             9619808391)                                         

 

             LVOT Diam,S (test code 1.92 cm                                



             = 9731046848)                                        

 

             LVOT Vmax (test code = 0.89 m/s                               



             6322109470)                                         

 

             LVOT VTI (test code = 0.18 m                                 



             3047174989)                                         

 

             LVPWD,d (test code = 1.06 cm                                



             4567733969)                                         

 

             PV Pk Grad (test code = 3.41         mmHg                      



             2333665213)                                         

 

             PV VMAX (test code = 0.92 m/s                               



             4740856448)                                         

 

             MV E A ratio (test code 0.82                                   



             = 9364495788)                                        

 

             AoV area i VTI BSA 1.27 cm2/m2                            



             [place] (test code =                                        



             8845229824)                                         

 

             BMI (test code = 21.93 kg/m2                            



             5228565888)                                         

 

             E wave decelartion time 127.59       msec                      



             (test code =                                        



             8118707603)                                         

 

             MV Peak A Mark (test 0.96 m/s                               



             code = 2642106457)                                        

 

             MV valve area p 1/2 5.95 cm2                               



             method (test code =                                        



             3376561234)                                         

 

             MV Peak E Mark (test 0.78 m/s                               



             code = 4733345707)                                        

 

             MV stenosis pressure 37.00 ms                               



             1/2 time (test code =                                        



             0904221349)                                         

 

             AV LVOT peak gradient 2.92         mmHg                      



             (test code =                                        



             3104526299)                                         

 

             Ascending aorta (test 3.11 cm                                



             code = 0780130263)                                        

 

             Ao Root Diameter (test 3.41 cm                                



             code = 3819211752)                                        

 

             LV SYS VOL (test code = 45.29 ml                               



             2015291676)                                         

 

             LV RANGEL VOL (test code 93.65 ml                               



             = 2561262689)                                        

 

             LA area s A4C (test 14.82 cm2                              



             code = 9377185019)                                        

 

             LV SI Teich 2D (test 27.83 ml/m2                            



             code = 8694619343)                                        

 

             LV SV Teich 2D (test 48.36 ml                               



             code = 2386868596)                                        

 

             LV Vol s Teich PSAX 45.29 ml                               



             (test code =                                        



             0544915866)                                         

 

             LVOT CI (test code = 2.26 l/min/m2                           



             6144872359)                                         

 

             LVOT CO (test code = 3.92 l/min                             



             5241624531)                                         

 

             LVOT HR for LVOT CO 77.28        bpm                       



             (test code =                                        



             6736985045)                                         

 

             LVOT SI (test code = 29.22 ml/m2                            



             3960034696)                                         

 

             BSA Haycock (test code 1.73 m2                                



             = 5037086398)                                        

 

             AoV Vmn (test code = 0.87                                   



             7542201216)                                         

 

             IVS s 2D (test code = 1.00                                   



             7390772264)                                         

 

             LV FS Teich 2D (test 26.29                                  



             code = 1014878641)                                        

 

             MV AE ratio (test code 1.22                                   



             = 0795911343)                                        

 

             LV FS Cube 2D (test 26.29                                  



             code = 5440063543)                                        

 

             LVOT Vmn (test code = 0.56                                   



             5716819425)                                         

 

             Pt Size (test code = 170.18                                 



             9991703684)                                         

 

             Pt Wt (test code = 63.50                                  



             2924502636)                                         

 

             Aov area Vmn (test code 2.05 cm2                               



             = 7989980397)                                        

 

             LA A_P score P (test 1.39                                   



             code = 8321310974)                                        

 

             LVOT mean grad (test 1.45         mmHg                      



             code = 8179641740)                                        

 

             MAX Pred HR (test code 147.68                                 



             = 5500591568)                                        

 

             85 of MPHR (test code = 125.53                                 



             6216226717)                                         

 

             AoV area I VMN bsa 1.18 cm2/m2                            



             (test code =                                        



             4359304287)                                         

 

             Calc MPHR (test code = 147.68       bpm                       



             8295108882)                                         

 

             IVS pct thck PLAX (test 5.97 %                                 



             code = 3658408283)                                        

 

             LV SI Cube 2D (test 31.96 ml/m2                            



             code = 8339131159)                                        

 

             LV SV Cube 2D (test 55.54 ml                               



             code = 8371457926)                                        

 

             LV vol d cube 2D (test 92.65 ml                               



             code = 7260780392)                                        

 

             LV vol s cube 2D (test 37.11 ml                               



             code = 6718892036)                                        

 

             LVPW pct thck PLAX -5.00 %                                



             (test code =                                        



             7828352581)                                         

 

             LVPW s PLAX (test code 1.01 cm                                



             = 0909116851)                                        

 

             MV Decel slope (test 6.13 m/s2                              



             code = 8068829131)                                        

 

             Pred Exer Dur R1 (test 6.82                                   



             code = 1824064757)                                        

 

             Pred METS R1 (test code 7.15                                   



             = 6043560584)                                        

 

             LA Vol MOD A4C (test 36.15 ml                               



             code = 8374072041)                                        

 

             Velocity Ratio (V1/V2) 0.79 m/s                               



             (test code = 4689)                                        

 

             EF (test code = 51.64 %                                



             7646345733)                                         

 

             E/A ratio (test code = 0.81                                   



             6462437282)                                         

 

             CARLOS (test code = CARLOS)  There is mild left                        

   



                          ventricular concentric                           



                          hypertrophy. Left                           



                          Ventricular ejection                           



                          fraction is 50 -                           



                          55%. Left atrium                           



                          size is moderately                           



                          dilated. There is                           



                          mild sclerosis of the                           



                          aortic valve                           



                          leaflets. Spectral                           



                          Doppler shows impaired                           



                          relaxation pattern of                           



                          left ventricular                           



                          diastolic filling.                           



[facility] [id number] 08:55:06





             Test Item    Value        Reference    Interpretation Comments



                                       Range                     

 

             Cholesterol (test 153 mg/dL    0-199                     



             code = 3-3)                                        

 

             Triglycerides (test 65 mg/dL     0-149                     



             code = 2571-8)                                        

 

             HDL cholesterol 57 mg/dL                        



             (test code = 5-9)                                        

 

             LDL cholesterol 103 mg/dL    0-99         H            Result obtai

juan f by direct



             (test code = -1)                                        LDL magaly

surement

 

             Lipid panel  See below                              Total Cholester

ol (mg/dL)



             interpretation (test                                               

    LDL Cholesterol



             code = 14836-1)                                        (mg/dL) <200



                                                                 Desirable



                                                                 <100         Op

timal



                                                                 200-239



                                                                 Borderline-high



                                                                 100-129      Ne

ar or above



                                                                 optimal &gt;=24

0



                                                                 High



                                                                 130-159



                                                                 Borderline-high



                                                                 



                                                                 160-189      Hi

gh



                                                                 



                                                                   >=190        

Very



                                                                 highHDL Cholest

tomy



                                                                 (mg/dL)



                                                                 Triglycerides (

mg/dL) <40



                                                                         Low



                                                                       <150     

    Normal



                                                                 >=60         Hi

gh



                                                                            150-

199



                                                                 Borderline-high



                                                                 



                                                                 200-499      Hi

gh



                                                                 



                                                                   >=500        

Very high



                                                                 Risk Catergorie

s that



                                                                 modify LDL goal

s.Risk



                                                                 Catergories



                                                                       LDL goal 

(mg/dL)CHD



                                                                 and CHD risk eq

uivalent



                                                                         <100  (

10-year



                                                                 risk >20%)Multi

ple (2+)



                                                                 risk factors



                                                                 <130  (10-year 

risk



                                                                 =<20%)0-1 risk 

factors



                                                                                

   <160



                                                                 (<10-year risk)



                                                                            Defi

cinthia levels



                                                                 of lipids in me

tabolic



                                                                 syndromeTriglyc

erides



                                                                                

     >=150



                                                                 mg/dLHDL Choles

terol



                                                                                

     Men



                                                                 



                                                                  <40 mg/dL    W

omen



                                                                                

       <50



                                                                 mg/dL Non-HDL c

holesterol



                                                                 is a second tar

get for



                                                                 therapy in pers

onswith



                                                                 high triglyceri

[place] (>=200



                                                                 mg/dL)

 

             Lab Interpretation Abnormal                               



             (test code =                                        



             60651-4)                                            



[facility] Chest 2 [id number] 18:41:55Hm Interface, Radiology Results
2020  6:44 PM CSTEXAMINATION: XR CHEST 2 VWCLINICALHISTORY: 
Chest pain  normal ekgCOMPARISON: September 3, 2018.FINDINGS: 1. The lungs are 
clear without a focal consolidation. There is no pleural effusion or 
pneumothorax. 2. The cardiomediastinal silhouette is within normal limits. Aorta
is mildly tortuous and partially calcified.3. There is no acute or suspicious 
osseous abnormality. Severe right chronic clavicular joint osteoarthritis. 
Multiple healed left rib fracturesIMPRESSION: No acute cardiopulmonary 
abnormality. [id number] MethodistHEPATITIS B PCR, QUANTITATIVE
2019 12:54:00





             Test Item    Value        Reference Range Interpretation Comments

 

             HBV NUMERIC RESULT (KATRINAKER) (test 154 IU/mL    <20          H      

      



             code = 2702)                                        



This test uses a Real-Time Polymerase Chain Reaction (RT-PCR) methodology and 
was performed using JEROD  AmpliPrep/JEROD  TaqMan  HBV Test, v2.0 (Roche 
[employer]Reportable range for this assay is 20 - 170,000,000 IU 
per mL (1.30 - 8.23 Log IU/mL).Hepatitis B e [id number] 17:50:00





             Test Item    Value        Reference Range Interpretation Comments

 

             Hep Be Ag    NONREACTIVE                             REFERENCE RANG

ES:



             (Antigen)                                           NONREACTIVE For



             (test code =                                        additional



             0683062)                                            information, pl

ease



                                                                 refer



                                                                 tohttp://educat

ion.Entrenarme/



                                                                 faq/ZCJ404(This

 link



                                                                 is being provid

ed



                                                                 for



                                                                 informational/e

ducat



                                                                 ional purposes



                                                                 only.)

 

             CARLOS (test code Performing Lab                           



             = CARLOS)       *[facility]                           



                          [facility]     [id number] [place] [address]                           



                          [id number]     ROBYN Nielson MD                           



[facility] [id number] 17:50:00





             Test Item    Value        Reference Range Interpretation Comments

 

             Hep Be Ab(Anti-Hbe) REACTIVE                  A             REFEREN

CE RANGE:



             (test code = 9461219)                                         NONRE

ACTIVE For



                                                                 additional



                                                                 information,



                                                                 please refer



                                                                 tohttp://educat

io



                                                                 n.[facility]/faq/FAQ20

2(



                                                                 This link is



                                                                 being provided



                                                                 for



                                                                 informational/e

du



                                                                 cational purpos

es



                                                                 only.)

 

             CARLOS (test code = CARLOS) Performing Lab                           



                           *[facility]                             



                          [facility]                           



                          [address] [place] [address]                           



                          [phone]     ROBYN Nielson MD                           

 

             Lab Interpretation Abnormal                               



             (test code = 14965-0)                                        



[facility] B PCR, [id number] 09:20:00





             Test Item    Value        Reference Range Interpretation Comments

 

             HBV NUMERIC RESULT (BEAKER) (test 133 IU/mL    <20          H      

      



             code = 2702)                                        



This test uses a Real-Time Polymerase Chain Reaction (RT-PCR) methodology and 
was performed using JEROD  AmpliPrep/JEROD  TaqMan  HBV Test, v2.0 (Roche 
[employer].).Reportable range for this assay is 20 - 170,000,000 IU 
per mL (1.30 - 8.23 Log IU/mL).HBSAG [id number] 14:24:00





             Test Item    Value        Reference Range Interpretation Comments

 

             HBsAg Confirm (test code Confirmed Positive Unconfirmed, HBSAG A   

         



             = 7905-3)                 Negative                  

 

             Lab Interpretation (test Abnormal                               



             code = 98292-1)                                        



[facility] Center[facility]BSAG [id number] 14:24:00





             Test Item    Value        Reference Range Interpretation Comments

 

             HBSAG CONFIRMATION Confirmed Positive Unconfirmed, HBSAG A         

   



             (BEAKER) (test code =              Negative                  



             1602)                                               



Hepatitis B surface [id number] 11:46:00





             Test Item    Value        Reference Range Interpretation Comments

 

             HBsAg Screen (test code = 5195-3) Reactive     Nonreactive  A      

      

 

             Lab Interpretation (test code = Abnormal                           

    



             22060-6)                                            



[facility] Center[facility] F[facility]OTE[facility] (AFP), TUMOR [id number] 
11:46:00





             Test Item    Value        Reference Range Interpretation Comments

 

             ALPHA-FETOPROTEIN (BEAKER) (test code < ng/mL      <10.0           

          



             = 1094)                                             



HEPATITIS B SURFACE [id number] 11:46:00





             Test Item    Value        Reference Range Interpretation Comments

 

             HEPATITIS B SURFACE ANTIGEN (2) Reactive     Nonreactive  A        

    



             (BEAKER) (test code = 2585)                                        



BASIC METABOLIC [id number] 11:31:00





             Test Item    Value        Reference Range Interpretation Comments

 

             SODIUM (BEAKER) 140 meq/L    136-145                   



             (test code = 381)                                        

 

             POTASSIUM (BEAKER) 4.4 meq/L    3.5-5.1                   



             (test code = 379)                                        

 

             CHLORIDE (BEAKER) 101 meq/L                        



             (test code = 382)                                        

 

             CO2 (BEAKER) (test 31 meq/L     22-29        H            



             code = 355)                                         

 

             BLOOD UREA NITROGEN 31 mg/dL     7-21         H            



             (BEAKER) (test code                                        



             = 354)                                              

 

             CREATININE (BEAKER) 2.68 mg/dL   0.57-1.25    H            



             (test code = 358)                                        

 

             GLUCOSE RANDOM 95 mg/dL                         



             (BEAKER) (test code                                        



             = 652)                                              

 

             CALCIUM (BEAKER) 10.1 mg/dL   8.4-10.2                  



             (test code = 697)                                        

 

             EGFR (BEAKER) (test 29 mL/min/1.73                           ESTIMA

GEOVANNA GFR IS



             code = 1092) sq m                                   NOT AS ACCURATE

 AS



                                                                 CREATININE



                                                                 CLEARANCE IN



                                                                 PREDICTING



                                                                 GLOMERULAR



                                                                 FILTRATION RATE

.



                                                                 ESTIMATED GFR I

S



                                                                 NOT APPLICABLE 

FOR



                                                                 DIALYSIS PATIEN

TS.



HEPATIC FUNCTION [id number] 11:20:00





             Test Item    Value        Reference Range Interpretation Comments

 

             TOTAL PROTEIN (BEAKER) (test code = 8.0 gm/dL    6.0-8.3           

        



             770)                                                

 

             ALBUMIN (BEAKER) (test code = 1145) 4.0 g/dL     3.5-5.0           

        

 

             BILIRUBIN TOTAL (BEAKER) (test code 0.3 mg/dL    0.2-1.2           

        



             = 377)                                              

 

             BILIRUBIN DIRECT (BEAKER) (test 0.1 mg/dL    0.1-0.5               

    



             code = 706)                                         

 

             ALKALINE PHOSPHATASE (BEAKER) (test 57 U/L                   

        



             code = 346)                                         

 

             AST (SGOT) (BEAKER) (test code = 24 U/L       5-34                 

     



             353)                                                

 

             ALT (SGPT) (BEAKER) (test code = 27 U/L       6-55                 

     



             347)                                                



Pro-time/[id number] 11:06:00





             Test Item    Value        Reference Range Interpretation Comments

 

             Protime (test code = 12.9         11.9- 14.2                



             5902-2)                   seconds                   

 

             INR (test code = 1.0          <=5.9                     



             6301-6)                                             

 

             CARLOS (test code = CARLOS) Effective 2019:                         

  



                          PT Reference Range                           



                          ChangeNew: 11.9-14.2                           



                          Previous: 11.7-14.7                           



                          RECOMMENDED                            



                          COUMADIN/WARFARIN INR                           



                          THERAPY RANGESSTANDARD                           



                          DOSE: 2.0-3.0                           



                          Includes: PROPHYLAXIS                           



                          for venous thrombosis,                           



                          systemic embolization;                           



                          TREATMENT for venous                           



                          thrombosis and/or                           



                          pulmonary embolus.HIGH                           



                          RISK: Target INR is                           



                          2.5-3.5 for patients                           



                          wiht mechanical heart                           



                          valves.                                

 

             Lab Interpretation Normal                                 



             (test code = 26194-5)                                        



[id number]





             Test Item    Value        Reference Range Interpretation Comments

 

             PROTIME (BEAKER) (test code = 12.9 seconds 11.9-14.2               

  



             759)                                                

 

             INR (BEAKER) (test code = 370) 1.0          <=5.9                  

   



Effective 2019: PT Reference Range ChangeNew: 11.9-14.2  Previous: 11.7-
14.7RECOMMENDED COUMADIN/WARFARIN INR THERAPY RANGESSTANDARD DOSE: 2.0-3.0  
Includes: PROPHYLAXIS for venous thrombosis, systemic embolization; TREATMENT 
for venous thrombosis and/or pulmonary embolus.HIGH RISK: Target INR is2.5-3.5 
for patients wiht mechanical heart valves.CBC W/PLT COUNT &amp; AUTO 
[id number] 10:58:00





             Test Item    Value        Reference Range Interpretation Comments

 

             WHITE BLOOD CELL COUNT (BEAKER) 5.3 K/ L     3.5-10.5              

    



             (test code = 775)                                        

 

             RED BLOOD CELL COUNT (BEAKER) 3.63 M/ L    4.63-6.08    L          

  



             (test code = 761)                                        

 

             HEMOGLOBIN (BEAKER) (test code = 11.0 GM/DL   13.7-17.5    L       

     



             410)                                                

 

             HEMATOCRIT (BEAKER) (test code = 35.0 %       40.1-51.0    L       

     



             411)                                                

 

             MEAN CORPUSCULAR VOLUME (BEAKER) 96.4 fL      79.0-92.2    H       

     



             (test code = 753)                                        

 

             MEAN CORPUSCULAR HEMOGLOBIN 30.3 pg      25.7-32.2                 



             (BEAKER) (test code = 751)                                        

 

             MEAN CORPUSCULAR HEMOGLOBIN CONC 31.4 GM/DL   32.3-36.5    L       

     



             (BEAKER) (test code = 752)                                        

 

             RED CELL DISTRIBUTION WIDTH 14.9 %       11.6-14.4    H            



             (BEAKER) (test code = 412)                                        

 

             PLATELET COUNT (BEAKER) (test 146 K/CU MM  150-450      L          

  



             code = 756)                                         

 

             MEAN PLATELET VOLUME (BEAKER) 12.1 fL      9.4-12.4                

  



             (test code = 754)                                        

 

             NUCLEATED RED BLOOD CELLS 0 /100 WBC   0-0                       



             (BEAKER) (test code = 413)                                        

 

             NEUTROPHILS RELATIVE PERCENT 50 %                                  

 



             (BEAKER) (test code = 429)                                        

 

             LYMPHOCYTES RELATIVE PERCENT 34 %                                  

 



             (BEAKER) (test code = 430)                                        

 

             MONOCYTES RELATIVE PERCENT 11 %                                   



             (BEAKER) (test code = 431)                                        

 

             EOSINOPHILS RELATIVE PERCENT 4 %                                   

 



             (BEAKER) (test code = 432)                                        

 

             BASOPHILS RELATIVE PERCENT 1 %                                    



             (BEAKER) (test code = 437)                                        

 

             NEUTROPHILS ABSOLUTE COUNT 2.64 K/ L    1.78-5.38                 



             (BEAKER) (test code = 670)                                        

 

             LYMPHOCYTES ABSOLUTE COUNT 1.82 K/ L    1.32-3.57                 



             (BEAKER) (test code = 414)                                        

 

             MONOCYTES ABSOLUTE COUNT (BEAKER) 0.58 K/ L    0.30-0.82           

      



             (test code = 415)                                        

 

             EOSINOPHILS ABSOLUTE COUNT 0.23 K/ L    0.04-0.54                 



             (BEAKER) (test code = 416)                                        

 

             BASOPHILS ABSOLUTE COUNT (BEAKER) 0.04 K/ L    0.01-0.08           

      



             (test code = 417)                                        

 

             IMMATURE GRANULOCYTES-RELATIVE 0 %          0-1                    

   



             PERCENT (BEAKER) (test code =                                      

  



             2801)                                               



MR, ABDOMEN, [id number] 14:49:00Referring: Dr. Mart GarciatFINAL REPORT 
PATIENT ID:   [id number]  MRI of the abdomen with and without contrast Clinical 
History: hemorrhagic liver cyst Technique: Multiplanar and multisequence MR 
images of the abdomen are obtainedbefore and after intravenous contrast 
administration. Contrast is administered to evaluate neoplasm and vasculature.  
Comparison: 2019, 2018 Discussion: Liver is not 
cirrhotic in morphology. In the right lobe, there is a stable heterogeneously T1
hyperintense and T2 hyperintense, nonenhancing lesion measuring 7 x 7.5 x 6.9 
cm, compatible with a hemorrhagic cyst. Several additional small simple cysts 
are present in the liver. No suspicious mass lesion is identified. Hepatic vas
culature is patent. The main portal vein measures 13 mm in diameter. No ductal 
dilatation. Gallbladder is unremarkable. The spleen, pancreas, adrenal glands 
are unremarkable. There are a few cysts in the kidneys, largest is on the right,
measuring up to 3.4 cm. No hydronephrosis or mass lesion. No ascites or 
lymphadenopathy. No suspicious bony lesion. Visualized bowel is unremarkable. 
Impression: Stable appearance of a hemorrhagic cyst in the liver. No suspicious 
mass lesion is identified. Signed: Marcy Bruno MDReport Verified Date/Time:  
2019 14:49:11 Reading Location: [address] Radiology Reading Room      
 Electronically signed by: MARCY BRUNO M.D. on 2019 02:49 PMHBSAG 
[id number] 12:53:00





             Test Item    Value        Reference Range Interpretation Comments

 

             HBSAG CONFIRMATION Confirmed Positive Unconfirmed, HBSAG A         

   



             (BEAKER) (test code =              Negative                  



             1602)                                               



HEPATITIS B SURFACE [id number] 11:22:00





             Test Item    Value        Reference Range Interpretation Comments

 

             HEPATITIS B SURFACE ANTIGEN (2) Reactive     Nonreactive  A        

    



             (BEAKER) (test code = 2585)                                        



HEPATITIS B SURFACE [id number] 10:14:00





             Test Item    Value        Reference Range Interpretation Comments

 

             HEPATITIS B SURFACE ANTIBODY < mIU/mL     <8.0                     

 



             (BEAKER) (test code = 647)                                        



ALPHA FETOPROTEIN (AFP), TUMOR [id number] 10:14:00





             Test Item    Value        Reference Range Interpretation Comments

 

             ALPHA-FETOPROTEIN (BEAKER) (test code < ng/mL      <10.0           

          



             = 1094)                                             



[id number] 10:10:00





             Test Item    Value        Reference Range Interpretation Comments

 

             PROSTATE SPECIFIC ANTIGEN (BEAKER) 3.4 ng/mL    0.0-4.0            

       



             (test code = 844)                                        



CARCINOEMBRYONIC ANTIGEN (CEA)2019 10:10:00





             Test Item    Value        Reference Range Interpretation Comments

 

             CARCINOEMBRYONIC ANTIGEN (BEAKER) 1.7 ng/mL    0.0-5.0             

      



             (test code = 685)                                        



BASIC METABOLIC [id number] 09:50:00





             Test Item    Value        Reference Range Interpretation Comments

 

             SODIUM (BEAKER) 141 meq/L    136-145                   



             (test code = 381)                                        

 

             POTASSIUM (BEAKER) 4.5 meq/L    3.5-5.1                   Specimen 

slightly



             (test code = 379)                                        hemolyzed

 

             CHLORIDE (BEAKER) 108 meq/L           H            



             (test code = 382)                                        

 

             CO2 (BEAKER) (test 25 meq/L     22-29                     



             code = 355)                                         

 

             BLOOD UREA NITROGEN 28 mg/dL     7-21         H            



             (BEAKER) (test code                                        



             = 354)                                              

 

             CREATININE (BEAKER) 2.16 mg/dL   0.57-1.25    H            Specimen

 slightly



             (test code = 358)                                        hemolyzed

 

             GLUCOSE RANDOM 100 mg/dL                        



             (BEAKER) (test code                                        



             = 652)                                              

 

             CALCIUM (BEAKER) 9.9 mg/dL    8.4-10.2                  



             (test code = 697)                                        

 

             EGFR (BEAKER) (test 37 mL/min/1.73                           ESTIMA

GEOVANNA GFR IS



             code = 1092) sq m                                   NOT AS ACCURATE

 AS



                                                                 CREATININE



                                                                 CLEARANCE IN



                                                                 PREDICTING



                                                                 GLOMERULAR



                                                                 FILTRATION RATE

.



                                                                 ESTIMATED GFR I

S



                                                                 NOT APPLICABLE 

FOR



                                                                 DIALYSIS PATIEN

TS.



HEPATIC FUNCTION [id number] 09:50:00





             Test Item    Value        Reference Range Interpretation Comments

 

             TOTAL PROTEIN (BEAKER) 8.0 gm/dL    6.0-8.3                   Speci

men slightly



             (test code = 770)                                        hemolyzed

 

             ALBUMIN (BEAKER) (test 3.8 g/dL     3.5-5.0                   Speci

men slightly



             code = 1145)                                        hemolyzed

 

             BILIRUBIN TOTAL 0.2 mg/dL    0.2-1.2                   Specimen sli

ghtly



             (BEAKER) (test code =                                        hemoly

zed



             377)                                                

 

             BILIRUBIN DIRECT 0.1 mg/dL    0.1-0.5                   Specimen sl

ightly



             (BEAKER) (test code =                                        hemoly

zed



             706)                                                

 

             ALKALINE PHOSPHATASE 54 U/L                           



             (BEAKER) (test code =                                        



             346)                                                

 

             AST (SGOT) (BEAKER) 21 U/L       5-34                      Specimen

 slightly



             (test code = 353)                                        hemolyzed

 

             ALT (SGPT) (BEAKER) 15 U/L       6-55                      Specimen

 slightly



             (test code = 347)                                        hemolyzed



PROTHROMBIN TIME/[id number] 09:46:00





             Test Item    Value        Reference Range Interpretation Comments

 

             PROTIME (BEAKER) (test code = 13.3 seconds 11.9-14.2               

  



             759)                                                

 

             INR (BEAKER) (test code = 370) 1.1          <=5.9                  

   



RECOMMENDED COUMADIN/WARFARIN INR THERAPY RANGESSTANDARD DOSE: 2.0 - 3.0   
Includes: PROPHYLAXIS forvenous thrombosis, systemic embolization; TREATMENT for
venous thrombosis and/or pulmonary embolus.HIGH RISK: Target INR is 2.5-3.5 for 
patients with mechanical heart valves.CBC W/PLT COUNT &amp; AUTO DIFFERENTIAL
2019 09:31:00





             Test Item    Value        Reference Range Interpretation Comments

 

             WHITE BLOOD CELL COUNT (BEAKER) 6.5 K/ L     3.5-10.5              

    



             (test code = 775)                                        

 

             RED BLOOD CELL COUNT (BEAKER) 3.50 M/ L    4.63-6.08    L          

  



             (test code = 761)                                        

 

             HEMOGLOBIN (BEAKER) (test code = 10.6 GM/DL   13.7-17.5    L       

     



             410)                                                

 

             HEMATOCRIT (BEAKER) (test code = 32.9 %       40.1-51.0    L       

     



             411)                                                

 

             MEAN CORPUSCULAR VOLUME (BEAKER) 94.0 fL      79.0-92.2    H       

     



             (test code = 753)                                        

 

             MEAN CORPUSCULAR HEMOGLOBIN 30.3 pg      25.7-32.2                 



             (BEAKER) (test code = 751)                                        

 

             MEAN CORPUSCULAR HEMOGLOBIN CONC 32.2 GM/DL   32.3-36.5    L       

     



             (BEAKER) (test code = 752)                                        

 

             RED CELL DISTRIBUTION WIDTH 15.1 %       11.6-14.4    H            



             (BEAKER) (test code = 412)                                        

 

             PLATELET COUNT (BEAKER) (test 179 K/CU MM  150-450                 

  



             code = 756)                                         

 

             MEAN PLATELET VOLUME (BEAKER) 11.2 fL      9.4-12.4                

  



             (test code = 754)                                        

 

             NUCLEATED RED BLOOD CELLS 0 /100 WBC   0-0                       



             (BEAKER) (test code = 413)                                        

 

             NEUTROPHILS RELATIVE PERCENT 61 %                                  

 



             (BEAKER) (test code = 429)                                        

 

             LYMPHOCYTES RELATIVE PERCENT 25 %                                  

 



             (BEAKER) (test code = 430)                                        

 

             MONOCYTES RELATIVE PERCENT 9 %                                    



             (BEAKER) (test code = 431)                                        

 

             EOSINOPHILS RELATIVE PERCENT 3 %                                   

 



             (BEAKER) (test code = 432)                                        

 

             BASOPHILS RELATIVE PERCENT 1 %                                    



             (BEAKER) (test code = 437)                                        

 

             NEUTROPHILS ABSOLUTE COUNT 3.97 K/ L    1.78-5.38                 



             (BEAKER) (test code = 670)                                        

 

             LYMPHOCYTES ABSOLUTE COUNT 1.63 K/ L    1.32-3.57                 



             (BEAKER) (test code = 414)                                        

 

             MONOCYTES ABSOLUTE COUNT (BEAKER) 0.61 K/ L    0.30-0.82           

      



             (test code = 415)                                        

 

             EOSINOPHILS ABSOLUTE COUNT 0.22 K/ L    0.04-0.54                 



             (BEAKER) (test code = 416)                                        

 

             BASOPHILS ABSOLUTE COUNT (BEAKER) 0.04 K/ L    0.01-0.08           

      



             (test code = 417)                                        

 

             IMMATURE GRANULOCYTES-RELATIVE 0 %          0-1                    

   



             PERCENT (BEAKER) (test code =                                      

  



             2801)                                               



GASTRIC,[id number] 13:49:00
--------------------------------------------------------------------------------

------------RUN DATE: 19                         [facility]           
              PAGE 1   RUN TIME: 1349                        Specimen Inquiry   
                RUN USER: INTERFACE                                     
----------------------------------------------------------------
----------------------------PATIENT: BRENDAN FISHER #: 
[id number] LOC:  [facility]    U #: [id number]                                     
 AGE/SX: 73/M         ROOM: [facility]      RE19REG DR:  Bhavesh Zimmer MD         :    49     BED:  1          DIS: 19                  
                 STATUS: DIS IN       TLOC:           
----------------------------
---------------------------------------------------------------- SPEC #: 
[id number]      RECD:      STATUS:  SOUT           REQ #: 
[id number]                           DOROTEO: 19-    SUBM DR: Bhavesh Zimmer MD          ENTERED:      SP TYPE: GASTRIC BX     OTHR DR: Self 
Referred                                                                        
    Estefany Burns MD, Robin Lynn MD Azzam, Mohamad MD Malhotra, Advitya MDORDERED:  SPEC
STAIN MONIKA, GM LEVEL 4, REQUEST                                         COPIES 
TO:   Self Referred    Bhavesh Zimmer MD   [address] [place] [address]   [phone]    Estefany Burns MD   [address] [place] [address]   [phone]    Suleman Huynh MD   [address] [place] [address]   [phone]   [email or web address]    
Bessy Torres MD   [address]   [place] [address]   
[phone]    Leobardo Alexandre MD   [facility]   [place] [address]   [phone]   OTHER PHONE [phone] (CELL) PROCEDURES: SPEC STAIN
MONIKA ()         GM LEVEL 4 ()            REQUEST 
() TISSUES:           GASTRIC MUCOUS MEMBRANE - ANTRUM BX          
                      ** CONTINUED ON NEXT PAGE ** ---------------
-----------------------------------------------------------------------------RUN
DATE: 19                   [facility]                          PAGE 2 
 RUN TIME: 1349         Specimen Inquiry                    RUN USER: INTERFACE 
                    
-------------------------------------------------------------------------------
-------------SPEC #: [id number]     PATIENT: BRENDAN FISHER                
#[id number]  (Contin
ued)----------------------------------------------------------------------------

----------------     CLINICAL HISTORY    Obscured GI bleeding, esophageal ulcer,
hiatal hernia         FINAL DIAGNOSIS    GASTRIC ANTRUM BIOPSIES:        ANTRAL 
TYPE MUCOSA WITH MINIMAL NON SPECIFIC CHRONIC        INFLAMMATION.          NO 
INTESTINAL METAPLASIA.        NO BACTERIA SEEN ON GIEMSA STAIN.        CPT CODE:
 10029, 14061           MACROSCOPIC Specimen is received in formalin labeled 
"antral bx" and consists of two tan-white softtissue fragments measuring in 
aggregate 0.4 x 0.2 x 0.2 cm.  One cassette.         MICROSCOPIC SEE DIAGNOSIS 
------------------------------------------------------------------
-------------------------- Signed SIGNATURE ON FILE                        
Sourav Lopez. 19 1349  
--------------------------------------------------------------------------------

------------                                                          ** END OF 
REPORT **CBC W/AUTO DIFF2019-03-10 05:57:00





             Test Item    Value        Reference Range Interpretation Comments

 

             WHITE BLOOD CELL (test code = 7.3 K/mm3    4.5-11.0     N          

  



             WBC)                                                

 

             RED BLOOD CELL (test code = 3.34 M/mm3   4.40-5.90    L            



             RBC)                                                

 

             HEMOGLOBIN (test code = HGB) 9.7 gm/dL    13.0-17.0    L           

 

 

             HEMATOCRIT (test code = HCT) 31.0 %       36.0-48.0    L           

 

 

             MEAN CELL VOLUME (test code = 92.8 UM3     80.0-94.0    N          

  



             MCV)                                                

 

             MEAN CELL HGB (test code = MCH) 29.0 UUG     25.5-32.5    N        

    

 

             MEAN CELL HGB CONCETRATION 31.3 gm/dL   29.0-35.5    N            



             (test code = MCHC)                                        

 

             RED CELL DISTRIBUTION WIDTH 16.5 %       11.5-15.0    H            



             (test code = RDW)                                        

 

             RED CELL DISTRIBUTION WIDTH SD 55.6 fL      34.8-50.2    H         

   



             (test code = RDW-SD)                                        

 

             PLATELET COUNT (test code = 388 K/mm3    150-400      N            



             PLT)                                                

 

             MEAN PLATELET VOLUME (test code 9.9 fl       7.4-10.4     N        

    



             = MPV)                                              

 

             NEUTROPHIL % (test code = NT%) 64.8 %       49.0-76.0    N         

   

 

             IMMATURE GRANULOCYTE % (test 0.5 %        0.0-0.4      H           

 



             code = IG%)                                         

 

             LYMPHOCYTE % (test code = LY%) 21.2 %       23.0-38.0    L         

   

 

             MONOCYTE % (test code = MO%) 10.6 %       1.0-10.0     H           

 

 

             EOSINOPHIL % (test code = EO%) 1.8 %        1.0-5.0      N         

   

 

             BASOPHIL % (test code = BA%) 1.1 %        0.0-1.0      H           

 

 

             NEUTROPHIL # (test code = NT#) 4.7 K/mm3    2.4-6.3      N         

   

 

             IMMATURE GRANULOCYTE # (test 0.04 x10 3/uL 0.00-0.07    N          

  



             code = IG#)                                         

 

             LYMPHOCYTE # (test code = LY#) 1.5 K/mm3    1.2-4.0      N         

   

 

             MONOCYTE # (test code = MO#) 0.8 K/mm3    0.0-0.6      H           

 

 

             EOSINOPHIL # (test code = EO#) 0.1 K/MM3    0.0-0.7      N         

   

 

             BASOPHIL # (test code = BA#) 0.1 K/mm3    0.0-0.2      N           

 



CBC W/AUTO [id number] 07:04:00





             Test Item    Value        Reference Range Interpretation Comments

 

             WHITE BLOOD CELL (test code = 6.3 K/mm3    4.5-11.0     N          

  



             WBC)                                                

 

             RED BLOOD CELL (test code = 3.30 M/mm3   4.40-5.90    L            



             RBC)                                                

 

             HEMOGLOBIN (test code = HGB) 9.5 gm/dL    13.0-17.0    L           

 

 

             HEMATOCRIT (test code = HCT) 30.2 %       36.0-48.0    L           

 

 

             MEAN CELL VOLUME (test code = 91.5 UM3     80.0-94.0    N          

  



             MCV)                                                

 

             MEAN CELL HGB (test code = MCH) 28.8 UUG     25.5-32.5    N        

    

 

             MEAN CELL HGB CONCETRATION 31.5 gm/dL   29.0-35.5    N            



             (test code = MCHC)                                        

 

             RED CELL DISTRIBUTION WIDTH 15.9 %       11.5-15.0    H            



             (test code = RDW)                                        

 

             RED CELL DISTRIBUTION WIDTH SD 52.7 fL      34.8-50.2    H         

   



             (test code = RDW-SD)                                        

 

             PLATELET COUNT (test code = 364 K/mm3    150-400      N            



             PLT)                                                

 

             MEAN PLATELET VOLUME (test code 10.4 fl      7.4-10.4     N        

    



             = MPV)                                              

 

             NEUTROPHIL % (test code = NT%) 58.7 %       49.0-76.0    N         

   

 

             IMMATURE GRANULOCYTE % (test 1.0 %        0.0-0.4      H           

 



             code = IG%)                                         

 

             LYMPHOCYTE % (test code = LY%) 26.3 %       23.0-38.0    N         

   

 

             MONOCYTE % (test code = MO%) 10.5 %       1.0-10.0     H           

 

 

             EOSINOPHIL % (test code = EO%) 2.5 %        1.0-5.0      N         

   

 

             BASOPHIL % (test code = BA%) 1.0 %        0.0-1.0      N           

 

 

             NEUTROPHIL # (test code = NT#) 3.7 K/mm3    2.4-6.3      N         

   

 

             IMMATURE GRANULOCYTE # (test 0.06 x10 3/uL 0.00-0.07    N          

  



             code = IG#)                                         

 

             LYMPHOCYTE # (test code = LY#) 1.7 K/mm3    1.2-4.0      N         

   

 

             MONOCYTE # (test code = MO#) 0.7 K/mm3    0.0-0.6      H           

 

 

             EOSINOPHIL # (test code = EO#) 0.2 K/MM3    0.0-0.7      N         

   

 

             BASOPHIL # (test code = BA#) 0.1 K/mm3    0.0-0.2      N           

 



PROTHROMBIN [id number] 14:06:00





             Test Item    Value        Reference Range Interpretation Comments

 

             PROTHROMBIN TIME 11.0 SECONDS 9.9-12.8                  



             PATIENT (test code =                                        



             PTP)                                                

 

             INTERNATIONAL NORMAL 0.9          0.89-1.14    N            THE INR

 IS TO BE USED



             RATIO (test code =                                        ONLY FOR 

MONITORING



             INR)                                                ORAL



                                                                 ANTICOAGULANTTH

ERAPY.



                                                                 THE FOLLOWING A

RE



                                                                 SUGGESTED RANGE

S FROM



                                                                 [facility]

LEGE OF



                                                                 CHEST



                                                                 PHYSICIANS:ANNITA

CATION



                                                                 



                                                                                

INR



                                                                 VALUEPROPHYLAXI

S OF



                                                                 VENOUS THROMBOS

IS



                                                                 (ORTHOPEDIC TONIA

ANNA)



                                                                 



                                                                   2.0 - 3.0PROP

HYLAXIS



                                                                 OF VENOUS THROM

BOSIS



                                                                 (OTHER THAN HIG

H-RISK



                                                                 SURGERY)



                                                                    2.0 - 3.0TRE

ATMENT



                                                                 OF DEEP VEIN



                                                                 THROMBOSIS OR



                                                                 PULMONARY EMBOL

ISM



                                                                 



                                                                   2.0 - 3.0PREV

ENTION



                                                                 OF SYSTEMIC EMB

OLISM



                                                                 TISSUE HEART VA

LVES



                                                                 



                                                                   2.0 - 3.0  AC

[place]



                                                                 MYOCARDIAL INFA

RCTION



                                                                    (TO PREVENT



                                                                 SYSTEMIC EMBOLI

SM)



                                                                           2.0 -

 3.0



                                                                 ACUTE MYOCARDIA

L



                                                                 INFARCTION     

(TO



                                                                 PREVENT RECURRE

NT



                                                                 INFARCT)



                                                                 2.5 - 3.0  VALV

ULAR



                                                                 HEART DISEASE



                                                                                

 2.0 -



                                                                 3.0  ATRIAL



                                                                 FIBRILATION



                                                                                

   2.0



                                                                 - 3.0BILEAFLET



                                                                 MECHANICAL VALV

E IN



                                                                 AORTIC POSITION



                                                                 2.0 - 3.0MECHAN

ICAL



                                                                 PROSTHETIC VALV

ES



                                                                 (HIGH RISK)



                                                                 2.5 - 3.5PRESEN

CE OF



                                                                 LUPUS ANTICOAGU

LANT OR



                                                                  ANTIPHOSPHOLIP

ID



                                                                 ANTIBODIES



                                                                         2.5 - 3

.5



CBC W/AUTO [id number] 06:52:00





             Test Item    Value        Reference Range Interpretation Comments

 

             WHITE BLOOD CELL (test code = 5.8 K/mm3    4.5-11.0     N          

  



             WBC)                                                

 

             RED BLOOD CELL (test code = 2.45 M/mm3   4.40-5.90    L            



             RBC)                                                

 

             HEMOGLOBIN (test code = HGB) 7.0 gm/dL    13.0-17.0    L           

 

 

             HEMATOCRIT (test code = HCT) 22.8 %       36.0-48.0    LL          

 

 

             MEAN CELL VOLUME (test code = 93.1 UM3     80.0-94.0    N          

  



             MCV)                                                

 

             MEAN CELL HGB (test code = MCH) 28.6 UUG     25.5-32.5    N        

    

 

             MEAN CELL HGB CONCETRATION 30.7 gm/dL   29.0-35.5    N            



             (test code = MCHC)                                        

 

             RED CELL DISTRIBUTION WIDTH 16.6 %       11.5-15.0    H            



             (test code = RDW)                                        

 

             RED CELL DISTRIBUTION WIDTH SD 55.7 fL      34.8-50.2    H         

   



             (test code = RDW-SD)                                        

 

             PLATELET COUNT (test code = 383 K/mm3    150-400      N            



             PLT)                                                

 

             MEAN PLATELET VOLUME (test code 10.5 fl      7.4-10.4     H        

    



             = MPV)                                              

 

             NEUTROPHIL % (test code = NT%) 57.9 %       49.0-76.0    N         

   

 

             IMMATURE GRANULOCYTE % (test 0.7 %        0.0-0.4      H           

 



             code = IG%)                                         

 

             LYMPHOCYTE % (test code = LY%) 28.9 %       23.0-38.0    N         

   

 

             MONOCYTE % (test code = MO%) 9.1 %        1.0-10.0     N           

 

 

             EOSINOPHIL % (test code = EO%) 2.4 %        1.0-5.0      N         

   

 

             BASOPHIL % (test code = BA%) 1.0 %        0.0-1.0      N           

 

 

             NEUTROPHIL # (test code = NT#) 3.4 K/mm3    2.4-6.3      N         

   

 

             IMMATURE GRANULOCYTE # (test 0.04 x10 3/uL 0.00-0.07    N          

  



             code = IG#)                                         

 

             LYMPHOCYTE # (test code = LY#) 1.7 K/mm3    1.2-4.0      N         

   

 

             MONOCYTE # (test code = MO#) 0.5 K/mm3    0.0-0.6      N           

 

 

             EOSINOPHIL # (test code = EO#) 0.1 K/MM3    0.0-0.7      N         

   

 

             BASOPHIL # (test code = BA#) 0.1 K/mm3    0.0-0.2      N           

 



CBC W/AUTO [id number] 07:52:00





             Test Item    Value        Reference Range Interpretation Comments

 

             WHITE BLOOD CELL (test code = 7.2 K/mm3    4.5-11.0     N          

  



             WBC)                                                

 

             RED BLOOD CELL (test code = 2.91 M/mm3   4.40-5.90    L            



             RBC)                                                

 

             HEMOGLOBIN (test code = HGB) 8.5 gm/dL    13.0-17.0    L           

 

 

             HEMATOCRIT (test code = HCT) 26.7 %       36.0-48.0    L           

 

 

             MEAN CELL VOLUME (test code = 91.8 UM3     80.0-94.0    N          

  



             MCV)                                                

 

             MEAN CELL HGB (test code = MCH) 29.2 UUG     25.5-32.5    N        

    

 

             MEAN CELL HGB CONCETRATION 31.8 gm/dL   29.0-35.5    N            



             (test code = MCHC)                                        

 

             RED CELL DISTRIBUTION WIDTH 16.5 %       11.5-15.0    H            



             (test code = RDW)                                        

 

             RED CELL DISTRIBUTION WIDTH SD 55.5 fL      34.8-50.2    H         

   



             (test code = RDW-SD)                                        

 

             PLATELET COUNT (test code = 399 K/mm3    150-400      N            



             PLT)                                                

 

             MEAN PLATELET VOLUME (test code 9.9 fl       7.4-10.4     N        

    



             = MPV)                                              

 

             NEUTROPHIL % (test code = NT%) 66.7 %       49.0-76.0    N         

   

 

             IMMATURE GRANULOCYTE % (test 1.1 %        0.0-0.4      H           

 



             code = IG%)                                         

 

             LYMPHOCYTE % (test code = LY%) 19.0 %       23.0-38.0    L         

   

 

             MONOCYTE % (test code = MO%) 9.8 %        1.0-10.0     N           

 

 

             EOSINOPHIL % (test code = EO%) 2.6 %        1.0-5.0      N         

   

 

             BASOPHIL % (test code = BA%) 0.8 %        0.0-1.0      N           

 

 

             NEUTROPHIL # (test code = NT#) 4.8 K/mm3    2.4-6.3      N         

   

 

             IMMATURE GRANULOCYTE # (test 0.08 x10 3/uL 0.00-0.07    H          

  



             code = IG#)                                         

 

             LYMPHOCYTE # (test code = LY#) 1.4 K/mm3    1.2-4.0      N         

   

 

             MONOCYTE # (test code = MO#) 0.7 K/mm3    0.0-0.6      H           

 

 

             EOSINOPHIL # (test code = EO#) 0.2 K/MM3    0.0-0.7      N         

   

 

             BASOPHIL # (test code = BA#) 0.1 K/mm3    0.0-0.2      N           

 



CBC W/AUTO [id number] 07:33:00





             Test Item    Value        Reference Range Interpretation Comments

 

             WHITE BLOOD CELL (test code = 5.5 K/mm3    4.5-11.0     N          

  



             WBC)                                                

 

             RED BLOOD CELL (test code = 3.36 M/mm3   4.40-5.90    L            



             RBC)                                                

 

             HEMOGLOBIN (test code = HGB) 9.5 gm/dL    13.0-17.0    L           

 

 

             HEMATOCRIT (test code = HCT) 30.9 %       36.0-48.0    L           

 

 

             MEAN CELL VOLUME (test code = 92.0 UM3     80.0-94.0    N          

  



             MCV)                                                

 

             MEAN CELL HGB (test code = MCH) 28.3 UUG     25.5-32.5    N        

    

 

             MEAN CELL HGB CONCETRATION 30.7 gm/dL   29.0-35.5    N            



             (test code = MCHC)                                        

 

             RED CELL DISTRIBUTION WIDTH 16.5 %       11.5-15.0    H            



             (test code = RDW)                                        

 

             RED CELL DISTRIBUTION WIDTH SD 54.6 fL      34.8-50.2    H         

   



             (test code = RDW-SD)                                        

 

             PLATELET COUNT (test code = 410 K/mm3    150-400      H            



             PLT)                                                

 

             MEAN PLATELET VOLUME (test code 10.3 fl      7.4-10.4     N        

    



             = MPV)                                              

 

             NEUTROPHIL % (test code = NT%) 58.1 %       49.0-76.0    N         

   

 

             IMMATURE GRANULOCYTE % (test 1.3 %        0.0-0.4      H           

 



             code = IG%)                                         

 

             LYMPHOCYTE % (test code = LY%) 22.4 %       23.0-38.0    L         

   

 

             MONOCYTE % (test code = MO%) 11.0 %       1.0-10.0     H           

 

 

             EOSINOPHIL % (test code = EO%) 6.3 %        1.0-5.0      H         

   

 

             BASOPHIL % (test code = BA%) 0.9 %        0.0-1.0      N           

 

 

             NEUTROPHIL # (test code = NT#) 3.2 K/mm3    2.4-6.3      N         

   

 

             IMMATURE GRANULOCYTE # (test 0.07 x10 3/uL 0.00-0.07    N          

  



             code = IG#)                                         

 

             LYMPHOCYTE # (test code = LY#) 1.2 K/mm3    1.2-4.0      N         

   

 

             MONOCYTE # (test code = MO#) 0.6 K/mm3    0.0-0.6      N           

 

 

             EOSINOPHIL # (test code = EO#) 0.4 K/MM3    0.0-0.7      N         

   

 

             BASOPHIL # (test code = BA#) 0.1 K/mm3    0.0-0.2      N           

 



CBC W/AUTO [id number] 08:39:00





             Test Item    Value        Reference Range Interpretation Comments

 

             WHITE BLOOD CELL (test code = WBC) 6.9 K/mm3    4.5-11.0     N     

       

 

             RED BLOOD CELL (test code = RBC) 3.60 M/mm3   4.40-5.90    L       

     

 

             HEMOGLOBIN (test code = HGB) 10.3 gm/dL   13.0-17.0    L           

 

 

             HEMATOCRIT (test code = HCT) 32.4 %       36.0-48.0    L           

 

 

             MEAN CELL VOLUME (test code = MCV) 90.0 UM3     80.0-94.0    N     

       

 

             MEAN CELL HGB (test code = MCH) 28.6 UUG     25.5-32.5    N        

    

 

             MEAN CELL HGB CONCETRATION (test 31.8 gm/dL   29.0-35.5    N       

     



             code = MCHC)                                        

 

             RED CELL DISTRIBUTION WIDTH (test 16.3 %       11.5-15.0    H      

      



             code = RDW)                                         

 

             PLATELET COUNT (test code = PLT) 438 K/mm3    150-400      H       

     

 

             MEAN PLATELET VOLUME (test code = 10.0 fl      7.4-10.4     N      

      



             MPV)                                                

 

             NEUTROPHIL % (test code = NT%) 59.5 %       49.0-76.0    N         

   

 

             LYMPHOCYTE % (test code = LY%) 23.5 %       23.0-38.0    N         

   

 

             MONOCYTE % (test code = MO%) 10.2 %       1.0-10.0     H           

 

 

             EOSINOPHIL % (test code = EO%) 4.9 %        1.0-5.0      N         

   

 

             BASOPHIL % (test code = BA%) 0.9 %        0.0-1.0      N           

 

 

             NEUTROPHIL # (test code = NT#) 4.1 K/mm3    2.4-6.3      N         

   

 

             LYMPHOCYTE # (test code = LY#) 1.6 K/mm3    1.2-4.0      N         

   

 

             MONOCYTE # (test code = MO#) 0.7 K/mm3    0.0-0.6      H           

 

 

             EOSINOPHIL # (test code = EO#) 0.3 K/MM3    0.0-0.7      N         

   

 

             BASOPHIL # (test code = BA#) 0.1 K/mm3    0.0-0.2      N           

 



- CT ANGIO [id number] 21:20:00 FAX: Bhavesh Quiroz  
[phone]    [place]:   St: [facility] FAX: Estefany Hyatt MD    
[phone]    FAX: Suleman Bourgeois  [phone]   
------------------------------------------------------------------------------- 
Name:  BRENDAN FISHER                   [facility]                     : 
1945   Age/S: 73/M           [address] [facility]    Unit:
[id number]       Loc: [address] [place] [address]                  Phys:  
Estefany Burns MD         [id number]                             Acct:  [id number]
Dis Date:               Status: ADM IN                                  PHONE #:
[phone]     Exam Date: 2019         FAX #: [phone]     
Reason: H/o PE off Anticoagulation                          EXAMS:              
                              CPT CODE:      [id number] CT ANGIO CHEST           
[address]                            HISTORY: Chest pain, evaluate for pulmonary 
embolus.   Location: C3        COMPARISON: 2018               TECHNIQUE: 
Axial tomograms through the chest were obtained after       intravenous contrast
utilizing pulmonary CTA protocol.  Multiplanar  maximum intensity projection 
reformatted images are provided.               One or more of the following dose
reduction techniques were used:       Automated exposure control, adjustment of 
the mA and/or kV according       to patient size, and/or utilization of 
iterative reconstruction       technique.                                       
 FINDINGS:       Aortic and coronary calcifications are present.       The 
pulmonary arteries are well opacified with no evidence of       pulmonary 
embolus.     There is no evidence of aortic aneurysm or dissection.       There 
is no significant mediastinal or hilar adenopathy.  Small left       effusion is
present.  Adjacent left basilar atelectasis is noted.  No       acute osseous 
abnormalities are demonstrated.       Emphysema is demonstrated.  Calcified 
granulomas noted in the right       upper lobe.  7 mm ovoid nodule seen in the 
anterior left upper lobe       unchanged from prior exam.                 
IMPRESSION:                   1. No evidenceof pulmonary embolus.         2.  No
evidence of aortic aneurysm or dissection.         3.  Emphysema.         4.  
Evidence of prior granulomatous disease.          ** Electronically Signed by 
LEI Hawley on 2019 at 2120 **                      Reported and 
signed by: Clifford Hawley M.D.  PAGE  1                       Signed Report       
            (CONTINUED)  FAX: Bhavesh Quiroz  [phone]    
[place]:   St: [facility] FAX: Estefany Hyatt MD    [phone]    FAX: Suleman Bourgeois  [phone]   
------------------------------------------------------------------------------- 
Name:  BRENDAN FISHER                   [facility]                     : 
1945   Age/S: 73/M           [address] [facility]    Unit: 
[id number]       Loc: [address]                  Phys:  
Estefany Burns MD               [id number]                   Acct:  [id number] Dis
Date:               Status: ADM IN          PHONE #: [phone]     Exam Date:
2019                        FAX #: [phone]     Reason: H/o PE 
off Anticoagulation                          EXAMS:                     CPT 
CODE:      [id number] CT ANGIO CHEST                             [address]        
&lt;Continued&gt;                                    CC: Bhavesh Zimmer MD; 
Estefany Burns MD; Suleman Huynh MD                                      
                                 Technologist: MEEK DELGADO                       
                     Trnscrd Dt/Tm: 2019 ()[id number]                 
       Orig Print D/T: S: 2019 (2124                              PAGE  2 
                     Signed ReportBASIC METABOLIC [id number] 07:15:00





             Test Item    Value        Reference Range Interpretation Comments

 

             SODIUM (test code = NA) 136 mmol/l   134.0-147.0  N            

 

             POTASSIUM (test code = K) 3.7 mmol/L   3.6-5.2      N            

 

             CHLORIDE (test code = CL) 104 mmol/l   98.0-107.0   N            

 

             CARBON DIOXIDE (test code = CO2) 23.4 mmol/l  21.0-33.0    N       

     

 

             ANION GAP (test code = GAP) 12.3         0-20         N            

 

             GLUCOSE (test code = GLU) 84 mg/dl     70.0-110.0   N            

 

             BLOOD UREA NITROGEN (test code = 9 mg/dl      7.0-18.0     N       

     



             BUN)                                                

 

             CREATININE (test code = CREAT) 0.84 mg/dL   0.60-1.30    N         

   

 

             GFR NON BLACK (test code = 95 mL/min    70-80        H            



             GFRNONBLACK)                                        

 

             GFR BLACK (test code = GFRBLACK) 116 mL/min   85-97        H       

     

 

             CALCIUM (test code = CA) 9.1 mg/dl    8.0-10.5     N            



CBC W/AUTO [id number] 06:52:00





             Test Item    Value        Reference Range Interpretation Comments

 

             WHITE BLOOD CELL (test code = WBC) 7.4 K/mm3    4.5-11.0     N     

       

 

             RED BLOOD CELL (test code = RBC) 3.76 M/mm3   4.40-5.90    L       

     

 

             HEMOGLOBIN (test code = HGB) 10.8 gm/dL   13.0-17.0    L           

 

 

             HEMATOCRIT (test code = HCT) 34.6 %       36.0-48.0    L           

 

 

             MEAN CELL VOLUME (test code = MCV) 92.0 UM3     80.0-94.0    N     

       

 

             MEAN CELL HGB (test code = MCH) 28.7 UUG     25.5-32.5    N        

    

 

             MEAN CELL HGB CONCETRATION (test 31.2 gm/dL   29.0-35.5    N       

     



             code = MCHC)                                        

 

             RED CELL DISTRIBUTION WIDTH (test 17.1 %       11.5-15.0    H      

      



             code = RDW)                                         

 

             PLATELET COUNT (test code = PLT) 411 K/mm3    150-400      H       

     

 

             MEAN PLATELET VOLUME (test code = 10.3 fl      7.4-10.4     N      

      



             MPV)                                                

 

             NEUTROPHIL % (test code = NT%) 53.6 %       49.0-76.0    N         

   

 

             LYMPHOCYTE % (test code = LY%) 27.2 %       23.0-38.0    N         

   

 

             MONOCYTE % (test code = MO%) 12.0 %       1.0-10.0     H           

 

 

             EOSINOPHIL % (test code = EO%) 4.6 %        1.0-5.0      N         

   

 

             BASOPHIL % (test code = BA%) 0.9 %        0.0-1.0      N           

 

 

             NEUTROPHIL # (test code = NT#) 4.0 K/mm3    2.4-6.3      N         

   

 

             LYMPHOCYTE # (test code = LY#) 2.0 K/mm3    1.2-4.0      N         

   

 

             MONOCYTE # (test code = MO#) 0.9 K/mm3    0.0-0.6      H           

 

 

             EOSINOPHIL # (test code = EO#) 0.3 K/MM3    0.0-0.7      N         

   

 

             BASOPHIL # (test code = BA#) 0.1 K/mm3    0.0-0.2      N           

 



BASIC METABOLIC [id number] 06:45:00





             Test Item    Value        Reference Range Interpretation Comments

 

             SODIUM (test code = NA) 139 mmol/l   134.0-147.0  N            

 

             POTASSIUM (test code = K) 4.2 mmol/L   3.6-5.2      N            

 

             CHLORIDE (test code = CL) 109 mmol/l   98.0-107.0   H            

 

             CARBON DIOXIDE (test code = CO2) 22.3 mmol/l  21.0-33.0    N       

     

 

             ANION GAP (test code = GAP) 11.9         0-20         N            

 

             GLUCOSE (test code = GLU) 89 mg/dl     70.0-110.0   N            

 

             BLOOD UREA NITROGEN (test code = 5 mg/dl      7.0-18.0     L       

     



             BUN)                                                

 

             CREATININE (test code = CREAT) 0.74 mg/dL   0.60-1.30    N         

   

 

             GFR NON BLACK (test code = 111 mL/min   70-80        H            



             GFRNONBLACK)                                        

 

             GFR BLACK (test code = GFRBLACK) 134 mL/min   85-97        H       

     

 

             CALCIUM (test code = CA) 8.4 mg/dl    8.0-10.5     N            



CBC W/AUTO [id number] 06:37:00





             Test Item    Value        Reference Range Interpretation Comments

 

             WHITE BLOOD CELL (test code = WBC) 6.1 K/mm3    4.5-11.0     N     

       

 

             RED BLOOD CELL (test code = RBC) 3.37 M/mm3   4.40-5.90    L       

     

 

             HEMOGLOBIN (test code = HGB) 9.7 gm/dL    13.0-17.0    L           

 

 

             HEMATOCRIT (test code = HCT) 31.1 %       36.0-48.0    L           

 

 

             MEAN CELL VOLUME (test code = MCV) 92.3 UM3     80.0-94.0    N     

       

 

             MEAN CELL HGB (test code = MCH) 28.8 UUG     25.5-32.5    N        

    

 

             MEAN CELL HGB CONCETRATION (test 31.2 gm/dL   29.0-35.5    N       

     



             code = MCHC)                                        

 

             RED CELL DISTRIBUTION WIDTH (test 17.5 %       11.5-15.0    H      

      



             code = RDW)                                         

 

             PLATELET COUNT (test code = PLT) 353 K/mm3    150-400      N       

     

 

             MEAN PLATELET VOLUME (test code = 10.4 fl      7.4-10.4     N      

      



             MPV)                                                

 

             NEUTROPHIL % (test code = NT%) 68.0 %       49.0-76.0    N         

   

 

             LYMPHOCYTE % (test code = LY%) 20.0 %       23.0-38.0    L         

   

 

             MONOCYTE % (test code = MO%) 8.9 %        1.0-10.0     N           

 

 

             EOSINOPHIL % (test code = EO%) 1.5 %        1.0-5.0      N         

   

 

             BASOPHIL % (test code = BA%) 0.8 %        0.0-1.0      N           

 

 

             NEUTROPHIL # (test code = NT#) 4.2 K/mm3    2.4-6.3      N         

   

 

             LYMPHOCYTE # (test code = LY#) 1.2 K/mm3    1.2-4.0      N         

   

 

             MONOCYTE # (test code = MO#) 0.5 K/mm3    0.0-0.6      N           

 

 

             EOSINOPHIL # (test code = EO#) 0.1 K/MM3    0.0-0.7      N         

   

 

             BASOPHIL # (test code = BA#) 0.1 K/mm3    0.0-0.2      N           

 



BASIC METABOLIC [id number] 05:55:00





             Test Item    Value        Reference Range Interpretation Comments

 

             SODIUM (test code = NA) 141 mmol/l   134.0-147.0  N            

 

             POTASSIUM (test code = K) 3.0 mmol/L   3.6-5.2      L            

 

             CHLORIDE (test code = CL) 109 mmol/l   98.0-107.0   H            

 

             CARBON DIOXIDE (test code = CO2) 24.0 mmol/l  21.0-33.0    N       

     

 

             ANION GAP (test code = GAP) 11.0         0-20         N            

 

             GLUCOSE (test code = GLU) 101 mg/dl    70.0-110.0   N            

 

             BLOOD UREA NITROGEN (test code = 8 mg/dl      7.0-18.0     N       

     



             BUN)                                                

 

             CREATININE (test code = CREAT) 0.78 mg/dL   0.60-1.30    N         

   

 

             GFR NON BLACK (test code = 104 mL/min   70-80        H            



             GFRNONBLACK)                                        

 

             GFR BLACK (test code = GFRBLACK) 126 mL/min   85-97        H       

     

 

             CALCIUM (test code = CA) 8.3 mg/dl    8.0-10.5     N            



CBC W/AUTO [id number] 05:28:00





             Test Item    Value        Reference Range Interpretation Comments

 

             WHITE BLOOD CELL (test code = WBC) 6.2 K/mm3    4.5-11.0     N     

       

 

             RED BLOOD CELL (test code = RBC) 2.41 M/mm3   4.40-5.90    L       

     

 

             HEMOGLOBIN (test code = HGB) 6.9 gm/dL    13.0-17.0    LL          

 

 

             HEMATOCRIT (test code = HCT) 22.9 %       36.0-48.0    LL          

 

 

             MEAN CELL VOLUME (test code = MCV) 95.0 UM3     80.0-94.0    H     

       

 

             MEAN CELL HGB (test code = MCH) 28.6 UUG     25.5-32.5    N        

    

 

             MEAN CELL HGB CONCETRATION (test 30.1 gm/dL   29.0-35.5    N       

     



             code = MCHC)                                        

 

             RED CELL DISTRIBUTION WIDTH (test 17.8 %       11.5-15.0    H      

      



             code = RDW)                                         

 

             PLATELET COUNT (test code = PLT) 409 K/mm3    150-400      H       

     

 

             MEAN PLATELET VOLUME (test code = 10.1 fl      7.4-10.4     N      

      



             MPV)                                                

 

             NEUTROPHIL % (test code = NT%) 61.6 %       49.0-76.0    N         

   

 

             LYMPHOCYTE % (test code = LY%) 26.3 %       23.0-38.0    N         

   

 

             MONOCYTE % (test code = MO%) 8.3 %        1.0-10.0     N           

 

 

             EOSINOPHIL % (test code = EO%) 1.0 %        1.0-5.0      N         

   

 

             BASOPHIL % (test code = BA%) 0.8 %        0.0-1.0      N           

 

 

             NEUTROPHIL # (test code = NT#) 3.8 K/mm3    2.4-6.3      N         

   

 

             LYMPHOCYTE # (test code = LY#) 1.6 K/mm3    1.2-4.0      N         

   

 

             MONOCYTE # (test code = MO#) 0.5 K/mm3    0.0-0.6      N           

 

 

             EOSINOPHIL # (test code = EO#) 0.1 K/MM3    0.0-0.7      N         

   

 

             BASOPHIL # (test code = BA#) 0.1 K/mm3    0.0-0.2      N           

 



URINALYSIS [id number] 07:17:00





             Test Item    Value        Reference Range Interpretation Comments

 

             UA COLOR (test code = COLU) YELLOW                                 

 

             UA APPEARANCE (test code = SLHZY                                  



             APPU)                                               

 

             UA GLUCOSE DIPSTICK (test NORMAL mg/dl NORMAL                    



             code = DGLUU)                                        

 

             UA BILIRUBIN DIPSTICK (test NEGATIVE mg/dL NEGATIVE                

  



             code = BILU)                                        

 

             UA KETONE DIPSTICK (test NEGATIVE mg/dl NEGATIVE                  



             code = KETU)                                        

 

             UA SPECIFIC GRAVITY (test 1.015        1.000-1.030               



             code = SGU)                                         

 

             UA BLOOD DIPSTICK (test NEGATIVE Raissa/micL NEGATIVE                 

 



             code = RONNIE)                                         

 

             UA PH DIPSTICK (test code = 6.0          5.0-9.0                   



             ADELA)                                                

 

             UA PROTEIN DIPSTICK (test NEGATIVE mg/dl NEGATIVE                  



             code = PROU)                                        

 

             UA UROBILINIOGEN DIPSTICK 8.0 mg/dl mg/dl NORMAL       A           

 



             (test code = URO)                                        

 

             UA NITRITE DIPSTICK (test NEGATIVE     NEGATIVE                  



             code = MARC)                                        

 

             UA LEUKOCYTE ESTERASE NEGATIVE Jag/micL NEGATIVE                  



             DIPSTICK (test code = LEUU)                                        

 

             UA WBC (test code = WBCU) 0-2 WBC/HPF  NONE                      

 

             UA RBC (test code = RBCU) 0-2 RBC/HPF  0-3                       

 

             UA EPITHELIAL CELLS (test 0-3 EPI/HPF  0-3                       



             code = EPIU)                                        

 

             UA BACTERIA (test code = FEW          NONE                      



             BACU)                                               



URINALYSIS [id number] 07:00:00





             Test Item    Value        Reference Range Interpretation Comments

 

             UA COLOR (test code = COLU) YELLOW                                 

 

             UA APPEARANCE (test code = SLHZY                                  



             APPU)                                               

 

             UA GLUCOSE DIPSTICK (test NORMAL mg/dl NORMAL                    



             code = DGLUU)                                        

 

             UA BILIRUBIN DIPSTICK (test NEGATIVE mg/dL NEGATIVE                

  



             code = BILU)                                        

 

             UA KETONE DIPSTICK (test NEGATIVE mg/dl NEGATIVE                  



             code = KETU)                                        

 

             UA SPECIFIC GRAVITY (test 1.015        1.000-1.030               



             code = SGU)                                         

 

             UA BLOOD DIPSTICK (test NEGATIVE Raissa/micL NEGATIVE                 

 



             code = RONNIE)                                         

 

             UA PH DIPSTICK (test code = 6.0          5.0-9.0                   



             ADELA)                                                

 

             UA PROTEIN DIPSTICK (test NEGATIVE mg/dl NEGATIVE                  



             code = PROU)                                        

 

             UA UROBILINIOGEN DIPSTICK 8.0 mg/dl mg/dl NORMAL       A           

 



             (test code = URO)                                        

 

             UA NITRITE DIPSTICK (test NEGATIVE     NEGATIVE                  



             code = MARC)                                        

 

             UA LEUKOCYTE ESTERASE NEGATIVE Jag/micL NEGATIVE                  



             DIPSTICK (test code = LEUU)                                        

 

             UA WBC (test code = WBCU)  WBC/HPF     NONE                      

 

             UA RBC (test code = RBCU)  RBC/HPF     0-3                       

 

             UA EPITHELIAL CELLS (test  EPI/HPF     0-3                       



             code = EPIU)                                        

 

             UA BACTERIA (test code =              NONE                      



             BACU)                                               



CBC W/AUTO [id number] 06:33:00





             Test Item    Value        Reference Range Interpretation Comments

 

             WHITE BLOOD CELL (test code = WBC) 6.1 K/mm3    4.5-11.0     N     

       

 

             RED BLOOD CELL (test code = RBC) 2.56 M/mm3   4.40-5.90    L       

     

 

             HEMOGLOBIN (test code = HGB) 7.3 gm/dL    13.0-17.0    L           

 

 

             HEMATOCRIT (test code = HCT) 24.7 %       36.0-48.0    LL          

 

 

             MEAN CELL VOLUME (test code = MCV) 96.5 UM3     80.0-94.0    H     

       

 

             MEAN CELL HGB (test code = MCH) 28.5 UUG     25.5-32.5    N        

    

 

             MEAN CELL HGB CONCETRATION (test 29.6 gm/dL   29.0-35.5    N       

     



             code = MCHC)                                        

 

             RED CELL DISTRIBUTION WIDTH (test 18.6 %       11.5-15.0    H      

      



             code = RDW)                                         

 

             PLATELET COUNT (test code = PLT) 434 K/mm3    150-400      H       

     

 

             MEAN PLATELET VOLUME (test code = 10.5 fl      7.4-10.4     H      

      



             MPV)                                                

 

             NEUTROPHIL % (test code = NT%) 66.8 %       49.0-76.0    N         

   

 

             LYMPHOCYTE % (test code = LY%) 20.0 %       23.0-38.0    L         

   

 

             MONOCYTE % (test code = MO%) 8.7 %        1.0-10.0     N           

 

 

             EOSINOPHIL % (test code = EO%) 1.7 %        1.0-5.0      N         

   

 

             BASOPHIL % (test code = BA%) 0.8 %        0.0-1.0      N           

 

 

             NEUTROPHIL # (test code = NT#) 4.1 K/mm3    2.4-6.3      N         

   

 

             LYMPHOCYTE # (test code = LY#) 1.2 K/mm3    1.2-4.0      N         

   

 

             MONOCYTE # (test code = MO#) 0.5 K/mm3    0.0-0.6      N           

 

 

             EOSINOPHIL # (test code = EO#) 0.1 K/MM3    0.0-0.7      N         

   

 

             BASOPHIL # (test code = BA#) 0.1 K/mm3    0.0-0.2      N           

 



CBC W/AUTO [id number] 19:02:00





             Test Item    Value        Reference Range Interpretation Comments

 

             WHITE BLOOD CELL (test code = WBC) 9.6 K/mm3    4.5-11.0     N     

       

 

             RED BLOOD CELL (test code = RBC) 2.56 M/mm3   4.40-5.90    L       

     

 

             HEMOGLOBIN (test code = HGB) 7.6 gm/dL    13.0-17.0    L           

 

 

             HEMATOCRIT (test code = HCT) 25.3 %       36.0-48.0    L           

 

 

             MEAN CELL VOLUME (test code = MCV) 98.8 UM3     80.0-94.0    H     

       

 

             MEAN CELL HGB (test code = MCH) 29.7 UUG     25.5-32.5    N        

    

 

             MEAN CELL HGB CONCETRATION (test 30.0 gm/dL   29.0-35.5    N       

     



             code = MCHC)                                        

 

             RED CELL DISTRIBUTION WIDTH (test 18.9 %       11.5-15.0    H      

      



             code = RDW)                                         

 

             PLATELET COUNT (test code = PLT) 358 K/mm3    150-400      N       

     

 

             MEAN PLATELET VOLUME (test code = 10.9 fl      7.4-10.4     H      

      



             MPV)                                                

 

             NEUTROPHIL % (test code = NT%) 67.6 %       49.0-76.0    N         

   

 

             LYMPHOCYTE % (test code = LY%) 20.3 %       23.0-38.0    L         

   

 

             MONOCYTE % (test code = MO%) 8.9 %        1.0-10.0     N           

 

 

             EOSINOPHIL % (test code = EO%) 0.7 %        1.0-5.0      L         

   

 

             BASOPHIL % (test code = BA%) 0.5 %        0.0-1.0      N           

 

 

             NEUTROPHIL # (test code = NT#) 6.5 K/mm3    2.4-6.3      H         

   

 

             LYMPHOCYTE # (test code = LY#) 2.0 K/mm3    1.2-4.0      N         

   

 

             MONOCYTE # (test code = MO#) 0.9 K/mm3    0.0-0.6      H           

 

 

             EOSINOPHIL # (test code = EO#) 0.1 K/MM3    0.0-0.7      N         

   

 

             BASOPHIL # (test code = BA#) 0.1 K/mm3    0.0-0.2      N           

 



CBC W/AUTO [id number] 05:06:00





             Test Item    Value        Reference Range Interpretation Comments

 

             WHITE BLOOD CELL (test code = WBC) 10.8 K/mm3   4.5-11.0     N     

       

 

             RED BLOOD CELL (test code = RBC) 2.19 M/mm3   4.40-5.90    L       

     

 

             HEMOGLOBIN (test code = HGB) 6.5 gm/dL    13.0-17.0    LL          

 

 

             HEMATOCRIT (test code = HCT) 20.8 %       36.0-48.0    LL          

 

 

             MEAN CELL VOLUME (test code = MCV) 95.0 UM3     80.0-94.0    H     

       

 

             MEAN CELL HGB (test code = MCH) 29.7 UUG     25.5-32.5    N        

    

 

             MEAN CELL HGB CONCETRATION (test 31.3 gm/dL   29.0-35.5    N       

     



             code = MCHC)                                        

 

             RED CELL DISTRIBUTION WIDTH (test 18.9 %       11.5-15.0    H      

      



             code = RDW)                                         

 

             PLATELET COUNT (test code = PLT) 415 K/mm3    150-400      H       

     

 

             MEAN PLATELET VOLUME (test code = 10.4 fl      7.4-10.4     N      

      



             MPV)                                                

 

             NEUTROPHIL % (test code = NT%) 70.7 %       49.0-76.0    N         

   

 

             LYMPHOCYTE % (test code = LY%) 18.5 %       23.0-38.0    L         

   

 

             MONOCYTE % (test code = MO%) 7.6 %        1.0-10.0     N           

 

 

             EOSINOPHIL % (test code = EO%) 0.8 %        1.0-5.0      L         

   

 

             BASOPHIL % (test code = BA%) 0.5 %        0.0-1.0      N           

 

 

             NEUTROPHIL # (test code = NT#) 7.6 K/mm3    2.4-6.3      H         

   

 

             LYMPHOCYTE # (test code = LY#) 2.0 K/mm3    1.2-4.0      N         

   

 

             MONOCYTE # (test code = MO#) 0.8 K/mm3    0.0-0.6      H           

 

 

             EOSINOPHIL # (test code = EO#) 0.1 K/MM3    0.0-0.7      N         

   

 

             BASOPHIL # (test code = BA#) 0.1 K/mm3    0.0-0.2      N           

 



Comments to Phlebotomist: Patient is currently in the ED                        
  waiting on a bedCBC W/AUTO [id number] 20:59:00





             Test Item    Value        Reference Range Interpretation Comments

 

             WHITE BLOOD CELL (test code = WBC) 15.7 K/mm3   4.5-11.0     H     

       

 

             RED BLOOD CELL (test code = RBC) 1.62 M/mm3   4.40-5.90    L       

     

 

             HEMOGLOBIN (test code = HGB) 4.6 gm/dL    13.0-17.0    LL          

 

 

             HEMATOCRIT (test code = HCT) 15.9 %       36.0-48.0    LL          

 

 

             MEAN CELL VOLUME (test code = MCV) 98.1 UM3     80.0-94.0    H     

       

 

             MEAN CELL HGB (test code = MCH) 28.4 UUG     25.5-32.5    N        

    

 

             MEAN CELL HGB CONCETRATION (test 28.9 gm/dL   29.0-35.5    L       

     



             code = MCHC)                                        

 

             RED CELL DISTRIBUTION WIDTH (test 22.2 %       11.5-15.0    H      

      



             code = RDW)                                         

 

             PLATELET COUNT (test code = PLT) 555 K/mm3    150-400      H       

     

 

             MEAN PLATELET VOLUME (test code = 10.6 fl      7.4-10.4     H      

      



             MPV)                                                

 

             NEUTROPHIL % (test code = NT%) 83.6 %       49.0-76.0    H         

   

 

             LYMPHOCYTE % (test code = LY%) 9.4 %        23.0-38.0    L         

   

 

             MONOCYTE % (test code = MO%) 4.4 %        1.0-10.0     N           

 

 

             EOSINOPHIL % (test code = EO%) 0.1 %        1.0-5.0      L         

   

 

             BASOPHIL % (test code = BA%) 0.2 %        0.0-1.0      N           

 

 

             NEUTROPHIL # (test code = NT#) 13.1 K/mm3   2.4-6.3      H         

   

 

             LYMPHOCYTE # (test code = LY#) 1.5 K/mm3    1.2-4.0      N         

   

 

             MONOCYTE # (test code = MO#) 0.7 K/mm3    0.0-0.6      H           

 

 

             EOSINOPHIL # (test code = EO#) 0.0 K/MM3    0.0-0.7      N         

   

 

             BASOPHIL # (test code = BA#) 0.0 K/mm3    0.0-0.2      N           

 

 

             POLYCHROMASIA (test code = POLC) 1+                                

     

 

             HYPOCHROMIA (test code = HYPO) 2+                                  

   

 

             HELMET CELLS (test code = HEL) OCCASIONAL                          

   



- XR CHEST 1 -73-81 17:37:00 FAX: Bhavesh Quiroz [phone]
   [place]:   St: [facility] FAX:         Rodríguez Dominique MD    [phone]   
------------------------------------------------------------------------------- 
Name:   BRENDAN FISHER                  [facility]                      :
1945  Age/S: 73/M           [address]    Unit #: 
[id number]      Loc: [place], [place]                  Phys: 
Rodríguez Dominique MD                   [id number]                             Acct: E
[id number] Dis Date:               Status: REG ER                              
  PHONE #: [phone]     Exam Date:     2019     1711                  
FAX #: [phone]     Reason: tachycardia / recent rib fx's w/ PTX            
  EXAMS:                                               CPT CODE:      [id number] 
XR CHEST 1 V                               [id number]                    AP VIEW OF 
THE CHEST               LOCATION:  B2               CLINICAL HISTORY:       
Tachycardia.COMPARISON:       Chest radiograph 2019.               
FINDINGS:         The cardiomediastinal shadow is within normal limits.  Small 
calcified       granulomas are redemonstrated in the mediastinum and the right 
lung.        The lungs show COPD changes.  No pneumothorax.  No pleural fluids. 
Degenerative bony changes are noted.  Degenerative bony changes are       noted.
 No acute bony abnormality is found.                 IMPRESSION:         No 
radiographic evidence of acute cardiopulmonary disease process.          ** 
Electronically Signed by LEI Lovell on 2019 at 1737 **              
    Reported and signed by: Elina Lovell M.D.                     CC: Bhavesh Zimmer MD; Rodríguez Dominique MD                   Technologist: IAN BAILON   
                                     Trnscrd Date/Time/By: 2019 (5007) : 
By: Nicho            PAGE  1                       Signed Report            
                FAX: Bhavesh Quiroz [phone]    [place]:   
St: REG FAX:         Rodríguez Dominique MD      [phone]   
------------------------------------------------------------------------------- 
Name:   BRENDAN FISHER                  [facility]  : 1945  Age/S: 
73/M           [address] [facility]    Unit #: [id number]      Loc: 
[address], [place]                  Phys: Rodríguez Dominique MD            
      [id number]                     Acct: [id number] Dis Date:               
Status: REG ER          PHONE #: [phone]     Exam Date:     2019     
1711                   FAX #: [phone]     Reason: tachycardia / recent rib 
fx's w/ PTX               EXAMS:                       CPT CODE:      [id number] 
XR CHEST 1 V                               00288          &lt;Continued&gt; Orig
Print D/T: S: 2019 (7467)                            PAGE  2              
        Signed ReportCOMPREHENSIVE METABOLIC [id number] 17:15:00





             Test Item    Value        Reference Range Interpretation Comments

 

             SODIUM (test code = NA) 135 mmol/l   134.0-147.0  N            

 

             POTASSIUM (test code = K) 4.1 mmol/L   3.6-5.2      N            

 

             CHLORIDE (test code = CL) 101 mmol/l   98.0-107.0   N            

 

             CARBON DIOXIDE (test code = CO2) 23.1 mmol/l  21.0-33.0    N       

     

 

             ANION GAP (test code = GAP) 15.0         0-20         N            

 

             GLUCOSE (test code = GLU) 147 mg/dl    70.0-110.0   H            

 

             BLOOD UREA NITROGEN (test code = 27 mg/dl     7.0-18.0     H       

     



             BUN)                                                

 

             CREATININE (test code = CREAT) 1.14 mg/dL   0.60-1.30    N         

   

 

             GFR NON BLACK (test code = 67 mL/min    70-80        L            



             GFRNONBLACK)                                        

 

             GFR BLACK (test code = GFRBLACK) 81 mL/min    85-97        L       

     

 

             TOTAL PROTEIN (test code = PROT) 5.7 GM/DL    6.0-8.1      L       

     

 

             ALBUMIN (test code = ALB) 2.0 gm/dL    3.2-4.7      L            

 

             CALCIUM (test code = CA) 8.6 mg/dl    8.0-10.5     N            

 

             BILIRUBIN TOTAL (test code = 0.3 mg/dl    0.0-1.0      N           

 



             BILT)                                               

 

             SGOT/AST (test code = AST) 37 Units/L   15.0-37.0    N            

 

             SGPT/ALT (test code = ALT) 27 Units/L   12.0-78.0    N            

 

             ALKALINE PHOSPHATASE TOTAL (test 103 Units/L  50.0-136.0   N       

     



             code = ALKP)                                        



[id number] 17:15:00





             Test Item    Value        Reference Range Interpretation Comments

 

             TROPONIN-I (test 0.03 NG/ML   0.00-0.06    N            REFERENCE R

MARC TROPONIN



             code = TROPI)                                        I HEALTHY ANNITA

VIDUALS:



                                                                 <0.06 ng/mL R/O



                                                                 ISCHEMIA:  0.07

 - 0.60



                                                                 ng/mL CUT-OFF R

MARC FOR



                                                                 AMI:  0.60 - 1.

5 ng/mL



PROTHROMBIN [id number] 17:12:00





             Test Item    Value        Reference Range Interpretation Comments

 

             PROTHROMBIN TIME 23.8 SECONDS 9.9-12.8     H            



             PATIENT (test code =                                        



             PTP)                                                

 

             INTERNATIONAL NORMAL 2.0          0.89-1.14    H            THE INR

 IS TO BE USED



             RATIO (test code =                                        ONLY FOR 

MONITORING



             INR)                                                ORAL



                                                                 ANTICOAGULANTTH

ERAPY.



                                                                 THE FOLLOWING A

RE



                                                                 SUGGESTED RANGE

S FROM



                                                                 [facility]

LEGE OF



                                                                 CHEST



                                                                 PHYSICIANS:ANNITA

CATION



                                                                 



                                                                                

INR



                                                                 VALUEPROPHYLAXI

S OF



                                                                 VENOUS THROMBOS

IS



                                                                 (ORTHOPEDIC TONIA

ANNA)



                                                                 



                                                                   2.0 - 3.0PROP

HYLAXIS



                                                                 OF VENOUS THROM

BOSIS



                                                                 (OTHER THAN HIG

H-RISK



                                                                 SURGERY)



                                                                    2.0 - 3.0TRE

ATMENT



                                                                 OF DEEP VEIN



                                                                 THROMBOSIS OR



                                                                 PULMONARY EMBOL

ISM



                                                                 



                                                                   2.0 - 3.0PREV

ENTION



                                                                 OF SYSTEMIC EMB

OLISM



                                                                 TISSUE HEART VA

LVES



                                                                 



                                                                   2.0 - 3.0  AC

[place]



                                                                 MYOCARDIAL INFA

RCTION



                                                                    (TO PREVENT



                                                                 SYSTEMIC EMBOLI

SM)



                                                                           2.0 -

 3.0



                                                                 ACUTE MYOCARDIA

L



                                                                 INFARCTION     

(TO



                                                                 PREVENT RECURRE

NT



                                                                 INFARCT)



                                                                 2.5 - 3.0  VALV

ULAR



                                                                 HEART DISEASE



                                                                                

 2.0 -



                                                                 3.0  ATRIAL



                                                                 FIBRILATION



                                                                                

   2.0



                                                                 - 3.0BILEAFLET



                                                                 MECHANICAL VALV

E IN



                                                                 AORTIC POSITION



                                                                 2.0 - 3.0MECHAN

ICAL



                                                                 PROSTHETIC VALV

ES



                                                                 (HIGH RISK)



                                                                 2.5 - 3.5PRESEN

CE OF



                                                                 LUPUS ANTICOAGU

LANT OR



                                                                  ANTIPHOSPHOLIP

ID



                                                                 ANTIBODIES



                                                                         2.5 - 3

.5



COMPREHENSIVE METABOLIC [id number] 17:08:00





             Test Item    Value        Reference Range Interpretation Comments

 

             SODIUM (test code = NA) 135 mmol/l   134.0-147.0  N            

 

             POTASSIUM (test code = K) 4.1 mmol/L   3.6-5.2      N            

 

             CHLORIDE (test code = CL) 101 mmol/l   98.0-107.0   N            

 

             CARBON DIOXIDE (test code = CO2) 23.1 mmol/l  21.0-33.0    N       

     

 

             ANION GAP (test code = GAP) 15.0         0-20         N            

 

             GLUCOSE (test code = GLU)  mg/dl       70.0-110.0                

 

             BLOOD UREA NITROGEN (test code =  mg/dl       7.0-18.0             

     



             BUN)                                                

 

             CREATININE (test code = CREAT)  mg/dL       0.60-1.30              

   

 

             GFR NON BLACK (test code =  mL/min      70-80                     



             GFRNONBLACK)                                        

 

             GFR BLACK (test code = GFRBLACK)  mL/min      85-97                

     

 

             TOTAL PROTEIN (test code = PROT)  gm/dL       6.4-8.2              

     

 

             ALBUMIN (test code = ALB)  gm/dl       3.2-4.7                   

 

             CALCIUM (test code = CA)  mg/dl       8.0-10.5                  

 

             BILIRUBIN TOTAL (test code =  mg/dl       0.0-1.0                  

 



             BILT)                                               

 

             SGOT/AST (test code = AST)  Units/L     15.0-37.0                 

 

             SGPT/ALT (test code = ALT)  Units/L     12.0-78.0                 

 

             ALKALINE PHOSPHATASE TOTAL (test  Units/L     50.0-136.0           

     



             code = ALKP)                                        



[id number] 17:08:00





             Test Item    Value        Reference Range Interpretation Comments

 

             TROPONIN-I (test code = TROPI)  NG/ML       0.00-0.06              

   



CBC W/AUTO [id number] 17:08:00





             Test Item    Value        Reference Range Interpretation Comments

 

             WHITE BLOOD CELL (test code = WBC) 15.7 K/mm3   4.5-11.0     H     

       

 

             RED BLOOD CELL (test code = RBC) 1.62 M/mm3   4.40-5.90    L       

     

 

             HEMOGLOBIN (test code = HGB) 4.6 gm/dL    13.0-17.0    LL          

 

 

             HEMATOCRIT (test code = HCT) 15.9 %       36.0-48.0    LL          

 

 

             MEAN CELL VOLUME (test code = MCV) 98.1 UM3     80.0-94.0    H     

       

 

             MEAN CELL HGB (test code = MCH) 28.4 UUG     25.5-32.5    N        

    

 

             MEAN CELL HGB CONCETRATION (test 28.9 gm/dL   29.0-35.5    L       

     



             code = MCHC)                                        

 

             RED CELL DISTRIBUTION WIDTH (test 22.2 %       11.5-15.0    H      

      



             code = RDW)                                         

 

             PLATELET COUNT (test code = PLT) 555 K/mm3    150-400      H       

     

 

             MEAN PLATELET VOLUME (test code = 10.6 fl      7.4-10.4     H      

      



             MPV)                                                

 

             NEUTROPHIL % (test code = NT%) 83.6 %       49.0-76.0    H         

   

 

             LYMPHOCYTE % (test code = LY%) 9.4 %        23.0-38.0    L         

   

 

             MONOCYTE % (test code = MO%) 4.4 %        1.0-10.0     N           

 

 

             EOSINOPHIL % (test code = EO%) 0.1 %        1.0-5.0      L         

   

 

             BASOPHIL % (test code = BA%) 0.2 %        0.0-1.0      N           

 

 

             NEUTROPHIL # (test code = NT#) 13.1 K/mm3   2.4-6.3      H         

   

 

             LYMPHOCYTE # (test code = LY#) 1.5 K/mm3    1.2-4.0      N         

   

 

             MONOCYTE # (test code = MO#) 0.7 K/mm3    0.0-0.6      H           

 

 

             EOSINOPHIL # (test code = EO#) 0.0 K/MM3    0.0-0.7      N         

   

 

             BASOPHIL # (test code = BA#) 0.0 K/mm3    0.0-0.2      N           

 



- XR SWLW FUNC W/C -17-13 15:15:00 FAX: Y       Abu-Atherah,Emran  NILSA 
[phone]    [place]:   St: ADM-------------------------------
------------------------------------------------  Name:   BRENDAN FISHER       
          [facility]                      : 1945  Age/S: 73/M       
   [address] [facility] [employer]    Unit#: [id number]      Loc: [address] [place] [address]                  Phys: Bhavesh Zimmer MD          [id number]    
                        Acct: [id number] Dis Date:               Status: ADM 
IN                                 PHONE #: [phone]     Exam Date:     
2019     1509           FAX #: [phone]     Reason: AS INDICATED BY 
RESULTS OF BSE                     EXAMS:                                       
      CPT CODE:      [id number] XR SWLW FUNC W/C V             73913             
      Location: U19.               MODIFIED BARIUM SWALLOW.     HISTORY: 
Dysphagia.               TECHNIQUE AND FINDINGS: Fluoroscopy time: 1.7 minutes. 
Dose area       product (DAP Gycm2): 2.285.               The exam was performed
with speech therapy. The patient was given       barium of various consistencies
from thin liquids to solids, including       a pill, to drink and eat under 
fluoroscopic observation. No aspiration       or penetration is seen of liquids 
or solids.The patient was able to       swallow a pill.                 
IMPRESSION:          No aspiration or penetration.                  ** 
Electronically Signed by LEI Anne **          **               on
2019 at 1515              **                      Reported and signed by: 
Zana Anne M.D.                      CC: Bhavesh Zimmer MD   
Technologist: CARI MARIE                                           Trnscrd 
Date/Time/By: 2019 (1515) : By: [email or web address]           PAGE  1              
        Signed Report             FAX: Bhavesh Quiroz [phone]
   [place]:   St: ADM-------------------
------------------------------------------------------------  Name:   
BRENDAN FISHER  [facility]                      : 1945  Age/S: 73/M
          [address] [facility]    Unit #: [id number]      Loc: [address]    
   [place], [place]                  Phys: Bhavesh Zimmer MD             
[id number]                             Acct: [id number] Dis Date:               St
atus: ADM IN                                 PHONE #: [phone]     Exam 
Date:     20191509                   FAX #: [phone]     Reason: AS 
INDICATED BY RESULTS OF BSE     EXAMS:                                          
    CPT CODE:      [id number] XR SWLW FUNC W/C V                         [address]   
           &lt;Continued&gt; Orig Print D/T: S: 2019 (1730)               
                                                PAGE  2                       
SignedReport- XR SWLW FUNC W/C -07-18 13:33:00 FAX: Bhavesh Samayoa [phone]    [place]:   St: 
ADM-------------------------------
------------------------------------------------  Name:   BRENDAN FISHER       
          [facility]                      : 1945  Age/S: 73/M       
   [address] [facility]    Unit#: [id number]      Loc: [address]        
[place], [place]                  Phys: Bhavesh Zimmer MD          [id number]    
                        Acct: [id number] Dis Date:               Status: ADM 
IN                                 PHONE #: [phone]     Exam Date:     
2019     1139           FAX #: [phone]     Reason: FEEDING 
DIFFICULTY, OROPHARYNGEAL DYSPHAGIA        EXAMS:                               
              CPT CODE:      [id number] XR SWLW FUNC W/C V             [address]     
              Location: U19.               MODIFIED BARIUM SWALLOW.     HISTORY:
Feeding difficulty, oral pharyngeal dysphagia               TECHNIQUE AND 
FINDINGS: Fluoroscopy time: 1.1 minute.  Dose area       product (DAP Gycm2): 
1.536.               The exam was performed with speech therapy.  A nasogastric 
tube is in       place.  The patient was only given thinliquids and nectar to 
drink by       mouth showing aspiration and prominent vallecular residual.  It
was       felt that the epiglottic movement was limited secondary to the       
nasogastric tube.  The exam was then terminated.                 IMPRESSION:    
              Limited exam.  Aspiration of thin liquids and nectar may partly 
due to         the nasogastric tube.  Recommend repeat exam oncepatient no 
longer         requires the nasogastric tube.                  ** Electronically
Signed by LEI Anne **          **               on 2019 at 
6214              **            Reported and signed by: Zana Anne M.D.
                   CC: Bhavesh Zimmer MD                  Technologist: 
CARI MARIE                                           Trnscrd Date/Time/By: 
2019 (8482) : By: [email or web address]           PAGE  1                       
Signed Report                            FAX: Bhavesh Quiroz 
[phone]    [place]:   St: ADM----
---------------------------------------------------------------------------  
Name:   BRENDAN FISHER                 [facility]                      : 
1945  Age/S: 73/M           [address] [facility]    Unit #: 
[id number]      Loc: [address] [place] [address]                  Phys: Bhavesh Noriega MD             [id number]                             Acct: 
[id number] Dis Date:             Status: ADM IN                               
 PHONE #: [phone]     Exam Date:2019     1132                   FAX 
#: [phone]     Reason: FEEDING DIFFICULTY, OROPHARYNGEAL DYSPHAGIA        
EXAMS:                                               CPT CODE:      [id number] XR
SWLW FUNC W/C V                         10591               &lt;Continued&gt; 
Orig Print D/T: S: 2019 (8292)                                            
                     PAGE  2        Signed ReportBASI METABOLIC [id number]
07:37:00





             Test Item    Value        Reference Range Interpretation Comments

 

             SODIUM (test code = NA) 143 mmol/l   134.0-147.0  N            

 

             POTASSIUM (test code = K) 3.7 mmol/L   3.6-5.2      N            

 

             CHLORIDE (test code = CL) 110 mmol/l   98.0-107.0   H            

 

             CARBON DIOXIDE (test code = CO2) 23.8 mmol/l  21.0-33.0    N       

     

 

             ANION GAP (test code = GAP) 12.9         0-20         N            

 

             GLUCOSE (test code = GLU) 116 mg/dl    70.0-110.0   H            

 

             BLOOD UREA NITROGEN (test code = 11 mg/dl     7.0-18.0     N       

     



             BUN)                                                

 

             CREATININE (test code = CREAT) 0.73 mg/dL   0.60-1.30    N         

   

 

             GFR NON BLACK (test code = 112 mL/min   70-80        H            



             GFRNONBLACK)                                        

 

             GFR BLACK (test code = GFRBLACK) 136 mL/min   85-97        H       

     

 

             CALCIUM (test code = CA) 8.5 mg/dl    8.0-10.5     N            



BASIC METABOLIC [id number] 07:33:00





             Test Item    Value        Reference Range Interpretation Comments

 

             SODIUM (test code = NA) 143 mmol/l   134.0-147.0  N            

 

             POTASSIUM (test code = K) 3.7 mmol/L   3.6-5.2      N            

 

             CHLORIDE (test code = CL) 110 mmol/l   98.0-107.0   H            

 

             CARBON DIOXIDE (test code = CO2) 23.8 mmol/l  21.0-33.0    N       

     

 

             ANION GAP (test code = GAP) 12.9         0-20         N            

 

             GLUCOSE (test code = GLU)  mg/dl       70.0-110.0                

 

             BLOOD UREA NITROGEN (test code =  mg/dl       7.0-18.0             

     



             BUN)                                                

 

             CREATININE (test code = CREAT)  mg/dL       0.60-1.30              

   

 

             GFR NON BLACK (test code =  mL/min      70-80                     



             GFRNONBLACK)                                        

 

             GFR BLACK (test code = GFRBLACK)  mL/min      85-97                

     

 

             CALCIUM (test code = CA)  mg/dl       8.0-10.5                  



CBC W/AUTO [id number] 07:27:00





             Test Item    Value        Reference Range Interpretation Comments

 

             WHITE BLOOD CELL (test code = WBC) 9.5 K/mm3    4.5-11.0     N     

       

 

             RED BLOOD CELL (test code = RBC) 2.76 M/mm3   4.40-5.90    L       

     

 

             HEMOGLOBIN (test code = HGB) 7.4 gm/dL    13.0-17.0    L           

 

 

             HEMATOCRIT (test code = HCT) 25.9 %       36.0-48.0    L           

 

 

             MEAN CELL VOLUME (test code = MCV) 93.8 UM3     80.0-94.0    N     

       

 

             MEAN CELL HGB (test code = MCH) 26.8 UUG     25.5-32.5    N        

    

 

             MEAN CELL HGB CONCETRATION (test 28.6 gm/dL   29.0-35.5    L       

     



             code = MCHC)                                        

 

             RED CELL DISTRIBUTION WIDTH (test 21.8 %       11.5-15.0    H      

      



             code = RDW)                                         

 

             PLATELET COUNT (test code = PLT) 501 K/mm3    150-400      H       

     

 

             MEAN PLATELET VOLUME (test code = 11.0 fl      7.4-10.4     H      

      



             MPV)                                                

 

             NEUTROPHIL % (test code = NT%) 74.1 %       49.0-76.0    N         

   

 

             LYMPHOCYTE % (test code = LY%) 15.3 %       23.0-38.0    L         

   

 

             MONOCYTE % (test code = MO%) 7.7 %        1.0-10.0     N           

 

 

             EOSINOPHIL % (test code = EO%) 1.5 %        1.0-5.0      N         

   

 

             BASOPHIL % (test code = BA%) 0.8 %        0.0-1.0      N           

 

 

             NEUTROPHIL # (test code = NT#) 7.0 K/mm3    2.4-6.3      H         

   

 

             LYMPHOCYTE # (test code = LY#) 1.5 K/mm3    1.2-4.0      N         

   

 

             MONOCYTE # (test code = MO#) 0.7 K/mm3    0.0-0.6      H           

 

 

             EOSINOPHIL # (test code = EO#) 0.1 K/MM3    0.0-0.7      N         

   

 

             BASOPHIL # (test code = BA#) 0.1 K/mm3    0.0-0.2      N           

 



[id number] 20:56:00





             Test Item    Value        Reference Range Interpretation Comments

 

             GLUBED (test code = GLUBED) 104 mg/dL           N            



CBC W/AUTO [id number] 20:50:00





             Test Item    Value        Reference Range Interpretation Comments

 

             WHITE BLOOD CELL (test code = WBC) 10.1 K/mm3   4.5-11.0     N     

       

 

             RED BLOOD CELL (test code = RBC) 2.99 M/mm3   4.40-5.90    L       

     

 

             HEMOGLOBIN (test code = HGB) 8.0 gm/dL    13.0-17.0    L           

 

 

             HEMATOCRIT (test code = HCT) 28.0 %       36.0-48.0    L           

 

 

             MEAN CELL VOLUME (test code = MCV) 93.6 UM3     80.0-94.0    N     

       

 

             MEAN CELL HGB (test code = MCH) 26.8 UUG     25.5-32.5    N        

    

 

             MEAN CELL HGB CONCETRATION (test 28.6 gm/dL   29.0-35.5    L       

     



             code = MCHC)                                        

 

             RED CELL DISTRIBUTION WIDTH (test 21.3 %       11.5-15.0    H      

      



             code = RDW)                                         

 

             PLATELET COUNT (test code = PLT) 501 K/mm3    150-400      H       

     

 

             MEAN PLATELET VOLUME (test code = 11.0 fl      7.4-10.4     H      

      



             MPV)                                                

 

             NEUTROPHIL % (test code = NT%) 77.1 %       49.0-76.0    H         

   

 

             LYMPHOCYTE % (test code = LY%) 13.6 %       23.0-38.0    L         

   

 

             MONOCYTE % (test code = MO%) 7.3 %        1.0-10.0     N           

 

 

             EOSINOPHIL % (test code = EO%) 0.8 %        1.0-5.0      L         

   

 

             BASOPHIL % (test code = BA%) 0.5 %        0.0-1.0      N           

 

 

             NEUTROPHIL # (test code = NT#) 7.8 K/mm3    2.4-6.3      H         

   

 

             LYMPHOCYTE # (test code = LY#) 1.4 K/mm3    1.2-4.0      N         

   

 

             MONOCYTE # (test code = MO#) 0.7 K/mm3    0.0-0.6      H           

 

 

             EOSINOPHIL # (test code = EO#) 0.1 K/MM3    0.0-0.7      N         

   

 

             BASOPHIL # (test code = BA#) 0.1 K/mm3    0.0-0.2      N           

 



SUKUMAR NGUYEN INFORMED ABOUT CBC AT [id number] 20:35:00





             Test Item    Value        Reference Range Interpretation Comments

 

             GLUBED (test code = GLUBED) 79 mg/dL            N            



[id number] 20:35:00





             Test Item    Value        Reference Range Interpretation Comments

 

             GLUBED (test code = GLUBED) 113 mg/dL           H            



AG STREPTOCOCCUS [id number] 20:13:00





             Test Item    Value        Reference Range Interpretation Comments

 

             AG STREPTOCOCCUS Negative     Negative                  TEST PERFOR

MED AT



             PNEUMO/URINE (test code =                                          

     [facility]



             STREPPNAG)                                          [address]



                                                                 [id number]



COMPREHENSIVE METABOLIC [id number] 07:04:00





             Test Item    Value        Reference Range Interpretation Comments

 

             SODIUM (test code = NA) 142 mmol/l   134.0-147.0  N            

 

             POTASSIUM (test code = K) 3.1 mmol/L   3.6-5.2      L            

 

             CHLORIDE (test code = CL) 108 mmol/l   98.0-107.0   H            

 

             CARBON DIOXIDE (test code = CO2) 25.1 mmol/l  21.0-33.0    N       

     

 

             ANION GAP (test code = GAP) 12.0         0-20         N            

 

             GLUCOSE (test code = GLU) 104 mg/dl    70.0-110.0   N            

 

             BLOOD UREA NITROGEN (test code = 14 mg/dl     7.0-18.0     N       

     



             BUN)                                                

 

             CREATININE (test code = CREAT) 0.70 mg/dL   0.60-1.30    N         

   

 

             GFR NON BLACK (test code = 118 mL/min   70-80        H            



             GFRNONBLACK)                                        

 

             GFR BLACK (test code = GFRBLACK) 143 mL/min   85-97        H       

     

 

             TOTAL PROTEIN (test code = PROT) 6.0 gm/dL    6.4-8.2      L       

     

 

             ALBUMIN (test code = ALB) 1.6 gm/dl    3.2-4.7      L            

 

             CALCIUM (test code = CA) 8.8 mg/dl    8.0-10.5     N            

 

             BILIRUBIN TOTAL (test code = 0.5 mg/dl    0.0-1.0      N           

 



             BILT)                                               

 

             SGOT/AST (test code = AST) 36 Units/L   15.0-37.0    N            

 

             SGPT/ALT (test code = ALT) 31 Units/L   12.0-78.0    N            

 

             ALKALINE PHOSPHATASE TOTAL (test 129 Units/L  50.0-136.0   N       

     



             code = ALKP)                                        



[id number] 07:04:00





             Test Item    Value        Reference Range Interpretation Comments

 

             MAGNESIUM (test code = MAG) 2.0 mg/dl    1.8-2.4      N            



COMPREHENSIVE METABOLIC [id number] 06:59:00





             Test Item    Value        Reference Range Interpretation Comments

 

             SODIUM (test code = NA) 142 mmol/l   134.0-147.0  N            

 

             POTASSIUM (test code = K) 3.1 mmol/L   3.6-5.2      L            

 

             CHLORIDE (test code = CL) 108 mmol/l   98.0-107.0   H            

 

             CARBON DIOXIDE (test code = CO2) 25.1 mmol/l  21.0-33.0    N       

     

 

             ANION GAP (test code = GAP) 12.0         0-20         N            

 

             GLUCOSE (test code = GLU)  mg/dl       70.0-110.0                

 

             BLOOD UREA NITROGEN (test code =  mg/dl       7.0-18.0             

     



             BUN)                                                

 

             CREATININE (test code = CREAT)  mg/dL       0.60-1.30              

   

 

             GFR NON BLACK (test code =  mL/min      70-80                     



             GFRNONBLACK)                                        

 

             GFR BLACK (test code = GFRBLACK)  mL/min      85-97                

     

 

             TOTAL PROTEIN (test code = PROT)  gm/dL       6.4-8.2              

     

 

             ALBUMIN (test code = ALB)  gm/dl       3.2-4.7                   

 

             CALCIUM (test code = CA)  mg/dl       8.0-10.5                  

 

             BILIRUBIN TOTAL (test code =  mg/dl       0.0-1.0                  

 



             BILT)                                               

 

             SGOT/AST (test code = AST)  Units/L     15.0-37.0                 

 

             SGPT/ALT (test code = ALT)  Units/L     12.0-78.0                 

 

             ALKALINE PHOSPHATASE TOTAL (test  Units/L     50.0-136.0           

     



             code = ALKP)                                        



[id number] 06:59:00





             Test Item    Value        Reference Range Interpretation Comments

 

             MAGNESIUM (test code = MAG)  mg/dl       1.8-2.4                   



CBC W/AUTO [id number] 06:39:00





             Test Item    Value        Reference Range Interpretation Comments

 

             WHITE BLOOD CELL (test code = WBC) 9.8 K/mm3    4.5-11.0     N     

       

 

             RED BLOOD CELL (test code = RBC) 2.74 M/mm3   4.40-5.90    L       

     

 

             HEMOGLOBIN (test code = HGB) 7.4 gm/dL    13.0-17.0    L           

 

 

             HEMATOCRIT (test code = HCT) 26.1 %       36.0-48.0    L           

 

 

             MEAN CELL VOLUME (test code = MCV) 95.3 UM3     80.0-94.0    H     

       

 

             MEAN CELL HGB (test code = MCH) 27.0 UUG     25.5-32.5    N        

    

 

             MEAN CELL HGB CONCETRATION (test 28.4 gm/dL   29.0-35.5    L       

     



             code = MCHC)                                        

 

             RED CELL DISTRIBUTION WIDTH (test 22.1 %       11.5-15.0    H      

      



             code = RDW)                                         

 

             PLATELET COUNT (test code = PLT) 447 K/mm3    150-400      H       

     

 

             MEAN PLATELET VOLUME (test code = 11.5 fl      7.4-10.4     H      

      



             MPV)                                                

 

             NEUTROPHIL % (test code = NT%) 79.9 %       49.0-76.0    H         

   

 

             LYMPHOCYTE % (test code = LY%) 10.5 %       23.0-38.0    L         

   

 

             MONOCYTE % (test code = MO%) 7.9 %        1.0-10.0     N           

 

 

             EOSINOPHIL % (test code = EO%) 0.6 %        1.0-5.0      L         

   

 

             BASOPHIL % (test code = BA%) 0.4 %        0.0-1.0      N           

 

 

             NEUTROPHIL # (test code = NT#) 7.9 K/mm3    2.4-6.3      H         

   

 

             LYMPHOCYTE # (test code = LY#) 1.0 K/mm3    1.2-4.0      L         

   

 

             MONOCYTE # (test code = MO#) 0.8 K/mm3    0.0-0.6      H           

 

 

             EOSINOPHIL # (test code = EO#) 0.1 K/MM3    0.0-0.7      N         

   

 

             BASOPHIL # (test code = BA#) 0.0 K/mm3    0.0-0.2      N           

 



PROCALCITONIN (PCT)2019 03:32:00





             Test Item    Value        Reference Range Interpretation Comments

 

             PROCALCITONIN (PCT) 0.55 ng/mL   0.00-0.05    H            [facility]

TONIN (PCT)



             (test code = PROCAL)                                        NORMAL 

RANGE (ADULT):



                                                                 <0.05 NG/ML. * 

a



                                                                 concentration <

0.5



                                                                 ng/mL represent

s a low



                                                                 risk of  severe

 sepsis



                                                                 and/or septic s

hock.* a



                                                                 concentration >

2 ng/mL



                                                                 represents a hi

gh risk



                                                                 of severe  seps

is



                                                                 and/or septic



                                                                 shock.Neverthel

ess,



                                                                 concentrations 

<0.5



                                                                 ng/mL do not ex

clude



                                                                 aninfection, on

 account



                                                                 of localized in

fections



                                                                 (withoutsystemi

c signs)



                                                                 which can be as

sociated



                                                                 with such



                                                                 lowconcentratio

ns, or a



                                                                 systemic infect

ion in



                                                                 its initialstag

es (< 6



                                                                 hours). Further

more,



                                                                 increased



                                                                 procalcitoninca

n occur



                                                                 without infecti

on. PCT



                                                                 concentrations 

between



                                                                 0.5and 2.0 ng/m

L should



                                                                 be interpreted 

taking



                                                                 into account



                                                                 thepatient's hi

story.



                                                                 It is recommend

ed to



                                                                 retest PCT with

in6-24



                                                                 hours if any



                                                                 concentrations 

<2 ng/mL



                                                                 are obtained.



PROCALCITONIN (PCT)2019 03:16:00





             Test Item    Value        Reference Range Interpretation Comments

 

             PROCALCITONIN (PCT) 0.55 ng/mL   0.00-0.05    H            [facility]

TONIN (PCT)



             (test code = PROCAL)                                        NORMAL 

RANGE (ADULT):



                                                                 <0.05 NG/ML. * 

a



                                                                 concentration <

0.5



                                                                 ng/mL represent

s a low



                                                                 risk of  severe

 sepsis



                                                                 and/or septic s

hock.* a



                                                                 concentration >

2 ng/mL



                                                                 represents a hi

gh risk



                                                                 of severe  seps

is



                                                                 and/or septic



                                                                 shock.Neverthel

ess,



                                                                 concentrations 

<0.5



                                                                 ng/mL do not ex

clude



                                                                 aninfection, on

 account



                                                                 of localized in

fections



                                                                 (withoutsystemi

c signs)



                                                                 which can be as

sociated



                                                                 with such



                                                                 lowconcentratio

ns, or a



                                                                 systemic infect

ion in



                                                                 its initialstag

es (< 6



                                                                 hours). Further

more,



                                                                 increased



                                                                 procalcitoninca

n occur



                                                                 without infecti

on. PCT



                                                                 concentrations 

between



                                                                 0.5and 2.0 ng/m

L should



                                                                 be interpreted 

taking



                                                                 into account



                                                                 thepatient's hi

story.



                                                                 It is recommend

ed to



                                                                 retest PCT with

in6-24



                                                                 hours if any



                                                                 concentrations 

<2 ng/mL



                                                                 are obtained.



PROCALCITONIN (PCT)2019 18:29:00





             Test Item    Value        Reference Range Interpretation Comments

 

             PROCALCITONIN (PCT) 1.08 ng/mL   0.00-0.05    H            [facility]

TONIN (PCT)



             (test code = PROCAL)                                        NORMAL 

RANGE (ADULT):



                                                                 <0.05 NG/ML. * 

a



                                                                 concentration <

0.5



                                                                 ng/mL represent

s a low



                                                                 risk of  severe

 sepsis



                                                                 and/or septic s

hock.* a



                                                                 concentration >

2 ng/mL



                                                                 represents a hi

gh risk



                                                                 of severe  seps

is



                                                                 and/or septic



                                                                 shock.Neverthel

ess,



                                                                 concentrations 

<0.5



                                                                 ng/mL do not ex

clude



                                                                 aninfection, on

 account



                                                                 of localized in

fections



                                                                 (withoutsystemi

c signs)



                                                                 which can be as

sociated



                                                                 with such



                                                                 lowconcentratio

ns, or a



                                                                 systemic infect

ion in



                                                                 its initialstag

es (< 6



                                                                 hours). Further

more,



                                                                 increased



                                                                 procalcitoninca

n occur



                                                                 without infecti

on. PCT



                                                                 concentrations 

between



                                                                 0.5and 2.0 ng/m

L should



                                                                 be interpreted 

taking



                                                                 into account



                                                                 thepatient's hi

story.



                                                                 It is recommend

ed to



                                                                 retest PCT with

in6-24



                                                                 hours if any



                                                                 concentrations 

<2 ng/mL



                                                                 are obtained.



PROCALCITONIN (PCT)2019 03:12:00





             Test Item    Value        Reference Range Interpretation Comments

 

             PROCALCITONIN (PCT) 1.08 ng/mL   0.00-0.05    H            [facility]

TONIN (PCT)



             (test code = PROCAL)                                        NORMAL 

RANGE (ADULT):



                                                                 <0.05 NG/ML. * 

a



                                                                 concentration <

0.5



                                                                 ng/mL represent

s a low



                                                                 risk of  severe

 sepsis



                                                                 and/or septic s

hock.* a



                                                                 concentration >

2 ng/mL



                                                                 represents a hi

gh risk



                                                                 of severe  seps

is



                                                                 and/or septic



                                                                 shock.Neverthel

ess,



                                                                 concentrations 

<0.5



                                                                 ng/mL do not ex

clude



                                                                 aninfection, on

 account



                                                                 of localized in

fections



                                                                 (withoutsystemi

c signs)



                                                                 which can be as

sociated



                                                                 with such



                                                                 lowconcentratio

ns, or a



                                                                 systemic infect

ion in



                                                                 its initialstag

es (< 6



                                                                 hours). Further

more,



                                                                 increased



                                                                 procalcitoninca

n occur



                                                                 without infecti

on. PCT



                                                                 concentrations 

between



                                                                 0.5and 2.0 ng/m

L should



                                                                 be interpreted 

taking



                                                                 into account



                                                                 thepatient's hi

story.



                                                                 It is recommend

ed to



                                                                 retest PCT with

in6-24



                                                                 hours if any



                                                                 concentrations 

<2 ng/mL



                                                                 are obtained.



PROCALCITONIN (PCT)2019 21:22:00





             Test Item    Value        Reference Range Interpretation Comments

 

             PROCALCITONIN (PCT) 1.82 ng/mL   0.00-0.05    H            [facility]

TONIN (PCT)



             (test code = PROCAL)                                        NORMAL 

RANGE (ADULT):



                                                                 <0.05 NG/ML. * 

a



                                                                 concentration <

0.5



                                                                 ng/mL represent

s a low



                                                                 risk of  severe

 sepsis



                                                                 and/or septic s

hock.* a



                                                                 concentration >

2 ng/mL



                                                                 represents a hi

gh risk



                                                                 of severe  seps

is



                                                                 and/or septic



                                                                 shock.Neverthel

ess,



                                                                 concentrations 

<0.5



                                                                 ng/mL do not ex

clude



                                                                 aninfection, on

 account



                                                                 of localized in

fections



                                                                 (withoutsystemi

c signs)



                                                                 which can be as

sociated



                                                                 with such



                                                                 lowconcentratio

ns, or a



                                                                 systemic infect

ion in



                                                                 its initialstag

es (< 6



                                                                 hours). Further

more,



                                                                 increased



                                                                 procalcitoninca

n occur



                                                                 without infecti

on. PCT



                                                                 concentrations 

between



                                                                 0.5and 2.0 ng/m

L should



                                                                 be interpreted 

taking



                                                                 into account



                                                                 thepatient's hi

story.



                                                                 It is recommend

ed to



                                                                 retest PCT with

in6-24



                                                                 hours if any



                                                                 concentrations 

<2 ng/mL



                                                                 are obtained.



ARTERIAL BLOOD [id number] 12:09:00





             Test Item    Value        Reference Range Interpretation Comments

 

             ARTERIAL BLOOD GAS PH 7.448        7.350-7.450  N            



             (test code = PHA)                                        

 

             ARTERIAL BLOOD GAS PCO2 34.5 mmHg    35.0-45.0    L            



             (test code = PCO2A)                                        

 

             ARTERIAL BLOOD GAS PO2 75.5 mmHg    >80.0        L            



             (test code = PO2A)                                        

 

             BICARBONATE TOTAL HCO3 23.3 MMOL/L  22.0-26.0    N            



             (test code = HCO3)                                        

 

             BASE EXCESS (test code = -0.4 MMOL/L  -4.0-4.0     N            



             ADAM)                                                

 

             ABG O2 SATURATION (test 95.7 %       92.0-99.0    N            



             code = SATA)                                        

 

             FIO2 (test code = FIO2A) 30.0                                   

 

             ABG VENT MODE (test code = BiPAP                                  



             MODEA)                                              

 

             ABG VENT RESP RATE (test 10.0 /MIN                              



             code = RRA)                                         

 

             ABG PEEP (test code = 6.0 cmH2O                              



             PEEPA)                                              

 

             ABG SITE (test code = LR                                     



             SITEA)                                              

 

             ALLENS TEST (test code = Unable                                 



             ALLENS)                                             

 

             TOTAL HGB (test code = 9.2 g/dL     12.0-16.0    L            



             THB)                                                

 

             CARBOXYHEMOGLOBIN (test 1.1 % THgb   0.0-1.5      N            



             code = HOHGBT)                                        

 

             METHEMOGLOBIN (test code = 0.4 %        0.0-1.5      N            N

ORMAL



             METHGB)                                             <2.0POTENTIALLY



                                                                 TOXIC   >20.0



- XR CHEST 1 -79-85 09:08:00 FAX: Bhavesh Quiroz [phone]
   [place]:   St: ADM-------------------------------
------------------------------------------------  Name:   BRENDAN FISHER       
          [facility]                      : 1945  Age/S: 73/M       
   [address] [employer]    Unit#: [id number]      Loc: [address]       
[place], [place]                  Phys: Bhavesh Zimmer MD          [id number]    
                        Acct: [id number] Dis Date:               Status: ADM 
IN                                 PHONE #: [phone]     Exam Date:     
2019     0845           FAX #: [phone]     Reason: F/U PNEUMO        
                                EXAMS:                                          
   CPT CODE:      [id number] XR CHEST 1 V             [address]                    
EXAM:  - XR CHEST 1 V               LOCATION: [id number]: F/U PNEUMO             
 COMPARISON: 19               FINDINGS:               Single view of the 
chest.               PICC line tip overlies the mid SVC. Nasogastric tube is in 
appropriate       position.               No pneumothorax.               Low 
lung volumes with bibasilar subsegmental atelectasis.   No pleural       
effusions are present.   The mediastinal contours are unremarkable.         No 
acute osseous findings are present. Left thoracic wall subcutaneous       
emphysema.                IMPRESSION:         No pneumothorax.                  
Low lung volumes with bibasilar subsegmental atelectasis.                       
      ** Electronically Signed by LEI Amador **            **           
on 2019 at 0908            **                      Reported andsigned by: 
Fadi Amador M.D.             CC: Bhavesh Zimmer MD                        
                                                         Technologist: KATHY MONTESINOS                                          Trnscrd Date/Time/By: 
2019 (0908) : By: OumarHV2            PAGE  1                       
Signed Report                                 FAX: Bhavesh Quiroz 
[phone]    [place]:   St: ADM-----------------------------------------
--------------------------------------  Name:   BRENDAN FISHER                 
[facility]               : 1945  Age/S: 73/M           [address] 
[facility]    Unit #: [id number]      Loc: [address]       [place], [place]  
               Phys: Bhavesh Zimmer [id number]                             
Acct: [id number] Dis Date:               Status: ADM IN                       
PHONE #: [phone]     Exam Date:     2019     0845 FAX #: [phone]
    Reason: F/U PNEUMO                                         EXAMS:           
                        CPT CODE:      [id number] XR CHEST 1 V   [address]           
   &lt;Continued&gt; Orig Print D/T: S: 2019 (0911)                       
                  PAGE  2                       Signed ReportBASIC METABOLIC 
[id number] 07:33:00





             Test Item    Value        Reference Range Interpretation Comments

 

             SODIUM (test code = NA) 140 mmol/l   134.0-147.0  N            

 

             POTASSIUM (test code = K) 3.1 mmol/L   3.6-5.2      L            

 

             CHLORIDE (test code = CL) 106 mmol/l   98.0-107.0   N            

 

             CARBON DIOXIDE (test code = CO2) 22.9 mmol/l  21.0-33.0    N       

     

 

             ANION GAP (test code = GAP) 14.2         0-20         N            

 

             GLUCOSE (test code = GLU) 224 mg/dl    70.0-110.0   H            

 

             BLOOD UREA NITROGEN (test code = 19 mg/dl     7.0-18.0     H       

     



             BUN)                                                

 

             CREATININE (test code = CREAT) 0.84 mg/dL   0.60-1.30    N         

   

 

             GFR NON BLACK (test code = 95 mL/min    70-80        H            



             GFRNONBLACK)                                        

 

             GFR BLACK (test code = GFRBLACK) 116 mL/min   85-97        H       

     

 

             CALCIUM (test code = CA) 8.6 mg/dl    8.0-10.5     N            



CBC W/AUTO [id number] 07:22:00





             Test Item    Value        Reference Range Interpretation Comments

 

             WHITE BLOOD CELL (test code = WBC) 10.9 K/mm3   4.5-11.0     N     

       

 

             RED BLOOD CELL (test code = RBC) 3.11 M/mm3   4.40-5.90    L       

     

 

             HEMOGLOBIN (test code = HGB) 8.2 gm/dL    13.0-17.0    L           

 

 

             HEMATOCRIT (test code = HCT) 29.2 %       36.0-48.0    L           

 

 

             MEAN CELL VOLUME (test code = MCV) 93.9 UM3     80.0-94.0    N     

       

 

             MEAN CELL HGB (test code = MCH) 26.4 UUG     25.5-32.5    N        

    

 

             MEAN CELL HGB CONCETRATION (test 28.1 gm/dL   29.0-35.5    L       

     



             code = MCHC)                                        

 

             RED CELL DISTRIBUTION WIDTH (test 22.1 %       11.5-15.0    H      

      



             code = RDW)                                         

 

             PLATELET COUNT (test code = PLT) 414 K/mm3    150-400      H       

     

 

             MEAN PLATELET VOLUME (test code = 11.6 fl      7.4-10.4     H      

      



             MPV)                                                

 

             NEUTROPHIL % (test code = NT%) 80.0 %       49.0-76.0    H         

   

 

             LYMPHOCYTE % (test code = LY%) 9.7 %        23.0-38.0    L         

   

 

             MONOCYTE % (test code = MO%) 9.0 %        1.0-10.0     N           

 

 

             EOSINOPHIL % (test code = EO%) 0.5 %        1.0-5.0      L         

   

 

             BASOPHIL % (test code = BA%) 0.2 %        0.0-1.0      N           

 

 

             NEUTROPHIL # (test code = NT#) 8.7 K/mm3    2.4-6.3      H         

   

 

             LYMPHOCYTE # (test code = LY#) 1.1 K/mm3    1.2-4.0      L         

   

 

             MONOCYTE # (test code = MO#) 1.0 K/mm3    0.0-0.6      H           

 

 

             EOSINOPHIL # (test code = EO#) 0.1 K/MM3    0.0-0.7      N         

   

 

             BASOPHIL # (test code = BA#) 0.0 K/mm3    0.0-0.2      N           

 



PROCALCITONIN (PCT)2019 03:37:00





             Test Item    Value        Reference Range Interpretation Comments

 

             PROCALCITONIN (PCT) 1.82 ng/mL   0.00-0.05    H            PROCALCI

TONIN (PCT)



             (test code = PROCAL)                                        NORMAL 

RANGE (ADULT):



                                                                 <0.05 NG/ML. * 

a



                                                                 concentration <

0.5



                                                                 ng/mL represent

s a low



                                                                 risk of  severe

 sepsis



                                                                 and/or septic s

hock.* a



                                                                 concentration >

2 ng/mL



                                                                 represents a hi

gh risk



                                                                 of severe  seps

is



                                                                 and/or septic



                                                                 shock.Neverthel

ess,



                                                                 concentrations 

<0.5



                                                                 ng/mL do not ex

clude



                                                                 aninfection, on

 account



                                                                 of localized in

fections



                                                                 (withoutsystemi

c signs)



                                                                 which can be as

sociated



                                                                 with such



                                                                 lowconcentratio

ns, or a



                                                                 systemic infect

ion in



                                                                 its initialstag

es (< 6



                                                                 hours). Further

more,



                                                                 increased



                                                                 procalcitoninca

n occur



                                                                 without infecti

on. PCT



                                                                 concentrations 

between



                                                                 0.5and 2.0 ng/m

L should



                                                                 be interpreted 

taking



                                                                 into account



                                                                 thepatient's hi

story.



                                                                 It is recommend

ed to



                                                                 retest PCT with

in6-24



                                                                 hours if any



                                                                 concentrations 

<2 ng/mL



                                                                 are obtained.



- CT ABD PELVIS W/O CONT2019-02-15 16:01:00 FAX: Bhavesh Quiroz  
[phone]    [place]:   St: ADM-------------------------------
------------------------------------------------  Name:  BRENDAN FISHER        
          [facility]                     : 1945   Age/S: 73/M       
   [address] [facility]    Unit: [id number]       Loc: [address]                  Phys:  Bhavesh Zimmer MD          [id number]   
                         Acct:  [id number] Dis Date:               Status: ADM
IN                                  PHONE #: [phone]     Exam Date: 
02/15/2019 3014          FAX #: [phone]     Reason: ABD DISTENDED          
                            EXAMS:                                              
CPT CODE:      [id number] CT ABD PELVIS W/O CONT             [address]               
    EXAMINATION:  - CT ABD PELVIS W/O CONT.               LOCATION: S 17.       
       HISTORY: ABD DISTENDED.               COMPARISON: CT chest and CT abdomen
and pelvis dated 2019.                 TECHNIQUE: CT abdomen and pelvis was 
performed without the use of IV       contrast.  Sagittal and coronal 
reconstructed images were available       for review.          This exam was 
performed according to our departmental       dose-optimization program, which 
includes automated exposure control,       adjustment of the mA and/or kV 
according to patient size, and/or use       of iterative reconstruction 
technique.               FINDINGS:       Lower chest:      Small left pleural 
effusion is seen with subsegmental atelectasis in       bilateral lower lobes.  
Heart size is normal.  Small pericardial       effusion is present.  Small 
amount of pneumomediastinum is noted       anteriorly, decreased since prior 
exam.               Abdomen:       There is diffuse hypoattenuation of the liver
consistent with       steatosis.  Linear calcifications in bilateral kidneys are
likely       vascular in nature.  The noncontrast appearance of the gallbladder,
pancreas, spleen, and bilateral adrenal glands is within normal       limits.   
           Air-fluid levels are seen in multiple small bowel loops, which are   
   within the upper limits of normal in size.  Air and stool are seen in       
the colon.               No pneumoperitoneum is identified.There is no 
mesenteric or       retroperitoneal adenopathy.               Pelvis:       The 
urinary bladder is decompressed by a Fuentes catheter and contains       air.  
Mild ascites is present.  No inguinal adenopathy is identified.               
Bones/soft tissues:       Multiple acute left rib fractures are again noted.    
  PAGE  1                       Signed Report                    (CONTINUED)  
FAX: Bhavesh Quiroz  [phone]    [place]:   St: 
ADM------------------------------
-------------------------------------------------  Name:  BRENDAN FISHER       
           [facility]                     : 1945   Age/S: 73/M      
    [address] [facility]    Unit: [id number]       Loc: [address]                  Phys:  Bhavesh Zimmer MD           [id number]  
                          Acct:  [id number] Dis Date:               Status: 
ADMIN                                  PHONE #: [phone]     Exam Date: 
02/15/2019 1517           FAX #: [phone]     Reason: ABD DISTENDED         
                             EXAMS:                                             
 CPT CODE:      [id number] CT ABD PELVIS W/O CONT              [address]             
 &lt;Continued&gt;                Subcutaneous emphysema in the left chest and 
abdominopelvic wall has       decreased since prior exam.  Small amount of air 
also extends into the       left inguinal canal.                 IMPRESSION:    
    Air-fluid levels within multiple small bowel loops, which are within        
the upper limits of normal in size.  Findings are likely secondary to         an
ileus or enteritis.                   Mild pelvic ascites.      Small left 
pleural effusion with subsegmental atelectasis in bilateral         lower lobes.
              Subcutaneous emphysema in the left chest and abdominopelvic wall 
with         a smallamount of pneumomediastinum, decreased since 2019.      
            Other findings as described.          ** Electronically Signed by 
LEI Kaplan on 02/15/2019 at 1601 ** Reported and signed by: Enrique Kaplan M.D.                  CC: Bhavesh Zimmer MD                                  
                                                                 Technologist: 
ALINA GALVEZ; HOWARD COONEY                          Trnscrd Dt/Tm: 02/15/2019
(1601)[email or web address]                          Orig Print D/T: S: 02/15/2019 (1604    
                         PAGE  2                       Signed ReportAMMONIA
2019-02-15 13:16:00





             Test Item    Value        Reference Range Interpretation Comments

 

             AMMONIA (test code = AMM) <10.0 MCMOL/L 11.0-32.0    L            



- XR CHEST 1 -02-15 13:10:00 FAX: Bhavesh Quiroz [phone]
   [place]:   St: ADM-------------------------------
------------------------------------------------  Name:   BRENDAN FISHER       
          [facility]                      : 1945  Age/S: 73/M       
   [address] [facility]    Unit#: [id number]      Loc: [address]       
[place], [place]                  Phys: Bhavesh Zimmer MD          [id number]    
                        Acct: [id number] Dis Date:               Status: ADM 
IN                                 PHONE #: [phone]     Exam Date:     
02/15/2019     1307           FAX #: [phone]     Reason: POST PICC LINE 
INSERTION                           EXAMS:                                      
       CPT CODE:      [id number] XR CHEST 1 V             [address]68                  
 Chest Radiograph               History: POST PICC LINE INSERTION               
Comparison: February 15, 2019               Location: R16               A single
frontal view of the chest is submitted.                The heart appears 
unchanged in size.        Pulmonary vasculature is unremarkable.         There 
is a minimal patchy opacity in the right lung base.    The bones appear 
unchanged. Left rib fractures are again identified.       Air in the soft tissue
s of the neck and chest is again identified.                         IMPRESSION:
                  Minimal patchy opacity right lung base.  This could be due to 
       atelectasis or pneumonia.           There has been placement of a right-
sided PICC line, terminating in         the SVC.             There has been 
placement of a nasogastric tube, terminating in the         stomach.            
  Left rib fractures are again identified.   ** Electronically Signed by LEI Gillespie **           **              on 02/15/2019 at 1310             
**                      Reported and signed by: Jay Gillespie M.D.        
CC: Bhavesh Zimmer MD                               Technologist: SADA SANFORD                                          Trnscrd Date/Time/By: 
02/15/2019 (6702) : By: [facility]            PAGE  1                       Niru
d Report                                 FAX: Bhavesh Quiroz 
[phone]    [place]:   St: 
ADM-----------------------------------------------------------------------------

--  Name:   BRENDAN FISHER                  [facility]                      
: 1945  Age/S: 73/M   [address] [facility]    Unit #: 
[id number]      Loc: [address]          Phys: Bhavesh Noriega MD             [id number]                             Acct: 
[id number] Dis Date:               Status: ADM IN                             
   PHONE #: [phone]     Exam Date:     02/15/2019     1307                 
 FAX #: [phone]     Reason: POST PICC LINE INSERTION                       
   EXAMS:                                               CPT CODE:[id number] XR 
CHEST 1 V                               [address]               &lt;Continued&gt; 
Orig Print D/T: S: 02/15/2019 (5422)                                            
                     PAGE  2                     Signed ReportARTERIAL BLOOD GAS
2019-02-15 10:22:00





             Test Item    Value        Reference Range Interpretation Comments

 

             ARTERIAL BLOOD GAS PH 7.485        7.350-7.450  H            



             (test code = PHA)                                        

 

             ARTERIAL BLOOD GAS PCO2 28.0 mmHg    35.0-45.0    L            



             (test code = PCO2A)                                        

 

             ARTERIAL BLOOD GAS PO2 86.3 mmHg    >80.0                     



             (test code = PO2A)                                        

 

             BICARBONATE TOTAL HCO3 20.6 MMOL/L  22.0-26.0    L            



             (test code = HCO3)                                        

 

             BASE EXCESS (test code = -2.0 MMOL/L  -4.0-4.0     N            



             ADAM)                                                

 

             ABG O2 SATURATION (test 97.3 %       92.0-99.0    N            



             code = SATA)                                        

 

             FIO2 (test code = FIO2A) 40.0                                   

 

             ABG VENT MODE (test code = BiPAP                                  



             MODEA)                                              

 

             ABG VENT RESP RATE (test 10.0 /MIN                              



             code = RRA)                                         

 

             ABG PEEP (test code = 6.0 cmH2O                              



             PEEPA)                                              

 

             ABG CPAP (test code = 12.0                                   



             CPAPA)                                              

 

             ABG PRESSURE SUPPORT (test 6 cmH2O                                



             code = PSABG)                                        

 

             ABG SITE (test code = RB                                     



             SITEA)                                              

 

             ALLENS TEST (test code = Yes                                    



             ALLENS)                                             

 

             TOTAL HGB (test code = 10.1 g/dL    12.0-16.0    L            



             THB)                                                

 

             CARBOXYHEMOGLOBIN (test 0.8 % THgb   0.0-1.5      N            



             code = HOHGBT)                                        

 

             METHEMOGLOBIN (test code = 0.5 %        0.0-1.5      N            N

ORMAL



             METHGB)                                             <2.0POTENTIALLY



                                                                 TOXIC   >20.0



COMPREHENSIVE METABOLIC PANEL2019--15 07:04:00





             Test Item    Value        Reference Range Interpretation Comments

 

             SODIUM (test code = NA) 136 mmol/l   134.0-147.0  N            

 

             POTASSIUM (test code = K) 3.3 mmol/L   3.6-5.2      L            

 

             CHLORIDE (test code = CL) 100 mmol/l   98.0-107.0   N            

 

             CARBON DIOXIDE (test code = CO2) 23.3 mmol/l  21.0-33.0    N       

     

 

             ANION GAP (test code = GAP) 16.0         0-20         N            

 

             GLUCOSE (test code = GLU) 111 mg/dl    70.0-110.0   H            

 

             BLOOD UREA NITROGEN (test code = 17 mg/dl     7.0-18.0     N       

     



             BUN)                                                

 

             CREATININE (test code = CREAT) 1.03 mg/dL   0.60-1.30    N         

   

 

             GFR NON BLACK (test code = 75 mL/min    70-80        N            



             GFRNONBLACK)                                        

 

             GFR BLACK (test code = GFRBLACK) 91 mL/min    85-97        N       

     

 

             TOTAL PROTEIN (test code = PROT) 7.0 GM/DL    6.0-8.1      N       

     

 

             ALBUMIN (test code = ALB) 1.9 gm/dL    3.2-4.7      L            

 

             CALCIUM (test code = CA) 8.9 mg/dl    8.0-10.5     N            

 

             BILIRUBIN TOTAL (test code = 1.4 mg/dl    0.0-1.0      H           

 



             BILT)                                               

 

             SGOT/AST (test code = AST) 69 Units/L   15.0-37.0    H            

 

             SGPT/ALT (test code = ALT) 53 Units/L   12.0-78.0    N            

 

             ALKALINE PHOSPHATASE TOTAL (test 158 Units/L  50.0-136.0   H       

     



             code = ALKP)                                        



COMPREHENSIVE METABOLIC PANEL2019--15 06:46:00





             Test Item    Value        Reference Range Interpretation Comments

 

             SODIUM (test code = NA) 136 mmol/l   134.0-147.0  N            

 

             POTASSIUM (test code = K) 3.3 mmol/L   3.6-5.2      L            

 

             CHLORIDE (test code = CL) 100 mmol/l   98.0-107.0   N            

 

             CARBON DIOXIDE (test code = CO2) 23.3 mmol/l  21.0-33.0    N       

     

 

             ANION GAP (test code = GAP) 16.0         0-20         N            

 

             GLUCOSE (test code = GLU)  mg/dl       70.0-110.0                

 

             BLOOD UREA NITROGEN (test code =  mg/dl       7.0-18.0             

     



             BUN)                                                

 

             CREATININE (test code = CREAT)  mg/dL       0.60-1.30              

   

 

             GFR NON BLACK (test code =  mL/min      70-80                     



             GFRNONBLACK)                                        

 

             GFR BLACK (test code = GFRBLACK)  mL/min      85-97                

     

 

             TOTAL PROTEIN (test code = PROT)  gm/dL       6.4-8.2              

     

 

             ALBUMIN (test code = ALB)  gm/dl       3.2-4.7                   

 

             CALCIUM (test code = CA)  mg/dl       8.0-10.5                  

 

             BILIRUBIN TOTAL (test code =  mg/dl       0.0-1.0                  

 



             BILT)                                               

 

             SGOT/AST (test code = AST)  Units/L     15.0-37.0                 

 

             SGPT/ALT (test code = ALT)  Units/L     12.0-78.0                 

 

             ALKALINE PHOSPHATASE TOTAL (test  Units/L     50.0-136.0           

     



             code = ALKP)                                        



CBC W/AUTO DIFF2019-02-15 06:38:00





             Test Item    Value        Reference Range Interpretation Comments

 

             WHITE BLOOD CELL (test code = WBC) 20.0 K/mm3   4.5-11.0     H     

       

 

             RED BLOOD CELL (test code = RBC) 3.77 M/mm3   4.40-5.90    L       

     

 

             HEMOGLOBIN (test code = HGB) 9.8 gm/dL    13.0-17.0    L           

 

 

             HEMATOCRIT (test code = HCT) 34.5 %       36.0-48.0    L           

 

 

             MEAN CELL VOLUME (test code = MCV) 91.5 UM3     80.0-94.0    N     

       

 

             MEAN CELL HGB (test code = MCH) 26.0 UUG     25.5-32.5    N        

    

 

             MEAN CELL HGB CONCETRATION (test 28.4 gm/dL   29.0-35.5    L       

     



             code = MCHC)                                        

 

             RED CELL DISTRIBUTION WIDTH (test 22.1 %       11.5-15.0    H      

      



             code = RDW)                                         

 

             PLATELET COUNT (test code = PLT) 400 K/mm3    150-400      N       

     

 

             MEAN PLATELET VOLUME (test code = 11.1 fl      7.4-10.4     H      

      



             MPV)                                                

 

             NEUTROPHIL % (test code = NT%) 85.9 %       49.0-76.0    H         

   

 

             LYMPHOCYTE % (test code = LY%) 4.0 %        23.0-38.0    L         

   

 

             MONOCYTE % (test code = MO%) 9.2 %        1.0-10.0     N           

 

 

             EOSINOPHIL % (test code = EO%) 0.0 %        1.0-5.0      L         

   

 

             BASOPHIL % (test code = BA%) 0.2 %        0.0-1.0      N           

 

 

             NEUTROPHIL # (test code = NT#) 17.2 K/mm3   2.4-6.3      H         

   

 

             LYMPHOCYTE # (test code = LY#) 0.8 K/mm3    1.2-4.0      L         

   

 

             MONOCYTE # (test code = MO#) 1.8 K/mm3    0.0-0.6      H           

 

 

             EOSINOPHIL # (test code = EO#) 0.0 K/MM3    0.0-0.7      N         

   

 

             BASOPHIL # (test code = BA#) 0.0 K/mm3    0.0-0.2      N           

 



- XR CHEST 1 -02-15 06:29:00 FAX: Bhavesh Quiroz [phone]
   [place]:   St: ADM-------------------------------
------------------------------------------------  Name:   BRENDAN FISHER       
          [facility]                      : 1945  Age/S: 73/M       
   [address] [facility] [employer]    Unit#: [id number]      Loc: E[address]       
[place], [place]                  Phys: Bhavesh Zimmer MD          [id number]    
                        Acct: [id number] Dis Date:               Status: ADM 
IN                                 PHONE #: [phone]     Exam Date:     
02/15/2019     0620           FAX #: [phone]     Reason: POSSIBLE 
ASPIRATION                                EXAMS:                                
             CPT CODE:      [id number] XR CHEST 1 V             [address]            
       LOCATION: Q15               HISTORY: 73-year-old male, possible 
aspiration.               COMMENT:               A frontal chest radiograph is 
obtained at 5:38 a.m., and compared to       study of 2019, 
obtained at 10:54 p.m.               Again seen is subcutaneous emphysema along 
the left lateral chest wall       were, radiating into both sides of the 
patient's neck.               The lungs exhibit no evidence of pneumothoraces. 
Improved aeration is       seen in the right lung base.               The 
cardiac silhouette and mediastinal structuresare unchanged.                 
IMPRESSION:                   Interval improvement is seen with respect to the 
infiltrate is in the         right lung base which appears to have resolved when
compared to a         study obtained last evening.                   ** 
Electronically Signed by LEI Quinteros **           **             on 
02/15/2019 at 0629             **                      Reportedand signed by: 
Clifford Cabello M.D.                  CC: Bhavesh Zimmer MD                 
                                                                           
Technologist: NITISH LUO                                        Trnscrd 
Date/Time/By: 02/15/2019 (629): By: [email or web address]           PAGE  1               
       Signed Report  FAX: Bhavesh Quiroz [phone]    [place]: 
  St: ADM------------------------------
-------------------------------------------------  Name:   BRENDAN FISHER      
           [facility]                      : 1945  Age/S: 73/M      
    [address] [facility] [employer]    Unit #: [id number]      Loc: [place]       
[place], [place]                  Phys: Bhavesh Zimmer MD           [id number]   
                         Acct: [id number] Dis Date:               Status: ADM 
IN                                 PHONE #: [phone]     Exam Date:     
02/15/2019     0620            FAX #: [phone]     Reason: POSSIBLE 
ASPIRATION                                EXAMS:                                
              CPT CODE:      [id number] XR CHEST 1 V              [address]          
    &lt;Continued&gt; Orig Print D/T: S: 02/15/2019 (0632)                      
                              PAGE  2                       Signed Report- XR 
CHEST 1 -37-48 23:10:00 FAX: Bhavesh Quiroz [phone]    
[place]:   St: [facility] FAX: Estefany Hyatt MD   [phone]   
------------------------------------------------------------------------------- 
Name:   BRENDAN FISHER                  [facility]                      :
1945  Age/S: 73/M           [address] [facility]    Unit #: 
[id number]      Loc: [place]       [place], [place]                  Phys: 
Estefany Burns MD                [id number]                             Acct: [id number]
[id number] Dis Date:               Status: ADM IN                              
  PHONE #: [phone]     Exam Date:     2019     2258                  
FAX #: [phone]     Reason: ELEVATED HR. POSSIBLE ASPIRATION                
  EXAMS:                                               CPT CODE:      [id number] 
XR CHEST 1 V                               [address]                    AFTER HOURS 
SERVICE ON: 2019 11:07 PM               AP Portable Chest               
Location Code M12       HISTORY: ELEVATED HR. POSSIBLE ASPIRATION               
FINDINGS:                Again noted is atelectasis in the right lung base.  
There is a       surrounding patchy infiltrate.  The infiltrates in the 
retrocardiac       region have significantly decreased from 2000 1913 hours
ago.  There are no pleural effusions. There is no pneumothorax. Cardiac       
silhouette and mediastinumappear within normal limits.  Soft tissue       
emphysema again noted.                 IMPRESSION:                 Bibasilar 
infiltrates, improved on the left.                   Right basilar subsegmental 
atelectasis.                  ** Electronically Signed by LEI Cantrell
**          **              on 2019 at 6240              **               
      Reported and signed by: Viviana Cantrell M.D.                 CC: Bhavesh Zimmer MD; Estefany Burns MD                                               
                           Technologist: NICK CORTEZ                          
               Trnscrd Date/Time/By: 2019 (8558) : By: [email or web address]         
PAGE  1                       Signed Report                                 FAX:
Bhavesh Quiroz [phone]    [place]:   St: [facility] FAX: Estefany Hyatt MD   [phone]  
------------------------------------------------------------------------------- 
Name:   BRENDAN FISHER                  [facility]                      :
1945  Age/S: 73/M           [address] [facility]    Unit #: 
[id number]      Loc: E.IC03       [place], [place]   Phys: Estefany Burns MD   
            [id number]                             Acct: [id number] Dis Date:      
        Status: ADM IN                                 PHONE #: [phone]    
Exam Date:     2019                   FAX #: [phone]     
Reason: ELEVATED HR. POSSIBLE ASPIRATION                   EXAMS:               
                               CPT CODE:      [id number] XR CHEST 1 V            
                  [address]               &lt;Continued&gt; Orig Print D/T: S: 
2019 (4392)                                                               
 PAGE  2             Signed ReportCOMPREHENSIVE METABOLIC [id number] 
18:23:00





             Test Item    Value        Reference Range Interpretation Comments

 

             SODIUM (test code = NA) 133 mmol/l   134.0-147.0  L            

 

             POTASSIUM (test code = K) 3.9 mmol/L   3.6-5.2      N            

 

             CHLORIDE (test code = CL) 99 mmol/l    98.0-107.0   N            

 

             CARBON DIOXIDE (test code = CO2) 22.1 mmol/l  21.0-33.0    N       

     

 

             ANION GAP (test code = GAP) 15.8         0-20         N            

 

             GLUCOSE (test code = GLU) 66 mg/dl     70.0-110.0   L            

 

             BLOOD UREA NITROGEN (test code = 11 mg/dl     7.0-18.0     N       

     



             BUN)                                                

 

             CREATININE (test code = CREAT) 0.75 mg/dL   0.60-1.30    N         

   

 

             GFR NON BLACK (test code = 109 mL/min   70-80        H            



             GFRNONBLACK)                                        

 

             GFR BLACK (test code = GFRBLACK) 132 mL/min   85-97        H       

     

 

             TOTAL PROTEIN (test code = PROT) 6.7 GM/DL    6.0-8.1      N       

     

 

             ALBUMIN (test code = ALB) 1.9 gm/dL    3.2-4.7      L            

 

             CALCIUM (test code = CA) 8.7 mg/dl    8.0-10.5     N            

 

             BILIRUBIN TOTAL (test code = 1.1 mg/dl    0.0-1.0      H           

 



             BILT)                                               

 

             SGOT/AST (test code = AST) 75 Units/L   15.0-37.0    H            

 

             SGPT/ALT (test code = ALT) 58 Units/L   12.0-78.0    N            

 

             ALKALINE PHOSPHATASE TOTAL (test 168 Units/L  50.0-136.0   H       

     



             code = ALKP)                                        



COMPREHENSIVE METABOLIC [id number] 18:17:00





             Test Item    Value        Reference Range Interpretation Comments

 

             SODIUM (test code = NA) 133 mmol/l   134.0-147.0  L            

 

             POTASSIUM (test code = K) 3.9 mmol/L   3.6-5.2      N            

 

             CHLORIDE (test code = CL) 99 mmol/l    98.0-107.0   N            

 

             CARBON DIOXIDE (test code = CO2) 22.1 mmol/l  21.0-33.0    N       

     

 

             ANION GAP (test code = GAP) 15.8         0-20         N            

 

             GLUCOSE (test code = GLU)  mg/dl       70.0-110.0                

 

             BLOOD UREA NITROGEN (test code =  mg/dl       7.0-18.0             

     



             BUN)                                                

 

             CREATININE (test code = CREAT)  mg/dL       0.60-1.30              

   

 

             GFR NON BLACK (test code =  mL/min      70-80                     



             GFRNONBLACK)                                        

 

             GFR BLACK (test code = GFRBLACK)  mL/min      85-97                

     

 

             TOTAL PROTEIN (test code = PROT)  gm/dL       6.4-8.2              

     

 

             ALBUMIN (test code = ALB)  gm/dl       3.2-4.7                   

 

             CALCIUM (test code = CA)  mg/dl       8.0-10.5                  

 

             BILIRUBIN TOTAL (test code =  mg/dl       0.0-1.0                  

 



             BILT)                                               

 

             SGOT/AST (test code = AST)  Units/L     15.0-37.0                 

 

             SGPT/ALT (test code = ALT)  Units/L     12.0-78.0                 

 

             ALKALINE PHOSPHATASE TOTAL (test  Units/L     50.0-136.0           

     



             code = ALKP)                                        



[id number] 11:41:00





             Test Item    Value        Reference Range Interpretation Comments

 

             GLUBED (test code = GLUBED) 82 mg/dL            N            



CBC W/AUTO [id number] 09:32:00





             Test Item    Value        Reference Range Interpretation Comments

 

             WHITE BLOOD CELL (test code = WBC) 11.3 K/mm3   4.5-11.0     H     

       

 

             RED BLOOD CELL (test code = RBC) 3.50 M/mm3   4.40-5.90    L       

     

 

             HEMOGLOBIN (test code = HGB) 9.2 gm/dL    13.0-17.0    L           

 

 

             HEMATOCRIT (test code = HCT) 31.7 %       36.0-48.0    L           

 

 

             MEAN CELL VOLUME (test code = MCV) 90.6 UM3     80.0-94.0    N     

       

 

             MEAN CELL HGB (test code = MCH) 26.3 UUG     25.5-32.5    N        

    

 

             MEAN CELL HGB CONCETRATION (test 29.0 gm/dL   29.0-35.5    N       

     



             code = MCHC)                                        

 

             RED CELL DISTRIBUTION WIDTH (test 22.0 %       11.5-15.0    H      

      



             code = RDW)                                         

 

             PLATELET COUNT (test code = PLT) 352 K/mm3    150-400      N       

     

 

             MEAN PLATELET VOLUME (test code = 10.4 fl      7.4-10.4     N      

      



             MPV)                                                

 

             NEUTROPHIL % (test code = NT%) 76.7 %       49.0-76.0    H         

   

 

             LYMPHOCYTE % (test code = LY%) 8.2 %        23.0-38.0    L         

   

 

             MONOCYTE % (test code = MO%) 13.3 %       1.0-10.0     H           

 

 

             EOSINOPHIL % (test code = EO%) 0.2 %        1.0-5.0      L         

   

 

             BASOPHIL % (test code = BA%) 0.4 %        0.0-1.0      N           

 

 

             NEUTROPHIL # (test code = NT#) 8.6 K/mm3    2.4-6.3      H         

   

 

             LYMPHOCYTE # (test code = LY#) 0.9 K/mm3    1.2-4.0      L         

   

 

             MONOCYTE # (test code = MO#) 1.5 K/mm3    0.0-0.6      H           

 

 

             EOSINOPHIL # (test code = EO#) 0.0 K/MM3    0.0-0.7      N         

   

 

             BASOPHIL # (test code = BA#) 0.1 K/mm3    0.0-0.2      N           

 



- XR CHEST 1 -13-40 09:04:00 FAX: Bhavesh Quiroz [phone]
   [place]:   St: ADM-------------------------------
------------------------------------------------  Name:   BRENDAN FISHER       
          [facility]                      : 1945  Age/S: 73/M       
   [address] [facility]    Unit#: [id number]      Loc: [address]                  Phys: Bhavesh Zimmer MD          [id number]    
                        Acct: [id number] Dis Date:               Status: ADM 
IN                                 PHONE #: [phone]     Exam Date:     
2019     09           FAX #: [phone]     Reason: DYSPNEA           
                                EXAMS:                                          
   CPT CODE:      [id number] XR CHEST 1 V             [address]                    
Chest Radiograph               History: DYSPNEA               Comparison: 
2019               Location: R16               A single frontal 
view of the chest is submitted.                The heart appears unchanged in 
size.        Pulmonary vasculature is unremarkable.         There is a patchy 
opacity in the left lung base. There is minimal       right basilar atelectasis.
No pneumothorax is identified.       The bones appear unchanged.       Air in t
he subcutaneous soft tissues of the chest and neck is again       identified.   
   No chest tube isidentified on today's exam.                 IMPRESSION:      
            Patchy opacity left lung base.  This could be due to atelectasis or 
       pneumonia.                   There is minimal rightbasilar atelectasis.  
                Compared to the prior exam, there has been little change.       
                                                         ** Electronically 
Signed by LEI Gillespie **           **              on 2019 at 
0904             **Reported and signed by: Jay Gillespie M.D.        CC: 
Bhavesh Zimmer MD                                                            
                                Technologist: SADA SANFORD                     
                    [facility] Date[facility]/By: 2019 (0904): By: Tomas      
     PAGE  1                       Signed Report  FAX: Bhavesh Quiroz [phone]    [place]:   St: ADM------------------------------
-------------------------------------------------  Name:   BRENDAN FISHER      
           [facility]                      : 1945  Age/S: 73/M      
    [address] [facility]    Unit #: [id number]      Loc: [address]                  Phys: Bhavesh Zimmer MD           [id number]   
                         Acct: [id number] Dis Date:               Status: ADM 
IN                                 PHONE #: [phone]     Exam Date:     
2019     0901            FAX #: [phone]     Reason: DYSPNEA          
                                 EXAMS:                                         
     CPT CODE:      [id number] XR CHEST 1 V              [address]               
&lt;Continued&gt; Orig Print D/T: S: 2019 (0907)                          
                          PAGE  2                       Signed Report-  
SCROT AND [id number] 11:53:00 FAX: Bhavesh Quiroz 
[phone]    [place]: EM  St: ADM FAX: Regulo Rodrigues 
[phone]   
------------------------------------------------------------------------------- 
Name:   BRENDAN FISHER                  [facility]                      :
1945  Age/S: 73/M           [address] [facility] [employer]    Unit #: 
[id number]      Loc: [address]        [place], [place]                  Phys: 
Regulo Carver MD        [id number]                             Acct: [id number]
[id number] Dis Date:               Status: ADM IN                              
  PHONE #: [phone]     Exam Date:     2019     1058                  
FAX #: [phone]     Reason: TESTICULAR MASS                                 
  EXAMS:                                               CPT CODE:      [id number] 
US SCROTUM AND CNTS                        [address]                    REASON FOR 
EXAM: Testicular mass, lump.               Testicular sonogram.               B-
mode/Gray scale imaging with color Doppler perfusion imaging with       spectral
analysis was performed.               The right testicle is 4.3 x 1.5 x 3.4 cm 
with normal echogenicity       pattern.  Perfusion pattern intact.  Tiny 
hydrocele seen.  Epididymis       with a 9 x 8 x 1.3 cm epididymal, simple cyst 
with the epididymis       otherwise intact.  No evidence of mass.               
The left testicle is 3.5 x 1.7 x 3.4 cm also with homogeneous pattern       and 
good perfusion.  Tiny hydrocele.  The epididymis appears to be       slightly 
irregular containing a 1.1 x 0.7 x 0.8 cm cyst.  Tiny       hydrocele component.
              A 3rd oval lesion is seen at 3.3 x 2 x 2.9 cm just above the right
      testicle, mildly inhomogeneous with tiny cystic areas but no       
significant perfusion is seen.No fluid surrounds the structure.  There does not 
appear to be in       epididymis formed.    No evidence of varicocele.          
      IMPRESSION: Normal testicular appearance.  No torsionor mass.          
Sliver-like hydrocele.                   There is a 3rd structure just above the
right testicle at the scrotal         wall but has an oval-shaped fairly 
homogeneous echogenicity but is not         well perfused.  This measures 3.3 x 
2 x 2.9 cm.  This could represent         a mass or a 3rd testicle component.  
The lesion would be typical for a         lymph node without some perfusion 
documented.                                       Location: U19                 
           PAGE1                       Signed Report                    
(CONTINUED)  FAX: GEOVANNY       GoranAnahyya [phone]    [place]:   St: 
ADM FAX: GEOVANNY       WenRegulo Pabon [phone]   -------------
------------------------------------------------------------------  Name:   
BRENDAN FISHER        [facility]                      : 1945  
Age/S: 73/M           [address] [facility]    Unit #: [id number]      
Loc: [address] [place] [address]                  Phys: Regulo Carver MD        [id number]                             Acct: [id number] Dis Date:    
Status: ADM IN                                 PHONE #: [phone]     Exam 
Date:     2019     1058                   FAX #: [phone]     Reason: 
TESTICULAR MASS           EXAMS:                                               
CPT CODE:      [id number] US SCROTUM AND CNTS                        19526       
       &lt;Continued&gt;              ** Electronically Signed by LEI Fuller **          **               on 2019 at 1153              **     
              Reported and signed by: Clifford Fuller M.D.      CC: Bhavesh Zimmer MD; Regulo Carver MD                                       
     Technologist: MICHELLE GUTIERREZ             Trnscrd Date/Time/By: 2019 
(5268) : [facility]: [facility]            PAGE  2         Signed Report                 
               FAX: Bhavesh Quiroz [phone]    [place]:   St:
ADM FAX: GEOVANNY       Regulo Carver [phone]   
------------------------------------------------------------------------------- 
Name:   BRENDAN FISHER                  [facility]                      :
1945  Age/S: 73/M           [address] [facility] [employer]    Unit #: 
[id number]      Loc: [address]        [place], [place]                  Phys: 
Regulo Carver MD        [id number]                             Acct: 
[id number] Dis Date:               Status: ADM IN                             
   PHONE #: [phone]     Exam Date:     2019     1058              FAX
#: [phone]     Reason: TESTICULAR MASS                                    
EXAMS:                                               CPT CODE:      [id number] US
SCROTUM AND CNTS                [address]               &lt;Continued&gt; Orig Print
D/T: S: 2019 (8489)                                                      
PAGE  3                       Signed Report- XR CHEST 1 -38-56 23:21:00 
FAX: Bhavesh Quiroz [phone]    [place]:   St: 
ADM-------------------------------
------------------------------------------------  Name:   BRENDAN FISHER       
          [facility]                      : 1945  Age/S: 73/M       
   [facility] [employer]    Unit#: [id number]      Loc: [address]        
[place], [place]                  Phys: Bhavesh Zimmer MD          [id number]    
                        Acct: [id number] Dis Date:               Status: ADM 
IN                                 PHONE #: [phone]     Exam Date:     
2019     0531           FAX #: [phone]     Reason: pneumothorax      
                                EXAMS:                                          
   CPT CODE:      [id number] XR CHEST 1 V             [address]                    
Exam: Chest portable erect               Location: F6     History: pneumothorax 
             Comparison: 2019               Findings:       No change has 
occurred in the aeration of the lungs. The left lung is       well expanded. A 
small bore chest tube remains in place. The heart       size is unchanged. The 
mediastinal silhouette is unremarkable. The       bony thorax is intact. The 
kidneys emphysema is again noted.                 Impression:         Stable 
chest/no change.                 ** Electronically Signed by LEI Viramontes **         **                on 2019 at 2771               **     
                Reported and signed by: Kahlil Viramontes M.D.              
         CC: Bhavesh Zimmer MD  Technologist: JUAN J RIZVI            
                       Trnscrd Date/Time/By: 2019 (3162) : By: Oumar   
         PAGE  1                       Signed Report            FAX: Bhavesh Quiroz [phone]    [place]:   St: ADM--------------------
-----------------------------------------------------------  Name:   
BRENDAN FISHER [facility]                      : 1945  Age/S: 73/M 
         [address] [facility]    Unit #: [id number]      Loc: [address] [place] [address]                  Phys: Bhavesh Zimmer MD             
[id number]                             Acct: [id number] Dis Date:               
tus: ADM IN                                 PHONE #: [phone]     Exam Date:
    2019     0531                   FAX #: [phone]     Reason: 
pneumothorax    EXAMS:                                               CPT CODE:  
   [id number] XR CHEST 1 V                        84713               
&lt;Continued&gt; Orig Print D/T: S: 2019 (0800)                          
                                    PAGE  2                       Signed Report
CBC W/AUTO [id number] 07:17:00





             Test Item    Value        Reference Range Interpretation Comments

 

             WHITE BLOOD CELL (test code = WBC) 7.6 K/mm3    4.5-11.0     N     

       

 

             RED BLOOD CELL (test code = RBC) 2.96 M/mm3   4.40-5.90    L       

     

 

             HEMOGLOBIN (test code = HGB) 7.7 gm/dL    13.0-17.0    L           

 

 

             HEMATOCRIT (test code = HCT) 26.3 %       36.0-48.0    L           

 

 

             MEAN CELL VOLUME (test code = MCV) 88.9 UM3     80.0-94.0    N     

       

 

             MEAN CELL HGB (test code = MCH) 26.0 UUG     25.5-32.5    N        

    

 

             MEAN CELL HGB CONCETRATION (test 29.3 gm/dL   29.0-35.5    N       

     



             code = MCHC)                                        

 

             RED CELL DISTRIBUTION WIDTH (test 23.2 %       11.5-15.0    H      

      



             code = RDW)                                         

 

             PLATELET COUNT (test code = PLT) 243 K/mm3    150-400      N       

     

 

             MEAN PLATELET VOLUME (test code = 10.1 fl      7.4-10.4     N      

      



             MPV)                                                

 

             NEUTROPHIL % (test code = NT%) 65.7 %       49.0-76.0    N         

   

 

             LYMPHOCYTE % (test code = LY%) 18.7 %       23.0-38.0    L         

   

 

             MONOCYTE % (test code = MO%) 12.0 %       1.0-10.0     H           

 

 

             EOSINOPHIL % (test code = EO%) 2.2 %        1.0-5.0      N         

   

 

             BASOPHIL % (test code = BA%) 0.3 %        0.0-1.0      N           

 

 

             NEUTROPHIL # (test code = NT#) 5.0 K/mm3    2.4-6.3      N         

   

 

             LYMPHOCYTE # (test code = LY#) 1.4 K/mm3    1.2-4.0      N         

   

 

             MONOCYTE # (test code = MO#) 0.9 K/mm3    0.0-0.6      H           

 

 

             EOSINOPHIL # (test code = EO#) 0.2 K/MM3    0.0-0.7      N         

   

 

             BASOPHIL # (test code = BA#) 0.0 K/mm3    0.0-0.2      N           

 



BASIC METABOLIC [id number] 06:57:00





             Test Item    Value        Reference Range Interpretation Comments

 

             SODIUM (test code = NA) 134 mmol/l   134.0-147.0  N            

 

             POTASSIUM (test code = K) 3.4 mmol/L   3.6-5.2      L            

 

             CHLORIDE (test code = CL) 101 mmol/l   98.0-107.0   N            

 

             CARBON DIOXIDE (test code = CO2) 26.2 mmol/l  21.0-33.0    N       

     

 

             ANION GAP (test code = GAP) 10.2         0-20         N            

 

             GLUCOSE (test code = GLU) 108 mg/dl    70.0-110.0   N            

 

             BLOOD UREA NITROGEN (test code = 7 mg/dl      7.0-18.0     N       

     



             BUN)                                                

 

             CREATININE (test code = CREAT) 0.91 mg/dL   0.60-1.30    N         

   

 

             GFR NON BLACK (test code = 87 mL/min    70-80        H            



             GFRNONBLACK)                                        

 

             GFR BLACK (test code = GFRBLACK) 105 mL/min   85-97        H       

     

 

             CALCIUM (test code = CA) 8.5 mg/dl    8.0-10.5     N            



[id number] 06:57:00





             Test Item    Value        Reference Range Interpretation Comments

 

             MAGNESIUM (test code = MAG) 2.0 mg/dl    1.8-2.4      N            



- XR CHEST 1 -38-99 17:02:00 FAX: Bhavesh Quiroz [phone]
   [place]:   St: ADM-------------------------------
------------------------------------------------  Name:   BRENDAN FISHER       
          [facility]                      : 1945  Age/S: 73/M       
   [address] [facility]    Unit#: [id number]      Loc: [address]                  Phys: Bhavesh Zimmer MD          [id number]    
                        Acct: [id number] Dis Date:               Status: ADM 
IN                                 PHONE #: [phone]     Exam Date:     
2019     1632           FAX #: [phone]     Reason: CHEST TUBE CLAMP 
FOR SIX HOURS                     EXAMS:                                        
     CPT CODE:      [id number] XR CHEST 1 V             [address]                    
REASON FOR EXAM: Pneumothorax, chest tube.               COMPARISON: 2019 earlier at 0918 hours.               Chest, portable single frontal 
view.  1620 hours.               The small diameter chest tube has similar 
position, at the edge of thepleural space.  Subcutaneous emphysema seen 
similarly but no       pneumothorax found.The lungs are poorly inflated with 
right basilar atelectasis less       likely pneumonia.  Heart size is normal.  
No effusion or pneumothorax       can be seen.               Osseous structures 
appear to be intact.                 IMPRESSION: No pneumothorax with the small 
diameter chest tube in  place.  Poor breath with right basilar atelectasis, less
likely         pneumonia Location: U19                  ** Electronically Signed
by LEI Fuller **          **            on 2019 at 1703      
       **                      Reported and signed by: Clifford Fuller M.D. 
                 CC: Bhavesh Zimmer MD                                       
                                    Technologist: JUAN J RIZVI           
                        Trnscrd Date/Time/By: 2019 (4387) : By: [facility] 
        PAGE  1                       Signed Report                             
   FAX: Bhavesh Quiroz [phone]    [place]:   St: 
ADM-----------------------------------------------
--------------------------------  Name:   BRENDAN FISHER                  [facility]         : 1945  Age/S: 73/M           [address] [facility]    Unit #: [id number]   Loc: [address]           
      Phys: Bhavesh Zimmer MD             [id number]                            
Acct: [id number] Dis Date:               Status: ADM IN                 PHONE 
#: [phone]     Exam Date:     2019     1632                   FAX #: 
[phone]     Reason: CHEST TUBE CLAMP FOR SIX HOURS                     
EXAMS:                              CPT CODE:      [id number] XR CHEST 1 V       
                       [address]               &lt;Continued&gt; Orig Print D/T: S: 
2019 (7388)                                    PAGE  2                    
  Signed Report- XR CHEST 1 -81-70 09:46:00 FAX: Bhavesh Quiroz [phone]    [place]:   St: ADM-------------------------------
------------------------------------------------  Name:   BRENDAN FISHER       
          [facility]                      : 1945  Age/S: 73/M       
   [address]    Unit#: [id number]      Loc: [address]                  Phys: Bhavesh Zimmer MD          [id number]    
                        Acct: [id number] Dis Date:               Status: ADM 
IN                                 PHONE #: [phone]     Exam Date:     
201945           FAX #: [phone]     Reason: pneumothorax      
                                EXAMS:                                          
   CPT CODE:      [id number] XR CHEST 1 V             [address]                    
REASON FOR EXAM: Pneumothorax.               Chest, single view, portable.      
        COMPARISON: 2019.               The small diameter chest 
tube mostly resides in the subcutaneous soft       tissues been retracted 
considerably.  The tip is in theleft pleural       space most likely.  There 
does not appear to be significant       pneumothorax present.  The degree of 
subcutaneous emphysema along the       chest wall may be improving.  Heart size 
normal.  Calcifications aortic arch.  Right lung is clear.               Old 
left rib fractures.                 IMPRESSION: Prominent subcutaneous 
emphysema.  Left-sided chest tube with only a tiny component possibly in the 
pleural space, most likely.          No detectable pneumothorax.                
            Location: U19                            ** Electronically Signed by
LEI Fuller **          **               on 2019 at 0946      
       **               Reported and signed by: Clifford Fuller M.D.        
        CC: Bhavesh Zimmer MD                  Technologist: SADA SANFORD   
                                      Trnscrd Date/Time/By: 2019 (0946) : 
By: [facility]            PAGE  1                       Signed Report            
               FAX: Bhavesh Quiroz [phone]    [place]:   St:
ADM----
---------------------------------------------------------------------------  
Name:   BRENDAN FISHER                 [facility]                      : 
1945  Age/S: 73/M           [address]    Unit #: 
[id number]      Loc: [address]                  Phys: Bhavesh Noriega MD             [id number]                             Acct: 
[id number] Dis Date:             Status: ADM IN                               
 PHONE #: [phone]     Exam Date:201945                   FAX 
#: [phone]     Reason: pneumothorax                    EXAMS:              
                                CPT CODE:      [id number] XRCHEST 1 V            
                  [address]               &lt;Continued&gt; Orig Print D/T: S:  (0987)                                                                  
PAGE  2        Signed ReportBASIC METABOLIC [id number] 07:16:00





             Test Item    Value        Reference Range Interpretation Comments

 

             SODIUM (test code = NA) 132 mmol/l   134.0-147.0  L            

 

             POTASSIUM (test code = K) 2.9 mmol/L   3.6-5.2      L            

 

             CHLORIDE (test code = CL) 97 mmol/l    98.0-107.0   L            

 

             CARBON DIOXIDE (test code = CO2) 24.1 mmol/l  21.0-33.0    N       

     

 

             ANION GAP (test code = GAP) 13.8         0-20         N            

 

             GLUCOSE (test code = GLU) 74 mg/dl     70.0-110.0   N            

 

             BLOOD UREA NITROGEN (test code = 7 mg/dl      7.0-18.0     N       

     



             BUN)                                                

 

             CREATININE (test code = CREAT) 0.79 mg/dL   0.60-1.30    N         

   

 

             GFR NON BLACK (test code = 102 mL/min   70-80        H            



             GFRNONBLACK)                                        

 

             GFR BLACK (test code = GFRBLACK) 124 mL/min   85-97        H       

     

 

             CALCIUM (test code = CA) 8.1 mg/dl    8.0-10.5     N            



[id number] 07:16:00





             Test Item    Value        Reference Range Interpretation Comments

 

             MAGNESIUM (test code = MAG) 1.9 mg/dl    1.8-2.4      N            



BASIC METABOLIC [id number] 07:00:00





             Test Item    Value        Reference Range Interpretation Comments

 

             SODIUM (test code = NA) 132 mmol/l   134.0-147.0  L            

 

             POTASSIUM (test code = K) 2.9 mmol/L   3.6-5.2      L            

 

             CHLORIDE (test code = CL) 97 mmol/l    98.0-107.0   L            

 

             CARBON DIOXIDE (test code = CO2) 24.1 mmol/l  21.0-33.0    N       

     

 

             ANION GAP (test code = GAP) 13.8         0-20         N            

 

             GLUCOSE (test code = GLU)  mg/dl       70.0-110.0                

 

             BLOOD UREA NITROGEN (test code =  mg/dl       7.0-18.0             

     



             BUN)                                                

 

             CREATININE (test code = CREAT)  mg/dL       0.60-1.30              

   

 

             GFR NON BLACK (test code =  mL/min      70-80                     



             GFRNONBLACK)                                        

 

             GFR BLACK (test code = GFRBLACK)  mL/min      85-97                

     

 

             CALCIUM (test code = CA)  mg/dl       8.0-10.5                  



[id number] 07:00:00





             Test Item    Value        Reference Range Interpretation Comments

 

             MAGNESIUM (test code = MAG)  mg/dl       1.8-2.4                   



CBC W/AUTO [id number] 06:50:00





             Test Item    Value        Reference Range Interpretation Comments

 

             WHITE BLOOD CELL (test code = WBC) 11.4 K/mm3   4.5-11.0     H     

       

 

             RED BLOOD CELL (test code = RBC) 3.06 M/mm3   4.40-5.90    L       

     

 

             HEMOGLOBIN (test code = HGB) 7.9 gm/dL    13.0-17.0    L           

 

 

             HEMATOCRIT (test code = HCT) 27.0 %       36.0-48.0    L           

 

 

             MEAN CELL VOLUME (test code = MCV) 88.2 UM3     80.0-94.0    N     

       

 

             MEAN CELL HGB (test code = MCH) 25.8 UUG     25.5-32.5    N        

    

 

             MEAN CELL HGB CONCETRATION (test 29.3 gm/dL   29.0-35.5    N       

     



             code = MCHC)                                        

 

             RED CELL DISTRIBUTION WIDTH (test 23.4 %       11.5-15.0    H      

      



             code = RDW)                                         

 

             PLATELET COUNT (test code = PLT) 238 K/mm3    150-400      N       

     

 

             MEAN PLATELET VOLUME (test code = 10.0 fl      7.4-10.4     N      

      



             MPV)                                                

 

             NEUTROPHIL % (test code = NT%) 80.8 %       49.0-76.0    H         

   

 

             LYMPHOCYTE % (test code = LY%) 8.1 %        23.0-38.0    L         

   

 

             MONOCYTE % (test code = MO%) 9.3 %        1.0-10.0     N           

 

 

             EOSINOPHIL % (test code = EO%) 0.9 %        1.0-5.0      L         

   

 

             BASOPHIL % (test code = BA%) 0.2 %        0.0-1.0      N           

 

 

             NEUTROPHIL # (test code = NT#) 9.2 K/mm3    2.4-6.3      H         

   

 

             LYMPHOCYTE # (test code = LY#) 0.9 K/mm3    1.2-4.0      L         

   

 

             MONOCYTE # (test code = MO#) 1.1 K/mm3    0.0-0.6      H           

 

 

             EOSINOPHIL # (test code = EO#) 0.1 K/MM3    0.0-0.7      N         

   

 

             BASOPHIL # (test code = BA#) 0.0 K/mm3    0.0-0.2      N           

 



PROTHROMBIN [id number] 06:46:00





             Test Item    Value        Reference Range Interpretation Comments

 

             PROTHROMBIN TIME 13.3 SECONDS 9.9-12.8     H            



             PATIENT (test code =                                        



             PTP)                                                

 

             INTERNATIONAL NORMAL 1.1          0.89-1.14    N            THE INR

 IS TO BE USED



             RATIO (test code =                                        ONLY FOR 

MONITORING



             INR)                                                ORAL



                                                                 ANTICOAGULANTTH

ERAPY.



                                                                 THE FOLLOWING A

RE



                                                                 SUGGESTED RANGE

S FROM



                                                                 [facility]

LEGE OF



                                                                 CHEST



                                                                 PHYSICIANS:ANNITA

CATION



                                                                 



                                                                                

INR



                                                                 VALUEPROPHYLAXI

S OF



                                                                 VENOUS THROMBOS

IS



                                                                 (ORTHOPEDIC TONIA

ANNA)



                                                                 



                                                                   2.0 - 3.0PROP

HYLAXIS



                                                                 OF VENOUS THROM

BOSIS



                                                                 (OTHER THAN HIG

H-RISK



                                                                 SURGERY)



                                                                    2.0 - 3.0TRE

ATMENT



                                                                 OF DEEP VEIN



                                                                 THROMBOSIS OR



                                                                 PULMONARY EMBOL

ISM



                                                                 



                                                                   2.0 - 3.0PREV

ENTION



                                                                 OF SYSTEMIC EMB

OLISM



                                                                 TISSUE HEART VA

LVES



                                                                 



                                                                   2.0 - 3.0  AC

[place]



                                                                 MYOCARDIAL INFA

RCTION



                                                                    (TO PREVENT



                                                                 SYSTEMIC EMBOLI

SM)



                                                                           2.0 -

 3.0



                                                                 ACUTE MYOCARDIA

L



                                                                 INFARCTION     

(TO



                                                                 PREVENT RECURRE

NT



                                                                 INFARCT)



                                                                 2.5 - 3.0  VALV

ULAR



                                                                 HEART DISEASE



                                                                                

 2.0 -



                                                                 3.0  ATRIAL



                                                                 FIBRILATION



                                                                                

   2.0



                                                                 - 3.0BILEAFLET



                                                                 MECHANICAL VALV

E IN



                                                                 AORTIC POSITION



                                                                 2.0 - 3.0MECHAN

ICAL



                                                                 PROSTHETIC VALV

ES



                                                                 (HIGH RISK)



                                                                 2.5 - 3.5PRESEN

CE OF



                                                                 LUPUS ANTICOAGU

LANT OR



                                                                  ANTIPHOSPHOLIP

ID



                                                                 ANTIBODIES



                                                                         2.5 - 3

.5



LACTIC ACID2019-02-10 10:54:00





             Test Item    Value        Reference Range Interpretation Comments

 

             LACTIC ACID (test code = LACT) 1.8 MMOL/L   0.4-2.0      N         

   



CBC W/AUTO DIFF2019-02-10 10:37:00





             Test Item    Value        Reference Range Interpretation Comments

 

             WHITE BLOOD CELL (test code = WBC) 11.4 K/mm3   4.5-11.0     H     

       

 

             RED BLOOD CELL (test code = RBC) 2.93 M/mm3   4.40-5.90    L       

     

 

             HEMOGLOBIN (test code = HGB) 7.6 gm/dL    13.0-17.0    L           

 

 

             HEMATOCRIT (test code = HCT) 25.5 %       36.0-48.0    L           

 

 

             MEAN CELL VOLUME (test code = MCV) 87.0 UM3     80.0-94.0    N     

       

 

             MEAN CELL HGB (test code = MCH) 25.9 UUG     25.5-32.5    N        

    

 

             MEAN CELL HGB CONCETRATION (test 29.8 gm/dL   29.0-35.5    N       

     



             code = MCHC)                                        

 

             RED CELL DISTRIBUTION WIDTH (test 22.9 %       11.5-15.0    H      

      



             code = RDW)                                         

 

             PLATELET COUNT (test code = PLT) 224 K/mm3    150-400      N       

     

 

             MEAN PLATELET VOLUME (test code = 10.3 fl      7.4-10.4     N      

      



             MPV)                                                

 

             NEUTROPHIL % (test code = NT%) 86.6 %       49.0-76.0    H         

   

 

             LYMPHOCYTE % (test code = LY%) 6.2 %        23.0-38.0    L         

   

 

             MONOCYTE % (test code = MO%) 6.5 %        1.0-10.0     N           

 

 

             EOSINOPHIL % (test code = EO%) 0.1 %        1.0-5.0      L         

   

 

             BASOPHIL % (test code = BA%) 0.2 %        0.0-1.0      N           

 

 

             NEUTROPHIL # (test code = NT#) 9.8 K/mm3    2.4-6.3      H         

   

 

             LYMPHOCYTE # (test code = LY#) 0.7 K/mm3    1.2-4.0      L         

   

 

             MONOCYTE # (test code = MO#) 0.7 K/mm3    0.0-0.6      H           

 

 

             EOSINOPHIL # (test code = EO#) 0.0 K/MM3    0.0-0.7      N         

   

 

             BASOPHIL # (test code = BA#) 0.0 K/mm3    0.0-0.2      N           

 



CBC W/AUTO DIFF9-02-10 06:01:00





             Test Item    Value        Reference Range Interpretation Comments

 

             WHITE BLOOD CELL (test code = WBC) 12.9 K/mm3   4.5-11.0     H     

       

 

             RED BLOOD CELL (test code = RBC) 3.01 M/mm3   4.40-5.90    L       

     

 

             HEMOGLOBIN (test code = HGB) 7.8 gm/dL    13.0-17.0    L           

 

 

             HEMATOCRIT (test code = HCT) 26.3 %       36.0-48.0    L           

 

 

             MEAN CELL VOLUME (test code = MCV) 87.4 UM3     80.0-94.0    N     

       

 

             MEAN CELL HGB (test code = MCH) 25.9 UUG     25.5-32.5    N        

    

 

             MEAN CELL HGB CONCETRATION (test 29.7 gm/dL   29.0-35.5    N       

     



             code = MCHC)                                        

 

             RED CELL DISTRIBUTION WIDTH (test 22.9 %       11.5-15.0    H      

      



             code = RDW)                                         

 

             PLATELET COUNT (test code = PLT) 230 K/mm3    150-400      N       

     

 

             MEAN PLATELET VOLUME (test code = 11.0 fl      7.4-10.4     H      

      



             MPV)                                                

 

             NEUTROPHIL % (test code = NT%) 85.2 %       49.0-76.0    H         

   

 

             LYMPHOCYTE % (test code = LY%) 7.7 %        23.0-38.0    L         

   

 

             MONOCYTE % (test code = MO%) 6.3 %        1.0-10.0     N           

 

 

             EOSINOPHIL % (test code = EO%) 0.2 %        1.0-5.0      L         

   

 

             BASOPHIL % (test code = BA%) 0.1 %        0.0-1.0      N           

 

 

             NEUTROPHIL # (test code = NT#) 11.0 K/mm3   2.4-6.3      H         

   

 

             LYMPHOCYTE # (test code = LY#) 1.0 K/mm3    1.2-4.0      L         

   

 

             MONOCYTE # (test code = MO#) 0.8 K/mm3    0.0-0.6      H           

 

 

             EOSINOPHIL # (test code = EO#) 0.0 K/MM3    0.0-0.7      N         

   

 

             BASOPHIL # (test code = BA#) 0.0 K/mm3    0.0-0.2      N           

 

 

             HYPOCHROMIA (test code = HYPO) 1+                                  

   

 

             MICROCYTOSIS (test code = MICR) 1+                                 

    

 

             MACROCYTOSIS (test code = MACR) 1+                                 

    

 

             TARGET CELLS (test code = TGT) 2+                                  

   

 

             PLATELET ESTIMATE (test code = ADQ                                 

   



             PLTEST)                                             



Comments to Phlebotomist: Patient is currently in the ED                        
  waiting on a bedCARDIAC ENZYMES PROFILE2019-02-10 05:05:00





             Test Item    Value        Reference Range Interpretation Comments

 

             CREATINE KINASE (CK) 151 Units/L         N            



             (test code = CK)                                        

 

             CKMB (test code = 1.0 NG/ML    0.5-5.0      N            DISREGARD 

CKMB INDEX



             CKMBT)                                              CALCULATION WHE

NEVER



                                                                 THE CKMBT ISREP

ORTED



                                                                 AS <0.5

 

             CKMB INDEX (test 0.7          0.0-2.5      N            



             code = CKMBI)                                        

 

             TROPONIN-I (test 0.09 NG/ML   0.00-0.06    H            REFERENCE R

MARC



             code = TROPI)                                        TROPONIN I HEA

LTHY



                                                                 INDIVIDUALS:  <

0.06



                                                                 ng/mL R/O ISCHE

ZENY:



                                                                 0.07 - 0.60 ng/

mL



                                                                 CUT-OFF RANGE F

OR AMI:



                                                                  0.60 - 1.5 ng/

mL



:     Specimen comments: Please see the initial specimen in      the lab as the 
first     draw on this series     Comments to Phlebotomist: Please draw the 2nd 
specimen      q4hrs after the     first and the 3rd 8hrs after the 
first.Specimen comments: drawn Q4hrs then [id number] to Phlebotomist: Patient
is currently in the ED                           waiting on a bedCOMPREHENSIVE 
METABOLIC PANEL2019-02-10 04:45:00





             Test Item    Value        Reference Range Interpretation Comments

 

             SODIUM (test code = NA) 134 mmol/l   134.0-147.0  N            

 

             POTASSIUM (test code = K) 3.0 mmol/L   3.6-5.2      L            

 

             CHLORIDE (test code = CL) 98 mmol/l    98.0-107.0   N            

 

             CARBON DIOXIDE (test code = CO2) 27.2 mmol/l  21.0-33.0    N       

     

 

             ANION GAP (test code = GAP) 11.8         0-20         N            

 

             GLUCOSE (test code = GLU) 93 mg/dl     70.0-110.0   N            

 

             BLOOD UREA NITROGEN (test code = 9 mg/dl      7.0-18.0     N       

     



             BUN)                                                

 

             CREATININE (test code = CREAT) 1.11 mg/dL   0.60-1.30    N         

   

 

             GFR NON BLACK (test code = 69 mL/min    70-80        L            



             GFRNONBLACK)                                        

 

             GFR BLACK (test code = GFRBLACK) 84 mL/min    85-97        L       

     

 

             TOTAL PROTEIN (test code = PROT) 5.9 gm/dL    6.4-8.2      L       

     

 

             ALBUMIN (test code = ALB) 1.7 gm/dl    3.2-4.7      L            

 

             CALCIUM (test code = CA) 8.0 mg/dl    8.0-10.5     N            

 

             BILIRUBIN TOTAL (test code = 1.6 mg/dl    0.0-1.0      H           

 



             BILT)                                               

 

             SGOT/AST (test code = AST) 68 Units/L   15.0-37.0    H            

 

             SGPT/ALT (test code = ALT) 59 Units/L   12.0-78.0    N            

 

             ALKALINE PHOSPHATASE TOTAL (test 146 Units/L  50.0-136.0   H       

     



             code = ALKP)                                        



Comments to Phlebotomist: Patient is currently in the ED                        
  waiting on a bedLIPID PROFILE (CORONARY RISK)2019-02-10 04:45:00





             Test Item    Value        Reference Range Interpretation Comments

 

             TRIGLYCERIDES (test code = TRIG) 77 mg/dl     40.0-150.0   N       

     

 

             CHOLESTEROL (test code = CHOL) 98 mg/dl     0.0-200.0    N         

   

 

             CHOLESTEROL/HDL RATIO (test code = 8.9 RATIO                       

       



             CHOLHDL)                                            

 

             HDL CHOLESTEROL (test code = HDL) 11 mg/dl     30.0-60.0    L      

      

 

             LIPOPROTEIN LDL (test code = LDL) 81 mg/dl            N      

      



Comments to Phlebotomist: Patient is currently in the ED                        
  waiting on a [id number] 04:45:00





             Test Item    Value        Reference Range Interpretation Comments

 

             MAGNESIUM (test code = MAG) 1.4 mg/dl    1.8-2.4      L            



Comments to Phlebotomist: Patient is currently in the ED                        
  waiting on a bedCBC W/AUTO DIFF2019-02-10 04:31:00





             Test Item    Value        Reference Range Interpretation Comments

 

             WHITE BLOOD CELL (test code = WBC) 12.9 K/mm3   4.5-11.0     H     

       

 

             RED BLOOD CELL (test code = RBC) 3.01 M/mm3   4.40-5.90    L       

     

 

             HEMOGLOBIN (test code = HGB) 7.8 gm/dL    13.0-17.0    L           

 

 

             HEMATOCRIT (test code = HCT) 26.3 %       36.0-48.0    L           

 

 

             MEAN CELL VOLUME (test code = MCV) 87.4 UM3     80.0-94.0    N     

       

 

             MEAN CELL HGB (test code = MCH) 25.9 UUG     25.5-32.5    N        

    

 

             MEAN CELL HGB CONCETRATION (test 29.7 gm/dL   29.0-35.5    N       

     



             code = MCHC)                                        

 

             RED CELL DISTRIBUTION WIDTH (test 22.9 %       11.5-15.0    H      

      



             code = RDW)                                         

 

             PLATELET COUNT (test code = PLT) 230 K/mm3    150-400      N       

     

 

             MEAN PLATELET VOLUME (test code = 11.0 fl      7.4-10.4     H      

      



             MPV)                                                

 

             NEUTROPHIL % (test code = NT%) 85.2 %       49.0-76.0    H         

   

 

             LYMPHOCYTE % (test code = LY%) 7.7 %        23.0-38.0    L         

   

 

             MONOCYTE % (test code = MO%) 6.3 %        1.0-10.0     N           

 

 

             EOSINOPHIL % (test code = EO%) 0.2 %        1.0-5.0      L         

   

 

             BASOPHIL % (test code = BA%) 0.1 %        0.0-1.0      N           

 

 

             NEUTROPHIL # (test code = NT#) 11.0 K/mm3   2.4-6.3      H         

   

 

             LYMPHOCYTE # (test code = LY#) 1.0 K/mm3    1.2-4.0      L         

   

 

             MONOCYTE # (test code = MO#) 0.8 K/mm3    0.0-0.6      H           

 

 

             EOSINOPHIL # (test code = EO#) 0.0 K/MM3    0.0-0.7      N         

   

 

             BASOPHIL # (test code = BA#) 0.0 K/mm3    0.0-0.2      N           

 



Comments to Phlebotomist: Patient is currently in the ED                        
  waiting on a bedPROCALCITONIN (PCT)2019-02-10 04:09:00





             Test Item    Value        Reference Range Interpretation Comments

 

             PROCALCITONIN (PCT) 0.21 ng/mL   0.00-0.05    H            PROCALCI

TONIN (PCT)



             (test code = PROCAL)                                        NORMAL 

RANGE (ADULT):



                                                                 <0.05 NG/ML. * 

a



                                                                 concentration <

0.5



                                                                 ng/mL represent

s a low



                                                                 risk of  severe

 sepsis



                                                                 and/or septic s

hock.* a



                                                                 concentration >

2 ng/mL



                                                                 represents a hi

gh risk



                                                                 of severe  seps

is



                                                                 and/or septic



                                                                 shock.Neverthel

ess,



                                                                 concentrations 

<0.5



                                                                 ng/mL do not ex

clude



                                                                 aninfection, on

 account



                                                                 of localized in

fections



                                                                 (withoutsystemi

c signs)



                                                                 which can be as

sociated



                                                                 with such



                                                                 lowconcentratio

ns, or a



                                                                 systemic infect

ion in



                                                                 its initialstag

es (< 6



                                                                 hours). Further

more,



                                                                 increased



                                                                 procalcitoninca

n occur



                                                                 without infecti

on. PCT



                                                                 concentrations 

between



                                                                 0.5and 2.0 ng/m

L should



                                                                 be interpreted 

taking



                                                                 into account



                                                                 thepatient's hi

story.



                                                                 It is recommend

ed to



                                                                 retest PCT with

in6-24



                                                                 hours if any



                                                                 concentrations 

<2 ng/mL



                                                                 are obtained.



Specimen comments: Clean CatchPROCALCITONIN (PCT)2019-02-10 03:15:00





             Test Item    Value        Reference Range Interpretation Comments

 

             PROCALCITONIN (PCT) 0.21 ng/mL   0.00-0.05    H            PROCALCI

TONIN (PCT)



             (test code = PROCAL)                                        NORMAL 

RANGE (ADULT):



                                                                 <0.05 NG/ML. * 

a



                                                                 concentration <

0.5



                                                                 ng/mL represent

s a low



                                                                 risk of  severe

 sepsis



                                                                 and/or septic s

hock.* a



                                                                 concentration >

2 ng/mL



                                                                 represents a hi

gh risk



                                                                 of severe  seps

is



                                                                 and/or septic



                                                                 shock.Neverthel

ess,



                                                                 concentrations 

<0.5



                                                                 ng/mL do not ex

clude



                                                                 aninfection, on

 account



                                                                 of localized in

fections



                                                                 (withoutsystemi

c signs)



                                                                 which can be as

sociated



                                                                 with such



                                                                 lowconcentratio

ns, or a



                                                                 systemic infect

ion in



                                                                 its initialstag

es (< 6



                                                                 hours). Further

more,



                                                                 increased



                                                                 procalcitoninca

n occur



                                                                 without infecti

on. PCT



                                                                 concentrations 

between



                                                                 0.5and 2.0 ng/m

L should



                                                                 be interpreted 

taking



                                                                 into account



                                                                 thepatient's hi

story.



                                                                 It is recommend

ed to



                                                                 retest PCT with

in6-24



                                                                 hours if any



                                                                 concentrations 

<2 ng/mL



                                                                 are obtained.



Specimen comments: Clean CatchCARDIAC ENZYMES [id number] 21:19:00





             Test Item    Value        Reference Range Interpretation Comments

 

             CREATINE KINASE (CK) 184 Units/L         N            



             (test code = CK)                                        

 

             CKMB (test code = 1.3 NG/ML    0.5-5.0      N            DISREGARD 

CKMB INDEX



             CKMBT)                                              CALCULATION WHE

NEVER



                                                                 THE CKMBT ISREP

ORTED



                                                                 AS <0.5

 

             CKMB INDEX (test 0.7          0.0-2.5      N            



             code = CKMBI)                                        

 

             TROPONIN-I (test 0.13 NG/ML   0.00-0.06    H            REFERENCE R

MARC



             code = TROPI)                                        TROPONIN I HEA

LTHY



                                                                 INDIVIDUALS:  <

0.06



                                                                 ng/mL R/O ISCHE

ZENY:



                                                                 0.07 - 0.60 ng/

mL



                                                                 CUT-OFF RANGE F

OR AMI:



                                                                  0.60 - 1.5 ng/

mL



CALLED [facility] AT 2018 TO DRAW CARDIAC.     Specimen comments: Please see the 
initial specimen in    the lab as the first     draw on this series     Comments
to Phlebotomist: Please draw the 2nd specimen      q4hrs after the     first and
the 3rd 8hrs after the first.Specimen comments: drawn Q4hrs then [id number] 
to Phlebotomist: Patient is currently in the ED                           
waiting on a bedLACTIC ACID [id number] 21:15:00





             Test Item    Value        Reference Range Interpretation Comments

 

             LACTIC ACID REPEAT (test code = 3.2 mmol/L   0.4-2.0      H        

    



             LACTR)                                              



CALLED [facility] AT 2019 TO DRAW LACTIC ACID.PT IS RECIEVING BLOOD WILL TAKE 
3HRS. (1500)  E.LAB..- XR CHEST 1 -83-56 15:04:00 FAX: Lori Vidales MD                   [place]:   St: 
REG-------------------------------
------------------------------------------------  Name:   BRENDAN FISHER       
          [facility]                      : 1945  Age/S: 73/M       
   [address] [facility]    Unit#: [id number]      Loc: [address]                  Phys: Lori Moran MD          [id number]       
                     Acct: [id number] Dis Date:               Status: REG ER  
                              PHONE #: [phone]     Exam Date:     
2019     1444           FAX #: [phone]     Reason: s/p chest tube    
                                EXAMS:                                          
   CPT CODE:      [id number] XR CHEST 1 V             [id number]                    
Chest Radiograph               History: s/p chest tube      Comparison: , earlier the same day               Location: R16               A single 
frontal view of the chest is submitted.                The heart appears 
unchanged in size.        Pulmonary vasculature is unremarkable.         A left-
sided chest tube is again identified.  No convincing       pneumothorax can be 
appreciated.       The bones appear unchanged.       Subcutaneous emphysema is 
again identified.       There is a calcified granuloma in the right lung.       
         IMPRESSION:                   A left-sided chest tube is again 
identified.  No convincing         pneumothorax is identified.                  
           ** Electronically Signed by LEI Gillespie **           **    
   on 2019 at 1507             **                      Reported and signed
by: Jay Gillespie M.D.         CC: Lori Moran MD                        
                                  Technologist: KATHY MONTESINOS                   
       Trnscrd Date/Time/By: 2019 (0152) : By: Tomas            PAGE  
1                       Signed Report                                 FAX: Lori Vidales MD                  [place]:   St: 
REG----------------------------------------------------------------
---------------  Name:   BRENDAN FISHER                  [facility]         
            : 1945  Age/S: 73/M           [address] [facility] 
  Unit #: [id number]      Loc: [address]                  Phys:
Lori Moran MD                [id number]           Acct: [id number] Dis Date:  
            Status: REG ERPHONE #: [phone]     Exam Date:     2019   
 1445                   FAX #: [phone]  Reason: s/p chest tube             
                       EXAMS:             CPT CODE:      [id number] XR CHEST 1 V 
                             66834&lt;Continued&gt; Orig Print D/T: S: 
2019 (7620)                   PAGE  2                       Signed Report-
CT CHEST W/O [id number] 13:10:00 FAX: Bhavesh Quiroz  
[phone]    [place]:   St: REG FAX: Lori Vidales MD              
    
------------------------------------------------------------------------------- 
Name:  BRENDAN FISHER                   [facility]                     : 
1945   Age/S: 73/M           [address] [facility]    Unit: 
[id number]       Loc: [address]        [place], [place]                  Phys:  
Lori Moran MD               [id number]                             Acct:
[id number] Dis Date:               Status: REG ER                             
    PHONE #: [phone]     Exam Date: 2019 1243                        
FAX #: [phone]     Reason: PNEUMO                                          
EXAMS:                                               CPT CODE:      [id number] CT
CHEST W/O CONTRAST                      [address]                    Location: T18  
            Chest CT , 19                TECHNIQUE:  Chest CT without IV  
contrast was performed on a helical       scanner. Contiguous direct 5mm axial 
slice thickness acquired,        scanning from thoracic inlet through  the 
diaphragms..  The       examination was performed on updated helical CT scanner 
utilizing       low-dose radiation technique.  Automatic exposure control was 
utilized      to reduce radiation dosage               CLINICAL HISTORY: Assess 
pneumothorax.               COMPARISON EXAMS: Chest x-ray exam 19 and to 
the CT examination       of the abdomen and pelvisof 19. The exam is also 
being       correlated with chest CTA exam 19                FINDINGS:    
          There is a moderate degree of subcutaneous emphysema predominantly    
  along the left chest wall. It does extending across the midline       dorsally
along the back. There is presence of a small left-sided       pneumothorax 
measuring approximately 15%. This in particular seen   along left basilar region
of the left hemithorax similar to the CT       examination of the abdomen 
findings conducted on today's exam. Finding       was not identified on the 
prior chest CTA exam conducted in 2018.       Pneumomediastinum is also 
identified in particular adjacent to the       thoracic aorta. Small amount of 
pneumopericardium is also seen. No       significant or definite right-sided 
pneumothorax. There is compressive       atelectatic changes in the left lower 
lobe. Subcutaneousemphysema       even extends along the neck along the trachea 
and great vessels.  Multiple rib fractures is seen involving the left seventh 
through       ninth ribs at least laterally. Minimal displacement of the left 
eighth       rib fracture seen on image #43. No definite right-sided rib 
fracture       is seen. The sternum appears intact and the scapula appears 
intact. Nopathological nodes. Heart size is within normal limits. Small hiatal  
    hernia. In the upper abdomen, fatty changes in the liver. No       
mediastinal hematoma.                 IMPRESSION:    PAGE  1                    
  Signed Report                    (CONTINUED)  FAX: Y      Abu-Atherah,Emran  M
 [phone]    [place]:   St: REG FAX: N      Lori Moran MD             
     ---
----------------------------------------------------------------------------  
Name:  BRENDAN FISHER                  [facility]                     : 
1945   Age/S: 73/M           [address] [facility]    Unit: 
[id number]       Loc: [address]                  Phys:  
Lori Moran MD               [id number]                             Acct:  
[id number] Dis Date:               Status: REG ER                             
    PHONE #: [phone]     Exam Date: 2019 1243                        
FAX #: [phone]     Reason: PNEUMO                     EXAMS:               
                               CPT CODE:      [id number] CT CHEST W/O CONTRAST   
                  [address]               &lt;Continued&gt;          Subcutaneousair
predominantly along the left chest wall of moderate         degree. There is 
also pneumomediastinum and a small amount of         pneumopericardium. Small 
amount of air even extends along the  subcutaneous compartment of the lower 
portion of the neck Left-sided         pneumothorax in particular along the 
basilar lateral segment of the         left hemithorax of approximately 15% 
similar to CT findings of the         abdomen conducted recently. No tension or 
mediastinal shift. No         definite right-sided pneumothorax is seen. 
Multiple acute left-sided         rib fractures  are  noted. Probable 
atelectatic changes seen         posteriorly in the left lower lobe with a very 
small left-sided         pleural effusion                          ** 
Electronically Signed by LEI Chapman **        **               
 on 2019 at 1310                ** Reported and signed by: Julienne Gaspar M.D.                       CC: Bhavesh Zimmer MD; Lori Moran MD 
            Technologist: EDGAR PIERRE                                         
Trnscrd Dt/Tm: 2019 (9980) [id number]                         Orig Print 
D/T: S: 2019 (8533              PAGE  2                       Signed 
Report- XR CHEST 1 -48-57 12:19:00 FAX: Bhavesh Quiroz 
[phone]    [place]:   St: REG FAX: Lori Vidales MD             
    
------------------------------------------------------------------------------- 
Name:   BRENDAN FISHER                  [facility]                      :
1945  Age/S: 73/M           [address] [facility] [employer]    Unit #: 
[id number]      Loc: [address]                  Phys: 
Lori Moran MD                [id number]                             Acct: [id number]
[id number] Dis Date:               Status: REG ER                              
  PHONE #: [phone]     Exam Date:     2019     1211                  
FAX #: [phone]     Reason: Code SEPSIS                                     
  EXAMS:                                               CPT CODE:      [id number] 
XR CHEST 1 V                               [address]                    LOCATION 
CODE: B2               CHEST AP       LEFT RIB SERIES, 2 VIEWS               
HISTORY:  Rib pain.  Fall            COMPARISON:  Chest dated 2018.         
     FINDINGS:       Acute left 9th and 10thlateral rib fractures.  Left 
pneumothorax is       better seen on CT abdomen of same day.    Mild compressive
atelectasis       at left lung base. Extensive left chest wall subcutaneous 
emphysemaextends to the left lung base.               Cardiomediastinal 
silhouette is within normal limits.              IMPRESSION:             1.  
Acute 9th and 10th lateral rib fractures. 2. Left pneumothorax is better seen on
CT abdomen of same day.                   3.  Extensive leftchest wall 
subcutaneous emphysema extends to the         left lung base.                   
Reported to Lori Moran MD at 12:18 PM on 2019.                   
*******************FOR INTERNAL CODING PURPOSES ONLY****************         
RESULT CODE: CVRMD               ** Electronically Signed by LEI Montgomery **       **                  on 2019 at 1219              
  **                   Reported and signed by: Yumiko Montgomery M.D.   
      CC: Bhavesh Zimmer MD; Lori Moran MD                     
Technologist: FREDY York
e/Time/By: 2019 (2696) : By: [facility]            PAGE  1                 
     Signed Report                               FAX: GEOVANNY ZimmerBhavesh [phone]    [place]:   St: REGFAX: Lori Vidlaes MD            
     ----------------------------------------------------
---------------------------  Name:   BRENDAN FISHER                  [facility]    : 1945  Age/S: 73/M           [address] [place] [address] 
  Unit #: [id number]      Loc: [address]                  
Phys: Lori Moran MD                [id number]                       Acct: 
[id number] Dis Date:               Status: REG ER            PHONE #: 
[phone]     Exam Date:     2019     1211                   FAX #: [phone]
[phone]     Reason: Code SEPSIS                                        EXAMS: 
                       CPT CODE:      [id number] XR CHEST 1 V                    
          [address]            &lt;Continued&gt; Orig Print D/T: S: 2019 
(9464)                              PAGE  2                       Signed Report-
XR RIBS UNI 2 V  12:19:00 FAX: Bhavesh Quiroz 
[phone]    [place]: EM  St: REG FAX: Lori Vidales MD             
    
------------------------------------------------------------------------------- 
Name:   BRENDAN FISHER                  [facility]                      :
1945  Age/S: 73/M           [address] [facility] [facility] [employer]    Unit #: 
[id number]      Loc: [address]       [place], [place]                  Phys: 
Lori Moran MD                [id number]                             Acct: [id number]
[id number] Dis Date:               Status: REG ER                              
  PHONE #: [phone]     Exam Date:     2019     1211                  
FAX #: [phone]     Reason: fall                                           
EXAMS:                                               CPT CODE:      [id number] XR
RIBS UNI 2 V LT                         [address]                    LOCATION CODE: 
B2               CHEST AP       LEFT RIB SERIES, 2 VIEWS               HISTORY: 
Rib pain.  Fall            COMPARISON:  Chest dated 2018.               
FINDINGS:       Acute left 9th and 10thlateral rib fractures.  Left pneumothorax
is       better seen on CT abdomen of same day.    Mild compressive atelectasis 
     at left lung base. Extensive left chest wall subcutaneous emphysemaextends 
to the left lung base.               Cardiomediastinal silhouette is within 
normal limits.              IMPRESSION:             1.  Acute 9th and 10th 
lateral rib fractures. 2. Left pneumothorax is better seen on CT abdomen of same
day.                   3.  Extensive leftchest wall subcutaneous emphysema 
extends to the         left lung base.                   Reported to Lori Moran MD at 12:18 PM on 2019.                   *******************FOR 
INTERNAL CODING PURPOSES ONLY****************         RESULT CODE: CVRMD        
      ** Electronically Signed by LEI Montgomery **       **     
            on 2019 at 1219                 **                   Reported 
and signed by: Yumiko Montgomery M.D.          CC: Bhavesh Zimmer MD;
Lori Moran MD                     Technologist: FREDY SHELBY               
                          Trnscrd Date/Time/By: 2019 (8791) : By: 
OumarEF            PAGE  1                       Signed Report                
              FAX: Bhavesh Quiroz [phone]    [place]:   St: 
REGFAX: Lori Vidales MD                  
------------------------------------------------------------------------------- 
Name:   BRENDAN FISHER                  [facility]    : 1945  
Age/S: 73/M           [address] [place], [place] [address]    Unit #: [id number]      
c: [address]       [place], [place]                  Phys: Lori Moran MD      
         [id number]                       Acct: [id number] Dis Date:               
Status: REG ER            PHONE #: [phone]     Exam Date:     2019   
 1211                   FAX #: [phone]     Reason: fall                    
                          EXAMS:                         CPT CODE:      
[id number] XR RIBS UNI 2 V LT                         [address]            
&lt;Continued&gt; Orig Print D/T: S: 2019 (7016)                          
   PAGE  2                       Signed Report- CT ABD PELVIS W/[id number] 
12:15:00 FAX: Bhavesh Quiroz  [phone]    [place]:   St: REG 
FAX: N      Lori Moran MD                   
------------------------------------------------------------------------------- 
Name:  BRENDAN FISHER                   [facility]                     : 
1945   Age/S: 73/M           [address] [place], [place] [address]    Unit: 
[id number]       Loc: [address]        [place], [place]                  Phys:  
Lori Moran MD               [id number]                             Acct:
[id number] Dis Date:               Status: REG ER                             
    PHONE #: [phone]     Exam Date: 2019 1155                        
FAX #: [phone]     Reason: Code SEPSIS                                     
   EXAMS:                                               CPT CODE:      [id number]
CT ABD PELVIS W/CONT                       [address]                    LOCATION 
CODE: B2               CT ABDOMEN AND PELVIS WITH CONTRAST               
HISTORY: Code sepsis   COMPARISON:  None available               TECHNIQUE: 
Serial axial CT the abdomen and pelvis were obtained from       above the 
diaphragm to the inferior pubic rami post-intravenous       contrast.  Coronal 
and sagittal reconstructions are provided.  One or       more of the following 
dose reductiontechniques were used: Automated       exposure control, adjustment
of the mA or KV according to patient       size, use of Iterative reconstruction
technique.  .28 mGy-cm               FINDINGS:       Lung bases: Moderate
sized left pneumothorax, partially imaged.  There       appears to be a small 
right pneumothorax as well.  Compressive       atelectasis at left lung base.  
Left chest wall subcutaneous       emphysema. Note of diffuse esophageal wall 
thickening up to 8 mm.               Liver: Severe diffuse low attenuation 
compatible with severe       emphysema.               Gallbladder:   No biliary 
dilatation.               Kidneys: No evidence of nephrolithiasis or 
hydronephrosis.  Vascular       calcifications are noted at bilateral renal 
arterial branches.               Pancreas:Normal               Spleen:  Normal 
sized.               Adrenal glands: Normal               Bowel:  No bowel 
obstruction.  Appendix not localized however no       pericecal inflammatory 
changes.  Colon is relatively decompressed.               Peritoneum: No free 
abdominal air or fluid.  Lymph nodes: No evidence of lymphadenopathy in the 
abdomen and pelvis.               Bladder/prostate: Prostate gland is of normal 
size.  Urinary bladder     PAGE  1                       Signed Report          
         (CONTINUED)  FAX: Bhavesh Quiroz  [phone]    [place]: 
  St: REG FAX: N      Lori Moran MD                   
------------------------------------------------------------------------------- 
Name:  BRENDAN FISHER                   [facility]      : 1945   
Age/S: 73/M           [address] [facility]    Unit: [id number]: 
[address]        [place], [place]                  Phys:  Lori Moran MD       
       [id number]                          Acct:  [id number] Dis Date:             
 Status: REG ER                 PHONE #: [phone]     Exam Date: 2019 
1155                        FAX #: [phone]     Reason: Code SEPSIS         
                               EXAMS:                            CPT CODE:      
[id number] CT ABD PELVIS W/CONT                       [address]               
&lt;Continued&gt;        is unremarkable.               Vascular: 
Atherosclerosis of aorta and branches without aneurysm.               Osseous: 
Left 9th and 10th lateral rib fractures.Grade 1       anterolisthesis at L4-L5 
facet arthrosis.  Disc space at L5-S1 intact.        IMPRESSION:         1. 
Moderate sized left pneumothorax, partially imaged.  Compressive   atelectasis 
at left lung base.  Left chest wall subcutaneous         emphysema.  Left 9th 
and 10th lateral rib fractures.                   2.  Note of diffuse esophageal
wall thickening up to 8 mm.                  3.  No acute intra-abdominal 
findings.                   4.  Incidental note of diffuse hepatic steatosis.   
               5.  Focal vascular calcifications, bilateral renal arterial 
branches.          No latricia nephrolithiasis.               ** Electronically 
Signed by LEI Montgomery **       **                  on 
2019 at 1215                 **       Reported and signed by: Yumiko Montgomery M.D.              CC: Bhavesh Zimmer MD;Lori Moran MD   
         Technologist: EDGAR PIERRE                                          
Trnscrd Dt/Tm: 2019 (8185) t.SDR.EFM                          Orig Print 
D/T: S: 2019 (9798             PAGE  2                       Signed Report
DRUGS OF ABUSE SCREEN [id number] 11:50:00





             Test Item    Value        Reference Range Interpretation Comments

 

             URN COCAINE (test code NEGATIVE     NEGATIVE                  Cocai

ne cut-off



             = COCAURN)                                          concentration: 

300



                                                                 ng/mL

 

             URN CANNABINOIDS (test NEGATIVE     NEGATIVE                  Canna

binoids cut-off



             code = CANNABURN)                                        concentrat

ion: 50



                                                                 ng/mL

 

             URN AMPHETAMINE (test NEGATIVE     NEGATIVE                  Amphet

amine cut-off



             code = AMPHETURN)                                        concentrat

ion: 1000



                                                                 ng/mL

 

             URN BARBITURATE (test NEGATIVE     NEGATIVE                  Barbit

urate cut-off



             code = BARBITURN)                                        concentrat

ion: 200



                                                                 ng/mL

 

             URN BENZODIAZEPINE NEGATIVE     NEGATIVE                  Benzodiaz

epine cut-off



             (test code = BENZOURN)                                        mekhi

ntration: 200



                                                                 ng/mL

 

             URN OPIATES (test code NEGATIVE     NEGATIVE                  Opiat

es cut-off



             = OPIATURN)                                         concentration: 

200



                                                                 ng/mL

 

             URN PHENCYCLIDINE (PCP) NEGATIVE     NEGATIVE                  Phen

cyclidine(PCP)



             (test code = PHENCURN)                                        cut-o

ff concentration:



                                                                 25 ng/ml

 

             URN METHADONE (test NEGATIVE     NEGATIVE                  Methadon

e cut-off



             code = METHAURN)                                        concentrati

on: 300



                                                                 ng/mL



Specimen comments: Clean CatchUA CULT [id number] 11:48:00





             Test Item    Value        Reference Range Interpretation Comments

 

             UA WBC (test code = 0-2 WBC/HPF  NONE                      



             WBCU)                                               

 

             UA SQUAMOUS CELLS (test 0-2 #/hpf                              



             code = SQU)                                         

 

             UA CULTURE NEEDED? Criteria not met                           



             (test code = UACULT) Criteria                               



Specimen comments: Clean CatchURINALYSIS [id number] 11:47:00





             Test Item    Value        Reference Range Interpretation Comments

 

             UA COLOR (test code = COLU) YELLOW                                 

 

             UA APPEARANCE (test code = CLEAR                                  



             APPU)                                               

 

             UA GLUCOSE DIPSTICK (test NORMAL mg/dl NORMAL                    



             code = DGLUU)                                        

 

             UA BILIRUBIN DIPSTICK (test NEGATIVE mg/dL NEGATIVE                

  



             code = BILU)                                        

 

             UA KETONE DIPSTICK (test NEGATIVE mg/dl NEGATIVE                  



             code = KETU)                                        

 

             UA SPECIFIC GRAVITY (test 1.010        1.000-1.030               



             code = SGU)                                         

 

             UA BLOOD DIPSTICK (test NEGATIVE Raissa/micL NEGATIVE                 

 



             code = RONNIE)                                         

 

             UA PH DIPSTICK (test code = 7.0          5.0-9.0                   



             ADELA)                                                

 

             UA PROTEIN DIPSTICK (test NEGATIVE mg/dl NEGATIVE                  



             code = PROU)                                        

 

             UA UROBILINIOGEN DIPSTICK NORMAL mg/dl NORMAL                    



             (test code = URO)                                        

 

             UA NITRITE DIPSTICK (test NEGATIVE     NEGATIVE                  



             code = MARC)                                        

 

             UA LEUKOCYTE ESTERASE NEGATIVE Jag/micL NEGATIVE                  



             DIPSTICK (test code = LEUU)                                        

 

             UA WBC (test code = WBCU) 0-2 WBC/HPF  NONE                      

 

             UA RBC (test code = RBCU) 0-2 RBC/HPF  0-3                       

 

             UA EPITHELIAL CELLS (test 0-3 EPI/HPF  0-3                       



             code = EPIU)                                        

 

             UA BACTERIA (test code = FEW          NONE                      



             BACU)                                               



Specimen comments: Clean CatchURINALYSIS [id number] 11:42:00





             Test Item    Value        Reference Range Interpretation Comments

 

             UA COLOR (test code = COLU) YELLOW                                 

 

             UA APPEARANCE (test code = CLEAR                                  



             APPU)                                               

 

             UA GLUCOSE DIPSTICK (test NORMAL mg/dl NORMAL                    



             code = DGLUU)                                        

 

             UA BILIRUBIN DIPSTICK (test NEGATIVE mg/dL NEGATIVE                

  



             code = BILU)                                        

 

             UA KETONE DIPSTICK (test NEGATIVE mg/dl NEGATIVE                  



             code = KETU)                                        

 

             UA SPECIFIC GRAVITY (test 1.010        1.000-1.030               



             code = SGU)                                         

 

             UA BLOOD DIPSTICK (test NEGATIVE Raissa/micL NEGATIVE                 

 



             code = RONNIE)                                         

 

             UA PH DIPSTICK (test code = 7.0          5.0-9.0                   



             ADELA)                                                

 

             UA PROTEIN DIPSTICK (test NEGATIVE mg/dl NEGATIVE                  



             code = PROU)                                        

 

             UA UROBILINIOGEN DIPSTICK NORMAL mg/dl NORMAL                    



             (test code = URO)                                        

 

             UA NITRITE DIPSTICK (test NEGATIVE     NEGATIVE                  



             code = MARC)                                        

 

             UA LEUKOCYTE ESTERASE NEGATIVE Jag/micL NEGATIVE                  



             DIPSTICK (test code = LEUU)                                        

 

             UA WBC (test code = WBCU)  WBC/HPF     NONE                      

 

             UA RBC (test code = RBCU)  RBC/HPF     0-3                       

 

             UA EPITHELIAL CELLS (test  EPI/HPF     0-3                       



             code = EPIU)                                        

 

             UA BACTERIA (test code =              NONE                      



             BACU)                                               



Specimen comments: Clean CatchBASIC METABOLIC [id number] 10:36:00





             Test Item    Value        Reference Range Interpretation Comments

 

             SODIUM (test code = NA) 128 mmol/l   134.0-147.0  L            

 

             POTASSIUM (test code = K) 3.3 mmol/L   3.6-5.2      L            

 

             CHLORIDE (test code = CL) 88 mmol/l    98.0-107.0   L            

 

             CARBON DIOXIDE (test code = CO2) 23.9 mmol/l  21.0-33.0    N       

     

 

             ANION GAP (test code = GAP) 19.4         0-20         N            

 

             GLUCOSE (test code = GLU) 119 mg/dl    70.0-110.0   H            

 

             BLOOD UREA NITROGEN (test code = 10 mg/dl     7.0-18.0     N       

     



             BUN)                                                

 

             CREATININE (test code = CREAT) 1.26 mg/dL   0.60-1.30    N         

   

 

             GFR NON BLACK (test code = 60 mL/min    70-80        L            



             GFRNONBLACK)                                        

 

             GFR BLACK (test code = GFRBLACK) 72 mL/min    85-97        L       

     

 

             CALCIUM (test code = CA) 9.2 mg/dl    8.0-10.5     N            



Specimen comments: Clean CatchHEPATIC FUNCTION PANEL -05-36 10:36:00





             Test Item    Value        Reference Range Interpretation Comments

 

             TOTAL PROTEIN (test code = PROT) 7.2 GM/DL    6.0-8.1      N       

     

 

             ALBUMIN (test code = ALB) 2.2 gm/dL    3.2-4.7      L            

 

             BILIRUBIN TOTAL (test code = 0.8 mg/dl    0.0-1.0      N           

 



             BILT)                                               

 

             BILIRUBIN DIRECT (test code = 0.3 mg/dl    0.0-0.3      N          

  



             BILD)                                               

 

             SGOT/AST (test code = AST) 114 Units/L  15.0-37.0    H            

 

             SGPT/ALT (test code = ALT) 83 Units/L   12.0-78.0    H            

 

             ALKALINE PHOSPHATASE TOTAL (test 203 Units/L  50.0-136.0   H       

     



             code = ALKP)                                        



Specimen comments: Clean [id number] 10:36:00





             Test Item    Value        Reference Range Interpretation Comments

 

             LIPASE (test code = LIP) 157 Units/L  65.0-230.0   N            



Specimen comments: Clean CatchB-TYPE NATRIURETIC [id number] 10:36:00





             Test Item    Value        Reference Range Interpretation Comments

 

             B-TYPE NATRIURETIC PEPTIDE (test 220 PG/ML    5-100        H       

     



             code = BNP)                                         



Specimen comments: Clean [id number] 10:36:00





             Test Item    Value        Reference Range Interpretation Comments

 

             TROPONIN-I (test 0.04 NG/ML   0.00-0.06    N            REFERENCE R

MARC TROPONIN



             code = TROPI)                                        I HEALTHY ANNITA

VIDUALS:



                                                                 <0.06 ng/mL R/O



                                                                 ISCHEMIA:  0.07

 - 0.60



                                                                 ng/mL CUT-OFF R

MARC FOR



                                                                 AMI:  0.60 - 1.

5 ng/mL



Specimen comments: Clean [id number] 10:36:00





             Test Item    Value        Reference Range Interpretation Comments

 

             ALCOHOL (test code 0.03 gm/dL   0.00-0.00    H            ETHYL ALC

OHOL VALUES -



             = ALC)                                              INTERPRETATION:

 0.050



                                                                 GM/DL - NOT INT

OXICATED



                                                                 0.100 GM/DL -



                                                                 INTOXICATED 0.3

50-0.450



                                                                 GM/DL - SEVEREL

Y



                                                                 INTOXICATED 0.5

50 GM/DL-



                                                                 FATAL INTOXICAT

ION



Specimen comments: Clean CatchCBC W/AUTO [id number] 10:27:00





             Test Item    Value        Reference Range Interpretation Comments

 

             WHITE BLOOD CELL (test code = WBC) 16.5 K/mm3   4.5-11.0     H     

       

 

             RED BLOOD CELL (test code = RBC) 2.70 M/mm3   4.40-5.90    L       

     

 

             HEMOGLOBIN (test code = HGB) 6.4 gm/dL    13.0-17.0    LL          

 

 

             HEMATOCRIT (test code = HCT) 22.9 %       36.0-48.0    LL          

 

 

             MEAN CELL VOLUME (test code = MCV) 84.8 UM3     80.0-94.0    N     

       

 

             MEAN CELL HGB (test code = MCH) 23.7 UUG     25.5-32.5    L        

    

 

             MEAN CELL HGB CONCETRATION (test 27.9 gm/dL   29.0-35.5    L       

     



             code = MCHC)                                        

 

             RED CELL DISTRIBUTION WIDTH (test 27.5 %       11.5-15.0    H      

      



             code = RDW)                                         

 

             PLATELET COUNT (test code = PLT) 280 K/mm3    150-400      N       

     

 

             MEAN PLATELET VOLUME (test code = 10.7 fl      7.4-10.4     H      

      



             MPV)                                                

 

             NEUTROPHIL % (test code = NT%) 87.9 %       49.0-76.0    H         

   

 

             LYMPHOCYTE % (test code = LY%) 4.1 %        23.0-38.0    L         

   

 

             MONOCYTE % (test code = MO%) 6.5 %        1.0-10.0     N           

 

 

             EOSINOPHIL % (test code = EO%) 0.1 %        1.0-5.0      L         

   

 

             BASOPHIL % (test code = BA%) 0.1 %        0.0-1.0      N           

 

 

             NEUTROPHIL # (test code = NT#) 14.5 K/mm3   2.4-6.3      H         

   

 

             LYMPHOCYTE # (test code = LY#) 0.7 K/mm3    1.2-4.0      L         

   

 

             MONOCYTE # (test code = MO#) 1.1 K/mm3    0.0-0.6      H           

 

 

             EOSINOPHIL # (test code = EO#) 0.0 K/MM3    0.0-0.7      N         

   

 

             BASOPHIL # (test code = BA#) 0.0 K/mm3    0.0-0.2      N           

 

 

             HYPOCHROMIA (test code = HYPO) 2+                                  

   

 

             ANISOCYTOSIS (test code = ANISO) 1+                                

     

 

             MICROCYTOSIS (test code = MICR) 1+                                 

    

 

             TARGET CELLS (test code = TGT) 1+                                  

   



BASIC METABOLIC [id number] 10:24:00





             Test Item    Value        Reference Range Interpretation Comments

 

             SODIUM (test code = NA) 128 mmol/l   134.0-147.0  L            

 

             POTASSIUM (test code = K) 3.3 mmol/L   3.6-5.2      L            

 

             CHLORIDE (test code = CL) 88 mmol/l    98.0-107.0   L            

 

             CARBON DIOXIDE (test code = CO2) 23.9 mmol/l  21.0-33.0    N       

     

 

             ANION GAP (test code = GAP) 19.4         0-20         N            

 

             GLUCOSE (test code = GLU)  mg/dl       70.0-110.0                

 

             BLOOD UREA NITROGEN (test code =  mg/dl       7.0-18.0             

     



             BUN)                                                

 

             CREATININE (test code = CREAT)  mg/dL       0.60-1.30              

   

 

             GFR NON BLACK (test code =  mL/min      70-80                     



             GFRNONBLACK)                                        

 

             GFR BLACK (test code = GFRBLACK)  mL/min      85-97                

     

 

             CALCIUM (test code = CA)  mg/dl       8.0-10.5                  



Specimen comments: Clean CatchHEPATIC FUNCTION PANEL  10:24:00





             Test Item    Value        Reference Range Interpretation Comments

 

             TOTAL PROTEIN (test code = PROT)  gm/dL       6.4-8.2              

     

 

             ALBUMIN (test code = ALB)  gm/dl       3.2-4.7                   

 

             BILIRUBIN TOTAL (test code = BILT)  mg/dl       0.0-1.0            

       

 

             BILIRUBIN DIRECT (test code = BILD)  mg/dl       0.0-0.3           

        

 

             SGOT/AST (test code = AST)  Units/L     15.0-37.0                 

 

             SGPT/ALT (test code = ALT)  Units/L     12.0-78.0                 

 

             ALKALINE PHOSPHATASE TOTAL (test  Units/L     50.0-136.0           

     



             code = ALKP)                                        



Specimen comments: Clean [id number] 10:24:00





             Test Item    Value        Reference Range Interpretation Comments

 

             LIPASE (test code = LIP)  Units/L     65.0-230.0                



Specimen comments: Clean CloudcityB-TYPE NATRIURETIC [id number] 10:24:00





             Test Item    Value        Reference Range Interpretation Comments

 

             B-TYPE NATRIURETIC PEPTIDE (test 220 PG/ML    5-100        H       

     



             code = BNP)                                         



Specimen comments: Clean [id number] 10:24:00





             Test Item    Value        Reference Range Interpretation Comments

 

             TROPONIN-I (test code = TROPI)  NG/ML       0.00-0.06              

   



Specimen comments: Clean [id number] 10:24:00





             Test Item    Value        Reference Range Interpretation Comments

 

             ALCOHOL (test code = ALC)  gm/dL       0.00-0.00                 



Specimen comments: Clean CloudcityBASIC METABOLIC [id number] 10:18:00





             Test Item    Value        Reference Range Interpretation Comments

 

             SODIUM (test code = NA)  mmol/l      134.0-147.0               

 

             POTASSIUM (test code = K)  mmol/L      3.6-5.2                   

 

             CHLORIDE (test code = CL)  mmol/l      98.0-107.0                

 

             CARBON DIOXIDE (test code = CO2)  mmol/l      21.0-33.0            

     

 

             ANION GAP (test code = GAP)              0-20                      

 

             GLUCOSE (test code = GLU)  mg/dl       70.0-110.0                

 

             BLOOD UREA NITROGEN (test code = BUN)  mg/dl       7.0-18.0        

          

 

             CREATININE (test code = CREAT)  mg/dL       0.60-1.30              

   

 

             GFR NON BLACK (test code =  mL/min      70-80                     



             GFRNONBLACK)                                        

 

             GFR BLACK (test code = GFRBLACK)  mL/min      85-97                

     

 

             CALCIUM (test code = CA)  mg/dl       8.0-10.5                  



Specimen comments: Clean CloudcityHEPATIC FUNCTION PANEL  10:18:00





             Test Item    Value        Reference Range Interpretation Comments

 

             TOTAL PROTEIN (test code = PROT)  gm/dL       6.4-8.2              

     

 

             ALBUMIN (test code = ALB)  gm/dl       3.2-4.7                   

 

             BILIRUBIN TOTAL (test code = BILT)  mg/dl       0.0-1.0            

       

 

             BILIRUBIN DIRECT (test code = BILD)  mg/dl       0.0-0.3           

        

 

             SGOT/AST (test code = AST)  Units/L     15.0-37.0                 

 

             SGPT/ALT (test code = ALT)  Units/L     12.0-78.0                 

 

             ALKALINE PHOSPHATASE TOTAL (test  Units/L     50.0-136.0           

     



             code = ALKP)                                        



Specimen comments: Clean [id number] 10:18:00





             Test Item    Value        Reference Range Interpretation Comments

 

             LIPASE (test code = LIP)  Units/L     65.0-230.0                



Specimen comments: Clean CatchB-TYPE NATRIURETIC [id number] 10:18:00





             Test Item    Value        Reference Range Interpretation Comments

 

             B-TYPE NATRIURETIC PEPTIDE (test 220 PG/ML    5-100        H       

     



             code = BNP)                                         



Specimen comments: Clean [id number] 10:18:00





             Test Item    Value        Reference Range Interpretation Comments

 

             TROPONIN-I (test code = TROPI)  NG/ML       0.00-0.06              

   



Specimen comments: Clean [id number] 10:18:00





             Test Item    Value        Reference Range Interpretation Comments

 

             ALCOHOL (test code = ALC)  gm/dL       0.00-0.00                 



Specimen comments: Clean CatchLACTIC [id number] 10:11:00





             Test Item    Value        Reference Range Interpretation Comments

 

             LACTIC ACID (test code = LACT) 9.6 MMOL/L   0.4-2.0      HH        

   



Specimen comments: Clean CatchPROTHROMBIN [id number] 10:07:00





             Test Item    Value        Reference Range Interpretation Comments

 

             PROTHROMBIN TIME 12.6 SECONDS 9.9-12.8     N            



             PATIENT (test code =                                        



             PTP)                                                

 

             INTERNATIONAL NORMAL 1.1          0.89-1.14    N            THE INR

 IS TO BE USED



             RATIO (test code =                                        ONLY FOR 

MONITORING



             INR)                                                ORAL



                                                                 ANTICOAGULANTTH

ERAPY.



                                                                 THE FOLLOWING A

RE



                                                                 SUGGESTED RANGE

S FROM



                                                                 [facility]

LEGE OF



                                                                 CHEST



                                                                 PHYSICIANS:ANNITA

CATION



                                                                 



                                                                                

INR



                                                                 VALUEPROPHYLAXI

S OF



                                                                 VENOUS THROMBOS

IS



                                                                 (ORTHOPEDIC TONIA

ANNA)



                                                                 



                                                                   2.0 - 3.0PROP

HYLAXIS



                                                                 OF VENOUS THROM

BOSIS



                                                                 (OTHER THAN HIG

H-RISK



                                                                 SURGERY)



                                                                    2.0 - 3.0TRE

ATMENT



                                                                 OF DEEP VEIN



                                                                 THROMBOSIS OR



                                                                 PULMONARY EMBOL

ISM



                                                                 



                                                                   2.0 - 3.0PREV

ENTION



                                                                 OF SYSTEMIC EMB

OLISM



                                                                 TISSUE HEART VA

LVES



                                                                 



                                                                   2.0 - 3.0  AC

[place]



                                                                 MYOCARDIAL INFA

RCTION



                                                                    (TO PREVENT



                                                                 SYSTEMIC EMBOLI

SM)



                                                                           2.0 -

 3.0



                                                                 ACUTE MYOCARDIA

L



                                                                 INFARCTION     

(TO



                                                                 PREVENT RECURRE

NT



                                                                 INFARCT)



                                                                 2.5 - 3.0  VALV

ULAR



                                                                 HEART DISEASE



                                                                                

 2.0 -



                                                                 3.0  ATRIAL



                                                                 FIBRILATION



                                                                                

   2.0



                                                                 - 3.0BILEAFLET



                                                                 MECHANICAL VALV

E IN



                                                                 AORTIC POSITION



                                                                 2.0 - 3.0MECHAN

ICAL



                                                                 PROSTHETIC VALV

ES



                                                                 (HIGH RISK)



                                                                 2.5 - 3.5PRESEN

CE OF



                                                                 LUPUS ANTICOAGU

LANT OR



                                                                  ANTIPHOSPHOLIP

ID



                                                                 ANTIBODIES



                                                                         2.5 - 3

.5



Specimen comments: Clean CatchTHROMBOPLASTIN TIME [id number] 10:07:00





             Test Item    Value        Reference Range Interpretation Comments

 

             THROMBOPLASTIN TIME 25.30 SECONDS 25.86-36.07  L            [facility]

[facility]



             PARTIAL (test code =                                        [facility]

utic Range -



             PTT)                                                APTT of 55.8-85

.4



                                                                 secondscorrelat

es with



                                                                 plasma heparin



                                                                 concentration o

f



                                                                 0.2-0.4 u/mL Ne

w range



                                                                 effective - 



Specimen comments: Clean CatchCBC W/AUTO [id number] 09:56:00





             Test Item    Value        Reference Range Interpretation Comments

 

             WHITE BLOOD CELL (test code = WBC) 16.5 K/mm3   4.5-11.0     H     

       

 

             RED BLOOD CELL (test code = RBC) 2.70 M/mm3   4.40-5.90    L       

     

 

             HEMOGLOBIN (test code = HGB) 6.4 gm/dL    13.0-17.0    LL          

 

 

             HEMATOCRIT (test code = HCT) 22.9 %       36.0-48.0    LL          

 

 

             MEAN CELL VOLUME (test code = MCV) 84.8 UM3     80.0-94.0    N     

       

 

             MEAN CELL HGB (test code = MCH) 23.7 UUG     25.5-32.5    L        

    

 

             MEAN CELL HGB CONCETRATION (test 27.9 gm/dL   29.0-35.5    L       

     



             code = MCHC)                                        

 

             RED CELL DISTRIBUTION WIDTH (test 27.5 %       11.5-15.0    H      

      



             code = RDW)                                         

 

             PLATELET COUNT (test code = PLT) 280 K/mm3    150-400      N       

     

 

             MEAN PLATELET VOLUME (test code = 10.7 fl      7.4-10.4     H      

      



             MPV)                                                

 

             NEUTROPHIL % (test code = NT%) 87.9 %       49.0-76.0    H         

   

 

             LYMPHOCYTE % (test code = LY%) 4.1 %        23.0-38.0    L         

   

 

             MONOCYTE % (test code = MO%) 6.5 %        1.0-10.0     N           

 

 

             EOSINOPHIL % (test code = EO%) 0.1 %        1.0-5.0      L         

   

 

             BASOPHIL % (test code = BA%) 0.1 %        0.0-1.0      N           

 

 

             NEUTROPHIL # (test code = NT#) 14.5 K/mm3   2.4-6.3      H         

   

 

             LYMPHOCYTE # (test code = LY#) 0.7 K/mm3    1.2-4.0      L         

   

 

             MONOCYTE # (test code = MO#) 1.1 K/mm3    0.0-0.6      H           

 

 

             EOSINOPHIL # (test code = EO#) 0.0 K/MM3    0.0-0.7      N         

   

 

             BASOPHIL # (test code = BA#) 0.0 K/mm3    0.0-0.2      N           

 



MR, ABDOMEN, [id number] 15:24:00Referring: Dr. Mart Briceño use 
liver cancer protocolFINAL REPORT PATIENT ID:   [id number] MRI of the abdomen 
dated 2019 COMPARISON: 2022 Comment: Multiplanar T1 and 
T2-weighted images of the abdomen, postcontrast axial and coronal T1-weighted 
images of the abdomen were obtained.  Liver and spleen are normal in size. The 
margin of the liver is not irregular. Again noted a complex cystic lesion in the
segment 7 of the liver measuring 7 x 7.3 cm. The lesion has heterogeneous high 
signal intensity on T1 and T2-weighted images without postcontrast enhancement. 
This is thought to represent hemorrhagic cyst. Several simple cysts are seen in 
the liver measuring up to 1.5 cm. Gallbladder is contracted. No gallstone or 
biliary dilatation is seen. Pancreas and adrenals are unremarkable. Both kidneys
are normal in size and functioning. Several cysts are seen in the right kidney 
with the largest measuring 3.1 cm. A few cysts are seen in the left kidney with 
the largest measuring up to 1 cm in size. No mass or adenopathy is seen abdomen.
No ascites is present. The visualized small and large bowel are unremarkable. 
IMPRESSION:1. Stable interval examination of the abdomen with a hemorrhagic cyst
in the segment 7 and a few simple cysts throughout the liver.2. Bilateral renal 
cysts. Signed: Nicole Artis MDReport Verified Date/Time:  2019 15:24:51 
Reading Location: [address] Radiology Reading Room      Electronically 
signed by: NICOLE ARTIS M.D. on 2019 03:24 [id number] 
09:27:00





             Test Item    Value        Reference Range Interpretation Comments

 

             POC-CREATININE 2.4 mg/dL    0.6-1.3      H            TESTED AT [facility] [address]



             ([facility]) (test                                        MARIAH TAMEZ

[place]



             code = 1859)                                        [address]

 

             POC-EGFR ([facility]) 33 mL/min/1.73M2                           



             (test code =                                        



             1860)                                               



HEPATITIS B PCR, [id number] 11:05:00





             Test Item    Value        Reference Range Interpretation Comments

 

             HBV NUMERIC RESULT ([facility]) (test 108 IU/mL    <20          H      

      



             code = 2702)                                        



This test uses a Real-Time Polymerase Chain Reaction (RT-PCR) methodology and 
was performed using JEROD  AmpliPrep/JEROD  TaqMan  HBV Test, v2.0 (Roche 
[employer].).Reportable range for this assay is 20 - 170,000,000 IU 
per mL (1.30 - 8.23 Log IU/mL).HEPATITIS C PCR, [id number] 13:49:00





             Test Item    Value        Reference Range Interpretation Comments

 

             HCV RESULT COMPONENT HCV RNA not detected HCV RNA not detected     

         



             (JEANNE) (test code =                                        



             2699)                                               



This test uses a Real-Time Polymerase Chain Reaction (RT-PCR) methodology and 
was performed using JEROD  Ampliprep/JEROD  TaqMan  HCV test kit version 2.0 
([employer]).Reportable range for this assay is 15 - 
100,000,000 IU per mL (1.18 - 8.00 Log IU/mL).MR, ABDOMEN, [id number] 
17:13:00Referring: Dr. Mart Garza 2. NEEDS CONTRAST STUDY. 
Discussed with Radiology. Recommended contrast studyFINAL REPORT PATIENT ID:   
[id number] TECHNIQUE: MRI of the abdomen WITHOUT and WITH intravenous contrast. 
INDICATION: Liver lesion, hepatitis B virus infection. COMPARISON: None.  
FINDINGS: LOWER THORAX: Unremarkable. LIVER: No hepatic signal abnormality. 
Several (greater than 15 cm cysts are scatteredthroughout the liver. The largest
simple cyst is located in segment VIII and measures 1.7 cm. A lesion in segment 
VII measures 7.3 cm and is hypointense on T2-weighted imaging and hyperintense 
on T1-weighted imaging. This does not definitely enhance and is most likely a 
hemorrhagic cyst. BILIARY: Gallbladder is unremarkable. No biliary ductal 
dilatation or filling defect.SPLEEN: No splenomegaly.PANCREAS: No focal masses 
or ductal dilatation. ADRENALS: No adrenal nodules.KIDNEYS/URETERS: No 
hydronephrosis or solid mass lesions. Bilateral simple renal cysts 
PERITONEUM/RETROPERITONEUM: No free fluid.LYMPH NODES: No 
lymphadenopathy.VESSELS: Unremarkable. GI TRACT: No distention or wall 
thickening. BONES AND SOFT TISSUES: Unremarkable.  IMPRESSION: 1.The lesion in 
segment VII is most consistent with ahemorrhagic cyst which measures up to 7.3 
cm. Given the intrinsic T1 hyperintensity and internal complexity, consider a 
follow-up MRI in three months. 2.There are several other smaller cysts scattered
throughout the liver. Signed: Dylan Silva Verified Date/Time:  
2018 17:13:08 Reading Location: [address] Body Reading Room      
Electronically signed by: DYLAN SILVA MD on 2018 05:13 PMHBSAG 
[id number] 15:24:00





             Test Item    Value        Reference Range Interpretation Comments

 

             HBSAG CONFIRMATION Confirmed Positive Unconfirmed, HBSAG A         

   



             (BEAKER) (test code =              Negative                  



             1602)                                               



HEPATITIS B SURFACE [id number] 14:14:00





             Test Item    Value        Reference Range Interpretation Comments

 

             HEPATITIS B SURFACE ANTIGEN (2) Reactive     Nonreactive  A        

    



             (BEAKER) (test code = 2585)                                        



ALPHA FETOPROTEIN (AFP), TUMOR [id number] 12:52:00





             Test Item    Value        Reference Range Interpretation Comments

 

             ALPHA-FETOPROTEIN (BEAKER) (test code < ng/mL      <10.0           

          



             = 1094)                                             



[id number] 12:47:00





             Test Item    Value        Reference Range Interpretation Comments

 

             PROSTATE SPECIFIC ANTIGEN (BEAKER) 3.3 ng/mL    0.0-4.0            

       



             (test code = 844)                                        



CARCINOEMBRYONIC ANTIGEN (CEA)2018 12:47:00





             Test Item    Value        Reference Range Interpretation Comments

 

             CARCINOEMBRYONIC ANTIGEN (BEAKER) 2.2 ng/mL    0.0-5.0             

      



             (test code = 685)                                        



HEPATITIS A ANTIBODY, [id number] 12:47:00





             Test Item    Value        Reference Range Interpretation Comments

 

             HEPATITIS A IGG ANTIBODY (BEAKER) Nonreactive  Nonreactive         

      



             (test code = 2797)                                        



HEPATIC FUNCTION [id number] 12:20:00





             Test Item    Value        Reference Range Interpretation Comments

 

             TOTAL PROTEIN (BEAKER) (test code = 8.4 gm/dL    6.0-8.3      H    

        



             770)                                                

 

             ALBUMIN (BEAKER) (test code = 1145) 4.0 g/dL     3.5-5.0           

        

 

             BILIRUBIN TOTAL (BEAKER) (test code 0.4 mg/dL    0.2-1.2           

        



             = 377)                                              

 

             BILIRUBIN DIRECT (BEAKER) (test 0.1 mg/dL    0.1-0.5               

    



             code = 706)                                         

 

             ALKALINE PHOSPHATASE (BEAKER) (test 52 U/L                   

        



             code = 346)                                         

 

             AST (SGOT) (BEAKER) (test code = 21 U/L       5-34                 

     



             353)                                                

 

             ALT (SGPT) (BEAKER) (test code = 20 U/L       6-55                 

     



             347)                                                



BASIC METABOLIC [id number] 12:20:00





             Test Item    Value        Reference Range Interpretation Comments

 

             SODIUM (BEAKER) 137 meq/L    136-145                   



             (test code = 381)                                        

 

             POTASSIUM (BEAKER) 4.7 meq/L    3.5-5.1                   



             (test code = 379)                                        

 

             CHLORIDE (BEAKER) 102 meq/L                        



             (test code = 382)                                        

 

             CO2 (BEAKER) (test 28 meq/L     22-29                     



             code = 355)                                         

 

             BLOOD UREA NITROGEN 30 mg/dL     7-21         H            



             (BEAKER) (test code                                        



             = 354)                                              

 

             CREATININE (BEAKER) 2.13 mg/dL   0.57-1.25    H            



             (test code = 358)                                        

 

             GLUCOSE RANDOM 73 mg/dL                         



             (BEAKER) (test code                                        



             = 652)                                              

 

             CALCIUM (BEAKER) 10.2 mg/dL   8.4-10.2                  



             (test code = 697)                                        

 

             EGFR (BEAKER) (test 37 mL/min/1.73                           ESTIMA

GEOVANNA GFR IS



             code = 1092) sq m                                   NOT AS ACCURATE

 AS



                                                                 CREATININE



                                                                 CLEARANCE IN



                                                                 PREDICTING



                                                                 GLOMERULAR



                                                                 FILTRATION RATE

.



                                                                 ESTIMATED GFR I

S



                                                                 NOT APPLICABLE 

FOR



                                                                 DIALYSIS PATIEN

TS.



PROTHROMBIN TIME/[id number] 12:06:00





             Test Item    Value        Reference Range Interpretation Comments

 

             PROTIME (BEAKER) (test code = 13.5 seconds 11.7-14.7               

  



             759)                                                

 

             INR (BEAKER) (test code = 370) 1.0          <=5.9                  

   



RECOMMENDED COUMADIN/WARFARIN INR THERAPY RANGESSTANDARD DOSE: 2.0 - 3.0   
Includes: PROPHYLAXIS forvenous thrombosis, systemic embolization; TREATMENT for
venous thrombosis and/or pulmonary embolus.HIGH RISK: Target INR is 2.5-3.5 for 
patients with mechanical heart valves.CBC W/PLT COUNT &amp; AUTO DIFFERENTIAL
2018 12:05:00





             Test Item    Value        Reference Range Interpretation Comments

 

             WHITE BLOOD CELL COUNT (BEAKER) 5.0 K/ L     3.5-10.5              

    



             (test code = 775)                                        

 

             RED BLOOD CELL COUNT (BEAKER) 3.69 M/ L    4.63-6.08    L          

  



             (test code = 761)                                        

 

             HEMOGLOBIN (BEAKER) (test code = 11.6 GM/DL   13.7-17.5    L       

     



             410)                                                

 

             HEMATOCRIT (BEAKER) (test code = 35.3 %       40.1-51.0    L       

     



             411)                                                

 

             MEAN CORPUSCULAR VOLUME (BEAKER) 95.7 fL      79.0-92.2    H       

     



             (test code = 753)                                        

 

             MEAN CORPUSCULAR HEMOGLOBIN 31.4 pg      25.7-32.2                 



             (BEAKER) (test code = 751)                                        

 

             MEAN CORPUSCULAR HEMOGLOBIN CONC 32.9 GM/DL   32.3-36.5            

     



             (BEAKER) (test code = 752)                                        

 

             RED CELL DISTRIBUTION WIDTH 15.1 %       11.6-14.4    H            



             (BEAKER) (test code = 412)                                        

 

             PLATELET COUNT (BEAKER) (test 160 K/CU MM  150-450                 

  



             code = 756)                                         

 

             MEAN PLATELET VOLUME (BEAKER) 12.0 fL      9.4-12.4                

  



             (test code = 754)                                        

 

             NUCLEATED RED BLOOD CELLS 0 /100 WBC   0-0                       



             (BEAKER) (test code = 413)                                        

 

             NEUTROPHILS RELATIVE PERCENT 49 %                                  

 



             (BEAKER) (test code = 429)                                        

 

             LYMPHOCYTES RELATIVE PERCENT 33 %                                  

 



             (BEAKER) (test code = 430)                                        

 

             MONOCYTES RELATIVE PERCENT 13 %                                   



             (BEAKER) (test code = 431)                                        

 

             EOSINOPHILS RELATIVE PERCENT 4 %                                   

 



             (BEAKER) (test code = 432)                                        

 

             BASOPHILS RELATIVE PERCENT 1 %                                    



             (BEAKER) (test code = 437)                                        

 

             NEUTROPHILS ABSOLUTE COUNT 2.43 K/ L    1.78-5.38                 



             (BEAKER) (test code = 670)                                        

 

             LYMPHOCYTES ABSOLUTE COUNT 1.64 K/ L    1.32-3.57                 



             (BEAKER) (test code = 414)                                        

 

             MONOCYTES ABSOLUTE COUNT (BEAKER) 0.63 K/ L    0.30-0.82           

      



             (test code = 415)                                        

 

             EOSINOPHILS ABSOLUTE COUNT 0.21 K/ L    0.04-0.54                 



             (BEAKER) (test code = 416)                                        

 

             BASOPHILS ABSOLUTE COUNT (BEAKER) 0.04 K/ L    0.01-0.08           

      



             (test code = 417)                                        

 

             IMMATURE GRANULOCYTES-RELATIVE 0 %          0-1                    

   



             PERCENT (BEAKER) (test code =                                      

  



             6481)

## 2020-10-08 NOTE — XMS REPORT
Continuity of Care Document

                           Created on:2020



Patient:SONIA FISHER

Sex:Male

:1945

External Reference #:[id number]





Demographics







                          Address                   [address] [place] [address]

 

                          Home Phone                [phone]

 

                          Phone                     [phone]

 

                          Preferred Language        en-US

 

                          Marital Status            Unknown

 

                          Anabaptist Affiliation     Unknown

 

                          Race                      Unknown

 

                          Ethnic Group              Unknown









Author







                          Organization              [facility]









Care Team Providers







                    Name                Role                Phone

 

                    [facility] Unavailable         Un

available









Problems







          Problem   Status    Onset     Classification Date      Comments  Sourc

e



                              Date                Reported            



                                                                      



                                                                      

 

          Disturbance of Active              Diagnosis 2020           2.16

.840.



          skin sensation                                                   1.113

883.



                                                                      4.391.11.



                                                                      7464



                                                                      



                                                                      

 

          Gait disorder Active              Diagnosis 2020           2.16.

840.



                                                                      1.709729.



                                                                      4.391.11.



                                                                      7464



                                                                      



                                                                      

 

          Essential Active              Problem   2020           2.16.840.



          hypertension                                                   1.41014

3.



                                                                      4.391.11.



                                                                      7464



                                                                      



                                                                      

 

          Lumbosacral Active              Problem   2020           2.16.84

0.



          radiculopathy                                                   1.1138

83.



                                                                      4.391.11.



                                                                      7464



                                                                      



                                                                      

 

          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

840.



          inflammatory                                                   1.05741

3.



          demyelinating                                                   4.391.

11.



          polyneuropathy)                                                   7464



                                                                      



                                                                      

 

          Weakness  Active              Problem   2020           2.16.840.



                                                                      1.680742.



                                                                      4.391.11.



                                                                      7464



                                                                      



                                                                      

 

          Essential tremor Active              Problem   2020           2.

16.840.



                                                                      1.232054.



                                                                      4.391.11.



                                                                      7464



                                                                      



                                                                      







Medications







        Medication Details Route   Status  Patient Ordering Order   Source



                                        Instructions Provider Date    



                                                                



                                                                



                                                                

 

        Flecainide 1 tablet Orally  Active  50 MG Orally Vanderlick         2.16

.840



        Acetate                         every 12 hrs                 .1.23313



                                                                3.4.391.



                                                                11.7464



                                                                



                                                                

 

        Zantac  1 tablet Orally  Active  150 MG Orally Vanderlick         2.16.8

40



                at bedtime                 Once a day                 .1.52934



                                                                3.4.391.



                                                                11.7464



                                                                



                                                                

 

        Hyacinth Aspirin 1 tablet Orally  Active  325 MG Orally Vanderlick         

2.16.840



                                        Once a day                 .1.20798



                                                                3.4.391.



                                                                11.7464



                                                                



                                                                

 

        Metoprolol 1 capsule Orally  Active  25 MG Orally Vanderlick         2.1

6.840



        Succinate                         Once a day                 .1.95700



                                                                3.4.391.



                                                                11.7464



                                                                



                                                                







Allergies, Adverse Reactions, Alerts







        Substance Category Reaction Severity Reaction Status  Date    Comments S

ource



                                        type            Reported         



                                                                        



                                                                        



                                                                        

 

        N.K.D.A. Adverse Info Not         Adverse                  2.16

.84



                Reaction Available         Reaction         9               0.1.

113



                                                                        883.4.3



                                                                        91.11.7



                                                                        464



                                                                        



                                                                        







Immunizations







                                        No Data Provided for This Section







Results







                                        No Data Provided for This Section







Pathology Reports







                                        No Data Provided for This Section







Diagnostic Reports







                                        No Data Provided for This Section







Consultation Notes







                                        No Data Provided for This Section







Discharge Summaries







                                        No Data Provided for This Section







History and Physicals







                                        No Data Provided for This Section







Vital Signs







             Vital Sign   Value        Date         Comments     Source



                                                                 

 

             Weight       246          2019                2.16.840.1.1138

83



                                                                 .4.391.11.7464



                                                                 

 

             Height       71           2019                2.16.840.1.1138

83



                                                                 .4.391.11.7464



                                                                 







Encounters







                                        No Data Provided for This Section







Procedures







                                        No Data Provided for This Section







Assessment and Plan







                                        No Data Provided for This Section







Plan of Care







                                        No Data Provided for This Section







Social History







                                        No Data Provided for This Section







Family History







                                        No Data Provided for This Section







Advance Directives







                                        No Data Provided for This Section







Functional Status







                                        No Data Provided for This Section

## 2020-10-08 NOTE — XMS REPORT
Clinical Summary

                           Created on:2020



Patient:Brendan Murray

Sex:Male

:1945

External Reference #:[id number]





Demographics







                          Address                   [address] [place] [address]

 

                          Home Phone                [phone]

 

                          Mobile Phone              [phone]

 

                          Email Address             [email or web address]

 

                          Email Address             [email or web address]

 

                          Preferred Language        English

 

                          Marital Status            

 

                          Islam Affiliation     Unknown

 

                          Race                      White

 

                          Ethnic Group              Not  or 









Author







                          Organization              [facility]

 

                          Address                   [address] [place] [address]









Support







                Name            Relationship    Address         Phone

 

                Beron[employer] ([employer]) Gujaardo Child           Unavailable     [phone]

 

                Naomie Kirk Unavailable     Unavailable     [phone]

5









Care Team Providers







                    Name                Role                Phone

 

                    Mazin Bernal MD   Primary Care Provider [phone]









Allergies

No Known Active Allergies



Medications







          Medication Sig       Dispensed Refills   Start     End Date  Status



                                                  Date                

 

          nitroglycerin Place 0.4 mg           0                             Act

lo



          (NITROSTAT) 0.4 MG under the tongue                                   

      



          SL tablet every 5 (five)                                         



                    minutes as                                         



                    needed for chest                                         



                    pain.                                             

 

          atorvastatin Take 20 mg by           0                             Act

lo



          (LIPITOR) 20 mg mouth nightly.                                        

 



          tablet    Default OP ins                                         

 

          citalopram Take 20 mg by           0                             Activ

e



          (CeleXA) 20 MG mouth daily.                                         



          tablet                                                      

 

          thiamine  1 tablet (100 mg 30 tablet 0         09/14/202 10/14/20  Act

lo



          mononitrate, vit total) by                     0         20        



          B1, (B-1) 100 mg Nasogastric                                         



          tablet    route daily for                                         



                    30 days.                                          

 

          spironolactone Take 0.5 tablets 15 tablet 0         09/14/202 10/14/20

  Active



          (ALDACTONE) 25 MG (12.5 mg total)                     0         20    

    



          tablet    by mouth daily                                         



                    for 30 days.                                         

 

          sodium chloride 3 Take 4 mL by 480 mL    0         09/14/202 10/14/20 

 Active



          % nebulizer nebulization                     0         20        



          solutionIndication every 6 (six)                                      

   



          s: Acute  hours for 30                                         



          respiratory days.                                             



          failure,                                                    



          unspecified                                                   



          whether with                                                   



          hypoxia or                                                   



          hypercapnia (HCC),                                                   



          Acute upper GI                                                   



          bleed, Chronic                                                   



          obstructive                                                   



          pulmonary disease,                                                   



          unspecified COPD                                                   



          type (HCC),                                                   



          Aspiration                                                   



          pneumonia,                                                   



          unspecified                                                   



          aspiration                                                   



          pneumonia type,                                                   



          unspecified                                                   



          laterality,                                                   



          unspecified part                                                   



          of lung (HCC)                                                   

 

          senna (SENOKOT) Take 1 tablet by 30 tablet 0         09/14/202 10/14/2

0  Active



          8.6 mg tablet mouth daily for                     0         20        



                    30 days.                                          

 

          pantoprazole 4 Infuse 10 mL (40 600 mL    0         09/14/202 10/14/20

  Active



          mg/mL in sodium mg total) into a                     0         20     

   



          chloride injection venous catheter                                    

     



                    2 (two) times a                                         



                    day before meals                                         



                    for 30 days.                                         

 

          naloxone (NARCAN) Infuse 0.5 mL 1 mL      0         09/14/202 10/14/20

  Active



          0.4 mg/mL (0.2 mg total)                     0         20        



          injection into a venous                                         



                    catheter once as                                         



                    needed for                                         



                    opioid reversal                                         



                    or respiratory                                         



                    depression (as                                         



                    needed for                                         



                    respiratory rate                                         



                    8 per minute or                                         



                    less OR patient                                         



                    sommnolent and                                         



                    difficult to                                         



                    arouse (POSS                                         



                    GREATER than                                         



                    3).) for up to                                         



                    30 days.                                          

 

          multivitamin Take 1 tablet by 30 tablet 0         09/14/202 10/14/20  

Active



          (THERAGRAN) tablet mouth daily for                     0         20   

     



                    30 days.                                          

 

          losartan (COZAAR) Take 1 tablet 30 tablet 0         09/14/202 10/14/20

  Active



          25 MG tablet (25 mg total) by                     0         20        



                    mouth daily for                                         



                    30 days.                                          

 

          ipratropium-albute Take 3 mL by           0         09/14/202 10/14/20

  Active



          roL (DUO-NEB) nebulization                     0         20        



          0.5-2.5 mg/3 mL every 6 (six)                                         



          nebulizer hours as needed                                         



                    for wheezing for                                         



                    up to 30 days.                                         

 

          insulin lispro Inject 0-12 10 mL     12        09/14/202 10/14/20  Act

lo



          (ADMELOG) 100 Units under the                     0         20        



          unit/mL injection skin every 4                                        

 



                    (four) hours for                                         



                    30 days.                                          

 

          glucagon 1 mg/mL Inject 1 mg into           0         09/14/202 10/14/

20  Active



          recon soln the shoulder,                     0         20        



                    thigh, or                                         



                    buttocks every                                         



                    15 (fifteen)                                         



                    minutes as                                         



                    needed (if                                         



                    patient NPO,                                         



                    unable to                                         



                    swallow safely                                         



                    with no IV                                         



                    access.) for up                                         



                    to 30 days.                                         

 

          furosemide (LASIX) 1 tablet (40 mg 30 tablet 0         09/14/202 10/14

/20  Active



          40 mg tablet total) by                     0         20        



                    Nasogastric                                         



                    route daily for                                         



                    30 days.                                          

 

          folic acid Take 1 tablet (1 30 tablet 0         09/14/202 10/14/20  Ac

tive



          (FOLVITE) 1 MG mg total) by                     0         20        



          tablet    mouth daily for                                         



                    30 days.                                          

 

          dextrose 50% Infuse 50 mL (25           0         09/14/202 10/14/20  

Active



          syringe   g total) into a                     0         20        



                    venous catheter                                         



                    every 20                                          



                    (twenty) minutes                                         



                    as needed (If                                         



                    blood glucose is                                         



                    40 mg/dL or                                         



                    LESS) for up to                                         



                    30 days.                                          

 

          dextrose 50% Infuse 25 mL           0         09/14/202 10/14/20  Acti

ve



          syringe   (12.5 g total)                     0         20        



                    into a venous                                         



                    catheter every                                         



                    20 (twenty)                                         



                    minutes as                                         



                    needed (If blood                                         



                    glucose is                                         



                    between 41-69                                         



                    mg/dL) for up to                                         



                    30 days.                                          

 

          dextrose 10 % Infuse 40 mL/hr 500 mL    0         09/14/202 10/14/20  

Active



          infusion  into a venous                     0         20        



                    catheter                                          



                    continuously as                                         



                    needed (for                                         



                    interruption in                                         



                    TPN or tube                                         



                    feeds) for up to                                         



                    30 days.                                          

 

          dextrose 10 % Infuse 40 mL/hr 500 mL    0         09/14/202 10/14/20  

Active



          infusion  into a venous                     0         20        



                    catheter                                          



                    continuously as                                         



                    needed (For                                         



                    bedside glucose                                         



                    LESS than 70                                         



                    mg/dL) for up to                                         



                    30 days.                                          

 

          carvediloL (COREG) Take 1 tablet 60 tablet 0         09/14/202 10/14/2

0  Active



          3.125 MG tablet (3.125 mg total)                     0         20     

   



                    by mouth 2 (two)                                         



                    times a day for                                         



                    30 days.                                          

 

          amIODarone Take 1 tablet 60 tablet 0         09/14/202 10/14/20  Activ

e



          (PACERONE) 200 MG (200 mg total)                     0         20     

   



          tablet    by mouth 2 (two)                                         



                    times a day for                                         



                    30 days.                                          

 

          acetaminophen 20.3 mL (650 mg           0         09/14/202 10/14/20  

Active



          (TYLENOL) 160 mg/5 total) by                     0         20        



          mL (5 mL) solution Nasogastric                                        

 



                    route every 4                                         



                    (four) hours as                                         



                    needed (Fever,                                         



                    mild pain or                                         



                    headache) for up                                         



                    to 30 days.                                         

 

          citalopram Take 10 mg by           0                   20  Disco

ntinued



          (CeleXA) 10 MG mouth daily.                               20        



          tablet                                                      

 

          fluticasone-vilant Inhale 1            0                   20  D

iscontinued



          tomy (BREO inhalations once                               20        (

ed List



          ELLIPTA) 200-25 daily.                                            Mary

nup)



          mcg/dose blister                                                   



          with device powder                                                   



          for inhalation                                                   

 

          albuterol (PROAIR Inhale 2 puffs 3           0                     Discontinued



          HFA,PROVENTIL (three) times a                               20        

(Med List



          HFA,VENTOLIN HFA) day.                                              Cl

eanup)



          90 mcg/actuation                                                   



          inhaler                                                     

 

          amLODIPine Take 10 mg by           0                   20  Disco

ntinued



          (NORVASC) 10 mg mouth daily.                               20        



          tablet                                                      

 

          aspirin (ECOTRIN) Take 81 mg by           0                   20

  Discontinued



          81 MG enteric mouth daily.                               20        (St

op Taking at



          coated tablet                                                   Discha

rge)

 

          atorvastatin Take 1 tablet 30 tablet 0         20  Dis

continued



          (LIPITOR) 20 MG (20 mg total) by                     0         20     

   (Med List



          tablet    mouth nightly                                         Cleanu

p)



                    for 30 days.                                         

 

          metoprolol Take 1 tablet 60 tablet 0         20  Disco

ntinued



          tartrate  (25 mg total) by                     0         20        (Me

d List



          (LOPRESSOR) 25 mg mouth 2 (two)                                       

  Cleanup)



          tablet    times a day for                                         



                    30 days.                                          

 

          nitroglycerin Place 1 tablet 25 tablet 12        20  D

iscontinued



          (NITROSTAT) 0.4 MG (0.4 mg total)                     0         20    

    (Med List



          SL tablet under the tongue                                         Pietro

tiara)



                    every 5 (five)                                         



                    minutes as                                         



                    needed for chest                                         



                    pain for up to                                         



                    30 days.                                          

 

          pantoprazole Take 1 tablet 30 tablet 0         20  Dis

continued



          (PROTONIX) 40 MG (40 mg total) by                     0         20    

    (Med List



          EC tablet mouth daily for                                         Mary

nup)



                    30 days.                                          

 

          atorvastatin Take 20 mg by           0                   20  Dis

continued



          (LIPITOR) 20 MG mouth nightly.                               20       

 



          tablet    Default OP ins                                         

 

          pantoprazole Take 40 mg by           0                   20  Dis

continued



          (PROTONIX) 40 MG mouth daily.                               20        

(Stop Taking at



          EC tablet                                                   Discharge)

 

          albuterol (PROAIR Inhale 2 puffs           0                   

0  Discontinued



          HFA) 90   every 6 (six)                               20        (Stop 

Taking at



          mcg/actuation hours as needed                                         

Discharge)



          inhaler   for wheezing.                                         

 

          metoprolol Take 25 mg by           0                   20  Disco

ntinued



          tartrate  mouth 2 (two)                               20        (Stop 

Taking at



          (LOPRESSOR) 25 mg times a day.                                        

 Discharge)



          tablet                                                      

 

          fluticasone Inhale 1  30 each   0         02/09/202 03/10/20  



          furoate-vilanterol inhalations once                     0         20  

      



          (BREO ELLIPTA) daily for 30                                         



          100-25 mcg/dose days.                                             



          blister with                                                   



          device powder for                                                   



          inhalation                                                   

 

          montelukast Take 1 tablet 30 tablet 0         20  Expi

red



          (SINGULAIR) 10 mg (10 mg total) by                     0         20   

     



          tablet    mouth nightly                                         



                    for 30 days.                                         

 

          levoFLOXacin Take 1 tablet 8 tablet  0         20  Exp

ired



          (Levaquin) 500 MG (500 mg total)                     0         20     

   



          tablet    by mouth daily                                         



                    for 8 days.                                         

 

          metroNIDAZOLE Take 1 tablet 24 tablet 0         20  Ex

pired



          (FlagyL) 500 MG (500 mg total)                     0         20       

 



          tablet    by mouth 3                                         



                    (three) times a                                         



                    day for 8 days.                                         

 

          codeine-guaifenesi Take 5 mL by 120 mL    0         20

  



          n (GUAIFENESIN AC) mouth 4 (four)                     0         20    

    



           mg/5 mL times a day as                                         



          liquidIndications: needed for cough                                   

      



          acute pain for up to 4 days                                         



                    .acute pain.                                         

 

          traMADoL (ULTRAM) 0.5 tablets (25           0           



          50 mg     mg total) by                     0         20        



          tabletIndications: Nasogastric                                        

 



          acute pain route every 6                                         



                    (six) hours as                                         



                    needed for                                         



                    moderate pain                                         



                    for up to 7 days                                         



                    .acute pain.                                         

 

          sodium chloride Irrigate with 500 mL    0         20  





          0.9% for  500 mL as                     0         20        



          IRRIGATION (NS) directed once                                         



          0.9 % irrigation for 1 dose.                                         







Active Problems







                          Problem                   Noted Date

 

                          Acute upper GI bleed      2020

 

                          Coffee ground emesis      2020









                                        Overview: 







                                        Added automatically from request for keegan

viktor [id number]









                          Lactic acidosis           2020

 

                          Syncope                   2020

 

                          Chest pain                2020

 

                          COPD (chronic obstructive pulmonary disease) 09/15/201

8

 

                          Alcohol abuse             09/15/2018

 

                          Acute pancreatitis        2018







Encounters







             Date         Type         Specialty    Care Team    Description

 

             2020   Surgery      Gastroenterology Masoud Zhong EGD WITH MIKKI Good MD    

 

             2020   Anesthesia Event Gastroenterology Rafaela Cloud MD 

 

             2020   [facility]     General Internal SvNetta liu, Aspirati

on pneumonia, 

unspecified aspiration pneumonia type, unspecified laterality, unspecified part 
of lung (HCC) (Primary Dx);



                -               Encounter       Medicine        DO



                                        Coffee ground emesis;



             2020                             Laxmi Padilla-Magali Unresponsive

;



                                                                MD Fatuma



                                        Alcohol abuse;



                                                    Dante,   Acute respirato

ry failure, unspecified whether with hypoxia or 

hypercapnia (Formerly [facility]);



                                                    Dex ARIZMENDI MD Lactic acid a

cidosis;



                                                                 Acute upper GI 

bleed;



                                                                 Chronic obstruc

tive pulmonary disease, unspecified COPD type (Formerly [facility]);



                                                                 Cardiac arrest 

(Formerly [facility]);



                                                                 Ventricular fib

rillation (Formerly [facility])

 

             2020   [facility] Norinsky,    Acute coliti

s (Primary Dx);



                -               Encounter                       Best DEWEY, DO



                                        Lactic acidosis;



             2020                             Lorri Padillaynh-Magali SVT (suprave

ntricular tachycardia) (Formerly [facility]);



                                                                MD Fatuma



                                        Vomiting and diarrhea;



                                                    Dante,   Cough;



                                                    Dex ARIZMENDI MD Suspected Cov

id-19 Virus Infection;



                                                                 Chronic obstruc

tive pulmonary disease, unspecified COPD type (Formerly [facility])

 

             2020   Surgery      Procedural Cardiology Christine Estrella Left 

heart cath micah NEAL MD       lv gram cors



                                                                 [48721 (CPT)]

 

             2020   Emergency    General Internal Gemignani,   Chest pain,

 unspecified type 

(Primary Dx);



                -                               Medicine        Clifford Thompson MD



                                        Syncope, unspecified syncope type



             2020                             Dex Howell MD 

 

             2020   Emergency    General Internal Bruce, Lj Boi, Chest p

ain, unspecified 

type (Primary Dx);



                -                               Medicine        MD



                                        Chronic obstructive pulmonary disease, u

nspecified COPD type (Formerly [facility])



             2020                             Arturo Gibbons MD Berberian, Esteban N., MD 



after 10/07/2019



Immunizations







                    Name                Administration Dates Next Due

 

                    FLUCELVAX QUAD PF   2020 (Deferred:  - patient has bee

n 



                                        sick), 09/15/2018   

 

                    Pneumococcal Conjugate 13-Valent 09/15/2018          







Surgical History







                Surgery         Date            Site/Laterality Comments

 

                CORONARY STENT PLACEMENT                                 

 

                APPENDECTOMY                                    

 

                TONSILLECTOMY                                   

 

                KNEE SURGERY                                    

 

                AMPUTATION, DIGIT, HAND                                 Left mid

dle finger amputated

 

                BACK SURGERY                                    ; 

 

                CARDIAC CATHETERIZATION 2020        N/A             Procedur

e: Left heart cath w lv



                                                                gram cors;  Surg

luz marina: Christine Estrella MD;  Locatio

n: [facility] [place], [place] [facility]



                                                                Invasive Locatio

n;  Service:



                                                                Cardiology;  Lat

erality: N/A;







                                                                Medical devices 

from this surgery



                                                                are in the Impla

nts section.

 

                EGD W BIOPSY P  2020       N/A             Procedure: EGD W

ITH BIOPSY;



                                                                Surgeon: HAI Zhong MD;



                                                                Location: [facility] [place], [place] E

NDOSCOPY;



                                                                Service: [place]

nterology;



                                                                Laterality: N/A;

  extensive



                                                                esophagitis







Medical History







                    Medical History     Date                Comments

 

                    COPD (chronic obstructive pulmonary disease) (HCC)          

           

 

                    Pancreatitis                            

 

                    Ulcer of abdomen wall (HCC)                     

 

                    Hypertension                            

 

                    Coronary artery disease                     

 

                    Hypercholesteremia                      

 

                    Pulmonary embolism (HCC)                     

 

                    Anemia                                  







Social History







             Tobacco Use  Types        Packs/Day    Years Used   Date

 

             Current Every Day Smoker Cigarettes   1                         









                Smokeless Tobacco: Never Used                                 









                                        Tobacco Cessation: Ready to Quit: No; Co

unseling Given: No









                Alcohol Use     Drinks/Week     oz/Week         Comments

 

                Not Currently                                   3 years ago









                          Sex Assigned at Birth     Date Recorded

 

                          Not on file               







Last Filed Vital Signs







                Vital Sign      Reading         Time Taken      Comments

 

                Blood Pressure  112/55          2020  7:25 PM CDT 

 

                Pulse           66              2020  9:20 PM CDT 

 

                Temperature     36.4 C (97.6 F) 2020  7:25 PM CDT 

 

                Respiratory Rate 18              2020  9:20 PM CDT 

 

                Oxygen Saturation 98%             2020  8:57 PM CDT 

 

                Inhaled Oxygen Concentration -               -               

 

                Weight          62 kg (136 lb 11 oz) 2020  5:34 AM CDT 

 

                Height          175.3 cm (5' 9") 2020  1:43 AM CDT 

 

                Body Mass Index 20.18           2020  1:43 AM CDT 







Plan of Treatment







                Health Maintenance Due Date        Last Done       Comments

 

                COLONOSCOPY SCREENING 1997                      

 

                SHINGLES VACCINES (#1) 1997                      

 

                65+ PNEUMOCOCCAL VACCINE (2 of 2 - PPSV23) 09/15/2019      09/15

/2018      

 

                INFLUENZA VACCINE 2020      09/15/2018      







Implants







          Implanted Type      Area       Device    Shelf     Model /



                                                  Identifier Expiration Serial /



                                                            Date      Lot

 

                          Device Vasclr Clsr Baln Cath 10ml Lkng Syr 5fr Gray My

nxgrip - [id number] 

Cardiovascular N/A:         ACCESS CLOSURE              2021   [id number] /



                    Implanted: 2020 at [facility] (Quantity not o

n file) Implants            N/A

                INC                                              /



                                                                      [id number]







Procedures







             Procedure Name Priority     Date/Time    Associated Diagnosis Comme

nts

 

             POC GLUCOSE  Routine      2020  4:02              Results for

 this



                                       PM CDT                    procedure are i

n



                                                                 the results



                                                                 section.

 

             POC GLUCOSE  Routine      2020 11:48              Results for

 this



                                       AM CDT                    procedure are i

n



                                                                 the results



                                                                 section.

 

             POC GLUCOSE  Routine      2020  9:15              Results for

 this



                                       AM CDT                    procedure are i

n



                                                                 the results



                                                                 section.

 

             POC GLUCOSE  Routine      2020  5:09              Results for

 this



                                       AM CDT                    procedure are i

n



                                                                 the results



                                                                 section.

 

             POC GLUCOSE  Routine      2020  1:18              Results for

 this



                                       AM CDT                    procedure are i

n



                                                                 the results



                                                                 section.

 

             POC GLUCOSE  Routine      2020  9:02              Results for

 this



                                       PM CDT                    procedure are i

n



                                                                 the results



                                                                 section.

 

             POC GLUCOSE  Routine      2020  4:03              Results for

 this



                                       PM CDT                    procedure are i

n



                                                                 the results



                                                                 section.

 

             POC GLUCOSE  Routine      2020 12:54              Results for

 this



                                       PM CDT                    procedure are i

n



                                                                 the results



                                                                 section.

 

             POC GLUCOSE  Routine      2020  8:27              Results for

 this



                                       AM CDT                    procedure are i

n



                                                                 the results



                                                                 section.

 

             PARTIAL THROMBOPLASTIN Timed        2020  5:45              R

esults for this



             TIME (PTT)                AM CDT                    procedure are i

n



                                                                 the results



                                                                 section.

 

             CBC HEMOGRAM Routine      2020  5:45              Results for

 this



                                       AM CDT                    procedure are i

n



                                                                 the results



                                                                 section.

 

             POC GLUCOSE  Routine      2020  4:39              Results for

 this



                                       AM CDT                    procedure are i

n



                                                                 the results



                                                                 section.

 

             POC GLUCOSE  Routine      2020 12:01              Results for

 this



                                       AM CDT                    procedure are i

n



                                                                 the results



                                                                 section.

 

             PARTIAL THROMBOPLASTIN Timed        2020 10:15              R

esults for this



             TIME (PTT)                PM CDT                    procedure are i

n



                                                                 the results



                                                                 section.

 

             POC GLUCOSE  Routine      2020  8:19              Results for

 this



                                       PM CDT                    procedure are i

n



                                                                 the results



                                                                 section.

 

             POC GLUCOSE  Routine      2020  3:27              Results for

 this



                                       PM CDT                    procedure are i

n



                                                                 the results



                                                                 section.

 

             PARTIAL THROMBOPLASTIN Timed        2020  2:30              R

esults for this



             TIME (PTT)                PM CDT                    procedure are i

n



                                                                 the results



                                                                 section.

 

             POC GLUCOSE  Routine      2020 12:04              Results for

 this



                                       PM CDT                    procedure are i

n



                                                                 the results



                                                                 section.

 

             POC GLUCOSE  Routine      2020  8:46              Results for

 this



                                       AM CDT                    procedure are i

n



                                                                 the results



                                                                 section.

 

             CBC HEMOGRAM Routine      2020  7:35              Results for

 this



                                       AM CDT                    procedure are i

n



                                                                 the results



                                                                 section.

 

             PARTIAL THROMBOPLASTIN Timed        2020  6:54              R

esults for this



             TIME (PTT)                AM CDT                    procedure are i

n



                                                                 the results



                                                                 section.

 

             POC GLUCOSE  Routine      2020  4:15              Results for

 this



                                       AM CDT                    procedure are i

n



                                                                 the results



                                                                 section.

 

             POC GLUCOSE  Routine      2020 12:26              Results for

 this



                                       AM CDT                    procedure are i

n



                                                                 the results



                                                                 section.

 

             PARTIAL THROMBOPLASTIN Timed        2020 10:50              R

esults for this



             TIME (PTT)                PM CDT                    procedure are i

n



                                                                 the results



                                                                 section.

 

             POC GLUCOSE  Routine      2020  8:46              Results for

 this



                                       PM CDT                    procedure are i

n



                                                                 the results



                                                                 section.

 

             POC GLUCOSE  Routine      2020  3:50              Results for

 this



                                       PM CDT                    procedure are i

n



                                                                 the results



                                                                 section.

 

             PARTIAL THROMBOPLASTIN Timed        2020  3:15              R

esults for this



             TIME (PTT)                PM CDT                    procedure are i

n



                                                                 the results



                                                                 section.

 

             POC GLUCOSE  Routine      2020 11:35              Results for

 this



                                       AM CDT                    procedure are i

n



                                                                 the results



                                                                 section.

 

             PARTIAL THROMBOPLASTIN Timed        2020  7:45              R

esults for this



             TIME (PTT)                AM CDT                    procedure are i

n



                                                                 the results



                                                                 section.

 

             POC GLUCOSE  Routine      2020  7:43              Results for

 this



                                       AM CDT                    procedure are i

n



                                                                 the results



                                                                 section.

 

             POC GLUCOSE  Routine      2020  4:39              Results for

 this



                                       AM CDT                    procedure are i

n



                                                                 the results



                                                                 section.

 

             MANUAL DIFFERENTIAL Timed        2020  4:30              Resu

lts for this



                                       AM CDT                    procedure are i

n



                                                                 the results



                                                                 section.

 

             ESTIMATED GFR Timed        2020  4:30              Results fo

r this



                                       AM CDT                    procedure are i

n



                                                                 the results



                                                                 section.

 

             IONIZED CALCIUM Timed        2020  4:30              Results 

for this



                                       AM CDT                    procedure are i

n



                                                                 the results



                                                                 section.

 

             PHOSPHORUS LEVEL Timed        2020  4:30              Results

 for this



                                       AM CDT                    procedure are i

n



                                                                 the results



                                                                 section.

 

             MAGNESIUM LEVEL Timed        2020  4:30              Results 

for this



                                       AM CDT                    procedure are i

n



                                                                 the results



                                                                 section.

 

             CBC WITH PLATELET AND Timed        2020  4:30              Re

sults for this



             DIFFERENTIAL              AM CDT                    procedure are i

n



                                                                 the results



                                                                 section.

 

             BASIC METABOLIC PANEL Timed        2020  4:30              Re

sults for this



                                       AM CDT                    procedure are i

n



                                                                 the results



                                                                 section.

 

             POC GLUCOSE  Routine      2020  3:59              Results for

 this



                                       AM CDT                    procedure are i

n



                                                                 the results



                                                                 section.

 

             POC GLUCOSE  Routine      2020 12:25              Results for

 this



                                       AM CDT                    procedure are i

n



                                                                 the results



                                                                 section.

 

             PARTIAL THROMBOPLASTIN Timed        09/10/2020 10:22              R

esults for this



             TIME (PTT)                PM CDT                    procedure are i

n



                                                                 the results



                                                                 section.

 

             POTASSIUM LEVEL Routine      09/10/2020 10:22              Results 

for this



                                       PM CDT                    procedure are i

n



                                                                 the results



                                                                 section.

 

             POC GLUCOSE  Routine      09/10/2020  8:58              Results for

 this



                                       PM CDT                    procedure are i

n



                                                                 the results



                                                                 section.

 

             IONIZED CALCIUM Routine      09/10/2020  5:30              Results 

for this



                                       PM CDT                    procedure are i

n



                                                                 the results



                                                                 section.

 

             POC GLUCOSE  Routine      09/10/2020  4:10              Results for

 this



                                       PM CDT                    procedure are i

n



                                                                 the results



                                                                 section.

 

             PARTIAL THROMBOPLASTIN Timed        09/10/2020  1:00              R

esults for this



             TIME (PTT)                PM CDT                    procedure are i

n



                                                                 the results



                                                                 section.

 

             POC GLUCOSE  Routine      09/10/2020 11:27              Results for

 this



                                       AM CDT                    procedure are i

n



                                                                 the results



                                                                 section.

 

             POC GLUCOSE  Routine      09/10/2020  8:07              Results for

 this



                                       [facility]                    procedure are i

n



                                                                 the results



                                                                 section.

 

             XR CHEST 1 VW PORTABLE Routine      09/10/2020  6:26              R

esults for this



                                       [facility]                    procedure are i

n



                                                                 the results



                                                                 section.

 

             MANUAL DIFFERENTIAL Routine      09/10/2020  5:00              Resu

lts for this



                                       [facility]                    procedure are i

n



                                                                 the results



                                                                 section.

 

             PARTIAL THROMBOPLASTIN Timed        09/10/2020  5:00              R

esults for this



             TIME (PTT)                [facility]                    procedure are i

n



                                                                 the results



                                                                 section.

 

             MAGNESIUM LEVEL Routine      09/10/2020  5:00              Results 

for this



                                       [facility]                    procedure are i

n



                                                                 the results



                                                                 section.

 

             ESTIMATED GFR Routine      09/10/2020  5:00              Results fo

r this



                                       [facility]                    procedure are i

n



                                                                 the results



                                                                 section.

 

             IONIZED CALCIUM Routine      09/10/2020  5:00              Results 

for this



                                       [facility]                    procedure are i

n



                                                                 the results



                                                                 section.

 

             PHOSPHORUS LEVEL Routine      09/10/2020  5:00              Results

 for this



                                       [facility]                    procedure are i

n



                                                                 the results



                                                                 section.

 

             COMPREHENSIVE Routine      09/10/2020  5:00              Results fo

r this



             METABOLIC PANEL              [facility]                    procedure ar

e in



                                                                 the results



                                                                 section.

 

             CBC WITH PLATELET AND Routine      09/10/2020  5:00              Re

sults for this



             DIFFERENTIAL              [facility]                    procedure are i

n



                                                                 the results



                                                                 section.

 

             ARTERIAL BLOOD GAS Routine      09/10/2020  4:17              Resul

ts for this



                                       [facility]                    procedure are i

n



                                                                 the results



                                                                 section.

 

             POC GLUCOSE  Routine      09/10/2020 12:50              Results for

 this



                                       [facility]                    procedure are i

n



                                                                 the results



                                                                 section.

 

             PARTIAL THROMBOPLASTIN Timed        2020 10:40              R

esults for this



             TIME (PTT)                PM [facility]T                    procedure are i

n



                                                                 the results



                                                                 section.

 

             POC GLUCOSE  Routine      2020  8:19              Results for

 this



                                       [facility]                    procedure are i

n



                                                                 the results



                                                                 section.

 

             IONIZED CALCIUM Timed        2020  4:40              Results 

for this



                                       [facility]                    procedure are i

n



                                                                 the results



                                                                 section.

 

             POC GLUCOSE  Routine      2020  3:45              Results for

 this



                                       [facility]                    procedure are i

n



                                                                 the results



                                                                 section.

 

             XR CHEST 1 VW PORTABLE STAT         2020  2:45              R

esults for this



                                       [facility]                    procedure are i

n



                                                                 the results



                                                                 section.

 

             HC CATH DUAL LUMEN Routine      2020  2:28              Resul

ts for this



             PICC # [id number]              PM CDT                    procedure are 

in



                                                                 the results



                                                                 section.

 

             HC CVL PICC INSERT 5 Routine      2020  2:28              Res

ults for this



             YRS OR > W/RS&I AND              PM [facility]T                    procedur

e are in



             IMG GUID                                            the results



                                                                 section.

 

             BRONCHOSCOPY Routine      2020 12:20 Acute respiratory Result

s for this



                                       PM CDT       failure, unspecified procedu

re are in



                                                    whether with hypoxia the res

ults



                                                    or hypercapnia (HCC) section

.

 

             XR CHEST 1 VW PORTABLE STAT         2020 12:08              R

esults for this



                                       [facility]                    procedure are i

n



                                                                 the results



                                                                 section.

 

             POC GLUCOSE  Routine      2020 11:52              Results for

 this



                                       [facility]                    procedure are i

n



                                                                 the results



                                                                 section.

 

             GRAM STAIN   Routine      2020 11:05              Results for

 this



                                       [facility]                    procedure are i

n



                                                                 the results



                                                                 section.

 

             RESPIRATORY CULTURE Routine      2020 11:05              Resu

lts for this



                                       [facility]                    procedure are i

n



                                                                 the results



                                                                 section.

 

             POC GLUCOSE  Routine      2020  9:10              Results for

 this



                                       [facility]                    procedure are i

n



                                                                 the results



                                                                 section.

 

             POC GLUCOSE  Routine      2020  7:42              Results for

 this



                                       [facility]                    procedure are i

n



                                                                 the results



                                                                 section.

 

             PARTIAL THROMBOPLASTIN STAT         2020  7:42              R

esults for this



             TIME (PTT)                [facility]                    procedure are i

n



                                                                 the results



                                                                 section.

 

             PROTHROMBIN TIME WITH STAT         2020  7:42              Re

sults for this



             INR                       [facility]                    procedure are i

n



                                                                 the results



                                                                 section.

 

             XR CHEST 1 VW PORTABLE Routine      2020  6:17              R

esults for this



                                       [facility]                    procedure are i

n



                                                                 the results



                                                                 section.

 

             ARTERIAL BLOOD GAS Routine      2020  4:34              Resul

ts for this



                                       [facility]                    procedure are i

n



                                                                 the results



                                                                 section.

 

             IONIZED CALCIUM Routine      2020  4:15              Results 

for this



                                       [facility]                    procedure are i

n



                                                                 the results



                                                                 section.

 

             ESTIMATED GFR Routine      2020  4:15              Results fo

r this



                                       [facility]                    procedure are i

n



                                                                 the results



                                                                 section.

 

             PHOSPHORUS LEVEL Routine      2020  4:15              Results

 for this



                                       [facility]                    procedure are i

n



                                                                 the results



                                                                 section.

 

             MAGNESIUM LEVEL Routine      2020  4:15              Results 

for this



                                       [facility]                    procedure are i

n



                                                                 the results



                                                                 section.

 

             COMPREHENSIVE Routine      2020  4:15              Results fo

r this



             METABOLIC PANEL              [facility]                    procedure ar

e in



                                                                 the results



                                                                 section.

 

             PARTIAL THROMBOPLASTIN Routine      2020  4:15              R

esults for this



             TIME (PTT)                [facility]                    procedure are i

n



                                                                 the results



                                                                 section.

 

             CBC HEMOGRAM Routine      2020  4:15              Results for

 this



                                       [facility]                    procedure are i

n



                                                                 the results



                                                                 section.

 

             POC GLUCOSE  Routine      2020  1:03              Results for

 this



                                       [facility]                    procedure are i

n



                                                                 the results



                                                                 section.

 

             POC GLUCOSE  Routine      2020  5:05              Results for

 this



                                       [facility]                    procedure are i

n



                                                                 the results



                                                                 section.

 

             MANUAL DIFFERENTIAL Timed        2020  5:00              Resu

lts for this



                                       [facility]                    procedure are i

n



                                                                 the results



                                                                 section.

 

             IONIZED CALCIUM Routine      2020  5:00              Results 

for this



                                       [facility]                    procedure are i

n



                                                                 the results



                                                                 section.

 

             CBC WITH PLATELET AND Timed        2020  5:00              Re

sults for this



             DIFFERENTIAL              [facility]                    procedure are i

n



                                                                 the results



                                                                 section.

 

             POC GLUCOSE  Routine      2020  3:51              Results for

 this



                                       [facility]                    procedure are i

n



                                                                 the results



                                                                 section.

 

             POC GLUCOSE  Routine      2020 12:14              Results for

 this



                                       [facility]                    procedure are i

n



                                                                 the results



                                                                 section.

 

             POC GLUCOSE  Routine      2020  8:29              Results for

 this



                                       [facility]                    procedure are i

n



                                                                 the results



                                                                 section.

 

             PROTHROMBIN TIME WITH Routine      2020  4:55              Re

sults for this



             INR                       [facility]                    procedure are i

n



                                                                 the results



                                                                 section.

 

             POC GLUCOSE  Routine      2020  4:15              Results for

 this



                                       [facility]                    procedure are i

n



                                                                 the results



                                                                 section.

 

             MANUAL DIFFERENTIAL Routine      2020  4:10              Resu

lts for this



                                       [facility]                    procedure are i

n



                                                                 the results



                                                                 section.

 

             IONIZED CALCIUM Routine      2020  4:10              Results 

for this



                                       [facility]                    procedure are i

n



                                                                 the results



                                                                 section.

 

             ESTIMATED GFR Routine      2020  4:10              Results fo

r this



                                       [facility]                    procedure are i

n



                                                                 the results



                                                                 section.

 

             MAGNESIUM LEVEL Routine      2020  4:10              Results 

for this



                                       [facility]                    procedure are i

n



                                                                 the results



                                                                 section.

 

             BASIC METABOLIC PANEL Routine      2020  4:10              Re

sults for this



                                       [facility]                    procedure are i

n



                                                                 the results



                                                                 section.

 

             CBC WITH PLATELET AND Routine      2020  4:10              Re

sults for this



             DIFFERENTIAL              [facility]                    procedure are i

n



                                                                 the results



                                                                 section.

 

             PARTIAL THROMBOPLASTIN Routine      2020  4:10              R

esults for this



             TIME (PTT)                [facility]                    procedure are i

n



                                                                 the results



                                                                 section.

 

             POC GLUCOSE  Routine      2020  8:08              Results for

 this



                                       [facility]                    procedure are i

n



                                                                 the results



                                                                 section.

 

             POC GLUCOSE  Routine      2020  4:08              Results for

 this



                                       [facility]                    procedure are i

n



                                                                 the results



                                                                 section.

 

             POC GLUCOSE  Routine      2020 12:35              Results for

 this



                                       [facility]                    procedure are i

n



                                                                 the results



                                                                 section.

 

             POC GLUCOSE  Routine      2020  9:57              Results for

 this



                                       [facility]                    procedure are i

n



                                                                 the results



                                                                 section.

 

             POC GLUCOSE  Routine      2020  7:44              Results for

 this



                                       [facility]                    procedure are i

n



                                                                 the results



                                                                 section.

 

             POC GLUCOSE  Routine      2020  5:43              Results for

 this



                                       [facility]                    procedure are i

n



                                                                 the results



                                                                 section.

 

             ARTERIAL BLOOD GAS Routine      2020  4:01              Resul

ts for this



                                       [facility]                    procedure are i

n



                                                                 the results



                                                                 section.

 

             POTASSIUM LEVEL Routine      2020  3:45              Results 

for this



                                       [facility]                    procedure are i

n



                                                                 the results



                                                                 section.

 

             PARTIAL THROMBOPLASTIN Routine      2020  3:45              R

esults for this



             TIME (PTT)                [facility]                    procedure are i

n



                                                                 the results



                                                                 section.

 

             MAGNESIUM LEVEL Routine      2020  3:45              Results 

for this



                                       [facility]                    procedure are i

n



                                                                 the results



                                                                 section.

 

             PHOSPHORUS LEVEL Routine      2020  3:45              Results

 for this



                                       [facility]                    procedure are i

n



                                                                 the results



                                                                 section.

 

             CBC HEMOGRAM Routine      2020  3:45              Results for

 this



                                       [facility]                    procedure are i

n



                                                                 the results



                                                                 section.

 

             IONIZED CALCIUM Routine      2020  3:42              Results 

for this



                                       [facility]                    procedure are i

n



                                                                 the results



                                                                 section.

 

             POC GLUCOSE  Routine      2020 12:01              Results for

 this



                                       [facility]                    procedure are i

n



                                                                 the results



                                                                 section.

 

             POC GLUCOSE  Routine      2020  8:33              Results for

 this



                                       [facility]                    procedure are i

n



                                                                 the results



                                                                 section.

 

             PARTIAL THROMBOPLASTIN Timed        2020  6:24              R

esults for this



             TIME (PTT)                [facility]                    procedure are i

n



                                                                 the results



                                                                 section.

 

             POC GLUCOSE  Routine      2020  4:32              Results for

 this



                                       [facility]                    procedure are i

n



                                                                 the results



                                                                 section.

 

             ESTIMATED GFR STAT         2020  3:20              Results fo

r this



                                       [facility]                    procedure are i

n



                                                                 the results



                                                                 section.

 

             MAGNESIUM LEVEL STAT         2020  3:20              Results 

for this



                                       [facility]                    procedure are i

n



                                                                 the results



                                                                 section.

 

             BASIC METABOLIC PANEL STAT         2020  3:20              Re

sults for this



                                       PM CDT                    procedure are i

n



                                                                 the results



                                                                 section.

 

             IONIZED CALCIUM Routine      2020  3:20              Results 

for this



                                       PM CDT                    procedure are i

n



                                                                 the results



                                                                 section.

 

             B NATRIURETIC PEPTIDE Routine      2020  2:23              Re

sults for this



                                       PM CDT                    procedure are i

n



                                                                 the results



                                                                 section.

 

             SMEAR REVIEW Timed        2020  2:23              Results for

 this



                                       PM CDT                    procedure are i

n



                                                                 the results



                                                                 section.

 

             HC COMPLETE BLD COUNT Timed        2020  2:23              Re

sults for this



             W/AUTO DIFF               PM CDT                    procedure are i

n



                                                                 the results



                                                                 section.

 

             POC GLUCOSE  Routine      2020  1:21              Results for

 this



                                       PM CDT                    procedure are i

n



                                                                 the results



                                                                 section.

 

             POC GLUCOSE  Routine      2020 12:05              Results for

 this



                                       PM CDT                    procedure are i

n



                                                                 the results



                                                                 section.

 

             PARTIAL THROMBOPLASTIN Timed        2020 10:05              R

esults for this



             TIME (PTT)                AM CDT                    procedure are i

n



                                                                 the results



                                                                 section.

 

             POC GLUCOSE  Routine      2020  9:06              Results for

 this



                                       AM CDT                    procedure are i

n



                                                                 the results



                                                                 section.

 

             TRANSFUSE RED BLOOD Routine      2020  8:17              



             CELLS                     AM CDT                    

 

             SMEAR REVIEW Timed        2020  6:52              Results for

 this



                                       AM CDT                    procedure are i

n



                                                                 the results



                                                                 section.

 

             HC COMPLETE BLD COUNT Timed        2020  6:52              Re

sults for this



             W/AUTO DIFF               AM CDT                    procedure are i

n



                                                                 the results



                                                                 section.

 

             XR CHEST 1 VW PORTABLE Routine      2020  6:30              R

esults for this



                                       AM CDT                    procedure are i

n



                                                                 the results



                                                                 section.

 

             ARTERIAL BLOOD GAS Routine      2020  4:40              Resul

ts for this



                                       AM CDT                    procedure are i

n



                                                                 the results



                                                                 section.

 

             MANUAL DIFFERENTIAL Routine      2020  3:15              Resu

lts for this



                                       AM CDT                    procedure are i

n



                                                                 the results



                                                                 section.

 

             ESTIMATED GFR Routine      2020  3:15              Results fo

r this



                                       AM CDT                    procedure are i

n



                                                                 the results



                                                                 section.

 

             IONIZED CALCIUM Routine      2020  3:15              Results 

for this



                                       AM CDT                    procedure are i

n



                                                                 the results



                                                                 section.

 

             PHOSPHORUS LEVEL Routine      2020  3:15              Results

 for this



                                       AM CDT                    procedure are i

n



                                                                 the results



                                                                 section.

 

             MAGNESIUM LEVEL Routine      2020  3:15              Results 

for this



                                       AM CDT                    procedure are i

n



                                                                 the results



                                                                 section.

 

             COMPREHENSIVE Routine      2020  3:15              Results fo

r this



             METABOLIC PANEL              AM CDT                    procedure ar

e in



                                                                 the results



                                                                 section.

 

             ANTI XA,     Timed        2020  3:15              Results for

 this



             UNFRACTIONATED              AM CDT                    procedure are

 in



                                                                 the results



                                                                 section.

 

             PARTIAL THROMBOPLASTIN Timed        2020  3:15              R

esults for this



             TIME (PTT)                AM CDT                    procedure are i

n



                                                                 the results



                                                                 section.

 

             CBC HEMOGRAM Routine      2020  3:15              Results for

 this



                                       AM CDT                    procedure are i

n



                                                                 the results



                                                                 section.

 

             POC GLUCOSE  Routine      2020  1:03              Results for

 this



                                       AM CDT                    procedure are i

n



                                                                 the results



                                                                 section.

 

             SMEAR REVIEW Timed        2020 10:20              Results for

 this



                                       PM CDT                    procedure are i

n



                                                                 the results



                                                                 section.

 

             HC COMPLETE BLD COUNT Timed        2020 10:20              Re

sults for this



             W/AUTO DIFF               PM CDT                    procedure are i

n



                                                                 the results



                                                                 section.

 

             POC GLUCOSE  Routine      2020  8:45              Results for

 this



                                       PM CDT                    procedure are i

n



                                                                 the results



                                                                 section.

 

             ANTI XA,     Timed        2020  6:25              Results for

 this



             UNFRACTIONATED              PM CDT                    procedure are

 in



                                                                 the results



                                                                 section.

 

             IONIZED CALCIUM Routine      2020  5:32              Results 

for this



                                       PM CDT                    procedure are i

n



                                                                 the results



                                                                 section.

 

             POTASSIUM LEVEL Routine      2020  5:32              Results 

for this



                                       PM CDT                    procedure are i

n



                                                                 the results



                                                                 section.

 

             POC GLUCOSE  Routine      2020  4:14              Results for

 this



                                       PM CDT                    procedure are i

n



                                                                 the results



                                                                 section.

 

             MRSA SCREEN CULTURE Routine      2020  3:51              Resu

lts for this



                                       PM CDT                    procedure are i

n



                                                                 the results



                                                                 section.

 

             SMEAR REVIEW Timed        2020  2:10              Results for

 this



                                       PM CDT                    procedure are i

n



                                                                 the results



                                                                 section.

 

             HC COMPLETE BLD COUNT Timed        2020  2:10              Re

sults for this



             W/AUTO DIFF               PM CDT                    procedure are i

n



                                                                 the results



                                                                 section.

 

             POC GLUCOSE  Routine      2020 11:09              Results for

 this



                                       AM CDT                    procedure are i

n



                                                                 the results



                                                                 section.

 

             OCCULT BLOOD, STOOL Routine      2020 11:09              Resu

lts for this



                                       AM CDT                    procedure are i

n



                                                                 the results



                                                                 section.

 

             ANTI XA,     Timed        2020  9:45              Results for

 this



             UNFRACTIONATED              AM CDT                    procedure are

 in



                                                                 the results



                                                                 section.

 

             LACTIC ACID LEVEL Routine      2020  9:00              Result

s for this



                                       AM CDT                    procedure are i

n



                                                                 the results



                                                                 section.

 

             AMMONIA LEVEL STAT         2020  9:00              Results fo

r this



                                       AM CDT                    procedure are i

n



                                                                 the results



                                                                 section.

 

             POC GLUCOSE  Routine      2020  8:56              Results for

 this



                                       AM CDT                    procedure are i

n



                                                                 the results



                                                                 section.

 

             POC GLUCOSE  Routine      2020  8:18              Results for

 this



                                       AM CDT                    procedure are i

n



                                                                 the results



                                                                 section.

 

             TRANSFUSE RED BLOOD Routine      2020  8:01              



             CELLS                     AM CDT                    

 

             XR CHEST 1 VW PORTABLE Routine      2020  6:42              R

esults for this



                                       AM CDT                    procedure are i

n



                                                                 the results



                                                                 section.

 

             XR ABDOMEN 1 VW Routine      2020  6:40              Results 

for this



                                       AM CDT                    procedure are i

n



                                                                 the results



                                                                 section.

 

             PREPARE RBC  Routine      2020  4:33              Results for

 this



                                       AM CDT                    procedure are i

n



                                                                 the results



                                                                 section.

 

             PREPARE RBC  Routine      2020  4:33              Results for

 this



                                       AM CDT                    procedure are i

n



                                                                 the results



                                                                 section.

 

             TYPE AND SCREEN Routine      2020  4:33              Results 

for this



                                       AM CDT                    procedure are i

n



                                                                 the results



                                                                 section.

 

             ARTERIAL BLOOD GAS Routine      2020  3:56              Resul

ts for this



                                       AM CDT                    procedure are i

n



                                                                 the results



                                                                 section.

 

             MANUAL DIFFERENTIAL Routine      2020  3:07              Resu

lts for this



                                       AM CDT                    procedure are i

n



                                                                 the results



                                                                 section.

 

             ESTIMATED GFR Routine      2020  3:07              Results fo

r this



                                       AM CDT                    procedure are i

n



                                                                 the results



                                                                 section.

 

             BILIRUBIN DIRECT Routine      2020  3:07              Results

 for this



                                       AM CDT                    procedure are i

n



                                                                 the results



                                                                 section.

 

             PHOSPHORUS LEVEL Routine      2020  3:07              Results

 for this



                                       AM CDT                    procedure are i

n



                                                                 the results



                                                                 section.

 

             MAGNESIUM LEVEL Routine      2020  3:07              Results 

for this



                                       AM CDT                    procedure are i

n



                                                                 the results



                                                                 section.

 

             IONIZED CALCIUM Routine      2020  3:07              Results 

for this



                                       AM CDT                    procedure are i

n



                                                                 the results



                                                                 section.

 

             COMPREHENSIVE Routine      2020  3:07              Results fo

r this



             METABOLIC PANEL              AM CDT                    procedure ar

e in



                                                                 the results



                                                                 section.

 

             CBC WITH PLATELET AND Routine      2020  3:07              Re

sults for this



             DIFFERENTIAL              AM CDT                    procedure are i

n



                                                                 the results



                                                                 section.

 

             POC GLUCOSE  Routine      2020  2:13              Results for

 this



                                       AM CDT                    procedure are i

n



                                                                 the results



                                                                 section.

 

             ANTI XA,     Timed        2020  2:04              Results for

 this



             UNFRACTIONATED              AM CDT                    procedure are

 in



                                                                 the results



                                                                 section.

 

             POC GLUCOSE  Routine      2020 10:44              Results for

 this



                                       PM CDT                    procedure are i

n



                                                                 the results



                                                                 section.

 

             ARTERIAL BLOOD GAS STAT         2020  9:54              Resul

ts for this



                                       PM CDT                    procedure are i

n



                                                                 the results



                                                                 section.

 

             XR CHEST 1 VW PORTABLE STAT         2020  9:33              R

esults for this



                                       PM CDT                    procedure are i

n



                                                                 the results



                                                                 section.

 

             BRONCHOSCOPY Routine      2020  9:15 Acute respiratory Result

s for this



                                       PM CDT       failure, unspecified procedu

re are in



                                                    whether with hypoxia the res

ults



                                                    or hypercapnia (HCC) section

.

 

             INTUBATION   Routine      2020  9:13 Acute respiratory Result

s for this



                                       PM CDT       failure, unspecified procedu

re are in



                                                    whether with hypoxia the res

ults



                                                    or hypercapnia (HCC) section

.

 

             ANTI XA,     Timed        2020  6:30              Results for

 this



             UNFRACTIONATED              PM CDT                    procedure are

 in



                                                                 the results



                                                                 section.

 

             IONIZED CALCIUM Timed        2020  4:48              Results 

for this



                                       PM CDT                    procedure are i

n



                                                                 the results



                                                                 section.

 

             POTASSIUM LEVEL Timed        2020  4:48              Results 

for this



                                       PM CDT                    procedure are i

n



                                                                 the results



                                                                 section.

 

             POC GLUCOSE  Routine      2020  4:04              Results for

 this



                                       PM CDT                    procedure are i

n



                                                                 the results



                                                                 section.

 

             POC GLUCOSE  Routine      2020 11:51              Results for

 this



                                       AM CDT                    procedure are i

n



                                                                 the results



                                                                 section.

 

             PARTIAL THROMBOPLASTIN STAT         2020 10:10              R

esults for this



             TIME (PTT)                AM CDT                    procedure are i

n



                                                                 the results



                                                                 section.

 

             PROTHROMBIN TIME WITH STAT         2020 10:10              Re

sults for this



             INR                       AM CDT                    procedure are i

n



                                                                 the results



                                                                 section.

 

             ANTI XA,     STAT         2020 10:10              Results for

 this



             UNFRACTIONATED              AM CDT                    procedure are

 in



                                                                 the results



                                                                 section.

 

             POC GLUCOSE  Routine      2020  7:48              Results for

 this



                                       AM CDT                    procedure are i

n



                                                                 the results



                                                                 section.

 

             POC GLUCOSE  Routine      2020  6:03              Results for

 this



                                       AM CDT                    procedure are i

n



                                                                 the results



                                                                 section.

 

             XR CHEST 1 VW PORTABLE Routine      2020  5:37              R

esults for this



                                       AM CDT                    procedure are i

n



                                                                 the results



                                                                 section.

 

             POC GLUCOSE  Routine      2020  3:52              Results for

 this



                                       AM CDT                    procedure are i

n



                                                                 the results



                                                                 section.

 

             SMEAR REVIEW Routine      2020  3:49              Results for

 this



                                       AM CDT                    procedure are i

n



                                                                 the results



                                                                 section.

 

             ESTIMATED GFR Routine      2020  3:49              Results fo

r this



                                       AM CDT                    procedure are i

n



                                                                 the results



                                                                 section.

 

             BILIRUBIN DIRECT Routine      2020  3:49              Results

 for this



                                       AM [facility]T                    procedure are i

n



                                                                 the results



                                                                 section.

 

             PHOSPHORUS LEVEL Routine      2020  3:49              Results

 for this



                                       AM [facility]T                    procedure are i

n



                                                                 the results



                                                                 section.

 

             MAGNESIUM LEVEL Routine      2020  3:49              Results 

for this



                                       AM CDT                    procedure are i

n



                                                                 the results



                                                                 section.

 

             IONIZED CALCIUM Routine      2020  3:49              Results 

for this



                                       AM CDT                    procedure are i

n



                                                                 the results



                                                                 section.

 

             COMPREHENSIVE Routine      2020  3:49              Results fo

r this



             METABOLIC PANEL              AM CDT                    procedure ar

e in



                                                                 the results



                                                                 section.

 

             HC COMPLETE BLD COUNT Routine      2020  3:49              Re

sults for this



             W/AUTO DIFF               AM CDT                    procedure are i

n



                                                                 the results



                                                                 section.

 

             POC GLUCOSE  Routine      2020  2:22              Results for

 this



                                       AM [facility]T                    procedure are i

n



                                                                 the results



                                                                 section.

 

             POC GLUCOSE  Routine      2020 10:48              Results for

 this



                                       PM CDT                    procedure are i

n



                                                                 the results



                                                                 section.

 

             POC GLUCOSE  Routine      2020  8:21              Results for

 this



                                       PM CDT                    procedure are i

n



                                                                 the results



                                                                 section.

 

             POC GLUCOSE  Routine      2020  6:36              Results for

 this



                                       PM CDT                    procedure are i

n



                                                                 the results



                                                                 section.

 

             IONIZED CALCIUM Routine      2020  6:35              Results 

for this



                                       PM CDT                    procedure are i

n



                                                                 the results



                                                                 section.

 

             MANUAL DIFFERENTIAL STAT         2020  3:40              Resu

lts for this



                                       PM CDT                    procedure are i

n



                                                                 the results



                                                                 section.

 

             HEPARIN PF4 ANTIBODY Routine      2020  3:40              Res

ults for this



             (IGG)                     PM CDT                    procedure are i

n



                                                                 the results



                                                                 section.

 

             ESTIMATED GFR STAT         2020  3:40              Results fo

r this



                                       PM CDT                    procedure are i

n



                                                                 the results



                                                                 section.

 

             CBC WITH PLATELET AND STAT         2020  3:40              Re

sults for this



             DIFFERENTIAL              PM CDT                    procedure are i

n



                                                                 the results



                                                                 section.

 

             BASIC METABOLIC PANEL STAT         2020  3:40              Re

sults for this



                                       PM CDT                    procedure are i

n



                                                                 the results



                                                                 section.

 

             ARTERIAL BLOOD GAS STAT         2020  3:30              Resul

ts for this



                                       PM CDT                    procedure are i

n



                                                                 the results



                                                                 section.

 

             POC GLUCOSE  Routine      2020  3:19              Results for

 this



                                       PM CDT                    procedure are i

n



                                                                 the results



                                                                 section.

 

             BRONCHOSCOPY Routine      2020  3:18 Acute respiratory Result

s for this



                                       PM CDT       failure, unspecified procedu

re are in



                                                    whether with hypoxia the res

ults



                                                    or hypercapnia (HCC) section

.

 

             POC GLUCOSE  Routine      2020 11:34              Results for

 this



                                       AM CDT                    procedure are i

n



                                                                 the results



                                                                 section.

 

             POC GLUCOSE  Routine      2020  9:30              Results for

 this



                                       AM CDT                    procedure are i

n



                                                                 the results



                                                                 section.

 

             GRAM STAIN   Routine      2020  9:11              Results for

 this



                                       AM CDT                    procedure are i

n



                                                                 the results



                                                                 section.

 

             RESPIRATORY CULTURE Routine      2020  9:11              Resu

lts for this



                                       AM CDT                    procedure are i

n



                                                                 the results



                                                                 section.

 

             GRAM STAIN   Routine      2020  9:10              Results for

 this



                                       AM CDT                    procedure are i

n



                                                                 the results



                                                                 section.

 

             RESPIRATORY CULTURE Routine      2020  9:10              Resu

lts for this



                                       AM CDT                    procedure are i

n



                                                                 the results



                                                                 section.

 

             XR CHEST 1 VW PORTABLE Routine      2020  6:41              R

esults for this



                                       AM CDT                    procedure are i

n



                                                                 the results



                                                                 section.

 

             POC GLUCOSE  Routine      2020  6:27              Results for

 this



                                       AM CDT                    procedure are i

n



                                                                 the results



                                                                 section.

 

             POC GLUCOSE  Routine      2020  3:43              Results for

 this



                                       AM CDT                    procedure are i

n



                                                                 the results



                                                                 section.

 

             MANUAL DIFFERENTIAL Routine      2020  3:40              Resu

lts for this



                                       AM CDT                    procedure are i

n



                                                                 the results



                                                                 section.

 

             ARTERIAL BLOOD GAS Routine      2020  3:40              Resul

ts for this



                                       AM CDT                    procedure are i

n



                                                                 the results



                                                                 section.

 

             ESTIMATED GFR Routine      2020  3:40              Results fo

r this



                                       AM CDT                    procedure are i

n



                                                                 the results



                                                                 section.

 

             PHOSPHORUS LEVEL Routine      2020  3:40              Results

 for this



                                       AM CDT                    procedure are i

n



                                                                 the results



                                                                 section.

 

             MAGNESIUM LEVEL Routine      2020  3:40              Results 

for this



                                       AM CDT                    procedure are i

n



                                                                 the results



                                                                 section.

 

             IONIZED CALCIUM Routine      2020  3:40              Results 

for this



                                       AM CDT                    procedure are i

n



                                                                 the results



                                                                 section.

 

             COMPREHENSIVE Routine      2020  3:40              Results fo

r this



             METABOLIC PANEL              AM CDT                    procedure ar

e in



                                                                 the results



                                                                 section.

 

             CBC WITH PLATELET AND Routine      2020  3:40              Re

sults for this



             DIFFERENTIAL              AM CDT                    procedure are i

n



                                                                 the results



                                                                 section.

 

             PROTHROMBIN TIME WITH Routine      2020  3:40              Re

sults for this



             INR                       AM CDT                    procedure are i

n



                                                                 the results



                                                                 section.

 

             HEPATIC FUNCTION PANEL Routine      2020  3:40              R

esults for this



                                       AM CDT                    procedure are i

n



                                                                 the results



                                                                 section.

 

             POC GLUCOSE  Routine      2020  2:13              Results for

 this



                                       AM CDT                    procedure are i

n



                                                                 the results



                                                                 section.

 

             POC GLUCOSE  Routine      2020 10:22              Results for

 this



                                       PM CDT                    procedure are i

n



                                                                 the results



                                                                 section.

 

             POC GLUCOSE  Routine      2020  7:26              Results for

 this



                                       PM CDT                    procedure are i

n



                                                                 the results



                                                                 section.

 

             POTASSIUM LEVEL Routine      2020  6:40              Results 

for this



                                       PM CDT                    procedure are i

n



                                                                 the results



                                                                 section.

 

             POC GLUCOSE  Routine      2020  5:07              Results for

 this



                                       PM CDT                    procedure are i

n



                                                                 the results



                                                                 section.

 

             POC GLUCOSE  Routine      2020  3:16              Results for

 this



                                       PM CDT                    procedure are i

n



                                                                 the results



                                                                 section.

 

             MRI BRAIN WO CONTRAST STAT         2020  3:05              Re

sults for this



                                       PM CDT                    procedure are i

n



                                                                 the results



                                                                 section.

 

             POC GLUCOSE  Routine      2020  1:17              Results for

 this



                                       PM CDT                    procedure are i

n



                                                                 the results



                                                                 section.

 

             POC GLUCOSE  Routine      2020 12:09              Results for

 this



                                       PM CDT                    procedure are i

n



                                                                 the results



                                                                 section.

 

             PHOSPHORUS LEVEL STAT         2020 10:43              Results

 for this



                                       AM CDT                    procedure are i

n



                                                                 the results



                                                                 section.

 

             HEPATIC FUNCTION PANEL STAT         2020 10:43              R

esults for this



                                       AM CDT                    procedure are i

n



                                                                 the results



                                                                 section.

 

             POC GLUCOSE  Routine      2020 10:17              Results for

 this



                                       AM CDT                    procedure are i

n



                                                                 the results



                                                                 section.

 

             POC GLUCOSE  Routine      2020  9:26              Results for

 this



                                       AM CDT                    procedure are i

n



                                                                 the results



                                                                 section.

 

             VENOUS BLOOD GAS STAT         2020  9:21              Results

 for this



                                       AM CDT                    procedure are i

n



                                                                 the results



                                                                 section.

 

             XR CHEST 1 VW PORTABLE Routine      2020  8:16              R

esults for this



                                       AM CDT                    procedure are i

n



                                                                 the results



                                                                 section.

 

             POC GLUCOSE  Routine      2020  8:08              Results for

 this



                                       AM CDT                    procedure are i

n



                                                                 the results



                                                                 section.

 

             POC GLUCOSE  Routine      2020  4:21              Results for

 this



                                       AM CDT                    procedure are i

n



                                                                 the results



                                                                 section.

 

             MANUAL DIFFERENTIAL Routine      2020  4:20              Resu

lts for this



                                       AM CDT                    procedure are i

n



                                                                 the results



                                                                 section.

 

             ESTIMATED GFR Routine      2020  4:20              Results fo

r this



                                       AM CDT                    procedure are i

n



                                                                 the results



                                                                 section.

 

             IONIZED CALCIUM Routine      2020  4:20              Results 

for this



                                       AM CDT                    procedure are i

n



                                                                 the results



                                                                 section.

 

             PHOSPHORUS LEVEL Routine      2020  4:20              Results

 for this



                                       AM CDT                    procedure are i

n



                                                                 the results



                                                                 section.

 

             MAGNESIUM LEVEL Routine      2020  4:20              Results 

for this



                                       AM CDT                    procedure are i

n



                                                                 the results



                                                                 section.

 

             CBC WITH PLATELET AND Routine      2020  4:20              Re

sults for this



             DIFFERENTIAL              AM CDT                    procedure are i

n



                                                                 the results



                                                                 section.

 

             BASIC METABOLIC PANEL Routine      2020  4:20              Re

sults for this



                                       AM CDT                    procedure are i

n



                                                                 the results



                                                                 section.

 

             ARTERIAL BLOOD GAS Routine      2020  4:20              Resul

ts for this



                                       AM CDT                    procedure are i

n



                                                                 the results



                                                                 section.

 

             POC GLUCOSE  Routine      2020  2:03              Results for

 this



                                       AM CDT                    procedure are i

n



                                                                 the results



                                                                 section.

 

             POC GLUCOSE  Routine      2020 10:04              Results for

 this



                                       PM CDT                    procedure are i

n



                                                                 the results



                                                                 section.

 

             POC GLUCOSE  Routine      2020  6:10              Results for

 this



                                       PM CDT                    procedure are i

n



                                                                 the results



                                                                 section.

 

             POC GLUCOSE  Routine      2020  2:59              Results for

 this



                                       PM CDT                    procedure are i

n



                                                                 the results



                                                                 section.

 

             ESTIMATED GFR Timed        2020  2:46              Results fo

r this



                                       PM CDT                    procedure are i

n



                                                                 the results



                                                                 section.

 

             MAGNESIUM LEVEL Timed        2020  2:46              Results 

for this



                                       PM CDT                    procedure are i

n



                                                                 the results



                                                                 section.

 

             BASIC METABOLIC PANEL Timed        2020  2:46              Re

sults for this



                                       PM CDT                    procedure are i

n



                                                                 the results



                                                                 section.

 

             ECG 12-LEAD  Routine      2020  9:53              Results for

 this



                                       AM CDT                    procedure are i

n



                                                                 the results



                                                                 section.

 

             POC GLUCOSE  Routine      2020  9:23              Results for

 this



                                       AM CDT                    procedure are i

n



                                                                 the results



                                                                 section.

 

             VENOUS BLOOD GAS STAT         2020  9:18              Results

 for this



                                       AM CDT                    procedure are i

n



                                                                 the results



                                                                 section.

 

             XR CHEST 1 VW PORTABLE Routine      2020  6:32              R

esults for this



                                       AM CDT                    procedure are i

n



                                                                 the results



                                                                 section.

 

             MANUAL DIFFERENTIAL Routine      2020  4:34              Resu

lts for this



                                       AM CDT                    procedure are i

n



                                                                 the results



                                                                 section.

 

             ESTIMATED GFR Routine      2020  4:34              Results fo

r this



                                       AM CDT                    procedure are i

n



                                                                 the results



                                                                 section.

 

             PHOSPHORUS LEVEL Routine      2020  4:34              Results

 for this



                                       AM CDT                    procedure are i

n



                                                                 the results



                                                                 section.

 

             MAGNESIUM LEVEL Routine      2020  4:34              Results 

for this



                                       AM CDT                    procedure are i

n



                                                                 the results



                                                                 section.

 

             PROTHROMBIN TIME WITH Routine      2020  4:34              Re

sults for this



             INR                       AM CDT                    procedure are i

n



                                                                 the results



                                                                 section.

 

             COMPREHENSIVE Routine      2020  4:34              Results fo

r this



             METABOLIC PANEL              AM CDT                    procedure ar

e in



                                                                 the results



                                                                 section.

 

             CBC WITH PLATELET AND Routine      2020  4:34              Re

sults for this



             DIFFERENTIAL              AM CDT                    procedure are i

n



                                                                 the results



                                                                 section.

 

             IONIZED CALCIUM Routine      2020  4:28              Results 

for this



                                       AM CDT                    procedure are i

n



                                                                 the results



                                                                 section.

 

             ARTERIAL BLOOD GAS Routine      2020  4:28              Resul

ts for this



                                       AM CDT                    procedure are i

n



                                                                 the results



                                                                 section.

 

             POC GLUCOSE  Routine      2020  4:26              Results for

 this



                                       AM CDT                    procedure are i

n



                                                                 the results



                                                                 section.

 

             POC GLUCOSE  Routine      2020 12:08              Results for

 this



                                       AM CDT                    procedure are i

n



                                                                 the results



                                                                 section.

 

             POTASSIUM LEVEL Timed        2020 10:04              Results 

for this



                                       PM CDT                    procedure are i

n



                                                                 the results



                                                                 section.

 

             IONIZED CALCIUM Timed        2020 10:04              Results 

for this



                                       PM CDT                    procedure are i

n



                                                                 the results



                                                                 section.

 

             POC GLUCOSE  Routine      2020  9:09              Results for

 this



                                       PM CDT                    procedure are i

n



                                                                 the results



                                                                 section.

 

             ECG 12-LEAD  STAT         2020  5:39              Results for

 this



                                       PM CDT                    procedure are i

n



                                                                 the results



                                                                 section.

 

             POC GLUCOSE  Routine      2020  4:44              Results for

 this



                                       PM CDT                    procedure are i

n



                                                                 the results



                                                                 section.

 

             ESTIMATED GFR Timed        2020  3:29              Results fo

r this



                                       PM CDT                    procedure are i

n



                                                                 the results



                                                                 section.

 

             PHOSPHORUS LEVEL Timed        2020  3:29              Results

 for this



                                       PM CDT                    procedure are i

n



                                                                 the results



                                                                 section.

 

             MAGNESIUM LEVEL Timed        2020  3:29              Results 

for this



                                       PM CDT                    procedure are i

n



                                                                 the results



                                                                 section.

 

             BASIC METABOLIC PANEL Timed        2020  3:29              Re

sults for this



                                       PM CDT                    procedure are i

n



                                                                 the results



                                                                 section.

 

             US ABDOMEN COMPLETE STAT         2020  9:56              Resu

lts for this



                                       AM CDT                    procedure are i

n



                                                                 the results



                                                                 section.

 

             IONIZED CALCIUM Timed        2020  8:54              Results 

for this



                                       AM CDT                    procedure are i

n



                                                                 the results



                                                                 section.

 

             POTASSIUM LEVEL Timed        2020  8:54              Results 

for this



                                       AM CDT                    procedure are i

n



                                                                 the results



                                                                 section.

 

             TTE COMPLETE, WO Today        2020  8:30              Results

 for this



             CONTRAST, W DOPPLER              AM CDT                    procedur

e are in



             (29949)                                             the results



                                                                 section.

 

             POC GLUCOSE  Routine      2020  8:18              Results for

 this



                                       AM CDT                    procedure are i

n



                                                                 the results



                                                                 section.

 

             XR CHEST 1 VW PORTABLE Routine      2020  6:13              R

esults for this



                                       AM CDT                    procedure are i

n



                                                                 the results



                                                                 section.

 

             MANUAL DIFFERENTIAL Routine      2020  4:41              Resu

lts for this



                                       AM CDT                    procedure are i

n



                                                                 the results



                                                                 section.

 

             TROPONIN     Timed        2020  4:41              Results for

 this



                                       AM CDT                    procedure are i

n



                                                                 the results



                                                                 section.

 

             ESTIMATED GFR Routine      2020  4:41              Results fo

r this



                                       AM CDT                    procedure are i

n



                                                                 the results



                                                                 section.

 

             ARTERIAL BLOOD GAS Routine      2020  4:41              Resul

ts for this



                                       AM CDT                    procedure are i

n



                                                                 the results



                                                                 section.

 

             PHOSPHORUS LEVEL Routine      2020  4:41              Results

 for this



                                       AM CDT                    procedure are i

n



                                                                 the results



                                                                 section.

 

             MAGNESIUM LEVEL Routine      2020  4:41              Results 

for this



                                       AM CDT                    procedure are i

n



                                                                 the results



                                                                 section.

 

             PROTHROMBIN TIME WITH Routine      2020  4:41              Re

sults for this



             INR                       AM CDT                    procedure are i

n



                                                                 the results



                                                                 section.

 

             COMPREHENSIVE Routine      2020  4:41              Results fo

r this



             METABOLIC PANEL              AM CDT                    procedure ar

e in



                                                                 the results



                                                                 section.

 

             CBC WITH PLATELET AND Routine      2020  4:41              Re

sults for this



             DIFFERENTIAL              AM CDT                    procedure are i

n



                                                                 the results



                                                                 section.

 

             POC GLUCOSE  Routine      2020  4:37              Results for

 this



                                       AM CDT                    procedure are i

n



                                                                 the results



                                                                 section.

 

             POC GLUCOSE  Routine      2020 12:40              Results for

 this



                                       AM CDT                    procedure are i

n



                                                                 the results



                                                                 section.

 

             MANUAL DIFFERENTIAL Routine      2020 12:22              Resu

lts for this



                                       AM CDT                    procedure are i

n



                                                                 the results



                                                                 section.

 

             ESTIMATED GFR Routine      2020 12:22              Results fo

r this



                                       AM CDT                    procedure are i

n



                                                                 the results



                                                                 section.

 

             PHOSPHORUS LEVEL Routine      2020 12:22              Results

 for this



                                       AM CDT                    procedure are i

n



                                                                 the results



                                                                 section.

 

             MAGNESIUM LEVEL Routine      2020 12:22              Results 

for this



                                       AM CDT                    procedure are i

n



                                                                 the results



                                                                 section.

 

             COMPREHENSIVE Routine      2020 12:22              Results fo

r this



             METABOLIC PANEL              AM CDT                    procedure ar

e in



                                                                 the results



                                                                 section.

 

             CBC WITH PLATELET AND Routine      2020 12:22              Re

sults for this



             DIFFERENTIAL              AM CDT                    procedure are i

n



                                                                 the results



                                                                 section.

 

             TROPONIN     Timed        2020 12:22              Results for

 this



                                       AM CDT                    procedure are i

n



                                                                 the results



                                                                 section.

 

             POC GLUCOSE  Routine      2020 10:07              Results for

 this



                                       PM CDT                    procedure are i

n



                                                                 the results



                                                                 section.

 

             TROPONIN     Timed        2020  9:07              Results for

 this



                                       PM CDT                    procedure are i

n



                                                                 the results



                                                                 section.

 

             POC GLUCOSE  Routine      2020  9:05              Results for

 this



                                       PM CDT                    procedure are i

n



                                                                 the results



                                                                 section.

 

             POC GLUCOSE  Routine      2020  4:41              Results for

 this



                                       PM CDT                    procedure are i

n



                                                                 the results



                                                                 section.

 

             XR CHEST 1 VW PORTABLE STAT         2020  3:54              R

esults for this



                                       PM CDT                    procedure are i

n



                                                                 the results



                                                                 section.

 

                HEART/LUNG      Routine         2020  3:52 Cardiac arrest 

(HCC)



                                        Results for this



             RESUSCITATION (CPR)              PM CDT       Ventricular  procedur

e are in



                                                    fibrillation (HCC) the resul

ts



                                                                 section.

 

             ECG 12-LEAD  STAT         2020  3:48              Results for

 this



                                       PM CDT                    procedure are i

n



                                                                 the results



                                                                 section.

 

             PHOSPHORUS LEVEL Routine      2020  3:38              Results

 for this



                                       PM CDT                    procedure are i

n



                                                                 the results



                                                                 section.

 

             MAGNESIUM LEVEL STAT         2020  3:38              Results 

for this



                                       PM CDT                    procedure are i

n



                                                                 the results



                                                                 section.

 

             BILIRUBIN DIRECT STAT         2020  3:38              Results

 for this



                                       PM CDT                    procedure are i

n



                                                                 the results



                                                                 section.

 

             THYROID STIMULATING STAT         2020  3:38              Resu

lts for this



             HORMONE                   PM CDT                    procedure are i

n



                                                                 the results



                                                                 section.

 

             ESTIMATED GFR STAT         2020  3:38              Results fo

r this



                                       PM CDT                    procedure are i

n



                                                                 the results



                                                                 section.

 

             COMPREHENSIVE STAT         2020  3:38              Results fo

r this



             METABOLIC PANEL              PM CDT                    procedure ar

e in



                                                                 the results



                                                                 section.

 

             ARTERIAL BLOOD GAS Routine      2020  3:37              Resul

ts for this



                                       PM CDT                    procedure are i

n



                                                                 the results



                                                                 section.

 

             MANUAL DIFFERENTIAL STAT         2020  3:35              Resu

lts for this



                                       PM CDT                    procedure are i

n



                                                                 the results



                                                                 section.

 

             CBC WITH PLATELET AND STAT         2020  3:35              Re

sults for this



             DIFFERENTIAL              PM CDT                    procedure are i

n



                                                                 the results



                                                                 section.

 

             POC GLUCOSE  Routine      2020 11:31              Results for

 this



                                       AM CDT                    procedure are i

n



                                                                 the results



                                                                 section.

 

             ARTERIAL BLOOD GAS STAT         2020 10:39              Resul

ts for this



                                       AM CDT                    procedure are i

n



                                                                 the results



                                                                 section.

 

             POC GLUCOSE  Routine      2020  8:17              Results for

 this



                                       AM CDT                    procedure are i

n



                                                                 the results



                                                                 section.

 

             XR CHEST 1 VW PORTABLE Routine      2020  6:59              R

esults for this



                                       AM CDT                    procedure are i

n



                                                                 the results



                                                                 section.

 

             MANUAL DIFFERENTIAL Routine      2020  4:09              Resu

lts for this



                                       AM CDT                    procedure are i

n



                                                                 the results



                                                                 section.

 

             LACTIC ACID LEVEL Routine      2020  4:09              Result

s for this



                                       AM CDT                    procedure are i

n



                                                                 the results



                                                                 section.

 

             IONIZED CALCIUM Routine      2020  4:09              Results 

for this



                                       AM CDT                    procedure are i

n



                                                                 the results



                                                                 section.

 

             ESTIMATED GFR Routine      2020  4:09              Results fo

r this



                                       AM CDT                    procedure are i

n



                                                                 the results



                                                                 section.

 

             LIPASE LEVEL Routine      2020  4:09              Results for

 this



                                       AM CDT                    procedure are i

n



                                                                 the results



                                                                 section.

 

             PROTHROMBIN TIME WITH Routine      2020  4:09              Re

sults for this



             INR                       AM CDT                    procedure are i

n



                                                                 the results



                                                                 section.

 

             MAGNESIUM LEVEL Routine      2020  4:09              Results 

for this



                                       AM CDT                    procedure are i

n



                                                                 the results



                                                                 section.

 

             COMPREHENSIVE Routine      2020  4:09              Results fo

r this



             METABOLIC PANEL              AM CDT                    procedure ar

e in



                                                                 the results



                                                                 section.

 

             CBC WITH PLATELET AND Routine      2020  4:09              Re

sults for this



             DIFFERENTIAL              AM CDT                    procedure are i

n



                                                                 the results



                                                                 section.

 

             PHOSPHORUS LEVEL Routine      2020  4:09              Results

 for this



                                       AM CDT                    procedure are i

n



                                                                 the results



                                                                 section.

 

             POC GLUCOSE  Routine      2020  4:08              Results for

 this



                                       AM CDT                    procedure are i

n



                                                                 the results



                                                                 section.

 

             ARTERIAL BLOOD GAS Routine      2020  4:08              Resul

ts for this



                                       AM CDT                    procedure are i

n



                                                                 the results



                                                                 section.

 

             POC GLUCOSE  Routine      2020 12:16              Results for

 this



                                       AM CDT                    procedure are i

n



                                                                 the results



                                                                 section.

 

             LACTIC ACID LEVEL Timed        2020 10:05              Result

s for this



                                       PM CDT                    procedure are i

n



                                                                 the results



                                                                 section.

 

             POC GLUCOSE  Routine      2020  8:53              Results for

 this



                                       PM CDT                    procedure are i

n



                                                                 the results



                                                                 section.

 

             ESTIMATED GFR Timed        2020  6:56              Results fo

r this



                                       PM CDT                    procedure are i

n



                                                                 the results



                                                                 section.

 

             HEMOGLOBIN & Timed        2020  6:56              Results for

 this



             HEMATOCRIT                PM CDT                    procedure are i

n



                                                                 the results



                                                                 section.

 

             BASIC METABOLIC PANEL Timed        2020  6:56              Re

sults for this



                                       PM CDT                    procedure are i

n



                                                                 the results



                                                                 section.

 

             LACTIC ACID LEVEL Timed        2020  6:56              Result

s for this



                                       PM CDT                    procedure are i

n



                                                                 the results



                                                                 section.

 

             VENOUS BLOOD GAS Timed        2020  6:45              Results

 for this



                                       PM CDT                    procedure are i

n



                                                                 the results



                                                                 section.

 

             POC GLUCOSE  Routine      2020  6:41              Results for

 this



                                       PM CDT                    procedure are i

n



                                                                 the results



                                                                 section.

 

             XR ABDOMEN 1 VW STAT         2020  3:14              Results 

for this



             PORTABLE                  PM CDT                    procedure are i

n



                                                                 the results



                                                                 section.

 

             ESTIMATED GFR Timed        2020 12:38              Results fo

r this



                                       PM CDT                    procedure are i

n



                                                                 the results



                                                                 section.

 

             HEMOGLOBIN & Timed        2020 12:38              Results for

 this



             HEMATOCRIT                PM CDT                    procedure are i

n



                                                                 the results



                                                                 section.

 

             BASIC METABOLIC PANEL Timed        2020 12:38              Re

sults for this



                                       PM CDT                    procedure are i

n



                                                                 the results



                                                                 section.

 

             LACTIC ACID LEVEL Timed        2020 12:38              Result

s for this



                                       PM CDT                    procedure are i

n



                                                                 the results



                                                                 section.

 

             VENOUS BLOOD GAS STAT         2020 12:35              Results

 for this



                                       PM CDT                    procedure are i

n



                                                                 the results



                                                                 section.

 

             ARTERIAL BLOOD GAS Timed        2020 12:35              Resul

ts for this



                                       PM CDT                    procedure are i

n



                                                                 the results



                                                                 section.

 

             SURGICAL PATHOLOGY Routine      2020 12:14              Resul

ts for this



             REQUEST                   PM CDT                    procedure are i

n



                                                                 the results



                                                                 section.

 

             EGD WITH BIOPSY              2020 11:35 Coffee ground emesis 



                                       AM CDT                    

 

             VENOUS BLOOD GAS Timed        2020 10:00              Results

 for this



                                       AM CDT                    procedure are i

n



                                                                 the results



                                                                 section.

 

             AMMONIA LEVEL STAT         2020  8:25              Results fo

r this



                                       AM CDT                    procedure are i

n



                                                                 the results



                                                                 section.

 

             LACTIC ACID LEVEL, Timed        2020  8:25              Resul

ts for this



             SEPSIS - NOW AND              AM CDT                    procedure a

re in



             REPEAT 2X EVERY 3                                        the result

s



             HOURS                                               section.

 

             POC GLUCOSE  Routine      2020  8:06              Results for

 this



                                       AM CDT                    procedure are i

n



                                                                 the results



                                                                 section.

 

             [place] INSERT    Routine      2020  7:57 Chronic obstructive Resu

lts for this



             CATH,ART,PERCUT,ELSA              AM CDT       pulmonary disease,

 procedure are in



             ERM                                    unspecified COPD the results



                                                    type (HCC)   section.

 

             MANUAL DIFFERENTIAL STAT         2020  6:27              Resu

lts for this



                                       AM CDT                    procedure are i

n



                                                                 the results



                                                                 section.

 

             HEPATIC FUNCTION PANEL Routine      2020  6:27              R

esults for this



                                       AM CDT                    procedure are i

n



                                                                 the results



                                                                 section.

 

             ESTIMATED GFR STAT         2020  6:27              Results fo

r this



                                       AM CDT                    procedure are i

n



                                                                 the results



                                                                 section.

 

             IONIZED CALCIUM STAT         2020  6:27              Results 

for this



                                       AM CDT                    procedure are i

n



                                                                 the results



                                                                 section.

 

             PHOSPHORUS LEVEL STAT         2020  6:27              Results

 for this



                                       AM CDT                    procedure are i

n



                                                                 the results



                                                                 section.

 

             MAGNESIUM LEVEL STAT         2020  6:27              Results 

for this



                                       AM CDT                    procedure are i

n



                                                                 the results



                                                                 section.

 

             CBC WITH PLATELET AND STAT         2020  6:27              Re

sults for this



             DIFFERENTIAL              AM CDT                    procedure are i

n



                                                                 the results



                                                                 section.

 

             BASIC METABOLIC PANEL STAT         2020  6:27              Re

sults for this



                                       AM CDT                    procedure are i

n



                                                                 the results



                                                                 section.

 

             GRAM STAIN   Routine      2020  5:34              Results for

 this



                                       AM CDT                    procedure are i

n



                                                                 the results



                                                                 section.

 

             SPUTUM CULTURE Routine      2020  5:34              Results f

or this



                                       AM CDT                    procedure are i

n



                                                                 the results



                                                                 section.

 

             ARTERIAL BLOOD GAS Routine      2020  4:35              Resul

ts for this



                                       AM CDT                    procedure are i

n



                                                                 the results



                                                                 section.

 

             URINE CULTURE Routine      2020  3:24              Results fo

r this



                                       AM CDT                    procedure are i

n



                                                                 the results



                                                                 section.

 

             URINALYSIS SCREEN AND Routine      2020  3:05              Re

sults for this



             MICROSCOPY, WITH              AM CDT                    procedure a

re in



             REFLEX TO CULTURE                                        the result

s



                                                                 section.

 

             COVID-19 QUALITATIVE STAT         2020  3:05              Res

ults for this



             PCR                       AM CDT                    procedure are i

n



                                                                 the results



                                                                 section.

 

             CONSULT TO SEPSIS Routine      2020  2:55 Lactic acid acidosi

s Results for this



             RESPONSE TEAM              AM CDT                    procedure are 

in



                                                                 the results



                                                                 section.

 

             LACTIC ACID LEVEL, Timed        2020  1:59              Resul

ts for this



             SEPSIS - NOW AND              AM CDT                    procedure a

re in



             REPEAT 2X EVERY 3                                        the result

s



             HOURS                                               section.

 

             PREPARE RBC  Routine      2020  1:54              Results for

 this



                                       AM CDT                    procedure are i

n



                                                                 the results



                                                                 section.

 

             MANUAL DIFFERENTIAL STAT         2020  1:54              Resu

lts for this



                                       AM CDT                    procedure are i

n



                                                                 the results



                                                                 section.

 

             ALCOHOL LEVEL, BLOOD STAT         2020  1:54              Res

ults for this



                                       AM CDT                    procedure are i

n



                                                                 the results



                                                                 section.

 

             ESTIMATED GFR STAT         2020  1:54              Results fo

r this



                                       AM CDT                    procedure are i

n



                                                                 the results



                                                                 section.

 

             LIPASE LEVEL STAT         2020  1:54              Results for

 this



                                       AM CDT                    procedure are i

n



                                                                 the results



                                                                 section.

 

             COMPREHENSIVE STAT         2020  1:54              Results fo

r this



             METABOLIC PANEL              AM CDT                    procedure ar

e in



                                                                 the results



                                                                 section.

 

             LACTIC ACID LEVEL, STAT         2020  1:54              Resul

ts for this



             SEPSIS - NOW AND              AM CDT                    procedure a

re in



             REPEAT 2X EVERY 3                                        the result

s



             HOURS                                               section.

 

             PARTIAL THROMBOPLASTIN STAT         2020  1:54              R

esults for this



             TIME (PTT)                AM CDT                    procedure are i

n



                                                                 the results



                                                                 section.

 

             PROTHROMBIN TIME WITH STAT         2020  1:54              Re

sults for this



             INR                       AM CDT                    procedure are i

n



                                                                 the results



                                                                 section.

 

             CBC WITH PLATELET AND STAT         2020  1:54              Re

sults for this



             DIFFERENTIAL              AM CDT                    procedure are i

n



                                                                 the results



                                                                 section.

 

             TYPE AND SCREEN Routine      2020  1:54              Results 

for this



                                       AM CDT                    procedure are i

n



                                                                 the results



                                                                 section.

 

             BLOOD CULTURE, AEROBIC Routine      2020  1:54              R

esults for this



             & ANAEROBIC               AM CDT                    procedure are i

n



                                                                 the results



                                                                 section.

 

             XR CHEST 1 VW STAT         2020  1:45              Results fo

r this



                                       AM CDT                    procedure are i

n



                                                                 the results



                                                                 section.

 

             ECG ED PRELIMINARY Routine      2020  1:37              Resul

ts for this



             INTERPRETATION              AM CDT                    procedure are

 in



                                                                 the results



                                                                 section.

 

             HC CVL NON-TUNNELED Routine      2020  1:37              Resu

lts for this



             INSERT 5YRS OR >              AM CDT                    procedure a

re in



                                                                 the results



                                                                 section.

 

             [place] INSERT NON-TUNNEL Routine      2020  1:37              Res

ults for this



             CV CATH                   AM CDT                    procedure are i

n



                                                                 the results



                                                                 section.

 

             INTUBATION   Routine      2020  1:37              Results for

 this



                                       AM CDT                    procedure are i

n



                                                                 the results



                                                                 section.

 

             [place] CRITICAL CARE, E/M Routine      2020  1:37              Re

sults for this



             30-74 MINUTES              AM CDT                    procedure are 

in



                                                                 the results



                                                                 section.

 

             ARTERIAL BLOOD GAS Routine      2020  1:15              Resul

ts for this



                                       AM CDT                    procedure are i

n



                                                                 the results



                                                                 section.

 

             BLOOD CULTURE, AEROBIC Routine      2020  1:10              R

esults for this



             & ANAEROBIC               AM CDT                    procedure are i

n



                                                                 the results



                                                                 section.

 

             ECG 12-LEAD  STAT         2020 12:54              Results for

 this



                                       AM CDT                    procedure are i

n



                                                                 the results



                                                                 section.

 

             LACTIC ACID LEVEL Routine      2020  8:51              Result

s for this



                                       AM CDT                    procedure are i

n



                                                                 the results



                                                                 section.

 

             LACTIC ACID LEVEL Routine      2020  5:05              Result

s for this



                                       PM CDT                    procedure are i

n



                                                                 the results



                                                                 section.

 

             LACTIC ACID LEVEL Timed        2020  1:43              Result

s for this



                                       PM CDT                    procedure are i

n



                                                                 the results



                                                                 section.

 

             CT ANGIOGRAM ABDOMEN Routine      2020 12:56              Res

ults for this



             PELVIS W AND OR WO              PM CDT                    procedure

 are in



             CONTRAST                                            the results



                                                                 section.

 

             ENTERIC VIRAL PANEL Routine      2020 11:43              Resu

lts for this



                                       AM CDT                    procedure are i

n



                                                                 the results



                                                                 section.

 

             ESTIMATED GFR Routine      2020  9:44              Results fo

r this



                                       AM CDT                    procedure are i

n



                                                                 the results



                                                                 section.

 

             COMPREHENSIVE Routine      2020  9:44              Results fo

r this



             METABOLIC PANEL              AM CDT                    procedure ar

e in



                                                                 the results



                                                                 section.

 

             LACTIC ACID LEVEL Routine      2020  8:50              Result

s for this



                                       AM CDT                    procedure are i

n



                                                                 the results



                                                                 section.

 

             TTE COMPLETE, WO STAT         2020  7:20              Results

 for this



             CONTRAST, W DOPPLER              AM CDT                    procedur

e are in



             ([id number])                                             the results



                                                                 section.

 

             LACTIC ACID LEVEL, Timed        2020  1:29              Resul

ts for this



             SEPSIS - NOW AND              AM CDT                    procedure a

re in



             REPEAT 2X EVERY 3                                        the result

s



             HOURS                                               section.

 

             XR CHEST 1 VW PORTABLE STAT         2020 12:59              R

esults for this



                                       AM CDT                    procedure are i

n



                                                                 the results



                                                                 section.

 

             ECG 12-LEAD  STAT         2020 12:38              Results for

 this



                                       AM CDT                    procedure are i

n



                                                                 the results



                                                                 section.

 

             ECG 12-LEAD  STAT         2020 12:38              



                                       AM CDT                    

 

             CT ABDOMEN PELVIS W STAT         2020  9:56              Resu

lts for this



             CONTRAST                  PM CDT                    procedure are i

n



                                                                 the results



                                                                 section.

 

             ECG ED PRELIMINARY Routine      2020  9:30              Resul

ts for this



             INTERPRETATION              PM CDT                    procedure are

 in



                                                                 the results



                                                                 section.

 

             ECG ED PRELIMINARY Routine      2020  9:30              Resul

ts for this



             INTERPRETATION              PM CDT                    procedure are

 in



                                                                 the results



                                                                 section.

 

             ECG ED PRELIMINARY Routine      2020  9:30              Resul

ts for this



             INTERPRETATION              PM CDT                    procedure are

 in



                                                                 the results



                                                                 section.

 

             [place] CRITICAL CARE, E/M Routine      2020  9:30              Re

sults for this



             30-74 MINUTES              PM CDT                    procedure are 

in



                                                                 the results



                                                                 section.

 

             XR CHEST 1 VW PORTABLE STAT         2020  8:49              R

esults for this



                                       PM CDT                    procedure are i

n



                                                                 the results



                                                                 section.

 

             ECG 12-LEAD  STAT         2020  8:45              Results for

 this



                                       PM CDT                    procedure are i

n



                                                                 the results



                                                                 section.

 

             SMEAR REVIEW STAT         2020  8:12              Results for

 this



                                       PM CDT                    procedure are i

n



                                                                 the results



                                                                 section.

 

             ESTIMATED GFR STAT         2020  8:12              Results fo

r this



                                       PM CDT                    procedure are i

n



                                                                 the results



                                                                 section.

 

             LIPASE LEVEL STAT         2020  8:12              Results for

 this



                                       PM CDT                    procedure are i

n



                                                                 the results



                                                                 section.

 

             LACTIC ACID LEVEL, STAT         2020  8:12              Resul

ts for this



             SEPSIS - NOW AND              PM CDT                    procedure a

re in



             REPEAT 2X EVERY 3                                        the result

s



             HOURS                                               section.

 

             COMPREHENSIVE STAT         2020  8:12              Results fo

r this



             METABOLIC PANEL              PM CDT                    procedure ar

e in



                                                                 the results



                                                                 section.

 

             HC COMPLETE BLD COUNT STAT         2020  8:12              Re

sults for this



             W/AUTO DIFF               PM CDT                    procedure are i

n



                                                                 the results



                                                                 section.

 

             COVID-19 QUALITATIVE STAT         2020  8:09              Res

ults for this



             PCR                       PM CDT                    procedure are i

n



                                                                 the results



                                                                 section.

 

             CV LEFT HEART CATH LV Routine      2020  3:25              



             GRAM WITH CORS              PM CST                    

 

             MAGNESIUM LEVEL Routine      2020  8:50              Results 

for this



                                       AM CST                    procedure are i

n



                                                                 the results



                                                                 section.

 

             TROPONIN     Timed        2020  9:40              Results for

 this



                                       PM CST                    procedure are i

n



                                                                 the results



                                                                 section.

 

             TROPONIN     Timed        2020  6:41              Results for

 this



                                       PM CST                    procedure are i

n



                                                                 the results



                                                                 section.

 

             XR RIBS W PA CHEST STAT         2020  4:45              Resul

ts for this



             LEFT                      PM CST                    procedure are i

n



                                                                 the results



                                                                 section.

 

             ESTIMATED GFR STAT         2020  3:43              Results fo

r this



                                       PM CST                    procedure are i

n



                                                                 the results



                                                                 section.

 

             B NATRIURETIC PEPTIDE STAT         2020  3:43              Re

sults for this



                                       PM CST                    procedure are i

n



                                                                 the results



                                                                 section.

 

             TROPONIN     STAT         2020  3:43              Results for

 this



                                       PM CST                    procedure are i

n



                                                                 the results



                                                                 section.

 

             COMPREHENSIVE STAT         2020  3:43              Results fo

r this



             METABOLIC PANEL              PM CST                    procedure ar

e in



                                                                 the results



                                                                 section.

 

             HC COMPLETE BLD COUNT STAT         2020  3:43              Re

sults for this



             W/AUTO DIFF               PM CST                    procedure are i

n



                                                                 the results



                                                                 section.

 

             ECG ED PRELIMINARY Routine      2020  3:33              Resul

ts for this



             INTERPRETATION              PM CST                    procedure are

 in



                                                                 the results



                                                                 section.

 

             ECG 12-LEAD  STAT         2020  3:30              Results for

 this



                                       PM CST                    procedure are i

n



                                                                 the results



                                                                 section.

 

             CT ANGIOGRAM PE CHEST STAT         2020  1:22              Re

sults for this



                                       PM CST                    procedure are i

n



                                                                 the results



                                                                 section.

 

             TROPONIN     Timed        2020 11:27              Results for

 this



                                       AM CST                    procedure are i

n



                                                                 the results



                                                                 section.

 

             TTE COMPLETE, WO Routine      2020 10:11              Results

 for this



             CONTRAST, W DOPPLER              AM CST                    procedur

e are in



             ([id number])                                             the results



                                                                 section.

 

             LIPID PANEL  Routine      2020  5:48              Results for

 this



                                       AM CST                    procedure are i

n



                                                                 the results



                                                                 section.

 

             TROPONIN     Timed        2020  5:48              Results for

 this



                                       AM CST                    procedure are i

n



                                                                 the results



                                                                 section.

 

             ESTIMATED GFR STAT         2020  3:35              Results fo

r this



                                       AM CST                    procedure are i

n



                                                                 the results



                                                                 section.

 

             PHOSPHORUS LEVEL STAT         2020  3:35              Results

 for this



                                       AM CST                    procedure are i

n



                                                                 the results



                                                                 section.

 

             MAGNESIUM LEVEL STAT         2020  3:35              Results 

for this



                                       AM CST                    procedure are i

n



                                                                 the results



                                                                 section.

 

             IONIZED CALCIUM STAT         2020  3:35              Results 

for this



                                       AM CST                    procedure are i

n



                                                                 the results



                                                                 section.

 

             BASIC METABOLIC PANEL STAT         2020  3:35              Re

sults for this



                                       AM CST                    procedure are i

n



                                                                 the results



                                                                 section.

 

             TROPONIN     STAT         2020  3:35              Results for

 this



                                       AM CST                    procedure are i

n



                                                                 the results



                                                                 section.

 

             ECG 12-LEAD  STAT         2020  3:29              Results for

 this



                                       AM CST                    procedure are i

n



                                                                 the results



                                                                 section.

 

             TROPONIN     Timed        2020 10:19              Results for

 this



                                       PM CST                    procedure are i

n



                                                                 the results



                                                                 section.

 

             TROPONIN     Timed        2020  7:41              Results for

 this



                                       PM CST                    procedure are i

n



                                                                 the results



                                                                 section.

 

             XR CHEST 2 VW STAT         2020  6:37              Results fo

r this



                                       PM CST                    procedure are i

n



                                                                 the results



                                                                 section.

 

             ECG ED PRELIMINARY Routine      2020  6:13              Resul

ts for this



             INTERPRETATION              PM CST                    procedure are

 in



                                                                 the results



                                                                 section.

 

             LIPASE LEVEL STAT         2020  6:08              Results for

 this



                                       PM CST                    procedure are i

n



                                                                 the results



                                                                 section.

 

             ESTIMATED GFR STAT         2020  6:08              Results fo

r this



                                       PM CST                    procedure are i

n



                                                                 the results



                                                                 section.

 

             B NATRIURETIC PEPTIDE STAT         2020  6:08              Re

sults for this



                                       PM CST                    procedure are i

n



                                                                 the results



                                                                 section.

 

             TROPONIN     STAT         2020  6:08              Results for

 this



                                       PM CST                    procedure are i

n



                                                                 the results



                                                                 section.

 

             COMPREHENSIVE STAT         2020  6:08              Results fo

r this



             METABOLIC PANEL              PM CST                    procedure ar

e in



                                                                 the results



                                                                 section.

 

             HC COMPLETE BLD COUNT STAT         2020  6:08              Re

sults for this



             W/AUTO DIFF               PM CST                    procedure are i

n



                                                                 the results



                                                                 section.

 

             ECG 12-LEAD  STAT         2020  5:05              Results for

 this



                                       PM CST                    procedure are i

n



                                                                 the results



                                                                 section.



after 10/07/2019



Results

POC glucose (2020  4:02 PM CDT)Only the most recent of107 resultswithin 
the time period is included.





             Component    Value        Ref Range    Performed At Pathologist Sig

nature

 

             POC glucose  112 (H)      65 - 100 mg/dL [facility] 



                          Comment:                  [facility] 



                           Name: Stefan Mccray                          

 



                          Device ID: [id number]                           



                                                                 









                                        Specimen

 

                                        Blood









                Performing Organization Address         City/State/ZIP Code Phon

e Number

 

                [facility] DEPARTMENT OF PATHOLOGY AND [address]  [place], 

[address] 



                GENOMIC MEDICINE                                 

 

                [facility] [address]  [place], [place] [address]

[id number] 



Partial thromboplastin time, activated (2020  5:45 AM CDT)Only the most 
recent of20 resultswithin the time period is included.





             Component    Value        Ref Range    Performed At Pathologist



                                                                 Signature

 

             PTT          53.3 (H)     23.0 - 36.0  [facility] 



                          Comment:     sec          [facility] 



                          PTT therapeutic range for unfractionated heparin is   

                        



                          61.0-112.0 seconds which corresponds to Anti-Xa       

                    



                          0.3-0.7 U/ml.                           



                                                                 









                                        Specimen

 

                                        Blood









                Performing Organization Address         City/[facility]/ZIP Code Phon

e Number

 

                [facility] [place], [place] DEPARTMENT OF PATHOLOGY AND [address]  [place] [id number]

21 



                [facility] [address]  [place], [place] [address]

[address] 



CBC hemogram (2020  5:45 AM CDT)Only the most recent of5 resultswithin the
time period is included.





             Component    Value        Ref Range    Performed At Pathologist Sig

nature

 

             WBC          14.2 (H)     4.2 - 11.0 k/uL [facility] 

 

             RBC          2.43 (L)     4.04 - 5.86 m/uL [facility] 

 

             HGB          7.3 (L)      13.0 - 17.3 g/dL [facility] 

 

             HCT          23.2 (L)     34.0 - 45.0 % [facility] 

 

             MCV          95.5         80.0 - 98.0 fL [facility] 

 

             MCH          30.0         27.0 - 34.0 pg [facility] 

 

             MCHC         31.5         31.5 - 36.5 g/dL [facility] 

 

             RDW - SD     82.4 (H)     37.0 - 51.0 fL [facility] 

 

             MPV          12.3 (H)     7.4 - 10.4 fL [facility] 

 

             Platelet count 419 (H)      150 - 400 k/uL [facility] 

 

             Nucleated RBC 0.00         /100 WBC     [facility] 









                                        Specimen

 

                                        Blood









                Performing Organization Address         City/[facility]/ZIP Code Phon

e Number

 

                [facility] [place], [place] DEPARTMENT OF PATHOLOGY AND [address]  [place] [phone]

21 



                [facility] [address]  [place], [place] [address]

[address] 



Estimated GFR (2020  4:30 AM CDT)Only the most recent of27 resultswithin 
the time period is included.





             Component    Value        Ref Range    Performed At Pathologist



                                                                 Signature

 

             Estimated GFR >=90         mL/min/1.73  [facility] 



                          Comment:     m2           [facility] 



                          Catergory Units  Interpretation                 

          



                          G1     >=90   Normal or high              

             



                          G2     60-89  Mildly decreased            

               



                          G3a    45-59  Mildly to moderately decreas

ed                           



                          G3b    30-44  Moderately to severely decre

ased                           



                          G4     15-29  Severely decreased          

                 



                          G5     <15   Kidney failure             

              



                          The eGFR was calculated using the Chronic Kidney Disea

se                           



                          Epidemiology Collaboration (CKD-EPI) equation.        

                   



                          Interpretation is based on recommendations of the     

                      



                          National Kidney Foundation-Kidney Disease Outcomes [place]

lity                           



                          Initiative (NKF-KDOQI) published in 2014.             

              



                                                                 









                                        Specimen

 

                                        









                Performing Organization Address         City/State/ZIP Code Phon

e Number

 

                [facility] [place], [place] DEPARTMENT OF PATHOLOGY AND [address]  [place], 

[address] 



                GENOMIC MEDICINE                                 

 

                [facility] [address]  [place], [place] [address]

[address] 



Manual differential (2020  4:30 AM CDT)Only the most recent of16 results
within the time period is included.





             Component    Value        Ref Range    Performed At Pathologist



                                                                 Signature

 

             Manual differential PERFORMED                 [facility] 

 

             Neutrophils  86.0 (H)     36.0 - 66.0 % [facility] 

 

             Lymphocytes  6.0 (L)      24.0 - 44.0 % [facility] 

 

             Monocytes    4.0          0.0 - 6.0 %  [facility] 

 

             Eosinophils  0.0          0.0 - 6.0 %  [facility] 

 

             Basophils    1.0          0.0 - 1.2 %  [facility] 

 

             Metamyelocytes 0            0 - 1 %      [facility] 

 

             Myelocytes   1            0 - 1 %      [facility] 

 

             Promyelocytes 0            0 - 1 %      [facility] 

 

             Reactive lymphocytes 2.0                       [facility] 

 

             Platelet slide review Zacarias adequate              [facility] 

 

             Toxic granulation Slight                    [facility] 

 

             Neutrophils, vacuolated Slight                    [facility] 

 

             Anisocytosis Moderate                  [facility] 

 

             Polychromasia slight                    [facility] 

 

             Tear drop cells Occasional                [facility] 

 

             Schistocytes Occasional                [facility] 

 

             Target cells few                       [facility] 

 

             Spherocytes  Occasional                [facility] 

 

             Ovalocytes   few                       [facility] 

 

             Moneta cells   Occasional                [facility] 

 

             Enlarged platelets Occasional                [facility] 

 

             Giant platelets Occasional                [facility] 

 

             Stomatocytes Occasional                [facility] 









                                        Specimen

 

                                        









                Performing Organization Address         City/[facility]/ZIP Code Phon

e Number

 

                [facility] [place], [place] DEPARTMENT OF PATHOLOGY AND [address]  [place], 

21 



                GENOMIC MEDICINE                                 

 

                [facility] [address] [facility]  [place], [place] [address]

[address] 



CBC with platelet and differential (2020  4:30 AM CDT)Only the most recent
of23 resultswithin the time period is included.





             Component    Value        Ref Range    Performed At [facility]

nature

 

             WBC          13.4 (H)     4.2 - 11.0 k/uL [facility] 

 

             RBC          2.45 (L)     4.04 - 5.86 m/uL [facility] 

 

             HGB          7.2 (L)      13.0 - 17.3 g/dL [facility] 

 

             HCT          23.5 (L)     34.0 - 45.0 % [facility] 

 

             MCV          95.9         80.0 - 98.0 fL [facility] 

 

             MCH          29.4         27.0 - 34.0 pg [facility] 

 

             MCHC         30.6 (L)     31.5 - 36.5 g/dL [facility] 

 

             RDW - SD     84.7 (H)     37.0 - 51.0 fL [facility] 

 

             MPV          12.1 (H)     7.4 - 10.4 fL [facility] 

 

             Platelet count 345          150 - 400 k/uL [facility] 

 

             Nucleated RBC 0.00         /100 WBC     [facility] 

 

             Neutrophils  86.0 (H)     36.0 - 66.0 % [facility] 

 

             Lymphocytes  6.0 (L)      24.0 - 44.0 % [facility] 

 

             Monocytes    4.0          0.0 - 6.0 %  [facility] 

 

             Eosinophils  0.0          0.0 - 6.0 %  [facility] 

 

             Basophils    1.0          0.0 - 1.2 %  [facility] 









                                        Specimen

 

                                        Blood









                Performing Organization Address         City/State/ZIP Code Phon

e Number

 

                [facility] [place], [place] DEPARTMENT OF PATHOLOGY AND [address] [address]

21 



                [facility] [address] [place], [place] [address]

[address] 



Phosphorus level (2020  4:30 AM CDT)Only the most recent of18 results
within the time period is included.





             Component    Value        Ref Range    Performed At [facility]

nature

 

             Phosphorus   2.6          2.4 - 4.5 mg/dL [facility]     









                                        Specimen

 

                                        Blood









                Performing Organization Address         City/[facility]/ZIP Code Phon

e Number

 

                [facility] [place], [place] DEPARTMENT OF PATHOLOGY AND [address] [place] [address]

21 



                [facility] MEDICINE                                 

 

                [facility] [address] [place], [place] [address]

[address] 



Magnesium level (2020  4:30 AM CDT)Only the most recent of21 resultswithin
the time period is included.





             Component    Value        Ref Range    Performed At [facility]ologist Sig

[facility]

 

             Magnesium    2.20         1.60 - 2.40 mg/dL [facility]     









                                        Specimen

 

                                        Blood









                Performing Organization Address         City/[facility]/ZIP Code Phon

e Number

 

                [facility] [place], [place] DEPARTMENT OF PATHOLOGY AND [address] [place] [address]

21 



                [facility] [address] [place], [place] [address]

[address] 



Ionized calcium (2020  4:30 AM CDT)Only the most recent of23 resultswithin
the time period is included.





             Component    Value        Ref Range    Performed At [facility]

nature

 

             pH           7.45                      [facility] 

 

             Ionized calcium 1.23         1.11 - 1.32 mmol/L [facility] 









                                        Specimen

 

                                        Blood









                Performing Organization Address         City/[facility]/ZIP Code Phon

e Number

 

                [facility] [place], [place] DEPARTMENT OF PATHOLOGY AND [address] [place] [address]

21 



                [facility] [address] [place] [address]

[address] 



Basic metabolic panel (2020  4:30 AM CDT)Only the most recent of11 results
within the time period is included.





             Component    Value        Ref Range    Performed At [facility]

nature

 

             Sodium       137          135 - 150 mEq/L [facility]     

 

             Potassium    3.9          3.5 - 5.0 mEq/L [facility]     

 

             Chloride     100          98 - 112 mEq/L [facility]     

 

             CO2          29           24 - 31 mmol/L [facility]     

 

             Anion gap    8@ANIO       7 - 15 mEq/L [facility]     

 

             BUN          17           7 - 18 mg/dL [facility]     

 

             Creatinine   0.60 (L)     0.70 - 1.20 mg/dL [facility]     

 

             Glucose      148 (H)      65 - 100 mg/dL [facility]     

 

             Calcium      8.8          8.8 - 10.2 mg/dL [facility]     









                                        Specimen

 

                                        Blood









                Performing Organization Address         City/[facility]/ZIP Code Phon

e Number

 

                [facility] [place], [place] DEPARTMENT OF PATHOLOGY AND [address] [place], 

21 



                GENOMIC MEDICINE                                 

 

                [facility] [address] [place], [place] [address]

[address] 



Potassium level (09/10/2020 10:22 PM CDT)Only the most recent of7 resultswithin 
the time period is included.





             Component    Value        Ref Range    Performed At [facility]ologist [facility]

nature

 

             Potassium    3.7          3.5 - 5.0 mEq/L [facility]     









                                        Specimen

 

                                        Blood









                Performing Organization Address         City/[facility]/ZIP Code Phon

e Number

 

                [facility] [place], [place] DEPARTMENT OF PATHOLOGY AND [address] [place], 

21 



                GENOMIC MEDICINE                                 

 

                [facility] [address] [place], [place] [address]

[address] 



XR Chest 1 Vw Portable (09/10/2020  6:26 AM CDT)Only the most recent of16 
resultswithin the time period is included.





                                        Specimen

 

                                        









                          Narrative                 Performed At

 

                                        SINGLE VIEW CHEST, 9/10/2020



                                         [facility]



                                         



                                        



                                        Clinical History: Stable ICU patient.



                                        



                                        Technique: Single, portable AP view ches

t.



                                        



                                        Comparison: 2020



                                        



                                         



                                        



                                        Impression:



                                        



                                        1.Tracheostomy, Dobbhoff and right PICC 

are unchanged.



                                        



                          2.Decreasing left basilar predominant atelectasis. No 

new focal airspace 



                                        disease. No cavitation. Lungs are otherw

ise clear.



                                        



                                        3.Normal heart size and mediastinal cont

our for technique.



                                        



                                        4.Normal pulmonary vasculature.



                                        



                          5.Slightly decreased trace left pleural effusion. No r

ight effusion. No 



                                        pneumothorax.



                                        



                                        6.Intact skeleton.



                                        



                                         



                                        



                                         



                                        



                                                    









                                        Procedure Note

 

                                        Hm Interface, Radiology Results Incoming

 - 09/10/2020  7:42 AM CDT



SINGLE VIEW CHEST, 9/10/2020



                                        



                                        Clinical History: Stable ICU patient.



                                        Technique: Single, portable AP view ches

t.



                                        Comparison: 2020



                                        



                                        Impression:



                                        1.Tracheostomy, Dobbhoff and right PICC 

are unchanged.



                                        2.Decreasing left basilar predominant at

electasis. No new focal airspace 

disease. No cavitation. Lungs are otherwise clear.



                                        3.Normal heart size and mediastinal cont

our for technique.



                                        4.Normal pulmonary vasculature.



                                        5.Slightly decreased trace left pleural 

effusion. No right effusion. No 

pneumothorax.



                                        6.Intact skeleton.









                Performing Organization Address         City/[facility]/ZIP Code Phon

e Number

 

                [facility]      [address] [place] [address] 



Comprehensive metabolic panel (09/10/2020  5:00 AM CDT)Only the most recent of16
resultswithin the time period is included.





             Component    Value        Ref Range    Performed At [facility]ologist [facility]

nature

 

             Sodium       142          135 - 150 mEq/L [facility] 

 

             Potassium    3.4 (L)      3.5 - 5.0 mEq/L [facility] 

 

             Chloride     103          98 - 112 mEq/L [facility] 

 

             CO2          31           24 - 31 mmol/L [facility] 

 

             Anion gap    8@ANIO       7 - 15 mEq/L [facility] 

 

             BUN          18           7 - 18 mg/dL [facility] 

 

             Creatinine   0.70         0.70 - 1.20  [facility] 



                                       mg/dL        [facility] 

 

             Glucose      128 (H)      65 - 100 mg/dL [facility] 

 

             Calcium      8.2 (L)      8.8 - 10.2 mg/dL [facility] 

 

             Protein      5.6 (L)      6.3 - 8.3 g/dL [facility] 

 

             Albumin      1.8 (L)      3.5 - 5.0 g/dL [facility] 

 

             A/G ratio    0.5 (L)      0.7 - 3.8    [facility] 

 

             Alkaline phosphatase 89           0 - 129 U/L  [facility] 

 

             AST          28           10 - 50 U/L  [facility] 

 

             ALT          20           5 - 50 U/L   [facility] 

 

             Total bilirubin 0.5          0.2 - 1.2 mg/dL [facility] 









                                        Specimen

 

                                        Blood









                Performing Organization Address         City/[facility]/ZIP Code Phon

e Number

 

                [facility] [place], [place] DEPARTMENT OF PATHOLOGY AND [address]  [place], 

21 



                [facility] [address]  [place], [place] [address]

[address] 



Arterial blood gas (09/10/2020  4:17 AM CDT)Only the most recent of17 results
within the time period is included.





             Component    Value        Ref Range    Performed At Pathologist



                                                                 [facility]

 

             pH, arterial 7.545 (H)    7.350 -      [place]      



                                       7.450        [facility]     

 

             pCO2, arterial 36.2         35.0 -       [place]      



                                       45.0 mmHg    [facility]     

 

             pO2, arterial 162.0 (H)    80.0 -       [place]      



                                       90.0 mmHg    [facility]     

 

             O2 saturation, arterial >100.0       95.0 -       [place]      



                                       100.0 %      [facility]     

 

             Base excess, arterial 8.9          mEq/L        [facility]     

 

             Bicarbonate  31.3 (H)     21.0 -       [facility]      



                                       28.0 mEq/L   [facility]     

 

             O2 content   11.1         VOL%         [facility]     

 

             FiO2, inspired O2% 40.0         %            [facility]     

 

             Carboxyhemoglobin 1.4          0.0 - 1.4    [place]      



                          Comment:     %            [facility]    



                          Reference Ranges: Carboxyhemoglobin              [facility]      



                          Non smoker:  0.0 - 2.0%              HOSPITAL     



                          Smoker:    2.1 - 5.0%                           



                          Heavy smoker: 5.1 - 9%                           



                                                                 

 

             Methemoglobin 0.4          0.0 - 1.0    [facility]     

 

             Hemoglobin, blood gas 7.8 (LL)     14.0 -       [place]      



                                       18.0 g/dL    [facility]     

 

             pO2, A-a     84.2         mmHg         [facility]     









                                        Specimen

 

                                        Blood









                Performing Organization Address         City/State/ZIP Code Phon

e Number

 

                [facility] [place] DEPARTMENT OF PATHOLOGY AND [address]  [place], 

[address] 



                [facility] [address]  [place], [place] [address]

[address] 



PICC insertion (2020  2:28 PM CDT)





                          Narrative                 Performed At

 

                                        Adrian, Kristin, RN   2020 2:38 

PM



                                        



                                        PICC insertion



                                        



                                        Date/Time: 2020 2:29 PM



                                        



                                        Performed by: Kristin Adrian RN



                                        



                                        Authorized by: Dex Howell MD

 



                                        



                                         



                                        



                                        Consent: 



                                        



                                         Consent obtained: Verbal



                                        



                                         Consent given by: Healthcare agent



                                        



                                         Risks discussed: Arterial puncture,

 incorrect placement, bleeding, 



                                        



                                        infection, superficial thrombus and deep

 vein thrombus



                                        



                                         Alternatives discussed: Delayed andree

atment and alternative treatment



                                        



                                        Universal protocol: 



                                        



                                         Procedure explained and questions ans

wered to patient or proxy's 



                                        



                                        satisfaction: yes 



                                        



                                         Relevant documents present and verifi

ed: yes 



                                        



                                         Test results available and properly l

abeled: yes 



                                        



                                         Imaging studies available: yes 



                                        



                                         Required blood products, implants, de

vices, and special equipment 



                                        



                                        available: yes 



                                        



                                         Site/side marked: yes 



                                        



                                         Immediately prior to procedure, a [place]

e out was called: yes 



                                        



                                         Patient identity confirmed: [place]ball

y with patient, arm band and 



                                        



                                        hospital-assigned identification number



                                        



                                        Pre-procedure details: 



                                        



                                         Hand hygiene: Hand hygiene performed 

prior to insertion 



                                        



                                         Sterile barrier technique: All elemen

ts of maximal sterile technique 



                                        



                                        followed 



                                        



                                         Skin preparation: ChloraPrep



                                        



                                         Skin preparation agent: Completely dr

ielydia prior to procedure 



                                        



                                        Anesthesia (see MAR for exact dosages): 



                                        



                                         Anesthesia method: Local infiltrati

on



                                        



                                         Local anesthetic: Lidocaine 1% w/o 

epi



                                        



                                         Route administered: Subcutaneous



                                        



                                        PICC Line Placement Details: 



                                        



                                         Extremity Circumference Upper (cm): 

29



                                        



                                         Extremity Circumference Forearm (cm):

 25



                                        



                                         Extremity Circumference Site: 27



                                        



                                         Patient position: Flat



                                        



                                         Vessel Size (mm): 5



                                        



                                         Indication: Known long term IV ther

apy and vesicants



                                        



                                         Location: Right basilic



                                        



                                         Site selection rationale: Largest v

ein in dominant arm 



                                        



                                         Device Type: Valved



                                        



                                         Catheter Lumens: Triple lumen



                                        



                                         Catheter size: 5 Fr



                                        



                                         Catheter to vein ratio: 11%



                                        



                                        PICC Characteristics: 



                                        



                           Catheter Brand: BARD POWER PICC SOLO HF CATHETER 

WITH SHERLOCK 3 CG 



                                        TIP 



                                        



                                        POSITIONING SYSTEM



                                        



                                         External Catheter Length (cm): 0



                                        



                                         Internal Catheter Length (cm): 42



                                        



                                         Total Catheter Length (cm): 42



                                        



                                         Catheter Lot Number: [id number]



                                        



                                         Catheter Expiration Date: 2021



                                        



                                         Micro-Introducer Lot Number: [id number]

[id number]



                                        



                                         Micro-Introducer Expiration Date: 



                                        



                                        Procedure details: 



                                        



                                         Landmarks identified: yes 



                                        



                                         Ultrasound guidance: yes 



                                        



                           Sterile ultrasound techniques: Sterile gel and ster

ile probe covers 



                                        were 



                                        



                                        used 



                                        



                                         Number of attempts: 1



                                        



                                         Number of PICC kits used during proce

dure: 1



                                        



                                         Extra guide wire required?: No 



                                        



                                         Purpose of procedure: PICC Placemen

t



                                        



                                         Successful PICC Placement: yes 



                                        



                                         Patency/Placement: Flushes without 

difficulty, flushed with 10 mL 



                                        



                                        normal saline, positive blood return, x-

ray placement verified and 



                                        



                                        ultrasound placement verified



                                        



                                         Dressing/Securement: Catheter secur

ement device and antimicrobial 



                                        



                                        dressing applied



                                        



                                         Blood Loss Amount: Less than 20 mL



                                        



                                        Post-procedure details: 



                                        



                                         Post-procedure: Dressing applied



                                        



                                         Tip placement confirmed by: X-Ray 



                                        



                           Name of provider who confirmed tip placement:: RYAN

DIOLOGIST. SEE 



                                        CXR. 



                                        



                                         Patient tolerance of procedure: [place]

erated well, no immediate 



                                        



                                        complications



                                        



                                        Comments: 



                                        



                            Report to Aida Azar RN, primary ICU nurse. 



Bronchoscopy (2020 12:20 PM CDT)





                          Narrative                 Performed At

 

                                        Tru Mendoza MD   2020 12

:23 PM



                                        



                                        Bronchoscopy



                                        



                                        Date/Time: 2020 12:21 PM



                                        



                                        Performed by: Tru Mendoza MD



                                        



                                        Authorized by: Tru Mendoza MD 



                                        



                                         



                                        



                                        Consent: 



                                        



                                         Consent obtained: Verbal



                                        



                                         Consent given by: Healthcare agent



                                        



                           Alternatives discussed: Referral, observation, al

ternative 



                                        treatment, 



                                        



                                        delayed treatment and no treatment



                                        



                                        Universal protocol: 



                                        



                                         Procedure explained and questions ans

wered to patient or proxy's 



                                        



                                        satisfaction: yes 



                                        



                                         Relevant documents present and verifi

ed: yes 



                                        



                                         Test results available and properly l

abeled: yes 



                                        



                                         Imaging studies available: yes 



                                        



                                         Required blood products, implants, de

vices, and special equipment 



                                        



                                        available: yes 



                                        



                                         Site/side marked: yes 



                                        



                                         Immediately prior to procedure, a [place]

e out was called: yes 



                                        



                                         Patient identity confirmed: Maverick freeman and hospital-assigned 



                                        



                                        identification number



                                        



                                        Pre-procedure details: 



                                        



                                         Indication:  Percutaneous traacheot

qian , fever/ sepsis



                                        



                                         Scope type: Flexible bronchoscope



                                        



                                         Airway: Intubated



                                        



                                         Monitoring devices: ECG, NIPB, renita

rial line and pulse oximetry



                                        



                                        Sedation: 



                                        



                                         Sedation Type: Anxiolysis and Narco

tic



                                        



                                         Anxiolysis used:: Versed



                                        



                                         Narcotic(s) used:: Fentanyl



                                        



                                        Washings: 



                                        



                                         Washings: Right and left



                                        



                                         Sample sent for: Gram stain



                                        



                                        Post-procedure details: 



                                        



                                         Patient tolerance of procedure: Patie

nt tolerated the procedure well 



                                        



                                        with no immediate complications 



                                        



                                        Comments: 



                                        



                            Patient was sedated with Versed, fentanyl, rocuron

ium. 



                                        Bronchoscopy 



                                        



                                        was inserted and bilateral secretions cl

eared, patient had significant 



                                        



                                        secretions on the left lower lobe, sucti

oned and sent for cultures, 



                                        



                          subsequently endotracheal tube was slowly withdrawn up

 to around 1718 



                                        cm, 



                                        



                                        and assisted with percutaneous tracheost

qian, after trach inserted 



                                        



                                        bronchoscopy was inserted into the trach

ea through the tracheostomy and 



                                        



                          confirmed position        



Respiratory culture (2020 11:05 AM CDT)Only the most recent of3 results
within the time period is included.





             Component    Value        Ref Range    Performed At Pathologist



                                                                 Signature

 

             Respiratory culture Normal oral lani and (A)              [facility] 

     



             isolate      Comment:                  [facility]    



                          Specimen Information              HOSPITAL     



                          Specimen Source: Bronchial Washing                    

       



                          Specimen Site: Right and left lobes                   

        



                                                                 

 

             Respiratory culture Acinetobacter species              [facility]     

 



             isolate      Many                      [facility]    



                          The performance characteristics of this assay on this 

[facility]              HOSPITAL     



                          were validated by the Microbiology Laboratory at [facility]. This source has not been approve

d by the                           



                          U.S. Food and Drug Administration. The results are n

ot                           



                          intended to be used as the sole means for clinical samuel

gnosis                           



                          or patient management. The Microbiology Laboratory i

s                           



                          authorized under the clinical Laboratory Improvement  

                         



                          Amendments of 1988 (CLIA-88) to perform high complexit

y                           



                          testing.                               



                            (A)                                  









                                        Specimen

 

                                        Bronchial washing - Right and left lobes









                Organism        Antibiotic      Method          Susceptibility

 

                Acinetobacter species Amikacin        MONIKA             <=8 mcg/mL

: Susceptible

 

                Acinetobacter species Aztreonam       MONIKA             >16 mcg/mL

: Resistant

 

                Acinetobacter species Ceftazidime     MONIKA             8 mcg/mL: 

Susceptible

 

                Acinetobacter species Ciprofloxacin   MONIKA             1 mcg/mL: 

Susceptible

 

                Acinetobacter species Ceftriaxone     MONIKA             32 mcg/mL:

 Resistant

 

                Acinetobacter species Cefepime        MONIKA             8 mcg/mL: 

Susceptible

 

                Acinetobacter species Gentamicin      MONIKA             <=2 mcg/mL

: Susceptible

 

                Acinetobacter species Levofloxacin    MONIKA             <=0.5 mcg/

mL: Susceptible

 

                Acinetobacter species Meropenem       MONIKA             1 mcg/mL: 

Susceptible

 

                Acinetobacter species Minocycline     MONIKA             <=1 mcg/mL

: Susceptible

 

                Acinetobacter species Tobramycin      MONIKA             <=2 mcg/mL

: Susceptible

 

                Acinetobacter species Ampicillin/Sulbactam MONIKA             8/4 m

cg/mL: Susceptible

 

                Acinetobacter species Trimethoprim/Sulfamethoxazole MONIKA         

    1/19 mcg/mL: Susceptible

 

                Acinetobacter species Ertapenem       MONIKA              mcg/mL: R

esistant

 

                Acinetobacter species Piperacillin/Tazobactam MONIKA             16

/4 mcg/mL: Susceptible









                Performing Organization Address         City/[facility]/ZIP Code Phon

e Number

 

                [facility] DEPARTMENT OF PATHOLOGY AND [address] [place], [place] [address]

0 



                GENOMIC MEDICINE                                 

 

                [facility] [address] [place] [id number] 



Gram stain (2020 11:05 AM CDT)Only the most recent of4 resultswithin the 
time period is included.





             Component    Value        Ref Range    Performed At Pathologist



                                                                 Signature

 

             Gram stain isolate Occasional WBC's              [facility] 



                          No organisms seen              HOSPITAL     



                                                                 



                          Comment:                               



                          Specimen Information                           



                          Specimen Source: Bronchial Washing                    

       



                          Specimen Site: Right and left lobes                   

        



                                                                 









                                        Specimen

 

                                        Bronchial washing - Right and left lobes









                Performing Organization Address         City/[facility]/ZIP Code Phon

e Number

 

                [facility] DEPARTMENT OF PATHOLOGY AND [address] [facility]. [place], [place] [address]

0 



                [facility] [address] [place] [address] 



Prothrombin time with INR (2020  7:42 AM CDT)Only the most recent of8 
resultswithin the time period is included.





             Component    Value        Ref Range    Performed At Pathologist



                                                                 Signature

 

             Prothrombin time 14.7 (H)     11.5 - 14.5  [facility] 

 

             INR          1.15                      [place]      



                          Comment:                  [facility]    



                                For patients on anticoagulant therapy, reference

 ranges below:                 [facility]                                



                          Indication:               

INR Value                           



                          Treatment of Venous Thrombosis,      2.0-3.0

                           



                          pulmonary emboli, or prophylaxis                      

     



                          of a venous thrombosis, or systemic                   

        



                          emboli.                                



                          High dose, high risk patients       3.0-4.

5                           



                          with mechanical valves.                           



                          NOTE: INR values over 3.0 are sometimes associated w

ith                           



                          gastrointestinal hemorrhage, especially values over 4.

0.                           



                                                                 









                                        Specimen

 

                                        Blood









                Performing Organization Address         City/[facility]/ZIP Code Phon

e Number

 

                [facility] [place] DEPARTMENT OF PATHOLOGY AND [address] [address]

21 



                [facility] [address] [place] [address]

[address] 



Smear review (2020  2:23 PM CDT)Only the most recent of6 resultswithin the
time period is included.





             Component    Value        Ref Range    Performed At Pathologist Sig

nature

 

             Platelet slide review Zacarias adequate              [facility] 

 

             Anisocytosis Moderate                  [facility] 

 

             Polychromasia SLIGHT                    [facility] 

 

             Tear drop cells OCCASIONAL                [facility] 

 

             Schistocytes OCCASIONAL                [facility] 

 

             Target cells FEW                       [facility] 

 

             Spherocytes  OCCASIONAL                [facility] 

 

             Ovalocytes   FEW                       [facility] 

 

             Enlarged platelets FEW                       [facility] 

 

             Toxic granulation SLIGHT                    [facility] 









                                        Specimen

 

                                        









                Performing Organization Address         City/[facility]/ZIP Code Phon

e Number

 

                [facility] [place] DEPARTMENT OF PATHOLOGY AND [address] [id number]

21 



                [facility] [address] [place] [address]

[phone] 



B natriuretic peptide (2020  2:23 PM CDT)Only the most recent of3 results
within the time period is included.





             Component    Value        Ref Range    Performed At Pathologist Sig

nature

 

             BNP          463 (H)      0 - 100 pg/mL [facility]     









                                        Specimen

 

                                        Blood









                Performing Organization Address         City/[facility]/ZIP Code Phon

e Number

 

                [facility] [place] DEPARTMENT OF PATHOLOGY AND [address] [place], 

21 



                [facility] [address] [place], [place] [address]

[address] 



Transfuse RBC (2020  8:17 AM CDT)Only the most recent of2 resultswithin 
the time period is included.Anti Xa, unfractionated (2020  3:15 AM CDT)
Only the most recent of6 resultswithin the time period is included.





             Component    Value        Ref Range    Performed At Pathologist



                                                                 Signature

 

             Anti Xa,     0.12 (L)Comment: 0.30 - 0.70  [place]      



             unfractionated Therapeutic Range: U/mL         [facility]    



                           0.30 - 0.70 U/mL              [facility] 









                                        Specimen

 

                                        Blood









                Performing Organization Address         City/[facility]/ZIP Code Phon

e Number

 

                [facility] [place] DEPARTMENT OF PATHOLOGY AND [address] [address]

21 



                [facility] [address] [place], [place] [address]

[address] 



MRSA screen culture (2020  3:51 PM CDT)





             Component    Value        Ref Range    Performed At Pathologist



                                                                 [facility]

 

                MRSA screen     No Methicillin Resistant Staphylococcus aureus i

solated.                 [facility]                               



             culture isolate Comment:                  HOSPITAL     



                          Specimen Information                           



                          Specimen Source: Nares                           



                          Specimen Site: Left                           



                                                                 









                                        Specimen

 

                                        Nares - Left









                Performing Organization Address         City/[facility]/ZIP Code Phon

e Number

 

                [facility] DEPARTMENT OF PATHOLOGY AND [address] [place] [address]

0 



                [address] [address] 



Occult blood, stool (2020 11:09 AM CDT)





             Component    Value        Ref Range    Performed At Pathologist



                                                                 [facility]

 

             Occult blood, Positive for Occult blood (A)              [facility]

THODIST 



             stool        Comment:                  [facility] 



                          Specimen Information                           



                          Specimen Source: Stool                           



                          Specimen Site: Nonpreserved                           



                                                                 









                                        Specimen

 

                                        Stool - Nonpreserved









                Performing Organization Address         City/State/ZIP Code Phon

e Number

 

                [facility] [place] DEPARTMENT OF PATHOLOGY AND [address] [place], 

[address] 



                [facility] [address] [place], [place] [address]

[address] 



Lactic acid level (2020  9:00 AM CDT)Only the most recent of9 results
within the time period is included.





             Component    Value        Ref Range    Performed At Pathologist Sig

nature

 

             Lactic acid  0.8          0.5 - 2.2 mmol/L [facility]     









                                        Specimen

 

                                        Blood









                Performing Organization Address         City/[facility]/ZIP Code Phon

e Number

 

                [facility] [place] DEPARTMENT OF PATHOLOGY AND [address], 

[address] 



                [facility] MEDICINE                                 

 

                [facility] [address] [place], [place] [address]

[address] 



Ammonia level (2020  9:00 AM CDT)Only the most recent of2 resultswithin 
the time period is included.





             Component    Value        Ref Range    Performed At Pathologist Sig

nature

 

             Ammonia      25           16 - 60 umol/L [facility]     









                                        Specimen

 

                                        Blood









                Performing Organization Address         City/[facility]/ZIP Code Phon

e Number

 

                [facility] [place] DEPARTMENT OF PATHOLOGY AND [address] [place], 

[address] 



                [facility] [address] [place] [address]

[address] 



XR Abdomen 1 Vw (2020  6:40 AM CDT)





                                        Specimen

 

                                        









                          Narrative                 Performed At

 

                                        EXAMINATION: XR ABDOMEN 1 VW



                                         [facility]



                                         



                                        



                                        CLINICAL HISTORY: Dobbhoff



                                        



                                         



                                        



                                        COMPARISON: 8/10/2020 



                                        



                                         



                                        



                                        IMPRESSION:



                                        



                                         



                                        



                          Dobbhoff tube has its tip in the distal third of the s

tomach. Bowel 



                          gas pattern is nonobstructive. There is no gross intra

-abdominal free 



                          air. There is bilateral perihilar infiltrate or edema 

as well as left 



                                        basilar opacities and volume loss and 



                                        



                                        possible effusion. 



                                        



                                         



                                        



                                         



                                        



                                        HMRM-SPHYAAL



                                        



                                                    









                                        Procedure Note

 

                                        Hm Interface, Radiology Results Incoming

 - 2020  6:49 AM CDT



EXAMINATION:  XR ABDOMEN 1 VW



                                        



                                        CLINICAL HISTORY:  Dobbhoff



                                        



                                        COMPARISON:  8/10/2020



                                        



                                        IMPRESSION:



                                        



                                        Dobbhoff tube has its tip in the distal 

third of the stomach.  Bowel gas pattern

is nonobstructive. There is no gross intra-abdominal free air. There is 
bilateral perihilar infiltrate or edema as well as left basilar opacities and 
volume loss and



                                        possible effusion.



                                        



                                        



                                        HMRM-SPHYAAL









                Performing Organization Address         City/[facility]/ZIP Code Phon

e Number

 

                 [facility]      [address] [place] [address] 



Prepare RBC, 1 Units (2020  4:33 AM CDT)Only the most recent of3 results
within the time period is included.





             Component    Value        Ref Range    Performed At Pathologist



                                                                 Signature

 

             Product name Red Blood Cells              [place]      



                          AS-1, Leukored              [facility] 

 

             Unit number  [id number]              [facility] 

 

             Product code [id number]                  [facility] 

 

             Dispense status Transfused                [facility] 

 

             Blood expiration date [id number]              [facility] 

 

             Blood type code 5100                      [facility] 

 

             Blood type   O POSITIVE                [facility] 

 

             Compatibility Compatible                [facility] 









                                        Specimen

 

                                        Blood









                Performing Organization Address         City/[facility]/ZIP Code Phon

e Number

 

                [facility] [place] DEPARTMENT OF PATHOLOGY AND [address] [place] [address]

21 



                [place] [address]

7521 



Type and screen (2020  4:33 AM CDT)Only the most recent of2 resultswithin 
the time period is included.





             Component    Value        Ref Range    Performed At Pathologist Sig

nature

 

             ABO grouping O                         [facility]     

 

             Rh type      POS                       [facility]     

 

             Antibody screen (gel) NEG                       [facility]     









                                        Specimen

 

                                        Blood









                Performing Organization Address         City/[facility]/ZIP Code Phon

e Number

 

                [facility] [place] [place] DEPARTMENT OF PATHOLOGY AND [address] [place] [address]

21 



                [facility] [address] [place] [address]

7521 



Bilirubin direct (2020  3:07 AM CDT)Only the most recent of3 resultswithin
the time period is included.





             Component    Value        Ref Range    Performed At Pathologist Sig

nature

 

             Bilirubin direct 0.6 (H)      0.0 - 0.4 mg/dL [facility] 









                                        Specimen

 

                                        









                Performing Organization Address         City/[facility]/ZIP Code Phon

e Number

 

                [facility] [place] [place] DEPARTMENT OF PATHOLOGY AND [address] [place] [address]

21 



                [facility] [address] [place] [address] 7

7521 



Bronchoscopy (2020  9:15 PM CDT)





                          Narrative                 Performed At

 

                                        Chayo Calloway MD   2020 

9:18 PM



                                        



                                        Bronchoscopy



                                        



                                        Date/Time: 2020 9:15 PM



                                        



                                        Performed by: Chayo Calloway MD



                                        



                                        Authorized by: Chayo Calloway MD 



                                        



                                         



                                        



                                        Consent: 



                                        



                                         Consent obtained: Written and verba

l



                                        



                                         Consent given by: Guardian (Consent

 been obtained from the patient 



                                        



                          daughter on the phone as the patient has altered menta

l status unable to 



                                        



                                        



                                        give consent at this time)



                                        



                                        Universal protocol: 



                                        



                                         Procedure explained and questions ans

wered to patient or proxy's 



                                        



                                        satisfaction: yes 



                                        



                                         Relevant documents present and verifi

ed: yes 



                                        



                                         Test results available and properly l

abeled: yes 



                                        



                                         Imaging studies available: yes 



                                        



                                         Required blood products, implants, de

vices, and special equipment 



                                        



                                        available: yes 



                                        



                                         Site/side marked: yes 



                                        



                                         Immediately prior to procedure, a [place]

e out was called: yes 



                                        



                           Patient identity confirmed: Verbally with patient

, provided 



                                        demographic 



                                        



                                        data and arm band



                                        



                                        Pre-procedure details: 



                                        



                           Indication: Acute hypoxemic respiratory failure, 

copious secretion, 



                                        



                                        



                                        possible mucous plugging



                                        



                                         Scope type: Flexible bronchoscope



                                        



                                         Airway: Intubated



                                        



                                         Monitoring devices: ECG, pulse oxim

etry and CVP



                                        



                                        Sedation: 



                                        



                                         Sedation Type: Systemic



                                        



                                         Systemic used:: Precidex



                                        



                                        Findings: 



                                        



                           Findings: Erythema (Extensive amount of copious s

ecretions mainly 



                                        in 



                                        



                          the left bronchus with mucous plugging of the left low

er lobe and 



                                        lingula)



                                        



                                         Secretion consistency: Thick



                                        



                                         Secretion amount: Large



                                        



                                        Post-procedure details: 



                                        



                                         Patient tolerance of procedure: Patie

nt tolerated the procedure well 



                                        



                                        with no immediate complications 



                                        



                                        Comments: 



                                        



                            Immediately after intubating the patient and conne

cted to mechanical 



                                        



                                        



                          ventilator I went through the ET tube using flexible b

ronchoscopy and 



                                        once 



                                        



                          to trachea which has some erythematous changes and the

n went to left 



                                        main 



                                        



                                        stem and left upper lobe had mucous secr

etions with some subsegmental 



                                        



                                        plugging which I did suction out and the

n went to right mainstem went 



                                        



                          down to bronchus intermedius and left right middle lob

e right lower lobe 



                                        



                                        



                          has thick secretions which I suctioned out no mucous p

lugging. I went 



                                        to 



                                        



                          the trachea again and then went to the left mainstem w

hich has thick 



                                        mucus 



                                        



                          secretions that I suctioned out also it looks purulent

 secretions and 



                                        went 



                                        



                                        down to left lower lobe which had mucous

 plugging that I suctioned out 



                                        



                                        also went down to left upper lobe and li

ngula also had thick secretions 



                                        



                          but no plugging.          



Intubation (2020  9:13 PM CDT)





                          Narrative                 Performed At

 

                                        Chayo Calloway MD   2020 

9:15 PM



                                        



                                        Intubation



                                        



                                        Date/Time: 2020 9:13 PM



                                        



                                        Performed by: Chayo Calloway MD



                                        



                                        Authorized by: Chayo Calloway MD 



                                        



                                         



                                        



                                        Consent: 



                                        



                                         Consent obtained: Verbal



                                        



                           Consent given by: Guardian (Sent was taken from t

he patient 



                                        daughter on 



                                        



                          the phone as the patient is confused and not able to g

lo consent at 



                                        this 



                                        



                                        time.)



                                        



                                         Risks discussed: Aspiration, bleedi

ng, death, brain injury, dental 



                                        



                                        trauma, hypoxia, laryngeal injury and pn

eumothorax



                                        



                                        Universal protocol: 



                                        



                                         Procedure explained and questions ans

wered to patient or proxy's 



                                        



                                        satisfaction: yes 



                                        



                                         Relevant documents present and verifi

ed: yes 



                                        



                                         Test results available and properly l

abeled: yes 



                                        



                                         Imaging studies available: yes 



                                        



                                         Required blood products, implants, de

vices, and special equipment 



                                        



                                        available: yes 



                                        



                                         Site/side marked: yes 



                                        



                                         Immediately prior to procedure, a [place]

e out was called: yes 



                                        



                                         Patient identity confirmed: Verball

y with patient, arm band and 



                                        



                                        provided demographic data



                                        



                                        Pre-procedure details: 



                                        



                                         Patient status: Altered mental stat

us



                                        



                                         Mallampati score: 1



                                        



                                         Induction: Etomidate



                                        



                                         Paralytics: Rocuronium



                                        



                                        Procedure details: 



                                        



                                         Preoxygenation: BiPAP



                                        



                                         Intubation method: Oral



                                        



                                         Technique: Video laryngoscopy



                                        



                                         Laryngoscope blade: Mac 4



                                        



                                         Grade view: 2



                                        



                                         Difficult airway?: No 



                                        



                                         Tube size (mm): 8.0



                                        



                                         Tube type: Cuffed



                                        



                                         Number of attempts: 1



                                        



                                         Tube visualized through cords: yes 



                                        



                                        Placement assessment: 



                                        



                                         ETT to lip: 23



                                        



                                         ETT to teeth: 22



                                        



                                         Tube secured with: Adhesive tape



                                        



                                         Breath sounds: Equal



                                        



                           Placement verification: chest rise, direct visualiz

ation, equal 



                                        breath 



                                        



                                        sounds, ETCO2 detector and fiberoptic sc

ope 



                                        



                                         CXR findings: ETT in proper place



                                        



                                        Post-procedure details: 



                                        



                                         Patient tolerance of procedure: [place]

erated well, no immediate 



                                        



                                        complications



                                        



                                        Comments: 



                                        



                            Indication of procedure acute hypoxemic respirator

y failure, copious 



                                        



                                        



                          secretions, mucous plugging 



Heparin PF4 antibody (IgG) (2020  3:40 PM CDT)





             Component    Value        Ref Range    Performed At Pathologist Sig

nature

 

             Heparin PF4 Ab OD 0.084        0.000 - 0.399 [facility]     

 

             Heparin PF4 Ab, IgG Negative     Negative     [facility]     









                                        Specimen

 

                                        Blood









                Performing Organization Address         City/State/ZIP Code Phon

e Number

 

                [facility] DEPARTMENT OF PATHOLOGY AND [address] [place] [address]

0 



                [facility] [address] [place] [address] 



Bronchoscopy (2020  3:18 PM CDT)





                          Narrative                 Performed At

 

                                        Tru Mendoza MD   9/3/2020 

3:20 PM



                                        



                                        Bronchoscopy



                                        



                                        Date/Time: 9/3/2020 3:18 PM



                                        



                                        Performed by: Tru Mendoza MD



                                        



                                        Authorized by: Tru Mendoza MD 



                                        



                                         



                                        



                                        Consent: 



                                        



                                         Consent obtained: Verbal



                                        



                                         Consent given by: Healthcare agent



                                        



                           Alternatives discussed: Referral, observation, al

ternative 



                                        treatment, 



                                        



                                        delayed treatment and no treatment



                                        



                                        Universal protocol: 



                                        



                                         Procedure explained and questions ans

wered to patient or proxy's 



                                        



                                        satisfaction: yes 



                                        



                                         Relevant documents present and verifi

ed: yes 



                                        



                                         Test results available and properly l

abeled: yes 



                                        



                                         Imaging studies available: yes 



                                        



                                         Required blood products, implants, de

vices, and special equipment 



                                        



                                        available: yes 



                                        



                                         Site/side marked: yes 



                                        



                                         Immediately prior to procedure, a [place]

e out was called: yes 



                                        



                                         Patient identity confirmed: Maverick freeman and hospital-assigned 



                                        



                                        identification number



                                        



                                        Pre-procedure details: 



                                        



                           Indication: Acute Hypoxemic Respiratory Failure, 

Atelectasis, 



                                        Sepsis



                                        



                                         Scope type: Flexible bronchoscope



                                        



                                         Airway: Intubated



                                        



                                         Monitoring devices: ECG, NIPB, renita

rial line and pulse oximetry



                                        



                                        Sedation: 



                                        



                                         Sedation Type: Anxiolysis and Narco

tic



                                        



                                         Anxiolysis used:: Versed



                                        



                                         Narcotic(s) used:: Fentanyl



                                        



                                        Findings: 



                                        



                                         Location of endotracheal tube: Push

ed et tube by 2 cm 



                                        



                                         Secretion consistency: Thick



                                        



                                         Secretion amount: Moderate



                                        



                                        Washings: 



                                        



                                         Washings: Right and left



                                        



                                         Sample sent for: Gram stain



                                        



                                        Lavage: 



                                        



                                         Bronchoscopy Lavage: Laterality: rml.



                                        



                                         Sample sent for: Gram stain and rou

jyoti culture



                                        



                                        Post-procedure details: 



                                        



                                         Patient tolerance of procedure: Patie

nt tolerated the procedure well 



                                        



                                        with no immediate complications 



                                        



                                        Comments: 



                                        



                            Thick copious mucous secretions , mucous plus bi

laterally , 



                                        mucosal 



                                        



                          edema and inflammation with friability right side- Bro

nch, bal - rml 



                                        done 



                                        



                          and cultures sent         



Hepatic function panel (2020  3:40 AM CDT)Only the most recent of3 results
within the time period is included.





             Component    Value        Ref Range    Performed At Pathologist Sig

nature

 

             Albumin      1.8 (L)      3.5 - 5.0 g/dL [facility] 

 

             Total bilirubin 2.8 (H)      0.2 - 1.2 mg/dL [facility] 

 

             Bilirubin direct 2.0 (H)      0.0 - 0.4 mg/dL [facility] 

 

             Alkaline phosphatase 150 (H)      0 - 129 U/L  [facility] 

 

             Protein      4.6 (L)      6.3 - 8.3 g/dL [facility] 

 

             ALT          96 (H)       5 - 50 U/L   [facility] 

 

             AST          127 (H)      10 - 50 U/L  [facility] 









                                        Specimen

 

                                        Blood









                Performing Organization Address         City/State/ZIP Code Phon

e Number

 

                [facility] [place] [place] DEPARTMENT OF PATHOLOGY AND [address]  [place], 

[address] 



                GENOMIC MEDICINE                                 

 

                [facility] [address]  [place], [place] [address]

[address] 



MRI Brain Wo Contrast (2020  3:05 PM CDT)





                                        Specimen

 

                                        









                          Narrative                 Performed At

 

                                        This result has an attachment that is no

t available.



EXAMINATION: MRI BRAIN WO CONTRAST     [facility]



                                                    



                          CLINICAL HISTORY: Altered level of consciousness (LO

C) unexplained 



                                                    



                          COMPARISON: None.       



                                                    



                          Findings:                 



                                                    



                                        Mild diffusion restriction in the right 

posterior cerebellum measures 1.9 x 1 cm

compatible with recent ischemia. No hemorrhage or mass effect. Questionable 
punctate focus of diffusion restriction in the left posterior temporal lobe. 



                                                    



                                        No intracranial hemorrhage, extra-axial 

fluid collections or parenchymal mass 

lesions. No hydrocephalus. No suspicious focal bone marrow lesions. 



                                                    



                                        Mild chronic ischemic small vessel white

 matter changes. Major flow voids are 

maintained. Mild chronic ischemic small vessel white matter changes. Small 
chronic insult in the right occipital lobe. 



                                                    



                          IMPRESSION:               



                                                    



                                        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

t cerebellum. Questionable punctate 

focus of recent ischemia in the left posterior temporal lobe. 



                                                    



                                                    



                          Findings were discussed with and acknowledged by REINALDO horne at 2020 3:10 PM. 



                                                    



                          [facility]-[id number]            









                                        Procedure Note

 

                                         Interface, Radiology Results Incoming

 - 2020  3:16 PM CDT



EXAMINATION:  MRI BRAIN WO CONTRAST



                                        



                                        CLINICAL HISTORY:  Altered level of cons

ciousness (LOC)  unexplained



                                        



                                        COMPARISON:  None.



                                        



                                        Findings:



                                        



                                        Mild diffusion restriction in the right 

posterior cerebellum measures 1.9 x 1 cm

compatible with recent ischemia. No hemorrhage or mass effect. Questionable 
punctate focus of diffusion restriction in the left posterior temporal lobe.



                                        



                                        No intracranial hemorrhage, extra-axial 

fluid collections or parenchymal mass 

lesions. No hydrocephalus. No suspicious focal bone marrow lesions.



                                        



                                        Mild chronic ischemic small vessel white

 matter changes. Major flow voids are 

maintained. Mild chronic ischemic small vessel white matter changes. Small 
chronic insult in the right occipital lobe.



                                        



                                        IMPRESSION:



                                        



                                        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

t cerebellum. Questionable punctate 

focus of recent ischemia in the left posterior temporal lobe.



                                        



                                        



                                        Findings were discussed with and acknowl

edged by REINALDO Dee at 2020 3:10 PM.



                                        



                                        [facility]-[id number]









                Performing Organization Address         City/[facility]/ZIP Code Phon

e Number

 

                [facility]      [address] [place] [address] 



Venous blood gas (2020  9:21 AM CDT)Only the most recent of5 resultswithin
the time period is included.





             Component    Value        Ref Range    Performed At Pathologist



                                                                 Signature

 

             pH, venous   7.428 (H)    7.320 -      [place]      



                                       7.420        [facility]     

 

             pCO2, venous 39.4 (L)     45.0 -       [place]      



                                       51.0 mmHg    [facility]     

 

             pO2, venous  42.4 (H)     25.0 -       [place]      



                                       40.0 mmHg    [facility]     

 

             O2 saturation, venous 77.5 (H)     40.0 -       [place]      



                                       70.0 %       [facility]     

 

             Base excess, venous 1.7          -2.0 - 2.0   [place]      



                                       mEq/L        [facility]     

 

             Bicarbonate  26.0         21.0 -       [place]      



                                       28.0 mEq/L   [facility]     

 

             O2 content   7.5          VOL%         [facility]     

 

             FiO2, inspired O2% 30.0         %            [facility]     

 

             Carboxyhemoglobin 1.7 (H)      0.0 - 1.4    [place]      



                          Comment:     %            [facility]    



                          Reference Ranges: Carboxyhemoglobin              [facility]      



                          Non smoker:  0.0 - 2.0%              [facility]     



                          Smoker:    2.1 - 5.0%                           



                          Heavy smoker: 5.1 - 9%                           



                                                                 

 

             Methemoglobin 1.3 (H)      0.0 - 1.0    [place]      



                                       %            [facility]     

 

             Hemoglobin, blood gas 7.0 (LL)Comment: NOT 14.0 -       [place]    

  



                          CRITICAL     18.0 g/dL    [facility]     









                                        Specimen

 

                                        Blood









                Performing Organization Address         City/[facility]/ZIP Code Phon

e Number

 

                [facility] DEPARTMENT OF PATHOLOGY AND [address]  [place], 

[address] 



                GENOMIC MEDICINE                                 

 

                [facility] [address]  [place], [place] [address]

[address] 



ECG 12 lead (2020  9:53 AM CDT)Only the most recent of9 resultswithin the 
time period is included.





             Component    Value        Ref Range    Performed At Pathologist Sig

nature

 

             Ventricular rate 91                        [facility]     

 

             Atrial rate  91                        [facility]     

 

             KY interval  150                       [facility]     

 

             QRSD interval 68                        [facility]     

 

             QT interval  404                       [facility]     

 

             QTC interval 496                       [facility]     

 

             P axis 1     62                        [facility]     

 

             QRS axis 1   73                        [facility]     

 

             T wave axis  72                        [facility]     

 

                          EKG impression            Normal sinus rhythm-Normal E

CG-In automated comparison with ECG 

of 31-AUG-2020 17:39,-Sinus rhythm has replaced Atrial fibrillation-Criteria for
Septal infarct are no longer present-Nonspecific T wave abnormality, improved in
Anterolateral                           [facility]            



                                        leads-Electronically Signed By Christine Estrella MD ([id number]) on 9/10/2020 2:19:05 PM 

                                                    



                                                                 









                                        Specimen

 

                                        









                          Narrative                 Performed At

 

                                        This result has an attachment that is no

t available.



                                        









                Performing Organization Address         City/State/ZIP Code Phon

e Number

 

                [facility]        [address] [place] [address] 



US Abdomen Complete (2020  9:56 AM CDT)





                                        Specimen

 

                                        









                          Narrative                 Performed At

 

                                        EXAM: US ABDOMEN COMPLETE



                                        [facility]



                                         



                                        



                          CLINICAL DATA: Assess for signs of cirrhosis liver

 nodularity 



                                        splenomegaly signs of portal hyperte

nsion



                                        



                                         



                                        



                                        COMPARISON: NONE.



                                        



                                         



                                        



                                        FINDINGS:



                                        



                                         



                                        



                          PANCREAS: The head and body of the pancreas are unre

markable. The tail 



                                        of the is not well seen on the current

 study.



                                        



                                         



                                        



                                         



                                        



                          LIVER: The liver demonstrates normal echogenicity wi

thout focal mass 



                                        or intrahepatic biliary ductal dilatatio

n.



                                        



                                         



                                        



                          MPV: Doppler evaluation of the portal vein demonstra

natalie normal 



                                        hepatopedal flow. 



                                        



                                         



                                        



                                        CBD: 0.72 cm within normal limits.



                                        



                                         



                                        



                          GALLBLADDER: The gallbladder is without evidence of 

calculi. The 



                          gallbladder wall is not thickened thickened. There is 

some minimal 



                                        pericholecystic fluid. Mild sludge is pr

esent within the gallbladder.



                                        



                                         



                                        



                          RIGHT KIDNEY: The right kidney is normal in size and

 echogenicity. 



                          There is no evidence of mass, calculi, or hydronephros

is. The right 



                                        kidney measures 12.30 cm 



                                        



                                         



                                        



                          LEFT KIDNEY: The left kidney is normal in size and e

chogenicity. There 



                          is no evidence of mass, calculi, or hydronephrosis. Th

e left kidney 



                                        measures 11.27 cm .



                                        



                                         



                                        



                          SPLEEN: The spleen is homogeneous and not enlarged m

easuring 8.54 cm 



                                        



                                        



                                         



                                        



                          AORTA: The visualized upper abdominal aorta demonstr

ates no evidence 



                                        of ectasia or aneurysm.



                                        



                                         



                                        



                          IVC: The visualized portions of the inferior vena ca

va are 



                                        unremarkable.



                                        



                                         



                                        



                                         



                                        



                                         



                                        



                                        IMPRESSION:



                                        



                          1. The gallbladder demonstrates sludge to be present

. There is some 



                                        minimal pericholecystic fluid. There are

 no stones identified.



                                        



                                        2. Common bile duct is not dilated.



                                        



                          3. Hepatopedal flow is seen within the portal vein. Th

e spleen is not 



                                        enlarged.



                                        



                                        4.There are no signs of portal hypertens

ion.



                                        



                                         



                                        



                                         



                                        



                                         



                                        



                                         



                                        



                          [id number]            









                                        Procedure Note

 

                                         Interface, Radiology Results Incoming

 - 2020 10:45 AM CDT



EXAM: US ABDOMEN COMPLETE



                                        



                                        CLINICAL DATA:  Assess for signs of cirr

hosis  liver nodularity  splenomegaly  

signs of portal hypertension



                                        



                                        COMPARISON: NONE.



                                        



                                        FINDINGS:



                                        



                                        PANCREAS: The  head and body of the panc

reas are unremarkable. The tail of the  

is not well seen on the current study.



                                        



                                        



                                        LIVER:  The liver demonstrates normal ec

hogenicity without focal mass or 

intrahepatic biliary ductal dilatation.



                                        



                                        MPV:  Doppler evaluation of the portal v

ein demonstrates normal hepatopedal 

flow.



                                        



                                        CBD:  0.72 cm within normal limits.



                                        



                                        GALLBLADDER:  The gallbladder is without

 evidence of calculi. The gallbladder 

wall is not thickened thickened. There is some minimal pericholecystic fluid. 
Mild sludge is present within the gallbladder.



                                        



                                        RIGHT KIDNEY:  The right kidney is ambrocio

l in size and echogenicity. There is no 

evidence of mass, calculi, or hydronephrosis.  The right kidney measures 12.30 
cm



                                        



                                        LEFT KIDNEY:  The left kidney is normal 

in size and echogenicity. There is no 

evidence of mass, calculi, or hydronephrosis. The left kidney measures  11.27 cm
 .



                                        



                                        SPLEEN:  The spleen is homogeneous and n

ot enlarged measuring  8.54 cm



                                        



                                        AORTA:  The visualized upper abdominal a

cam demonstrates no evidence of ectasia

or aneurysm.



                                        



                                        IVC:  The visualized portions of the inf

erior vena cava are unremarkable.



                                        



                                        



                                        



                                        IMPRESSION:



                                        1.  The gallbladder demonstrates sludge 

to be present. There is some minimal 

pericholecystic fluid. There are no stones identified.



                                        2. Common bile duct is not dilated.



                                        3. Hepatopedal flow is seen within the p

ortal vein. The spleen is not enlarged.



                                        4.There are no signs of portal hypertens

ion.



                                        



                                        



                                        



                                        



                                        [id number]









                Performing Organization Address         City/State/ZIP Code Phon

e Number

 

                 [facility]      [address] [place] [address] 



Transthoracic Echocardiogram Complete, (w Contrast, Strain and 3D if needed) 
(2020  8:30 AM CDT)





             Component    Value        Ref Range    Performed At Pathologist Sig

nature

 

             Ao Root Diameter 3.32         cm           [facility]     

 

             AoV Area, Vmax 3.14         cm2          [facility]     

 

             AoV Area, VTI 3.97         cm2          [facility]     

 

             AoV Mean PG  0.91         mmHg         [facility]     

 

             AoV Peak PG  1.44         mmHg         [facility]     

 

             AoV Vmax     0.60         m/s          [facility]     

 

             AoV VTI      0.10         m            [facility]     

 

             BSA Lorenzo     1.72         m2           [facility]     

 

             BSA          1.75         m2           [facility]     

 

             IVS,d        0.72         cm           [facility]     

 

             IVS/LVPW,2D  0.90                      [facility]     

 

             Left Atrium Dimension Anterior 2.75         cm           [facility]  

   

 

             LV,d         4.23         cm           [facility]     

 

             LV EF,2D     29.91        %            [facility]     

 

             LV EF,A4C    19.80        %            [facility]     

 

             Lobo Axis,d A4C 6.98         cm           [facility]     

 

             Lobo Axis,s A4C 6.72         cm           [facility]     

 

             LV,s         3.76         cm           [facility]     

 

             LV SV,A4C    15.49        %            [facility]     

 

             LV Vol,d A4C 78.26        ml           [facility]     

 

             LV Vol,s A4C 62.76        ml           [facility]     

 

             LVOT area    3.56         cm2          [facility]     

 

             LVOT Diam,S  2.13         cm           [facility]     

 

             LVOT Vmax    0.53         m/s          [facility]     

 

             LVOT VTI     0.11         m            [facility]     

 

             LVPWD,d      0.80         cm           [facility]     

 

             PV Pk Grad   0.71         mmHg         [facility]     

 

             PV VMAX      0.42         m/s          [facility]     

 

             MV E A ratio 3.03                      [facility]     

 

             AoV area i VTI BSA [place] 2.27         cm2/m2       [facility]     

 

             BMI          19.94        kg/m2        [facility]     

 

             E wave decelartion time 166.88       msec         [facility]     

 

             MV Peak A Mark 0.24         m/s          [facility]     

 

             MV valve area p 1/2 method 4.55         cm2          [facility]     

 

             MV Peak E Mark 0.74         m/s          [facility]     

 

             MV stenosis pressure 1/2 time 48.40        ms           [facility]   

  

 

             AV LVOT peak gradient 1.13         mmHg         [facility]     

 

             Ascending aorta 3.23         cm           [facility]     

 

             Ao Root Diameter 3.32         cm           [facility]     

 

             LV SYS VOL   60.31        ml           [facility]     

 

             LV RANGEL VOL  79.91        ml           [facility]     

 

             LA area s A4C 15.46        cm2          [facility]     

 

             LV SI Teich 2D 11.22        ml/m2        [facility]     

 

             LV SV Teich 2D 19.60        ml           [facility]     

 

             LV Vol s Teich PSAX 60.31        ml           [facility]     

 

             LVOT CI      1.65         l/min/m2     [facility]     

 

             LVOT CO      2.88         l/min        [facility]     

 

             LVOT HR for LVOT CO 75.88        bpm          [facility]     

 

             LVOT SI      21.72        ml/m2        [facility]     

 

             BSA [place]  1.72         m2           [facility]     

 

             AoV Vmn      0.46                      [facility]     

 

             IVS s 2D     0.92                      [facility]     

 

             LV FS Teich 2D 11.17                     [facility]     

 

             MV AE ratio  0.33                      [facility]     

 

             LV FS Cube 2D 11.17                     [facility]     

 

             LVOT Vmn     0.34                      [facility]     

 

             Pt Size      175.26                    [facility]     

 

             Pt Wt        61.23                     [facility]     

 

             Aov area Vmn 2.61         cm2          [facility]     

 

             LA A_P score P 0.17                      [facility]     

 

             LVOT mean grad 0.53         mmHg         [facility]     

 

             MAX Pred HR  147.03                    [facility]     

 

             85 of MPHR   124.98                    [facility]     

 

             AoV area I VMN bsa 1.49         cm2/m2       [facility]     

 

             Calc MPHR    147.03       bpm          [facility]     

 

             IVS pct thck PLAX 27.59        %            [facility]     

 

             LV SI Cube 2D 12.95        ml/m2        [facility]     

 

             LV SV Cube 2D 22.64        ml           [facility]     

 

             LV vol d cube 2D 75.69        ml           [facility]     

 

             LV vol s cube 2D 53.05        ml           [facility]     

 

             LVPW pct thck PLAX 32.78        %            [facility]     

 

             LVPW s PLAX  1.06         cm           [facility]     

 

             MV Decel slope 4.41         m/s2         [facility]     

 

             Pred Exer Dur R1 6.72                      [facility]     

 

             Pred METS R1 7.05                      [facility]     

 

             LA Vol MOD A4C 35.48        ml           [facility]     

 

             Velocity Ratio (V1/V2) 0.88         m/s          [facility]     

 

             EF           24.53        %            [facility]     

 

             E/A ratio    3.08                      [facility]     









                                        Specimen

 

                                        









                          Narrative                 Performed At

 

                                        This result has an attachment that is no

t available.



 The left ventricular chamber size is mildly enlarged. Left ventricular [facility] 

[facility]



                          systolic function is severely impaired. Left Ventricul

ar ejection fraction 



                          is 25 - 30%. There is mild to moderate Severely Hypoki

netic in the 



                          anterior septal segment of the wall. Right Ventricle: 



                                                    









                Performing Organization Address         City/State/ZIP Code Phon

e Number

 

                 [facility]        [address] [place] [address],  



Troponin (2020  4:41 AM CDT)Only the most recent of12 resultswithin the 
time period is included.





             Component    Value        Ref Range    Performed At Pathologist



                                                                 Signature

 

             Troponin     0.453 (H)    0.000 - 0.040 [facility] 



                          Comment:     ng/mL        [facility] 



                          In patients suspected of having a myocardial infarctio

n, along with all                           



                          other appropriate clinical measures and actions includ

ing ECG and other                           



                          diagnostics as appropriate, measure Ultra TnI at 0 hrs

 and at 3 hrs.                           



                          Myocardial infarction VERY LIKELY                     

      



                          The 0 hr TnI level is > 0.10 ng/mL                    

       



                          ------------------------------------------------------

------------------                           



                          Myocardial infarction LIKELY                          

 



                          The 0 hr TnI level is > 0.04 ng/mL and 3 hr level is i

ncreased or decreased                           



                          by at least 0.020 ng/mL                           



                          ------------------------------------------------------

------------------                           



                          Myocardial infarction VERY UNLIKELY                   

        



                          Both the 0 hr and 3 hr TnI levels <= 0.04 ng/mL(within

 normal limits) OR 0                           



                          hr is > 0.04 ng/mL and 3 hr is increased OR decreased 

by less than 0.020                           



                          ng/mL                                  



                                                                 









                                        Specimen

 

                                        Blood









                Performing Organization Address         City/State/ZIP Code Phon

e Number

 

                [facility] DEPARTMENT OF PATHOLOGY AND [address]  [place], 

21 



                GENOMIC MEDICINE                                 

 

                [facility] [address]  [place], [place] [address]

[address] 



HEART/LUNG RESUSCITATION (CPR) (2020  3:52 PM CDT)





                          Narrative                 Performed At

 

                                        Chayo Calloway MD   2020

 3:58 PM



                                        



                                         



                                        



                                        Date of Service: 2020



                                        



                                         



                                        



                                        Code Leader (Attending Provider): Chayo Jewell MD



                                        



                                        Diagnosis: Cardiopulmonary arrest



                                        



                                        Procedure: Cardiopulmonary resuscitation

 - CPT code: 77273



                                        



                                         



                                        



                                        Initial events: Brendan Murray a 74 y.o

. male was found pulseless at 



                                        



                                        15:29.



                                        



                                         



                                        



                                        Comments: 



                                        



                                         



                                        



                                        Description of events: 



                                        



                                        Compressions: Initiated (20 1529).

 15:29



                                        



                                        Upon my arrival at the bedside, patient'

s rhythm was ventricular 



                                        



                          tachycardia. The initial rhythm reported to me by 

e code narrator 



                                        nurse 



                                        



                                        was ventricular fibrillation.



                                        



                                        CPR Stop Time: Compressions: Stopped for

 Pulse Check (20 1535). 



                                        



                                        15:34



                                        



                                        CPR Outcome: Code Termination Due to: Re

turn of spontaneous circulation 



                                        



                                        (20 1535). ROSC



                                        



                                         



                                        



                                          



                                        



                                         



                                        



                                         



                                        



                                        Airway: Already intubated 



                                        



                                         



                                        



                                        Defibrillation/Cardioversion during CPR:

 Yes  Defibrilated @200Jx



                                        



                                        Disposition: remained in ICU following R

[facility].



                                        



                                        Comments: The patient had went into vent

ricular tachycardia at the 



                                        



                          ventricular fibrillation he was shocked twice given 1 

amp of epinephrine 



                                        



                                        



                                        CPR was started at 1529 we got a pulse b

ack after giving him 1 amp of 



                                        



                                        calcium chloride and 1 amp of bicarb and

 re-defibrillated the patient 



                                        



                                        second time.



                                        



                                        I did order electrolytes and give the pa

tient 2 g of magnesium and also 



                                        



                          did consult cardiology started on amiodarone we are go

ing to do EKG 



                                        which 



                                        



                          shows supraventricular tachycardia rate of 160s to 170

. Repeat EKG and 



                                        



                                        



                                        will again order echocardiogram also we 

will get a trend troponin and 



                                        



                                        start on amiodarone drip. We will get 

a try to replace electrolytes.



                                        



                          Did update the patient sister on the phone Nusrat alarcon and I did 



                                        try 



                                        



                                        to update the daughter Beronica freeman on the phone but she was not 



                                        



                                        available. 



                                        



                                         



                                        



                                        Electronically Signed By: 



                                        



                                        MD Chayo Draper MD



                                        



                                        20 3:52 PM



                                        



                                                    



Thyroid stimulating hormone (2020  3:38 PM CDT)





             Component    Value        Ref Range    Performed At Pathologist Sig

nature

 

             TSH          0.63         0.27 - 4.20 uIU/mL [facility]     









                                        Specimen

 

                                        









                Performing Organization Address         City/State/ZIP Code Phon

e Number

 

                [facility] [place] DEPARTMENT OF PATHOLOGY AND [facility] [place] [address]

21 



                [facility] [address] [place], [place] [address]

[address] 



Lipase level (2020  4:09 AM CDT)Only the most recent of4 resultswithin the
time period is included.





             Component    Value        Ref Range    Performed At Pathologist [facility]

nature

 

             Lipase       8 (L)        13 - 60 U/L  [facility]

SPITAL 









                                        Specimen

 

                                        Blood









                Performing Organization Address         City/[facility]/ZIP Code Phon

e Number

 

                [facility], [place] DEPARTMENT OF PATHOLOGY AND [address] [place], [place] [id number]

21 



                [facility] [address] [place], [place] [address]

[address] 



Hemoglobin &amp; hematocrit (2020  6:56 PM CDT)Only the most recent of2 
resultswithin the time period is included.





             Component    Value        Ref Range    Performed At Pathologist Sig

nature

 

             HGB          8.4 (L)      13.0 - 17.3 g/dL [facility]     

 

             HCT          25.0 (L)     34.0 - 45.0 % [facility]     









                                        Specimen

 

                                        Blood









                Performing Organization Address         City/State/ZIP Code Phon

e Number

 

                [facility] [place] [place] DEPARTMENT OF PATHOLOGY AND [address] [place] [address]

21 



                [facility] [address] [place], [place] [address]

[address] 



XR Abdomen 1 Vw Portable (2020  3:14 PM CDT)





                                        Specimen

 

                                        









                          Narrative                 Performed At

 

                                        EXAMINATION: XR ABDOMEN 1 VW PORTABLE



                                         [facility]



                                         



                                        



                                        CLINICAL HISTORY: feeding tube inserti

on



                                        



                                         



                                        



                                        COMPARISON: None available.



                                        



                                         



                                        



                                         



                                        



                                        FINDINGS: 



                                        



                                         



                                        



                                        A Dobbhoff tube terminates within the ga

stric body.



                                        



                                         



                                        



                                        Nonobstructive bowel gas pattern



                                        



                                         



                                        



                                        No acute osseous abnormality.



                                        



                                         



                                        



                                        IMPRESSION:



                                        



                                         



                                        



                                        Consider advancement of Dobbhoff tube in

to the proximal small bowel.



                                        



                                         



                                        



                                         



                                        



                                        [id number] 



                                        



                                                    









                                        Procedure Note

 

                                        Hm Interface, Radiology Results Incoming

 - 2020  3:44 PM CDT



EXAMINATION:  XR ABDOMEN 1 VW PORTABLE



                                        



                                        CLINICAL HISTORY:  feeding tube insertio

n



                                        



                                        COMPARISON:  None available.



                                        



                                        



                                        FINDINGS:



                                        



                                        A Dobbhoff tube terminates within the ga

stric body.



                                        



                                        Nonobstructive bowel gas pattern



                                        



                                        No acute osseous abnormality.



                                        



                                        IMPRESSION:



                                        



                                        Consider advancement of Dobbhoff tube in

to the proximal small bowel.



                                        



                                        



                                        [id number]



                                        









                Performing Organization Address         City/[facility]/ZIP Code Phon

e Number

 

                 [facility]      [address] [place] [address] 



Surgical pathology request (2020 12:14 PM CDT)





             Component    Value        Ref Range    Performed At Pathologist



                                                                 Signature

 

             Case number  [id number]              [facility] [place] DEPARTMENT OF 



                                                    PATHOLOGY AND 



                                                    GENOMIC MEDICINE 

 

             Surgical pathology See link below              [facility] [place] DEPARTMENT OF 



             report       for PDF Lab               PATHOLOGY AND 



                          Report                    GENOMIC MEDICINE 

 

             Result status This is Final              [facility] [place] DEPARTMENT OF 



                          Report for                PATHOLOGY AND 



                          [id number]              GENOMIC MEDICINE 









                                        Specimen

 

                                        









                Performing Organization Address         City/State/ZIP Code Phon

e Number

 

                [facility] [place] DEPARTMENT OF PATHOLOGY AND [address]  [place], 

[address] 



                GENOMIC MEDICINE                                 



Lactic acid level, SEPSIS - Now and repeat 2x every 3 hours (2020  8:25 AM
CDT)Only the most recent of5 resultswithin the time period is included.





             Component    Value        Ref Range    Performed At Pathologist



                                                                 Signature

 

             Lactic acid  4.7 (HH)     0.5 - 2.2    [facility] 



                          Comment:     mmol/L       [facility] 



                          Results called to and read back by _ADRIANA FALL NP AND

 JOHANA CASIANO                            



                          (name/location) at _ICU                           



                          (date/time) by __LSL. 2020 08:48              

             



                                                                 









                                        Specimen

 

                                        Blood









                Performing Organization Address         City/State/ZIP Code Phon

e Number

 

                [facility] [place] [place] DEPARTMENT OF PATHOLOGY AND [address]  [place], 

[address] 



                GENOMIC MEDICINE                                 

 

                [facility] [address]  [place], [place] [address]

[address] 



Arterial Line Insertion (2020  7:57 AM CDT)





                          Narrative                 Performed At

 

                                        Eduar Mckinley NP   2020 

7:57 AM



                                        



                                        Arterial Line Insertion



                                        



                                        Date/Time: 2020 7:57 AM



                                        



                                        Performed by: Eduar Mckinley NP



                                        



                                        Authorized by: Eduar Mckinley NP 



                                        



                                         



                                        



                                        Consent: 



                                        



                                         Consent obtained: Emergent situatio

n



                                        



                                        Indications: 



                                        



                                         Indications: hemodynamic monitoring a

nd multiple ABGs 



                                        



                                        Pre-procedure details: 



                                        



                                         Skin preparation: 2% Chlorhexidine



                                        



                                         Preparation: Patient was prepped and 

draped in sterile fashion 



                                        



                                        Anesthesia (see MAR for exact dosages): 



                                        



                                         Anesthesia method: None



                                        



                                        Procedure details: 



                                        



                                         Location: R radial



                                        



                                         Needle gauge: 20 G



                                        



                                         Ultrasound guidance: yes 



                                        



                                         Number of attempts: 1



                                        



                                         Transducer: waveform confirmed 



                                        



                                        Post-procedure details: 



                                        



                           Post-procedure: Secured with tape, sterile dressi

ng applied, 



                                        sutured 



                                        



                                        and wrist guard applied



                                        



                                         CMS: Normal



                                        



                                         Patient tolerance of procedure: [place]ford

erated well, no immediate 



                                        



                          complications             



Sputum culture (2020  5:34 AM CDT)





             Component    Value        Ref Range    Performed At Pathologist



                                                                 Signature

 

             Sputum culture Normal oral lani and (A)              [facility]

[facility] 



             isolate      Comment:                  HOSPITAL     



                          Specimen Information                           



                          Specimen Source: Sputum                           



                          Specimen Site: Induced                           



                                                                 

 

                Sputum culture  Acinetobacter calcoaceticus-Acinetobacter surjit

isis complex                 

[facility]     



                          The performance characteristics of this assay on this 

isolate                           



                          were validated by the Microbiology Laboratory at [facility]. This source has not been approve

d by the                           



                          U.S. Food and Drug Administration. The results are n

ot                           



                          intended to be used as the sole means for clinical samuel

gnosis                           



                          or patient management. The Microbiology Laboratory i

s                           



                          authorized under the clinical Laboratory Improvement  

                         



                          Amendments of 1988 (CLIA-88) to perform high complexit

y                           



                          testing.                               



                          The performance characteristics of this assay on this 

isolate                           



                          were validated by the Microbiology Laboratory at [facility]. This source has not been approve

d by the                           



                          U.S. Food and Drug Administration. The results are n

ot                           



                          intended to be used as the sole means for clinical samuel

gnosis                           



                          or patient management. The Microbiology Laboratory i

s                           



                          authorized under the clinical Laboratory Improvement  

                         



                          Amendments of 1988 (CLIA-88) to perform high complexit

y                           



                          testing. (A)                           









                                        Specimen

 

                                        Sputum - Induced









                Organism        Antibiotic      Method          Susceptibility

 

                Acinetobacter   Amikacin        MONIKA             <=8 mcg/mL: Susc

eptible



                calcoaceticus-Acinetobacte                                 



                r baumannii complex                                 

 

                Acinetobacter   Aztreonam       MONIKA             >16 mcg/mL: Resi

stant



                calcoaceticus-Acinetobacte                                 



                r baumannii complex                                 

 

                Acinetobacter   Ceftazidime     MONIKA             4 mcg/mL: Suscep

tible



                calcoaceticus-Acinetobacte                                 



                r baumannii complex                                 

 

                Acinetobacter   Ciprofloxacin   MONIKA             <=0.25 mcg/mL: S

usceptible



                calcoaceticus-Acinetobacte                                 



                r baumannii complex                                 

 

                Acinetobacter   Ceftriaxone     MONIKA             8 mcg/mL: Suscep

tible



                calcoaceticus-Acinetobacte                                 



                r baumannii complex                                 

 

                Acinetobacter   Cefepime        MONIKA             2 mcg/mL: Suscep

tible



                calcoaceticus-Acinetobacte                                 



                r baumannii complex                                 

 

                Acinetobacter   Gentamicin      MONIKA             <=2 mcg/mL: Susc

eptible



                calcoaceticus-Acinetobacte                                 



                r baumannii complex                                 

 

                Acinetobacter   Levofloxacin    MONIKA             <=0.5 mcg/mL: Shaikh

sceptible



                calcoaceticus-Acinetobacte                                 



                r baumannii complex                                 

 

                Acinetobacter   Meropenem       MONIKA             <=0.5 mcg/mL: Shaikh

sceptible



                calcoaceticus-Acinetobacte                                 



                r baumannii complex                                 

 

                Acinetobacter   Minocycline     MONIKA             <=1 mcg/mL: Susc

eptible



                calcoaceticus-Acinetobacte                                 



                r baumannii complex                                 

 

                Acinetobacter   Tobramycin      MONIKA             <=2 mcg/mL: Susc

eptible



                calcoaceticus-Acinetobacte                                 



                r baumannii complex                                 

 

                Acinetobacter   Ampicillin/Sulbactam MONIKA             4/2 mcg/mL:

 Susceptible



                calcoaceticus-Acinetobacte                                 



                r baumannii complex                                 

 

                Acinetobacter   Trimethoprim/Sulfamethoxaz MONIKA             <=0.5

/9.5 mcg/mL:



                calcoaceticus-Acinetobacte ole                             Susce

ptible



                r baumannii complex                                 

 

                Acinetobacter   Ertapenem       MONIKA              mcg/mL: Resista

nt



                calcoaceticus-Acinetobacte                                 



                r baumannii complex                                 

 

                Acinetobacter   Cefazolin       MONIKA              mcg/mL: Resista

nt



                calcoaceticus-Acinetobacte                                 



                r baumannii complex                                 

 

                Acinetobacter   Cefoxitin       MONIKA              mcg/mL: Resista

nt



                calcoaceticus-Acinetobacte                                 



                r baumannii complex                                 

 

                Acinetobacter   Piperacillin/Tazobactam MONIKA             <=2/4 mc

g/mL: Susceptible



                calcoaceticus-Acinetobacte                                 



                r baumannii complex                                 









                Performing Organization Address         City/[facility]/ZIP Code Phon

e Number

 

                [facility] DEPARTMENT OF PATHOLOGY AND [address] [place] [address]

0 



                [address] [address] 



Urine culture (2020  3:24 AM CDT)





             Component    Value        Ref Range    Performed At Pathologist



                                                                 Signature

 

             Urine culture No growth after 24 hours              [facility]      Comment:                  HOSPITAL     



                          Specimen Information                           



                          Specimen Source: Urine                           



                          Specimen Site: Clean catch                           



                                                                 









                                        Specimen

 

                                        Urine









                Performing Organization Address         City/[facility]/ZIP Code Phon

e Number

 

                [facility] DEPARTMENT OF PATHOLOGY AND [address] [place] [id number] 



Urinalysis screen and microscopy, with reflex to culture (2020  3:05 AM 
CDT)





             Component    Value        Ref Range    Performed At Pathologist



                                                                 Signature

 

             Specimen site Clean catch               [facility] 

 

             Color, UA    Yellow                    [facility] 

 

             Appearance, UA Slightly-Cloudy              [facility] 

 

             Specific gravity, 1.015        1.001 - 1.035 [facility] 

 

             pH, UA       6.0          5.0 - 8.5    [facility] 

 

             Protein, UA  2+ (A)       Negative     [facility] 

 

             Glucose, UA  1+ (A)       Negative     [facility] 

 

             Ketones, UA  1+ (A)       Negative     [facility] 

 

             Bilirubin, UA Negative     Negative     [facility] 

 

             Blood, UA    Large (A)    Negative     [facility] 

 

             Nitrite, UA  Negative     Negative     [facility] 

 

             Urobilinogen, UA Negative     <2.0         [facility] 

 

             Leukocyte esterase, Negative     Negative     [facility] 

 

             Epithelial cells, Many         /HPF         [facility] 

 

             WBC, UA      19 (H)       0 - 1 /HPF   [facility] 

 

             RBC, UA      10 (A)       0 - 5 /HPF   [facility] 

 

             Bacteria, UA Few          None seen    [facility] 

 

             Yeast, UA    None seen                 [facility] 

 

             Yeast with   None seen                 [facility] 



             pseudohyphae, UA                           [facility] 

 

             Hyaline casts, UA 81           /LPF         [facility] 









                                        Specimen

 

                                        Urine









                Performing Organization Address         City/[facility]/ZIP Code Phon

e Number

 

                [facility] [place] DEPARTMENT OF PATHOLOGY AND [address], 

[address] 



                [facility] MEDICINE                                 

 

                [address]

[address] 



COVID-19 qualitative PCR (2020  3:05 AM CDT)Only the most recent of2 
resultswithin the time period is included.





             Component    Value        Ref Range    Performed At Pathologist



                                                                 Signature

 

                          Interpretation            Negative results do not prec

lude 2019-nCoV infection and should 

not be used as the sole basis for treatment or other patient management 
decisions. Negative results must be combined with clinical observations, patient
history, and epidemiological                     [facility]             



                          information.              [facility]     

 

             COVID-19 qualitative Not-Detected Not-Detecte  [facility]      



             PCR result                d            [facility]     

 

             COVID-19 qualitative See link below for              [facility]      



             PCR          PDF Lab                   [facility]    



                          ReportComment: Case              HOSPITAL     



                          Number: [id number]                           









                                        Specimen

 

                                        Nasopharyngeal swab









                Performing Organization Address         City/[facility]/ZIP Code Phon

e Number

 

                [facility] DEPARTMENT OF PATHOLOGY AND [address] [place], [place] [address]

0 



                [facility] MEDICINE                                 

 

                [id number] [place] [address] 

 

                [facility]                                 



Sepsis Clinical Assessment (2020  2:55 AM CDT)





                          Narrative                 Performed At

 

                                        Nell Jay NP    3:27 AM



                                        



                          74 y.o. male with PMH of COPD, CAD, HTN, PE, pancreati

tis, ETOH abuse 



                                        who 



                                        



                          presented to the ED following consuming an entire kell

le of whiskey. Per 



                                        



                                        



                                        records, pt became minimally responsive 

so family called EMS. Pt became 



                                        



                                        unresponsive in route and subsequently i

ntubated in ED.



                                        



                                         



                                        



                                        Lab called to report lactic acid 13.2. S

epsis score 4, SIRS criteria 2.



                                        



                                         



                                        



                                        CXR negative. Blood cx pending. 



                                        



                                         



                                        



                          Received Rocephin in ED. Discussed with ED physician -

- reported that pt 



                                        



                                        



                                        received 1L fluid bolus per EMS and an a

dditional 1L fluid bolus in ED 



                                        



                          (adequate for 30mL/kg fluid resuscitation). MD states 

she will broaden 



                                        abx 



                                        



                          coverage with Zosyn. Elevated lactic acid likely due i

n part to ETOH 



                                        abuse 



                                        



                                        and GI bleed. Transfused 2 units PRBCs i

n ED. MD reports concern for 



                                        



                                        active bleed/possible esophageal varices

.



                                        



                                         



                                        



                                        UA/cx added. Trend lactic acid. 



                                        



                                         



                                        



                                        Pt being admitted to ICU. Further manage

ment per ICU team. 



                                        



                                         



                                        



                                         



                                        



                                        Sepsis Clinical Assessment



                                        



                                        Performed by: Nell Jay NP



                                        



                                        Authorized by: Nell Jay NP 



                                        



                                         



                                        



                                        Sepsis Clinical Assessment



                                        



                                         



                                        



                                         



                                        



                                        General Assessment Information



                                        



                                        Current sepsis score: 4



                                        



                                         



                                        



                                        On comfort care?: No



                                        



                                         



                                        



                                        If score does not worsen, snooze alerts 

until: 2020 14:56 CDT



                                        



                                         



                                        



                                        SIRS Criteria



                                        



                                         



                                        



                                         



                                        



                                         



                                        



                                        Heart rate > 90 bpm due to acute conditi

on



                                        



                                         



                                        



                                        WBC > 12 K/mcL due to acute condition



                                        



                                         



                                        



                                         



                                        



                                         



                                        



                                        Organ Dysfunction



                                        



                                         



                                        



                                         



                                        



                                         



                                        



                                         



                                        



                                        Lactate > 2.0 mmol/L due to acute condit

ion



                                        



                                         



                                        



                                         



                                        



                                         



                                        



                                         



                                        



                                         



                                        



                                        Sepsis Assessment



                                        



                                        Clinical suspicion of infection? Yes



                                        



                                        Time of suspicion of infection: 

020 2:56 AM



                                        



                                        Clinical suspicion of sepsis?: Yes



                                        



                                        Sepsis staging: Severe sepsis



                                        



                                        Severe Sepsis T0: 2020 2:56 AM



                                        



                                         



                                        



                                        Sepsis protocol started? Yes



                                        



                                        Where did the protocol start?: [facility]

[facility]



                                        



                                         



                                        



                                         



                                        



                                        Suspected Type/Source of Infection



                                        



                                        Suspected Type of Infection: Bacterial



                                        



                                        Suspected Source of Infection: Source un

known



                                        



                                         



                                        



                                         



                                        



                                         



                                        



                                         



                                        



                                         



                                        



                                         



                                        



                                         



                                        



                                        Sepsis Related Vitals



                                        



                                        Heart rate: 130



                                        



                                        Temperature: 



                                        



                                        Respiratory rate: 20



                                        



                                        Blood pressure: 130/75



                                        



                                        Altered mental status: 



                                        



                          WBC (k/uL)   Date           Va

lue      



                                          2020   



                                        



                               27.6 (HH)      2020  

      



                                        9.2      



                                        



                                        ----------



                                        



                                         



                                        



                                        Weight-Based Fluid Bolus Calculation



                                        



                                        The recommended weight-based bolus volum

e: 1,842 mL (dosing weight)



                                        



                          Please refer to the MAR for actual med/fluid administr

ations. 



Blood culture, aerobic &amp; anaerobic (2020  1:54 AM CDT)Only the most 
recent of2 resultswithin the time period is included.





             Component    Value        Ref Range    Performed At Pathologist



                                                                 Signature

 

             Blood culture No growth after 5 days of incubation.              HO

[facility] 



             isolate      Comment:                  HOSPITAL     



                          Specimen Information                           



                          Specimen Source: Blood                           



                          Specimen Site: [facility]                           



                                                                 









                                        Specimen

 

                                        Blood









                Performing Organization Address         City/[facility]/ZIP Code Phon

e Number

 

                [facility] DEPARTMENT OF PATHOLOGY AND [address] [place], [place] [address]

0 



                [facility] [address] [place] [address] 



Alcohol level, blood (2020  1:54 AM CDT)





             Component    Value        Ref Range    Performed At Pathologist



                                                                 Signature

 

             Alcohol      60.3         mg/dL        [facility] 



                          Comment:                  [facility] 



                          Normal            None Detecte

d                           



                          Legal Intoxication in [place]  80 mg/dL (0.08%)       

                    



                          Toxic Concentration      200 mg/dL (0.2%)   

                        



                          Potentially Fatal       350-500 mg/dL (0.3

5%-0.5%)                           



                                                                 

 

             Alcohol percent 0.060        %            [facility] 









                                        Specimen

 

                                        Blood









                Performing Organization Address         City/[facility]/ZIP Code Phon

e Number

 

                [facility] [place] DEPARTMENT OF PATHOLOGY AND [address], 

[address] 



                [facility] [address] [facility].  [place], [place] [address]

[address] 



XR Chest 1 Vw (2020  1:45 AM CDT)





                                        Specimen

 

                                        









                          Narrative                 Performed At

 

                                        Examination: XR CHEST 1 VW



                                         RADIANT



                                         



                                        



                                        Clinical History: ETT



                                        



                                         



                                        



                                        Comparison: 2020



                                        



                                         



                                        



                                        Technique: Single frontal view of the 

est is obtained.



                                        



                                         



                                        



                                        Findings:



                                        



                                        The lungs are free of infiltrate.



                                        



                                        The heart size is normal.



                                        



                                        No pleural effusion is seen.



                                        



                          Tip of the left IJ line is at the mid to distal SVC. T

ip of the ET tube 



                          is 4.8 cm above the antony. Mild bibasilar atelectasis

 is seen. No 



                                        pneumothorax is seen.



                                        



                                         



                                        



                                        Impression:



                                        



                                        Mild bibasilar atelectasis but otherwise

 no acute infiltrate.



                                        



                                         



                                        



                                         



                                        



                                         



                                        



                          [id number]               









                                        Procedure Note

 

                                        Hm Interface, Radiology Results Incoming

 - 2020  2:05 AM CDT



Examination:  XR CHEST 1 VW



                                        



                                        Clinical History:  ETT



                                        



                                        Comparison: 2020



                                        



                                        Technique: Single frontal view of the ch

est is obtained.



                                        



                                        Findings:



                                        The lungs are free of infiltrate.



                                        The heart size is normal.



                                        No pleural effusion is seen.



                                        Tip of the left IJ line is at the mid to

 distal SVC. Tip of the ET tube is 4.8 

cm above the antony. Mild bibasilar atelectasis is seen. No pneumothorax is 
seen.



                                        



                                        Impression:



                                        Mild bibasilar atelectasis but otherwise

 no acute infiltrate.



                                        



                                        



                                        



                                        [id number]









                Performing Organization Address         City/State/ZIP Code Phon

e Number

 

                [facility]      [address] [place] [address] 



ECG ED Preliminary Interpretation - Not an Order (2020  1:37 AM CDT)Only 
the most recent of6 resultswithin the time period is included.





                          Narrative                 Performed At

 

                                        Netta Boyd DO   2020 11:31

 AM



                                        



                                        ECG ED Preliminary Interpretation - Not 

an Order



                                        



                                        Performed by: Netta Boyd DO



                                        



                                        Authorized by: Netta Boyd DO 



                                        



                                         



                                        



                                        ECG reviewed by ED Physician in the abse

nce of a cardiologist: yes 



                                        



                                        Interpretation: 



                                        



                                         Interpretation: abnormal 



                                        



                                        Rate: 



                                        



                                         ECG rate: 131



                                        



                                         ECG rate assessment: tachycardic 



                                        



                                        Rhythm: 



                                        



                                         Rhythm: sinus tachycardia 



                                        



                                        Ectopy: 



                                        



                                         Ectopy: none 



                                        



                                        QRS: 



                                        



                                         QRS axis: Normal



                                        



                                         QRS intervals: Normal



                                        



                                        Conduction: 



                                        



                                         Conduction: normal 



                                        



                                        ST segments: 



                                        



                                         ST segments: Non-specific (changes 

in lateral leads)



                                        



                                        T waves: 



                                        



                           T waves: normal      



CRITICAL CARE (2020  1:37 AM CDT)





                          Narrative                 Performed At

 

                                        Netta Boyd DO   2020 11:31

 AM



                                        



                                        Critical Care



                                        



                                        Performed by: Netta Boyd DO



                                        



                                        Authorized by: Netta Boyd DO 



                                        



                                         



                                        



                                        Critical care provider statement: 



                                        



                                         Critical care time (minutes): 60



                                        



                           Critical care time was exclusive of: Separately b

illable procedures 



                                        and 



                                        



                                        treating other patients



                                        



                                         Critical care was necessary to treat 

or prevent imminent or 



                                        



                          life-threatening deterioration of the following condit

ions: 



                                        Respiratory 



                                        



                                        failure, shock and hepatic failure



                                        



                                         Critical care was time spent personal

ly by me on the following 



                                        



                          activities: Re-evaluation of patient's condition, re

view of old 



                                        charts, 



                                        



                                        discussions with consultants, evaluation

 of patient's response to 



                                        



                                        treatment, examination of patient, gastr

ic intubation, ordering and 



                                        



                          performing treatments and interventions, ordering and 

review of 



                                        laboratory 



                                        



                                        studies, ordering and review of radiogra

phic studies, ventilator 



                                        



                                        management and vascular access procedure

s



                                        



                           Gama 'yes' if you are taking over critical care for

 this patient from 



                                        



                                        



                          another provider.: no   



Intubation (2020  1:37 AM CDT)





                          Narrative                 Performed At

 

                                        Netta Boyd DO   2020 11:31

 AM



                                        



                                        Intubation



                                        



                                        Performed by: Netta Boyd DO



                                        



                                        Authorized by: Netta Boyd DO 



                                        



                                         



                                        



                                        Consent: 



                                        



                                         Consent obtained: Emergent situatio

n



                                        



                                         Alternatives discussed: No treatmen

t



                                        



                                        Universal protocol: 



                                        



                                         Patient identity confirmed: Anonymo

us protocol, patient 



                                        



                                        vented/unresponsive



                                        



                                        Pre-procedure details: 



                                        



                                         Patient status: Unresponsive



                                        



                                         Induction: Etomidate



                                        



                                         Paralytics: Rocuronium



                                        



                                        Procedure details: 



                                        



                                         Preoxygenation: Bag valve mask



                                        



                                         CPR in progress: no 



                                        



                                         Intubation method: Oral



                                        



                                         Technique: Video laryngoscopy



                                        



                                         Laryngoscope blade: Mac 3



                                        



                                         Grade view: 1



                                        



                                         Difficult airway?: No 



                                        



                                         Tube size (mm): 7.5



                                        



                                         Tube type: Cuffed



                                        



                                         Number of attempts: 1



                                        



                                         Cricoid pressure: yes 



                                        



                                         Tube visualized through cords: yes 



                                        



                                        Placement assessment: 



                                        



                                         ETT to teeth: 21



                                        



                                         Tube secured with: ETT tripp



                                        



                                         Breath sounds: Equal and absent ove

r the epigastrium



                                        



                           Placement verification: chest rise, condensation, C

XR verification 



                                        and 



                                        



                                        ETCO2 detector 



                                        



                                         CXR findings: ETT in proper place



                                        



                                        Post-procedure details: 



                                        



                                         Patient tolerance of procedure: Sanford

erated well, no immediate 



                                        



                          complications             



Central Line (2020  1:37 AM CDT)





                          Narrative                 Performed At

 

                                        Netta Boyd DO   2020 11:31

 AM



                                        



                                        Central Line



                                        



                                        Performed by: Netta Boyd DO



                                        



                                        Authorized by: Netta Boyd DO 



                                        



                                         



                                        



                                        Consent: 



                                        



                                         Consent obtained: Emergent situatio

n



                                        



                                         Alternatives discussed: No treatmen

t



                                        



                                        Universal protocol: 



                                        



                           Patient identity confirmed: Hospital-assigned nida

ntification number 



                                        and 



                                        



                                        arm band



                                        



                                        Pre-procedure details: 



                                        



                                         Hand hygiene: Hand hygiene performed 

prior to insertion 



                                        



                                         Sterile barrier technique: All elemen

ts of maximal sterile technique 



                                        



                                        followed 



                                        



                                         Skin preparation: 2% chlorhexidine



                                        



                           Skin preparation agent: Skin preparation agent comp

letely dried prior 



                                        to 



                                        



                                        procedure 



                                        



                                        Sedation: 



                                        



                                         Sedation Type: Systemic



                                        



                                         Systemic used:: Propofol



                                        



                                        Procedure details: 



                                        



                                         Catheter type: Triple lumen



                                        



                                         Catheter size: 7 Fr



                                        



                                         Catheter length (cm): 20



                                        



                                         Catheter site: internal jugular vein 





                                        



                                         Catheter Site Laterality: Left



                                        



                                         Patient position: Flat



                                        



                                         Landmarks identified: yes 



                                        



                                         Ultrasound guidance: yes 



                                        



                           Sterile ultrasound techniques: Sterile gel and ster

ile probe covers 



                                        were 



                                        



                                        used 



                                        



                                         Number of attempts: 1



                                        



                                         Successful placement: yes 



                                        



                                        Post-procedure details: 



                                        



                                         Post-procedure: Dressing applied an

d line sutured



                                        



                           Assessment: Blood return through all ports, no pn

eumothorax on 



                                        x-ray, 



                                        



                                        free fluid flow and placement verified b

y x-ray



                                        



                                         Patient tolerance of procedure: Sanford

erated well, no immediate 



                                        



                          complications             



CTA Abdomen Pelvis W And Or Wo Contrast (2020 12:56 PM CDT)





                                        Specimen

 

                                        









                          Narrative                 Performed At

 

                                        EXAMINATION: CT ANGIOGRAM ABDOMEN PELV

IS W AND OR WO CONTRAST



                                        [facility]



                                         



                                        



                                        CLINICAL HISTORY: lactic acidosis shaikh

spect ischemic



                                        



                                         



                                        



                          TECHNIQUE: Multiple CT angiographic images of the abdo

men and pelvis 



                          were obtained during intravenous administration of con

trast. Multiple 



                          computerized reformatted images as well as 3-D volume 

rendered images 



                                        were also obtained.



                                        



                          CT imaging was performed with iterative reconstruction

 techniques and/or 



                                        automated exposure control to reduce rad

iation dose.



                                        



                                         



                                        



                                         Precontrast images of the abdomen wer

e also obtained.



                                        



                                         



                                        



                                        COMPARISON: None.



                                        



                                         



                                        



                                        FINDINGS:



                                        



                                         



                                        



                                        FINDINGS:



                                        



                                         



                                        



                                        Vasculature:



                                        



                                         



                                        



                          Abdominal aorta demonstrates a normal course and calib

er. There are 



                          moderate atherosclerotic changes of the abdominal aort

a. No evidence of 



                          aortic aneurysm, dissection or other acute aortic synd

kristen. Moderate 



                                        approximately 50% stenosis in the proxim

al 



                                        



                          celiac at the ostium. SMA is patent with minimal disea

se which is 



                          nonobstructive. Mild stenosis at the ostium of the rig

ht renal artery 



                          with mixed calcified and noncalcified plaque, approxim

ately 25% 



                                        stenosis. The left renal artery is paten

t. Patent GRAHAM.



                                        



                                         



                                        



                                        Aortic Measurements are as follows:



                                        



                                         



                                        



                                        Abdominal aorta at the diaphragmatic cru

s: 25 mm



                                        



                                        Abdominal aorta at the renal arteries: 1

6 mm



                                        



                                        Infrarenal abdominal aorta: 20 mm



                                        



                                        Abdominal aorta just proximal to the bif

urcation: 12.7 mm



                                        



                                         



                                        



                                        Right pelvic and runoff vessels:



                                        



                                         



                                        



                          Mild disease throughout the right common iliac, extern

al iliac and CFA, 



                                        without significant stenosis.



                                        



                                         



                                        



                                        Left pelvic and runoff vessels:



                                        



                                         



                                        



                          Mild disease throughout the left common iliac, externa

l iliac and CFA, 



                                        without significant stenosis.



                                        



                                         



                                        



                                        Chest:



                                        



                                         



                                        



                                        Lung bases are unremarkable.



                                        



                                         



                                        



                                        Abdomen:



                                        



                                         



                                        



                           Liver parenchyma is diffusely and markedly low densit

y, compatible with 



                          severe steatosis. The spleen, pancreas, gallbladder, a

nd adrenal glands 



                          are unremarkable. Small hiatal hernia stable from prio

r. The kidneys are 



                                        unremarkable. Colonic diverticuli 



                                        



                          noted. Unchanged pancolonic wall thickening compared w

ith prior study 



                                        and mild surrounding inflammatory change

s in the mesentery.



                                        



                                         



                                        



                                        Pelvis:



                                        



                           Bladder is unremarkable. The orthopedic hardware in t

he sacrum is in 



                                        place.



                                        



                                         



                                        



                                         



                                        



                                        IMPRESSION:



                                        



                                         



                                        



                                        1.Patent mesenteric vasculature.



                                        



                                         



                                        



                          2.Unchanged pancolonic mild wall thickening, favored i

nfectious or 



                                        inflammatory.



                                        



                                         



                                        



                                        3.Severe hepatic steatosis.



                                        



                                         



                                        



                                        [facility]-[id number]



                                        



                                         



                                        



                          Dictated and approved by radiology resident/fellow: Zechariah Floyd M.D.



                                        



                                         



                                        



                          I, Arpan Corbin MD, personally reviewed the images and

 



                          resident's/fellow's findings and agree with the final 

report. 









                                        Procedure Note

 

                                        Hm Interface, Radiology Results Incoming

 - 2020  4:32 PM CDT



EXAMINATION:  CT ANGIOGRAM ABDOMEN PELVIS W AND OR WO CONTRAST



                                        



                                        CLINICAL HISTORY:  lactic acidosis  susp

ect ischemic



                                        



                                        TECHNIQUE: Multiple CT angiographic imag

es of the abdomen and pelvis were 

obtained during intravenous administration of contrast. Multiple computerized 
reformatted images as well as 3-D volume rendered images were also obtained.



                                        CT imaging was performed with iterative 

reconstruction techniques and/or 

automated exposure control to reduce radiation dose.



                                        



                                          Precontrast images of the abdomen were

 also obtained.



                                        



                                        COMPARISON:  None.



                                        



                                        FINDINGS:



                                        



                                        FINDINGS:



                                        



                                        Vasculature:



                                        



                                        Abdominal aorta demonstrates a normal co

urse and caliber. There are moderate 

atherosclerotic changes of the abdominal aorta. No evidence of aortic aneurysm, 
dissection or other acute aortic syndrome. Moderate



                                        approximately 50% stenosis in the proxim

al



                                        celiac at the ostium. SMA is patent with

 minimal disease which is 

nonobstructive. Mild stenosis at the ostium of the right renal artery with mixed
calcified and noncalcified plaque, approximately 25% stenosis. The



                                        left renal artery is patent. Patent GRAHAM.



                                        



                                        Aortic Measurements are as follows:



                                        



                                        Abdominal aorta at the diaphragmatic cru

s: 25 mm



                                        Abdominal aorta at the renal arteries: 1

6 mm



                                        Infrarenal abdominal aorta: 20 mm



                                        Abdominal aorta just proximal to the bif

urcation: 12.7 mm



                                        



                                        Right pelvic and runoff vessels:



                                        



                                        Mild disease throughout the right common

 iliac, external iliac and CFA, without 

significant stenosis.



                                        



                                        Left pelvic and runoff vessels:



                                        



                                        Mild disease throughout the left common 

iliac, external iliac and CFA, without 

significant stenosis.



                                        



                                        Chest:



                                        



                                        Lung bases are unremarkable.



                                        



                                        Abdomen:



                                        



                                         Liver parenchyma is diffusely and marke

dly low density, compatible with severe 

steatosis. The spleen, pancreas, gallbladder, and adrenal glands are 
unremarkable. Small hiatal hernia stable from prior. The kidneys are



                                        unremarkable. Colonic diverticuli



                                        noted. Unchanged pancolonic wall thicken

ing compared with prior study and mild 

surrounding inflammatory changes in the mesentery.



                                        



                                        Pelvis:



                                         Bladder is unremarkable. The orthopedic

 hardware in the sacrum is in place.



                                        



                                        



                                        IMPRESSION:



                                        



                                        1.Patent mesenteric vasculature.



                                        



                                        2.Unchanged pancolonic mild wall thicken

ing, favored infectious or inflammatory.



                                        



                                        3.Severe hepatic steatosis.



                                        



                                        [facility]-[id number]



                                        



                                        Dictated and approved by radiology resid

ent/fellow: LEI Silva, Arpan Corbin MD, personally reviewed 

the images and resident's/fellow's 

findings and agree with the final report.









                Performing Organization Address         City/State/ZIP Code Phon

e Number

 

                [facility]      [address] [place] [id number] 



Enteric viral panel (2020 11:43 AM CDT)





             Component    Value        Ref Range    Performed At Pathologist



                                                                 Signature

 

             Enteric viral Negative for all pathogens tested:              [facility]

ON      



             panel        Negative for Adenovirus F 40/41              [facility]

    



                          Negative for Astrovirus              [facility]     



                          Negative for Norovirus GI/GII                         

  



                          Negative for Rotavirus A                           



                          Negative for Sapovirus                           



                          This real-time PCR assay detects the presence of nucle

ic                           



                          acids (RNA or DNA) for the gastrointestinal pathogens 

listed.                           



                          A result of "Not-detected" does not exclude the possib

ility                           



                          of the presence of one or more pathogens at concentrat

ions                           



                          less than the detectable limits of the assay.         

                  



                                                                 



                          Comment:                               



                          Specimen Information                           



                          Specimen Source: Stool                           



                          Specimen Site: Nonpreserved                           



                                                                 









                                        Specimen

 

                                        Stool - Nonpreserved









                Performing Organization Address         City/State/ZIP Code Phon

e Number

 

                [facility] DEPARTMENT OF PATHOLOGY AND [address] [place] [id number]

0 



                GENOMIC MEDICINE                                 

 

                [address] [address] 



Transthoracic Echocardiogram Complete, (w Contrast, Strain and 3D if needed) 
(2020  7:20 AM CDT)





             Component    Value        Ref Range    Performed At Pathologist Sig

nature

 

             Ao Root Diameter 3.54         cm            [facility]     

 

             AoV Area, Vmax 4.53         cm2          [facility]     

 

             AoV Area, VTI 5.02         cm2          [facility]     

 

             AoV Mean PG  2.03         mmHg         [facility]     

 

             AoV Peak PG  3.13         mmHg         [facility]     

 

             AoV Vmax     0.89         m/s          [facility]     

 

             AoV VTI      0.13         m            [facility]     

 

             BSA Lorenzo     1.69         m2           [facility]     

 

             BSA          1.74         m2           [facility]     

 

             IVS,d        1.09         cm           [facility]     

 

             IVS/LVPW,2D  0.90                      [facility]     

 

             Left Atrium Dimension Anterior 3.78         cm           [facility]  

   

 

             LV,d         3.63         cm           [facility]     

 

             LV EF,2D     62.15        %            [facility]     

 

             LV,s         2.63         cm           [facility]     

 

             LVOT area    4.79         cm2          [facility]     

 

             LVOT Diam,S  2.47         cm           [facility]     

 

             LVOT Vmax    0.87         m/s          [facility]     

 

             LVOT VTI     0.15         m            [facility]     

 

             LVPWD,d      1.22         cm           [facility]     

 

             PV Pk Grad   1.91         mmHg         [facility]     

 

             PV VMAX      0.69         m/s          [facility]     

 

             MV E A ratio 0.69                      [facility]     

 

             AoV area i VTI BSA Pily 2.89         cm2/m2       [facility]     

 

             BMI          18.65        kg/m2        [facility]     

 

             E wave decelartion time 219.04       msec         [facility]     

 

             MV Peak A Mrak 0.83         m/s          [facility]     

 

             MV valve area p 1/2 method 3.46         cm2          [facility]     

 

             MV Peak E Mark 0.57         m/s          [facility]     

 

             MV stenosis pressure 1/2 time 63.52        ms           [facility]   

  

 

             AV LVOT peak gradient 2.78         mmHg         [facility]     

 

             Ascending aorta 3.32         cm           [facility]     

 

             Ao Root Diameter 3.54         cm           [facility]     

 

             LV SYS VOL   25.20        ml           [facility]     

 

             LV RANGEL VOL  55.49        ml           [facility]     

 

             LA area s A4C 14.07        cm2          [facility]     

 

             LV SI Teich 2D 17.43        ml/m2        [facility]     

 

             LV SV Teich 2D 30.29        ml           [facility]     

 

             LV Vol s Teich PSAX 25.20        ml           [facility]     

 

             LVOT CI      4.37         l/min/m2     [facility]     

 

             LVOT CO      7.60         l/min        [facility]     

 

             LVOT HR for LVOT .49       bpm          [facility]     

 

             LVOT SI      37.88        ml/m2        [facility]     

 

             BSA Haycock  1.69         m2           [facility]     

 

             AoV Vmn      0.68                      [facility]     

 

             IVS s 2D     1.24                      [facility]     

 

             LV FS Teich 2D 27.66                     [facility]     

 

             MV AE ratio  1.46                      [facility]     

 

             LV FS Cube 2D 27.66                     [facility]     

 

             LVOT Vmn     0.62                      [facility]     

 

             Pt Size      177.80                    [facility]     

 

             Pt Wt        58.97                     [facility]     

 

             Aov area Vmn 4.14         cm2          [facility]     

 

             LA A_P score P 2.47                      [facility]     

 

             LVOT mean grad 1.67         mmHg         [facility]     

 

             MAX Pred HR  147.20                    [facility]     

 

             85 of MPHR   125.12                    [facility]     

 

             AoV area I VMN bsa 2.38         cm2/m2       [facility]     

 

             Calc MPHR    147.20       bpm          [facility]     

 

             IVS pct thck PLAX 13.18        %            [facility]     

 

             LV SI Cube 2D 17.09        ml/m2        [facility]     

 

             LV SV Cube 2D 29.71        ml           [facility]     

 

             LV vol d cube 2D 47.80        ml           [facility]     

 

             LV vol s cube 2D 18.09        ml           [facility]     

 

             LVPW pct thck PLAX -8.83        %            [facility]     

 

             LVPW s PLAX  1.11         cm           [facility]     

 

             MV Decel slope 2.61         m/s2         [facility]     

 

             Pred Exer Dur R1 6.74                      [facility]     

 

             Pred METS R1 7.08                      [facility]     

 

             LA Vol MOD A4C 34.15        ml           [facility]     

 

             Velocity Ratio (V1/V2) 0.98         m/s          [facility]     

 

             EF           54.59        %            [facility]     

 

             E/A ratio    0.69                      [facility]     









                                        Specimen

 

                                        









                          Narrative                 Performed At

 

                                        This result has an attachment that is no

t available.



 There is borderline left ventricular concentric hypertrophy. [facility]



                           Left Ventricular ejection fraction is 50 - 55%. 



                           Spectral Doppler shows impaired relaxation pattern 

of left ventricular 



                          diastolic filling.        



                           Left atrium size is mildly dilated. 



                                                    









                Performing Organization Address         City/State/ZIP Code Phon

e Number

 

                [facility]        [address] [place] [address],  



CT Abdomen Pelvis W Contrast (2020  9:56 PM CDT)





                                        Specimen

 

                                        









                          Narrative                 Performed At

 

                                        Examination: CT ABDOMEN PELVIS W CONTR

AST



                                        [facility]



                                         



                                        



                                        Clinical History: Abd pain gastroenter

itis or colitis suspected



                                        



                                         



                                        



                                        Comparison: None.



                                        



                                         



                                        



                                        Findings:



                                        



                          CT scans are performed using radiation dose reduction 

techniques. 



                          Technical factors are evaluated and adjusted to ensu

re appropriate 



                          moderation of exposure. Automated dose management te

chnology is 



                                        applied to adjust radiation exposure whi

le achieving a 



                                        



                          diagnostic quality image. CT imaging was performed wit

h iterative 



                          reconstruction techniques and/or automated exposure co

ntrol to reduce 



                                        radiation dose.



                                        



                                         



                                        



                          CT scan of the abdomen and pelvis was performed after 

intravenous 



                                        contrast.



                                        



                                         



                                        



                          The liver is diffusely low in density. The spleen, pan

creas, 



                                        gallbladder, and adrenal glands are unre

markable.



                                        



                                         



                                        



                          The kidneys are within normal limits without hydroneph

rosis or urinary 



                                        calculus.



                                        



                                         



                                        



                          The appendix is nonvisualized. There is mild bowel wal

l prominence or 



                          thickening noted of the sigmoid colon but is not well-

distended. No fat 



                          stranding is seen. Scattered colonic diverticuli are n

oted. No bowel 



                                        dilatation is seen.



                                        



                                         



                                        



                                        No free air or fluid is seen. Urinary bl

adder is unremarkable.



                                        



                                         



                                        



                                        The visualized lung bases show small hia

trevon hernia.



                                        



                                         



                                        



                                        IMPRESSION:



                                        



                                        1. Fatty liver.



                                        



                                         



                                        



                                        2 small hiatal hernia.



                                        



                          3. No bowel wall prominence with thickening noted of t

he sigmoid colon 



                                        may represent incomplete distention vers

us mild inflammation.



                                        



                                         



                                        



                          [facility]-[id number]            









                                        Procedure Note

 

                                        Hm Interface, Radiology Results Incoming

 - 2020 10:30 PM CDT



Examination:  CT ABDOMEN PELVIS W CONTRAST



                                        



                                        Clinical History: Abd pain  gastroenteri

tis or colitis suspected



                                        



                                        Comparison: None.



                                        



                                        Findings:



                                        CT scans are performed using radiation d

ose reduction techniques.  Technical 

factors are evaluated and adjusted to ensure appropriate moderation of exposure.
 Automated dose management technology is applied



                                        to adjust radiation exposure while achie

ving a



                                        diagnostic quality image. CT imaging was

 performed with iterative reconstruction

techniques and/or automated exposure control to reduce radiation dose.



                                        



                                        CT scan of the abdomen and pelvis was pe

rformed after intravenous contrast.



                                        



                                        The liver is diffusely low in density. T

he spleen, pancreas, gallbladder, and 

adrenal glands are unremarkable.



                                        



                                        The kidneys are within normal limits wit

hout hydronephrosis or urinary calculus.



                                        



                                        The appendix is nonvisualized. There is 

mild bowel wall prominence or thickening

noted of the sigmoid colon but is not well-distended. No fat stranding is seen. 
Scattered colonic diverticuli are noted. No bowel dilatation is seen.



                                        



                                        No free air or fluid is seen. Urinary bl

adder is unremarkable.



                                        



                                        The visualized lung bases show small hia

trevon hernia.



                                        



                                        IMPRESSION:



                                        1. Fatty liver.



                                        



                                        2 small hiatal hernia.



                                        3. No bowel wall prominence with thicken

ing noted of the sigmoid colon may 

represent incomplete distention versus mild inflammation.



                                        



                                        [facility]-[id number]









                Performing Organization Address         City/State/ZIP Code Phon

e Number

 

                 [facility]      [address] [place] [address] 



CRITICAL CARE (2020  9:30 PM CDT)





                          Narrative                 Performed At

 

                                        Best Ramos DO   

0 1:35 AM



                                        



                                        Critical Care



                                        



                                        Performed by: Best Ramos DO



                                        



                                        Authorized by: Best Ramos D O 



                                        



                                         



                                        



                                        Critical care provider statement: 



                                        



                                         Critical care time (minutes): 65



                                        



                           Critical care time was exclusive of: Separately b

illable procedures 



                                        and 



                                        



                                        treating other patients and teaching [place]

e



                                        



                                         Critical care was necessary to treat 

or prevent imminent or 



                                        



                                        life-threatening deterioration of the fo

llowing conditions: Cardiac 



                                        



                                        failure and circulatory failure



                                        



                                         Critical care was time spent personal

ly by me on the following 



                                        



                          activities: Blood draw for specimens, development of

 treatment plan 



                                        with 



                                        



                                        patient or surrogate, discussions with c

onsultants, discussions with 



                                        



                                        primary provider, evaluation of patient'

s response to treatment, 



                                        



                                        examination of patient, interpretation o

f cardiac output measurements, 



                                        



                          obtaining history from patient or surrogate, vascular 

access procedures, 



                                        



                                        



                          re-evaluation of patient's condition, pulse oximetry, 

ordering and 



                                        review 



                                        



                                        of radiographic studies, ordering and re

view of laboratory studies, 



                                        



                                        ordering and performing treatments and i

nterventions and review of old 



                                        



                                        charts



                                        



                           Gama 'yes' if you are taking over critical care for

 this patient from 



                                        



                                        



                          another provider.: no   



Cath lab procedure (2020  3:25 PM CST)





                                        Specimen

 

                                        









                          Narrative                 Performed At

 

                                        This result has an attachment that is no

t available.



                                        



XR Ribs W Pa Chest Left (2020  4:45 PM CST)





                                        Specimen

 

                                        









                          Narrative                 Performed At

 

                                        Procedure: XR RIBS W PA CHEST LEFT



                                         [facility]



                                         



                                        



                                        REFERRING PHYSICIAN: CLIFFORD NIEVES



                                        



                                         



                                        



                                        HISTORY: fall



                                        



                                         



                                        



                                        COMPARISON:



                                        



                                         



                                        



                                        None



                                        



                                         



                                        



                                        FINDINGS:



                                        



                                         



                                        



                          No radiographic evidence of acute left rib fracture. O

ld union fracture 



                          deformity is seen along the posterior aspect of the le

ft 8th and 10th 



                          ribs. There is old nonsolid union fracture deformity o

f along the 



                                        posterior aspect of the left ninth rib T

he 



                                        



                          lungs are clear. Right upper lobe calcified granuloma 

is noted. The 



                                        heart is normal in size. Pulmonary vascu

lature is within normal limits.



                                        



                                         



                                        



                                        XR RIBS W PA CHEST LEFT acquired



                                        



                                         



                                        



                                        IMPRESSION: 



                                        



                                         



                                        



                                        No radiographic evidence of acute left r

ib fracture.



                                        



                                         



                                        



                                        [id number]



                                        



                                         



                                        



                                         



                                        



                                                    









                                        Procedure Note

 

                                         Interface, Radiology Results Incoming

 - 2020  5:02 PM CST



Procedure: XR RIBS W PA CHEST LEFT



                                        



                                        REFERRING PHYSICIAN: CLIFFORD NIEVES



                                        



                                        HISTORY: fall



                                        



                                        COMPARISON:



                                        



                                        None



                                        



                                        FINDINGS:



                                        



                                        No radiographic evidence of acute left r

ib fracture. Old union fracture 

deformity is seen along the posterior aspect of the left 8th and 10th ribs. 
There is old nonsolid union fracture deformity of along the



                                        posterior aspect of the left ninth rib T

he



                                        lungs are clear. Right upper lobe calcif

ied granuloma is noted. The heart is 

normal in size. Pulmonary vasculature is within normal limits.



                                        



                                        XR RIBS W PA CHEST LEFT acquired



                                        



                                        IMPRESSION:



                                        



                                        No radiographic evidence of acute left r

ib fracture.



                                        



                                        [facility] [place] [place]-[id number]









                Performing Organization Address         City/State/ZIP Code Phon

e Number

 

                 [facility]      [address] [place] [address] 



CT Angiogram Pe Chest (2020  1:22 PM CST)





                                        Specimen

 

                                        









                          Narrative                 Performed At

 

                                        EXAMINATION:



                                        [facility]



                                        CT ANGIOGRAM PE CHEST



                                        



                                         



                                        



                                        CLINICAL HISTORY:



                                        



                                        sinus tach sob



                                        



                                         



                                        



                                        TECHNIQUE:



                                        



                          CT angiographic images of the chest were obtained duri

ng intravenous 



                          administration of iodinated contrast. Computerized ref

ormatted images 



                          and 3-D MIP images were also obtained and archived (CT

 pulmonary embolus 



                                        protocol).



                                        



                                         



                                        



                          DOSE REDUCTION: CT imaging was performed with iterativ

e reconstruction 



                                        technique and/or automated exposure cont

rol to reduce radiation dose.



                                        



                                         



                                        



                                        COMPARISON:



                                        



                          2 view chest from 2020 and CT abdomen and pelvis w

ith contrast from 



                                        2018



                                        



                                         



                                        



                                        FINDINGS:



                                        



                          1.Pulmonary artery opacification is good. There is no 

evidence of 



                          pulmonary embolism. The pulmonary arteries are normal 

in caliber. No 



                                        radiographic evidence of right heart str

ain.



                                        



                                         



                                        



                          2.The thoracic aorta is normal in course and caliber w

ith a minimal 



                          amount of calcified atherosclerotic disease. The cardi

ac size is normal. 



                                        No pericardial effusion.



                                        



                                         



                                        



                          3.Centrilobular emphysematous change is noted bilatera

lly the biapical 



                          pleural-parenchymal scarring. A calcified granuloma se

en within the 



                                        right upper lobe.



                                        



                                         



                                        



                          4.No noncalcified pulmonary nodules. Bibasilar atelect

asis. No focal 



                                        consolidation or pleural effusion. No pl

eural thickening.



                                        



                                         



                                        



                          5.The trachea main bronchi are clear. No bronchiectasi

s. Mild central 



                                        peribronchial thickening is noted.



                                        



                                         



                                        



                          6.No mediastinal, hilar, or axillary lymphadenopathy. 

Calcified lymph 



                                        nodes are seen within the mediastinum.



                                        



                                         



                                        



                                        7.The thyroid is unremarkable.



                                        



                                         



                                        



                                        8.The bones of the chest are within norm

al limits.



                                        



                                         



                                        



                          9.Questionable filling defect seen within the right in

ternal jugular 



                          vein which may represent a thrombus versus a filling d

efect. Recommend 



                                        further evaluation with ultrasound.



                                        



                                         



                                        



                          10.Limited evaluation of the upper abdomen is unremark

able. Nonspecific 



                                        bilateral perinephric stranding is noted

. Small hiatal hernia.



                                        



                                         



                                        



                                        IMPRESSION:



                                        



                                        1.There is no evidence of pulmonary embo

lism.



                                        



                                         



                                        



                          2.Mild reactive airway disease without evidence of pne

umonia or 



                          bronchiolitis. However, the patient has a small hiatal

 hernia, correlate 



                                        for history of gastroesophageal reflux.



                                        



                                         



                                        



                          3.Questionable filling defect within the right interna

l jugular vein. 



                          Findings may represent an internal thrombus versus mix

ing artifact. This 



                                        can be better evaluated with ultrasound 

as clinically indicated.



                                        



                                         



                                        



                          [facility]-[id number]           









                                        Procedure Note

 

                                         Interface, Radiology Results Incoming

 - 2020  1:51 PM CST



EXAMINATION:



                                        CT ANGIOGRAM PE CHEST



                                        



                                        CLINICAL HISTORY:



                                        sinus tach  sob



                                        



                                        TECHNIQUE:



                                        CT angiographic images of the chest were

 obtained during intravenous 

administration of iodinated contrast. Computerized reformatted images and 3-D 
MIP images were also obtained and archived (CT pulmonary embolus protocol).



                                        



                                        DOSE REDUCTION: CT imaging was performed

 with iterative reconstruction technique

and/or automated exposure control to reduce radiation dose.



                                        



                                        COMPARISON:



                                        2 view chest from 2020 and CT abdome

n and pelvis with contrast from 

2018



                                        



                                        FINDINGS:



                                        1.Pulmonary artery opacification is good

. There is no evidence of pulmonary 

embolism. The pulmonary arteries are normal in caliber. No radiographic evidence
of right heart strain.



                                        



                                        2.The thoracic aorta is normal in course

 and caliber with a minimal amount of 

calcified atherosclerotic disease. The cardiac size is normal. No pericardial 
effusion.



                                        



                                        3.Centrilobular emphysematous change is 

noted bilaterally the biapical pleural-

parenchymal scarring. A calcified granuloma seen within the right upper lobe.



                                        



                                        4.No noncalcified pulmonary nodules. Bib

asilar atelectasis. No focal 

consolidation or pleural effusion. No pleural thickening.



                                        



                                        5.The trachea main bronchi are clear. No

 bronchiectasis. Mild central 

peribronchial thickening is noted.



                                        



                                        6.No mediastinal, hilar, or axillary lym

phadenopathy. Calcified lymph nodes are 

seen within the mediastinum.



                                        



                                        7.The thyroid is unremarkable.



                                        



                                        8.The bones of the chest are within norm

al limits.



                                        



                                        9.Questionable filling defect seen withi

n the right internal jugular vein which 

may represent a thrombus versus a filling defect. Recommend further evaluation 
with ultrasound.



                                        



                                        10.Limited evaluation of the upper abdom

en is unremarkable. Nonspecific 

bilateral perinephric stranding is noted. Small hiatal hernia.



                                        



                                        IMPRESSION:



                                        1.There is no evidence of pulmonary embo

lism.



                                        



                                        2.Mild reactive airway disease without e

vidence of pneumonia or bronchiolitis. 

However, the patient has a small hiatal hernia, correlate for history of 
gastroesophageal reflux.



                                        



                                        3.Questionable filling defect within the

 right internal jugular vein. Findings 

may represent an internal thrombus versus mixing artifact. This can be better 
evaluated with ultrasound as clinically indicated.



                                        



                                        [facility]-[id number]









                Performing Organization Address         City/State/ZIP Code Phon

e Number

 

                 [facility]      [address] [place] [address] 



Echocardiogram complete w contrast and 3D if needed (2020 10:11 AM CST)





             Component    Value        Ref Range    Performed At Pathologist Sig

nature

 

             Ao Root Diameter 3.41         cm           [facility]     

 

             AoV Area, Vmax 2.32         cm2          [facility]     

 

             AoV Area, VTI 2.20         cm2          [facility]     

 

             AoV Mean PG  3.23         mmHg         [facility]     

 

             AoV Peak PG  4.58         mmHg         [facility]     

 

             AoV Vmax     1.12         m/s          [facility]     

 

             AoV VTI      0.25         m            [facility]     

 

             BSA Lorenzo     1.74         m2           [facility]     

 

             BSA          1.74         m2           [facility]     

 

             IVS,d        0.94         cm           [facility]     

 

             IVS/LVPW,2D  0.89                      [facility]     

 

             Left Atrium Dimension Anterior 3.25         cm           [facility]  

   

 

             LV,d         4.52         cm           [facility]     

 

             LV EF,2D     59.95        %            [facility]     

 

             LV,s         3.34         cm           [facility]     

 

             LVOT area    2.89         cm2          [facility]     

 

             LVOT Diam,S  1.92         cm           [facility]     

 

             LVOT Vmax    0.89         m/s          [facility]     

 

             LVOT VTI     0.18         m            [facility]     

 

             LVPWD,d      1.06         cm           [facility]     

 

             PV Pk Grad   3.41         mmHg         [facility]     

 

             PV VMAX      0.92         m/s          [facility]     

 

             MV E A ratio 0.82                      [facility]     

 

             AoV area i VTI BSA [place] 1.27         cm2/m2       [facility]     

 

             BMI          21.93        kg/m2        [facility]     

 

             E wave decelartion time 127.59       msec         [facility]     

 

             MV Peak A Mark 0.96         m/s          [facility]     

 

             MV valve area p 1/2 method 5.95         cm2          [facility]     

 

             MV Peak E Mark 0.78         m/s          [facility]     

 

             MV stenosis pressure 1/2 time 37.00        ms           [facility]   

  

 

             AV LVOT peak gradient 2.92         mmHg         [facility]     

 

             Ascending aorta 3.11         cm           [facility]     

 

             Ao Root Diameter 3.41         cm           [facility]     

 

             LV SYS VOL   45.29        ml           [facility]     

 

             LV RANGEL VOL  93.65        ml           [facility]     

 

             LA area s A4C 14.82        cm2          [facility]     

 

             LV SI Teich 2D 27.83        ml/m2        [facility]     

 

             LV SV Teich 2D 48.36        ml           [facility]     

 

             LV Vol s Teich PSAX 45.29        ml           [facility]     

 

             LVOT CI      2.26         l/min/m2     [facility]     

 

             LVOT CO      3.92         l/min        [facility]     

 

             LVOT HR for LVOT CO 77.28        bpm          [facility]     

 

             LVOT SI      29.22        ml/m2        [facility]     

 

             BSA [place]  1.73         m2           [facility]     

 

             AoV Vmn      0.87                      [facility]     

 

             IVS s 2D     1.00                      [facility]     

 

             LV FS Teich 2D 26.29                     [facility]     

 

             MV AE ratio  1.22                      [facility]     

 

             LV FS Cube 2D 26.29                     [facility]     

 

             LVOT Vmn     0.56                      [facility]     

 

             Pt Size      170.18                    [facility]     

 

             Pt Wt        63.50                     [facility]     

 

             Aov area Vmn 2.05         cm2          [facility]     

 

             LA A_P score P 1.39                      [facility]     

 

             LVOT mean grad 1.45         mmHg         [facility]     

 

             MAX Pred HR  147.68                    [facility]     

 

             85 of MPHR   125.53                    [facility]     

 

             AoV area I VMN bsa 1.18         cm2/m2       [facility]     

 

             Calc MPHR    147.68       bpm          [facility]     

 

             IVS pct thck PLAX 5.97         %            [facility]     

 

             LV SI Cube 2D 31.96        ml/m2        [facility]     

 

             LV SV Cube 2D 55.54        ml           [facility]     

 

             LV vol d cube 2D 92.65        ml           [facility]     

 

             LV vol s cube 2D 37.11        ml           [facility]     

 

             LVPW pct thck PLAX -5.00        %            [facility]     

 

             LVPW s PLAX  1.01         cm           [facility]     

 

             MV Decel slope 6.13         m/s2         [facility]     

 

             Pred Exer Dur R1 6.82                      [facility]     

 

             Pred METS R1 7.15                      [facility]     

 

             LA Vol MOD A4C 36.15        ml           [facility]     

 

             Velocity Ratio (V1/V2) 0.79         m/s          [facility]     

 

             EF           51.64        %            [facility]     

 

             E/A ratio    0.81                      [facility]     









                                        Specimen

 

                                        









                          Narrative                 Performed At

 

                                        This result has an attachment that is no

t available.



 There is mild left ventricular concentric hypertrophy. [facility]



                           Left Ventricular ejection fraction is 50 - 55%. 



                           Left atrium size is moderately dilated. 



                           There is mild sclerosis of the aortic valve leaflet

s. 



                           Spectral Doppler shows impaired relaxation pattern 

of left ventricular 



                          diastolic filling.        



                                                    









                Performing Organization Address         City/State/ZIP Code Phon

e Number

 

                [facility]        [address] [place] [address],  



Lipid panel (2020  5:48 AM CST)





             Component    Value        Ref Range    Performed At Pathologist



                                                                 Signature

 

             Cholesterol  153          0 - 199      [place]      



                                       mg/dL        [facility]     

 

             Triglycerides 65           0 - 149      [place]      



                                       mg/dL        [facility]     

 

             HDL cholesterol 57           40 - 9,999   [place]      



                                       mg/dL        [facility]     

 

             LDL cholesterol 103 (H)Comment: 0 - 99 mg/dL [facility]      



                          Result obtained by              [facility]    



                          direct LDL                [facility]     

 

             Lipid panel  See below                 [facility]      



             interpretation Comment:                  [facility]    



                          Total Cholesterol (mg/dL)       LDL Choles

terol (mg/dL)              [place]      



                                 <200     Desirable       

 <100     Optimal                 

HOSPITAL                                



                                                     200-239   Borderline-

high     100-129   Near or above 

optimal                                                     



                                                     >=240    High  

         130-159   

Borderline-high                                           



                                                      

      160-189   High 

                                                    



                                                              

          >=190    Very

high                                                        



                          HDL Cholesterol (mg/dL)        Triglycer

ides (mg/dL)                           



                                         <40     Low      

     <150     Normal 

                                                    



                                                     >=60     High  

         150-199   

Borderline-high                                           



                                                      

      200-499   High 

                                                    



                                                              

          >=500    Very

high                                                        



                          Risk Catergories that modify LDL goals.               

            



                          Risk Catergories           LDL g

oal (mg/dL)                           



                          CHD and CHD risk equivalent      <100     

                      



                           (10-year risk >20%)                           



                          Multiple (2+) risk factors       <130     

                      



                           (10-year risk =<20%)                           



                          0-1 risk factors            <160

                           



                           (<10-year risk)              

                        



                          Defining levels of lipids in metabolic syndrome       

                    



                          Triglycerides             >=

150 mg/dL                           



                          HDL Cholesterol                 

                     



                            Men               

 <40 mg/dL                           



                            Women               

<50 mg/dL                           



                          Non-HDL cholesterol is a second target for therapy in 

persons                           



                          with high triglycerides (>=200 mg/dL)      

                               









                                        Specimen

 

                                        Plasma specimen









                Performing Organization Address         City/State/ZIP Code Phon

e Number

 

                [facility] AND [address]  [place], 

[address] 



                GENOMIC MEDICINE                                 

 

                [facility] [address]  [place], [place] [address]

[address] 



XR Chest 2 Vw (2020  6:37 PM CST)





                                        Specimen

 

                                        









                          Narrative                 Performed At

 

                                        EXAMINATION: XR CHEST 2 VW



                                        [facility]



                                         



                                        



                                        CLINICAL HISTORY: Chest pain normal ek

g



                                        



                                         



                                        



                                        COMPARISON: September 3, 2018.



                                        



                                         



                                        



                                         



                                        



                                        FINDINGS: 



                                        



                                         



                                        



                          1. The lungs are clear without a focal consolidation. 

There is no 



                                        pleural effusion or pneumothorax. 



                                        



                                         



                                        



                          2. The cardiomediastinal silhouette is within normal l

imits. Aorta is 



                                        mildly tortuous and partially calcified.



                                        



                                         



                                        



                          3. There is no acute or suspicious osseous abnormality

. Severe right 



                          chronic clavicular joint osteoarthritis. Multiple heal

ed left rib 



                                        fractures



                                        



                                         



                                        



                                         



                                        



                                        IMPRESSION: 



                                        



                                         



                                        



                                        No acute cardiopulmonary abnormality. 



                                        



                                         



                                        



                                         



                                        



                          [facility]-[id number]            









                                        Procedure Note

 

                                        Hm Interface, Radiology Results Incoming

 - 2020  6:44 PM CST



EXAMINATION: XR CHEST 2 VW



                                        



                                        CLINICAL HISTORY: Chest pain  normal ekg



                                        



                                        COMPARISON: September 3, 2018.



                                        



                                        



                                        FINDINGS:



                                        



                                        1. The lungs are clear without a focal c

onsolidation. There is no pleural 

effusion or pneumothorax.



                                        



                                        2. The cardiomediastinal silhouette is w

ithin normal limits. Aorta is mildly 

tortuous and partially calcified.



                                        



                                        3. There is no acute or suspicious osseo

us abnormality. Severe right chronic 

clavicular joint osteoarthritis. Multiple healed left rib fractures



                                        



                                        



                                        IMPRESSION:



                                        



                                        No acute cardiopulmonary abnormality.



                                        



                                        



                                        [facility]-[id number]









                Performing Organization Address         City/State/ZIP Code Phon

e Number

 

                 [facility]      [address] [place] [address] 



after 10/07/2019



Insurance







          Payer     Benefit Plan / Subscriber ID Effective Dates Phone     Addre

ss   Type



                    Group                                             

 

          [employer] [id number]  2020-Presen       

              HMO



                    HMO MCR ADV           t                             









           Guarantor Name Account Type Relation to Date of    Phone      Billing



                                 Patient    Birth                 Address

 

           Brendan Murray Personal/Family Self       1945 [phone]

[address] [place] [address]



                                                       ([place], [place]



                                                                  [id number]







Advance Directives

For more information, please contact: [phone]





                Type            Date Recorded   Patient Representative Explanati

on

 

                Advance Directives, Living 2020  7:08 PM                 



                Will and Medical Power of                                 



                                                        

 

                Advance Directives, Living 2020  4:38 PM                 



                Will and Medical Power of                                 



                                                        

 

                Advance Directives, Living 2020  8:00 PM                 



                Will and Medical Power of                                 



                                                        

 

                Advance Directives, Living 2020  8:10 AM                 



                Will and Medical Power of                                 



                                                        

 

                Advance Directives, Living 2020  9:03 AM                 PO

A-2017



                Will and Medical Power of                                 



                                                        

 

                Advance Directives, Living 2020  9:03 AM                 AD

V-2017



                Will and Medical Power of                                 



                                                        









                Code Status     Date Activated  Date Inactivated Comments

 

                Full Code       2018  9:33 PM 2018  5:26 PM 









                    Code Status decision reached by: Patient

## 2020-10-08 NOTE — XMS REPORT
Clinical Summary

                           Created on:2020



Patient:Brendan Murray

Sex:Male

:1945

External Reference #:[id number]





Demographics







                          Address                   [address]



                                                    [place] [address]

 

                          Mobile Phone              [phone]

 

                          Home Phone                [phone]

 

                          Work Phone                [phone]

 

                          Email Address             [email or web address]

 

                          Preferred Language        English

 

                          Marital Status            Unknown

 

                          Tenriism Affiliation     Unknown

 

                          Race                      Black or 

 

                          Ethnic Group              Not  or 









Author







                          Organization              [facility]

 

                          Address                   [address] [place] [address]









Support







                Name            Relationship    Address         Phone

 

                Natalie Murray Unavailable     [address] [facility] [phone]



                                                [place] [address] 

 

                Brendan Murray      Unavailable     [phone]









Care Team Providers







                    Name                Role                Phone

 

                    MD Linda        Primary Care Provider [phone]









Allergies

No Known Allergies



Medications







          Medication Sig       Dispensed Refills   Start Date End Date  Status

 

          olmesartan-hydrochlo Take 1 tablet           0                        

     Active



          rothiazide (BENICAR by mouth                                          



          HCT) 40-12.5 mg per daily.                                            



          tablet                                                      

 

          flecainide Take 50 mg by           0                             Activ

e



          (TAMBOCOR) 50 MG mouth 2 (two)                                        

 



          tablet    times daily.                                         

 

          cyanocobalamin Take 1,000           0                             Acti

ve



          (VITAMIN B-12) 1000 mcg by mouth                                      

   



          MCG tablet daily.                                            

 

          FOLIC     Take by             0                             Active



          ACID/MULTIVIT-MIN/AMIRA mouth.                                           

 



          TEIN (CENTRUM SILVER                                                  

 



          ORAL)                                                       

 

          allopurinol Take 300 mg           0                             Active



          (ZYLOPRIM) 300 MG by mouth.                                         



          tablet                                                      

 

          aspirin (SATHISH Take 325 mg           0                             Act

lo



          ASPIRIN) 325 MG by mouth.                                         



          tablet                                                      

 

          red yeast rice 600 Take 2              0                             A

ctive



          mg Cap    tablets by                                         



                    mouth.                                            

 

          IRON, FERROUS Take 65 mg by           0                             Ac

tive



          SULFATE, ORAL mouth daily .                                         

 

          OMEGA-3 FATTY Take by             0                             Active



          ACIDS-FISH OIL ORAL mouth.                                            

 

          calcitriol Take 0.25 mcg           0                             Activ

e



          (ROCALTROL) 0.25 MCG by mouth                                         

 



          capsule   daily.                                            

 

          doxazosin (CARDURA) Take 4 mg by           3         10/07/2019       

    Active



          4 MG tablet mouth daily.                                         

 

          metoprolol Take 50 mg by           3         10/05/2019           Acti

ve



          (TOPROL-XL) 50 MG 24 mouth daily.                                     

    



          hr tablet                                                   

 

          omeprazole Take 40 mg by           1         10/15/2019           Acti

ve



          (PRILOSEC) 40 MG mouth daily.                                         



          capsule                                                     

 

          furosemide (LASIX) Take 40 mg by           0         10/07/2019       

    Active



          40 MG tablet mouth daily                                         



                    as needed.                                         

 

          olmesartan (BENICAR) Take 40 mg by           3         10/26/2019     

      Active



          40 MG tablet mouth daily.                                         

 

          ARNUITY ELLIPTA 200 Inhale 1 puff           1         2019      

     Active



          mcg/actuation DsDv by mouth via                                       

  



                    inhaler                                           



                    daily.                                            

 

          ranitidine (ZANTAC) Take 150 mg           0                   20

19 Discontinued



          150 MG tablet by mouth                                          



                    nightly.                                          







Active Problems







                          Problem                   Noted Date

 

                          Liver lesion              2018









                                        Last Assessment & Plan: 







                                        Recent non-contrast MRI liver showed 7x7

 cm lesion on right lobe. We will review

the



                                        imaging. He may need resection or US katty

ded biopsy to confirm the diagnosis. 

Serum



                                        AFP 1.25.









                          Chronic viral hepatitis B without delta agent and with

out coma 2018









                                        Last Assessment & Plan: 



Diagnosed >30 yr ago, HBsAg positive, HBeAg negative  with low level viremia.  
Unclear mode of transmission. He was never treated. No previous imaging for HCC 
surveillance. No evidence of advance fibrosis/decompensation.



                                        We will re-check HBV DNA, and will hold 

off anti-HBV therapy. Discussed the risk

of re-activation on immunosuppression, screening of family members for HBV, and 
if negative, needs vaccination.









                          Screening for endocrine, metabolic and immunity disord

er 2018

 

                          Obesity                   2018









                                        Last Assessment & Plan: 







                                        Discussed the need for weight loss with 

a target of 10% weight loss in next 6 

months



                                        through life-style modification.









                          Stage 3 chronic kidney disease 2018

 

                          Screening for endocrine, metabolic and immunity disord

er 2018







Encounters







             Date         Type         Specialty    Care Team    Description

 

             2020   Abstract     Hepatology   Jo Franklin MA   

 

             2020   Documentation Hepatology   Terri Montelongo FNP 

 

             2020   Orders Only  Transplant   Quoc Barbosa Chronic viral



                                       Hepatology   MD Jae    hepatitis B wit

hout



                                                                 delta agent and



                                                                 without coma (H

CC)

 

             2020   Hospital Encounter Radiology    Quoc Barbosa Chronic 

viral



                                                    MD Jae    hepatitis B wit

hout



                                                                 delta agent and



                                                                 without coma (H

CC)

 

             2020   Audio - Telemedicine Hepatology   Quoc Barbosa Chroni

c viral



                                                                MD Jae



                                        hepatitis B without



                                                    Reginald,     delta agent and



                                                    Tanmayi A., FNP without coma

 (HCC)



                                                                 (Primary Dx)

 

             2020   Telephone    Hepatology   Coreas Marilu, Jovan



                                                    MA           

 

             2020   Abstract     Hepatology   Anmol Magi, 



                                                    MA           

 

             2020   Documentation Hepatology   Maximiliano Alston, DHARMESH 

 

             2020   Abstract     Hepatology   Magi Corea, 



                                                    MA           

 

             01/15/2020   Documentation Hepatology   Maximiliano Alston, DHARMESH 

 

             2020   Hospital Encounter Radiology    Familia Quoc Chronic 

viral hepatitis B 

without delta agent and without coma (HCC);



                                                    MD Jae    Liver lesion;



                                                                 Screening for c

ancer

 

             2020   Outside Orders              Julio Mckeon MD   

 

             2019   Telephone    Hepatology   Maximiliano Alston NP 

 

             2019   Office Visit Hepatology   Pericolavicky Quoc Chronic viral 

hepatitis B 

without delta agent and without coma (HCC) (Primary Dx);



                                                                MD Jae



                                        Liver lesion;



                                                    Maximiliano Alston Screening for c

ancer;



                                                    Mackenzie, NP Stage 3 chronic

 kidney disease (HCC);



                                                                 Screening for e

ndocrine, metabolic and immunity disorder;



                                                                 Class 2 obesity

 without serious comorbidity with body mass index (BMI) of 

36.0 to 36.9 in adult, unspecified obesity type

 

             2019   Telephone    Hepatology   Xi Elizabeth Follow-up



                                                    ANA Beckett 



after 10/07/2019



Family History







                Medical History Relation        Name            Comments

 

                Hypertension    Mother                          

 

                Kidney disease  Mother                          









                Relation        Name            Status          Comments

 

                Mother                                  







Social History







             Tobacco Use  Types        Packs/Day    Years Used   Date

 

             Never Smoker                                        









                Smokeless Tobacco: Never Used                                 









                Alcohol Use     Drinks/Week     oz/Week         Comments

 

                No                                              









                          Sex Assigned at Birth     Date Recorded

 

                          Not on file               









                    Job Start Date      Occupation          Industry

 

                    Not on file         Not on file         Not on file









                    Travel History      Travel Start        Travel End









                                        No recent travel history available.







Last Filed Vital Signs







                    Vital Sign          Reading             Time Taken

 

                    Blood Pressure      149/102             2019  9:11 AM 

CST

 

                    Pulse               62                  2019  9:10 AM 

CST

 

                    Temperature         36.6 C (97.8 F) 2019  9:10 AM 

CST

 

                    Respiratory Rate    16                  2019  9:10 AM 

CST

 

                    Oxygen Saturation   97%                 2019  9:10 AM 

CST

 

                    Inhaled Oxygen Concentration -                   -

 

                    Weight              112.6 kg (248 lb 4.8 oz) 2019  9:1

0 AM CST

 

                    Height              176.5 cm (5' 9.5")  2019  9:10 AM 

CST

 

                    Body Mass Index     36.14               2019  9:10 AM 

CST







Plan of Treatment







                Health Maintenance Due Date        Last Done       Comments

 

                COLON CANCER SCREENING COLONOSCOPY 1945                   

   

 

                PNEUMOCOCCAL 65+ LOW/MEDIUM RISK (1 of 2 - PCV13) 2012    

                  

 

                MEDICARE ANNUAL WELLNESS (YEAR 2 or FIRST YEAR if no 2018 

                     



                IPPE)                                           

 

                INFLUENZA VACCINE (#1) 2020                      







Implants







          Implanted Type      Area       Device    Shelf     Model /



                                                  Identifier Expiration Serial /

 Lot



                                                            Date      

 

                Iol Tecnis [id number] 21.0 Jeannie [id number] - [id number] Ophthalmolog

y   Right:          [employer]

OPTICS                                  2020          [id number] /



          Implanted: Qty: 1 on 2017 by Hakan Sales MD      

     Eye                                     

[id number] /

 

                Iol Tecnis [id number] 20.5 Jeannie [id number] - [id number] Ophthalmolog

y   Left:           [employer] 

OPT[employer]                                  2022          [id number] /



          Implanted: Qty: 1 on 10/18/2018 by Hakan Sales MD      

     Eye                                     

[id number] /







Procedures







             Procedure Name Priority     Date/Time    Associated   Comments



                                                    Diagnosis    

 

             CBC W/PLT COUNT & Routine      2020  9:51 Chronic viral Resul

ts for this



             AUTO DIFFERENTIAL              AM CDT       hepatitis B without pro

cedure are in



                                                    delta agent and the results



                                                    without coma (HCC) section.

 

             HEPATITIS B PCR, Routine      2020  9:51 Chronic viral Result

s for this



             QUANTITATIVE              AM CDT       hepatitis B without procedur

e are in



                                                    delta agent and the results



                                                    without coma (HCC) section.

 

             ALPHA FETOPROTEIN Routine      2020  9:51 Chronic viral Resul

ts for this



             (AFP), TUMOR MARKER              AM CDT       hepatitis B without p

rocedure are in



                                                    delta agent and the results



                                                    without coma (HCC) section.

 

             CBC W/PLT COUNT & Routine      2020  9:51 Chronic viral Resul

ts for this



             AUTO DIFFERENTIAL              AM CDT       hepatitis B without pro

cedure are in



                                                    delta agent and the results



                                                    without coma (HCC) section.

 

             HEPATIC FUNCTION Routine      2020  9:51 Chronic viral Result

s for this



             PANEL                     AM CDT       hepatitis B without procedur

e are in



                                                    delta agent and the results



                                                    without coma (HCC) section.

 

             BASIC METABOLIC PANEL Routine      2020  9:51 Chronic viral R

esults for this



             (7)                       AM CDT       hepatitis B without procedur

e are in



                                                    delta agent and the results



                                                    without coma (HCC) section.

 

             MR ABDOMEN WITH & Routine      2020  9:12 Chronic viral Resul

ts for this



             WITHOUT IV CONTRAST              AM CDT       hepatitis B without p

rocedure are in



                                                    delta agent and the results



                                                    without coma (HCC) section.

 

             POCT-CREATININE Routine      2020  8:29              Results 

for this



                                       AM CDT                    procedure are i

n



                                                                 the results



                                                                 section.

 

             US ABDOMEN COMPLETE Routine      2020  9:30 Chronic viral Res

ults for this



                                       AM CST       hepatitis B without procedur

e are in



                                                    delta agent and the results



                                                                without coma (HC

C)



                                        section.



                                                                Liver lesion



                                        



                                                    Screening for 



                                                    cancer       

 

             CBC W/PLT COUNT & Routine      2019 10:06 Chronic viral Resul

ts for this



             AUTO DIFFERENTIAL              AM CST       hepatitis B without pro

cedure are in



                                                    delta agent and the results



                                                    without coma (HCC) section.

 

             HBSAG CONFIRMATION Routine      2019 10:06 Chronic viral Resu

lts for this



                                       AM CST       hepatitis B without procedur

e are in



                                                    delta agent and the results



                                                    without coma (HCC) section.

 

             HEPATITIS B PCR, Routine      2019 10:06 Chronic viral Result

s for this



             QUANTITATIVE              AM CST       hepatitis B without procedur

e are in



                                                    delta agent and the results



                                                    without coma (HCC) section.

 

             HEPATITIS B E Routine      2019 10:06 Chronic viral Results f

or this



             ANTIBODY                  AM CST       hepatitis B without procedur

e are in



                                                    delta agent and the results



                                                    without coma (HCC) section.

 

             HEPATITIS B E ANTIGEN Routine      2019 10:06 Chronic viral R

esults for this



                                       AM CST       hepatitis B without procedur

e are in



                                                    delta agent and the results



                                                    without coma (HCC) section.

 

             HEPATITIS B SURFACE Routine      2019 10:06 Chronic viral Res

ults for this



             ANTIGEN                   AM CST       hepatitis B without procedur

e are in



                                                    delta agent and the results



                                                    without coma (HCC) section.

 

             ALPHA FETOPROTEIN Routine      2019 10:06 Chronic viral Resul

ts for this



             (AFP), TUMOR MARKER              AM CST       hepatitis B without p

rocedure are in



                                                    delta agent and the results



                                                    without coma (HCC) section.

 

             PROTHROMBIN TIME/INR Routine      2019 10:06 Chronic viral Re

sults for this



                                       AM CST       hepatitis B without procedur

e are in



                                                    delta agent and the results



                                                    without coma (HCC) section.

 

             CBC W/PLT COUNT & Routine      2019 10:06 Chronic viral Resul

ts for this



             AUTO DIFFERENTIAL              AM CST       hepatitis B without pro

cedure are in



                                                    delta agent and the results



                                                    without coma (HCC) section.

 

             HEPATIC FUNCTION Routine      2019 10:06 Chronic viral Result

s for this



             PANEL                     AM CST       hepatitis B without procedur

e are in



                                                    delta agent and the results



                                                    without coma (HCC) section.

 

             BASIC METABOLIC PANEL Routine      2019 10:06 Chronic viral R

esults for this



             (7)                       AM CST       hepatitis B without procedur

e are in



                                                    delta agent and the results



                                                    without coma (HCC) section.



after 10/07/2019



Results

Hepatitis B Viral DNA, quant PCR (2020  9:51 AM CDT)Only the most recent 
of2 resultswithin the time period is included.





                Component       Value           Ref Range       Performed At

 

                HBV PCR, Quantitative 22 (H)          <20 IU/mL       [facility]









                                        Specimen

 

                                        Blood









                          Narrative                 Performed At

 

                          This test uses a Real-Time Polymerase Chain [facility]



                          Reaction (RT-PCR) methodology and was 



                          performed using JEROD AmpliPrep/JEROD 



                          TaqMan HBV Test, v2.0 ([employer] 



                                        [employer].).



                                        



                                         



                                        



                          Reportable range for this assay is 20 - 



                          170,000,000 IU per mL (1.30 - 8.23 Log 



                          IU/mL).                   









                Performing Organization Address         City/State/Zipcode Phone

 Number

 

                [id number] [place]

 [address] [phone]



                CENTER                                          



CBC with platelet count + automated diff (2020  9:51 AM CDT)Only the most 
recent of2 resultswithin the time period is included.





                Component       Value           Ref Range       Performed At

 

                WBC             4.7             3.5 - 10.5 K/L [facility]

 

                RBC             3.60 (L)        4.63 - 6.08 M/L [facility]

 

                Hemoglobin      11.0 (L)        13.7 - 17.5 GM/DL [facility]

 

                Hematocrit      34.3 (L)        40.1 - 51.0 %   [facility]

[facility]

 

                MCV             95.3 (H)        79.0 - 92.2 fL  [facility]

[facility]

 

                MCH             30.6            25.7 - 32.2 pg  [facility] [facility]

[facility]

 

                MCHC            32.1 (L)        32.3 - 36.5 GM/DL [facility]

 

                RDW             14.9 (H)        11.6 - 14.4 %   [facility]

 

                Platelets       150             150 - 450 K/CU MM [facility]

 

                MPV             11.0            9.4 - 12.4 fL   [facility]

 

                nRBC            0               0 - 0 /100 WBC  [facility]

 

                % Neutros       52              %               [facility]

 

                % Lymphs        32              %               [facility]

 

                % Monos         10              %               [facility]

 

                % Eos           5               %               [facility]

 

                % Baso          1               %               [facility]

 

                # Neutros       2.44            1.78 - 5.38 K/L [facility]

 

                # Lymphs        1.52            1.32 - 3.57 K/L [facility]

 

                # Monos         0.47            0.30 - 0.82 K/L [facility]

 

                # Eos           0.21            0.04 - 0.54 K/L [facility]

 

                # Baso          0.05            0.01 - 0.08 K/L [facility]

 

                Immature Granulocytes-Relative 0               0 - 1 %         C

[facility]









                                        Specimen

 

                                        Blood









                Performing Organization Address         City/State/Zipcode Phone

 Number

 

                [facility] [address] [place]

 [address] [phone]



                CENTER                                          



Alpha fetoprotein (AFP), tumor marker (2020  9:51 AM CDT)Only the most 
recent of2 resultswithin the time period is included.





                Component       Value           Ref Range       Performed At

 

                Alpha-Fetoprotein <2.0            <10.0 ng/mL     [facility]









                                        Specimen

 

                                        Blood









                          Narrative                 Performed At

 

                           ID - SALVADOR ZUNIGA     [facility]









                Performing Organization Address         City/[facility]/Zipcode Phone

 Number

 

                [facility] [address] [place]

 [address] [phone]



                CENTER                                          



Hepatic function panel (2020  9:51 AM CDT)Only the most recent of2 results
within the time period is included.





                Component       Value           Ref Range       Performed At

 

                Protein, Total  7.4             6.0 - 8.3 gm/dL [facility]

 

                Albumin         3.8             3.5 - 5.0 g/dL  [facility]

 

                Total Bilirubin 0.4             0.2 - 1.2 mg/dL [facility]

 

                Bilirubin, Direct 0.1             0.1 - 0.5 mg/dL [facility]

 

                Alkaline Phosphatase 48              40 - 150 U/L    [facility]

 

                AST             16              5 - 34 U/L      [facility]

 

                ALT             14              6 - 55 U/L      [facility]









                                        Specimen

 

                                        Blood









                          Narrative                 Performed At

 

                           ID - ARMIN CORONA       [facility]









                Performing Organization Address         City/[facility]/Zipcode Phone

 Number

 

                [facility] [address] [place]

 [address] [phone]



                CENTER                                          



Basic Metabolic Panel (2020  9:51 AM CDT)Only the most recent of2 results
within the time period is included.





                Component       Value           Ref Range       Performed At

 

                Sodium          139             136 - 145 meq/L [facility]

 

                Potassium       4.9             3.5 - 5.1 meq/L [facility]

 

                Chloride        105             98 - 107 meq/L  [facility]

 

                CO2             28              22 - 29 meq/L   [facility]

 

                BUN             30 (H)          7 - 21 mg/dL    [facility]

 

                Creatinine      2.36 (H)        0.57 - 1.25 mg/dL [facility]

 

                Glucose         90              70 - 105 mg/dL  [facility]

 

                Calcium         9.6             8.4 - 10.2 mg/dL [facility]

[facility]

 

                EGFR            33Comment: ESTIMATED GFR IS mL/min/1.73 sq m [facility]



                                NOT AS ACCURATE AS CREATININE                 [facility]

[facility]



                                CLEARANCE IN PREDICTING                 



                                GLOMERULAR FILTRATION RATE.                 



                                ESTIMATED GFR IS NOT                 



                                APPLICABLE FOR DIALYSIS                 



                                PATIENTS.                       









                                        Specimen

 

                                        Blood









                          Narrative                 Performed At

 

                           ID - ARMIN CORONA       [facility]

ICAL CENTER









                Performing Organization Address         City/State/Zipcode Phone

 Number

 

                [facility] [address] [place]

 [address] [phone]



                CENTER                                          



MR abdomen with/without IV contrast (2020  9:12 AM CDT)





                                        Specimen

 

                                        









                          Narrative                 Performed At

 

                                        FINAL REPORT



                                        [facility]



                                         



                                        



                                        PATIENT ID: [id number]



                                        



                                         



                                        



                                        TECHNIQUE: MRI of the abdomen WITHOUT an

d WITH intravenous contrast.



                                        



                                         



                                        



                                        INDICATION: Hepatitis B screening for HC

C.



                                        



                                         



                                        



                                        COMPARISON: 2019.



                                        



                                         



                                        



                                         



                                        



                                        FINDINGS:



                                        



                                         



                                        



                                        LOWER THORAX: The heart is enlarged..



                                        



                                         



                                        



                                        LIVER: No hepatic signal abnormality. Mi

xed T2 signal intensity 



                                        



                                        lesion in the dome of the right hepatic 

lobe measuring 7.6 x 7 cm, 



                                        



                                        not significant change since . The lesion 



                                        



                                        demonstrates intrinsic T1 hyperintense s

ignal enhance on the 



                                        



                                        postcontrast images. There are multiple 

additional T2 hyperintense 



                                        



                                        foci in the bilateral hepatic lobes whic

h do not postcontrast images 



                                        



                                        consistent with multiple simple cysts.. 



                                        



                                        BILIARY: Gallbladder is unremarkable. No

 biliary ductal dilatation or 



                                        



                                        filling defect.



                                        



                                        SPLEEN: No splenomegaly.



                                        



                                        PANCREAS: No focal masses or ductal dila

tation.



                                        



                                         



                                        



                                        ADRENALS: No adrenal nodules.



                                        



                                        KIDNEYS/URETERS: No hydronephrosis or so

lid mass lesions. Multiple 



                                        



                                        bilateral renal cysts. The largest measu

res 4.3 cm in the lower pole 



                                        



                                        of the right kidney.



                                        



                                         



                                        



                                        PERITONEUM/RETROPERITONEUM: No free flui

d.



                                        



                                        LYMPH NODES: No lymphadenopathy.



                                        



                                        VESSELS: Unremarkable.



                                        



                                         



                                        



                                        GI TRACT: No distention or wall thickeni

ng. There is colonic 



                                        



                                        diverticulosis.



                                        



                                         



                                        



                                        BONES AND SOFT TISSUES: Multilevel degen

erative changes in the spine..



                                        



                                         



                                        



                                         



                                        



                                        IMPRESSION:



                                        



                                        Stable hemorrhagic cyst in the dome of t

he right hepatic lobe. 



                                        



                                         



                                        



                                        No suspicious hepatic lesions.



                                        



                                         



                                        



                                        Signed: Kaila Horne MD



                                        



                                        Report Verified Date/Time:2020

 13:54:01



                                        



                                         



                                        



                                        Reading Location[address]

y Reading Room



                                        



                           Electronically signed by: KAILA HORNE MD on 2020 



                                        01:54 PM



                                        



                                                    









                                        Procedure Note

 

                                        Interface, External Ris In - 2020 

 1:56 PM CDT



FINAL REPORT



                                        



                                        PATIENT ID:   [id number]



                                        



                                        TECHNIQUE: MRI of the abdomen WITHOUT an

d WITH intravenous contrast.



                                        



                                        INDICATION: Hepatitis B screening for HC

C.



                                        



                                        COMPARISON: 2019.



                                        



                                        



                                        FINDINGS:



                                        



                                        LOWER THORAX: The heart is enlarged..



                                        



                                        LIVER: No hepatic signal abnormality. Mi

xed T2 signal intensity



                                        lesion in the dome of the right hepatic 

lobe measuring 7.6 x 7 cm,



                                        not significant change since . The lesion



                                        demonstrates intrinsic T1 hyperintense s

ignal enhance on the



                                        postcontrast images. There are multiple 

additional T2 hyperintense



                                        foci in the bilateral hepatic lobes whic

h do not postcontrast images



                                        consistent with multiple simple cysts..



                                        BILIARY: Gallbladder is unremarkable. No

 biliary ductal dilatation or



                                        filling defect.



                                        SPLEEN: No splenomegaly.



                                        PANCREAS: No focal masses or ductal dila

tation.



                                        



                                        ADRENALS: No adrenal nodules.



                                        KIDNEYS/URETERS: No hydronephrosis or so

lid mass lesions. Multiple



                                        bilateral renal cysts. The largest measu

res 4.3 cm in the lower pole



                                        of the right kidney.



                                        



                                        PERITONEUM/RETROPERITONEUM: No free flui

d.



                                        LYMPH NODES: No lymphadenopathy.



                                        VESSELS: Unremarkable.



                                        



                                        GI TRACT: No distention or wall thickeni

ng. There is colonic



                                        diverticulosis.



                                        



                                        BONES AND SOFT TISSUES: Multilevel degen

erative changes in the spine..



                                        



                                        



                                        IMPRESSION:



                                        Stable hemorrhagic cyst in the dome of t

he right hepatic lobe.



                                        



                                        No suspicious hepatic lesions.



                                        



                                        Signed: Kaila Horne MD



                                        Report Verified Date/Time:  2020 1

3:54:01



                                        



                                        Reading Location: [address]

y Reading Room



                                           Electronically signed by: KAILA HORNE MD on 2020 01:54 PM



                                        









                Performing Organization Address         City/State/Zipcode Phone

 Number

 

                [facility]                                          



POC-Creatinine (2020  8:29 AM CDT)





                Component       Value           Ref Range       Performed At

 

                POC-Creatinine  2.4 (H)Comment: : TESTED 0.6 - 1.3 mg/dL [facility]



                                [facility] [address] [facility]

NTER



                                [facility], [address]:                 



                                /Technician ID =                 



                                [id number] for Chary Fuentes                          

 

                POC-EGFR        32              mL/min/1.73M2   [facility] CENTER









                                        Specimen

 

                                        Blood









                Performing Organization Address         City/State/Zipcode Phone

 Number

 

                [facility] [address] [place], [place]

 [id number] [phone]



                CENTER                                          



US abdomen complete (2020  9:30 AM CST)





                                        Specimen

 

                                        









                          Narrative                 Performed At

 

                                        FINAL REPORT



                                        [facility]



                                         



                                        



                                        PATIENT ID: [id number]



                                        



                                         



                                        



                                        Abdominal ultrasound dated 2020



                                        



                                         



                                        



                                        Clinical information:hepatitis B, liver 

lesion, screening for HCC



                                        



                                         



                                        



                                        Comment:Real-time transabdominal ult

rasound was performed.



                                        



                                         



                                        



                                        Liver is normal in size and measures 16.

2 cm in length. The 



                                        



                                        echogenicity of the liver is increased. 

A 0.29 x 1.0 cm cyst is seen 



                                        



                                        in the liver. A 4.6 x 5.1 x 5.1 cm echog

enic mass is seen in the 



                                        



                                        liver. Spleen is normal in size without 

focal abnormality.



                                        



                                         



                                        



                                        Gallbladder is contracted. No gallstone 

is present. No biliary 



                                        



                                        dilatation is seen. Common bile duct magaly

sures 5 mm in diameter.Main 



                                        



                                        portal vein measures 8 mm in diameter.



                                        



                                         



                                        



                                        Pancreas is visualized and unremarkable.



                                        



                                         



                                        



                                        Right kidney measures 12 x 4.7 x 5.2 cm.

Left kidney measures 10.3 x 



                                        



                                        4.4 x 4.5 cm.Echogenicity of both ki

dney is increased.No 



                                        



                                        hydronephrosis or solid mass seen in eit

her kidney.A 3.3 x 3.4 x 



                                        



                                        2.4 cm and 1.1 x 1.0 x 0.9 cm cysts are 

seen in the right kidney. A 



                                        



                                        1.1 x 0.9 x 1.2 cm cystis seen in th

e left kidney.



                                        



                                         



                                        



                                        No ascites is present in the abdomen.



                                        



                                         



                                        



                                        Abdominal aorta is normal in caliber. IV

C and Hepatic veins are 



                                        



                                        patent.



                                        



                                         



                                        



                                         



                                        



                                        Impression: 



                                        



                                        1. Echogenic kidneys suggestive of medic

al renal disease with 



                                        



                                        bilateral renal cysts.



                                        



                                        2. Hyperechoic mass in the liver. Please

 correlate with prior MR 



                                        



                                        examination.



                                        



                                         



                                        



                                        Signed: Nicole Artis MD



                                        



                                        Report Verified Date/Time:2020

 10:11:17



                                        



                                         



                                        



                                        Reading Location: [address] [facility]

y Reading Room



                                        



                          Electronically signed by: CLIFFORD MADERA on 2020 



                                        10:11 AM



                                        



                                                    









                                        Procedure Note

 

                                        Interface, External Ris In - 2020 

10:13 AM CST



FINAL REPORT



                                        



                                        PATIENT ID:   [id number]



                                        



                                        Abdominal ultrasound dated 2020



                                        



                                        Clinical information:hepatitis B, liver 

lesion, screening for HCC



                                        



                                        Comment:  Real-time transabdominal ultra

sound was performed.



                                        



                                        Liver is normal in size and measures 16.

2 cm in length. The



                                        echogenicity of the liver is increased. 

A 0.29 x 1.0 cm cyst is seen



                                        in the liver. A 4.6 x 5.1 x 5.1 cm echog

enic mass is seen in the



                                        liver. Spleen is normal in size without 

focal abnormality.



                                        



                                        Gallbladder is contracted. No gallstone 

is present. No biliary



                                        dilatation is seen. Common bile duct magaly

sures 5 mm in diameter.  Main



                                        portal vein measures 8 mm in diameter.



                                        



                                        Pancreas is visualized and unremarkable.



                                        



                                        Right kidney measures 12 x 4.7 x 5.2 cm.

  Left kidney measures 10.3 x



                                        4.4 x 4.5 cm.  Echogenicity of both kidn

ey is increased.  No



                                        hydronephrosis or solid mass seen in eit

her kidney.  A 3.3 x 3.4 x



                                        2.4 cm and 1.1 x 1.0 x 0.9 cm cysts are 

seen in the right kidney. A



                                        1.1 x 0.9 x 1.2 cm cyst  is seen in the 

left kidney.



                                        



                                        No ascites is present in the abdomen.



                                        



                                        Abdominal aorta is normal in caliber. IV

C and Hepatic veins are



                                        patent.



                                        



                                        



                                        Impression:



                                        1. Echogenic kidneys suggestive of medic

al renal disease with



                                        bilateral renal cysts.



                                        2. Hyperechoic mass in the liver. Please

 correlate with prior MR



                                        examination.



                                        



                                        Signed: Nicole Artis MD



                                        Report Verified Date/Time:  2020 1

0:11:17



                                        



                                        Reading Location: [address] [facility]

y Reading Room



                                              Electronically signed by: NICOLE ARTIS M.D. on 2020 10:11 AM



                                        









                Performing Organization Address         City/State/Zipcode Phone

 Number

 

                [facility]                                          



HBSAG Confirmation (2019 10:06 AM CST)





                Component       Value           Ref Range       Performed At

 

                HBsAg Confirm   Confirmed Positive (A) Unconfirmed, HBSAG [facility]

 [facility]









                                        Specimen

 

                                        Blood









                Performing Organization Address         City/State/Zipcode Phone

 Number

 

                [facility] [address] [place]

 [address] [phone]



                CENTER                                          



Hepatitis B e antibody (2019 10:06 AM CST)





                Component       Value           Ref Range       Performed At

 

                Hep Be Ab(Anti-Hbe) REACTIVE (A)                    [facility]

[facility]



                                Comment:                        [facility]



                                                                



                                REFERENCE RANGE:NONREACTIVE                 



                                                                



                                For additional information, please refer to     

            



                                http://wedgies.[employer]/faq/IYA577

                 



                                (This link is being provided for informational/ 

                



                                educational purposes only.)                 



                                                                









                                        Specimen

 

                                        Blood









                          Narrative                 Performed At

 

                                        Performing Lab



                                        [facility]



                                         *[place]



                                        



                           [address] [place] 



                                        [address]



                                        



                           ROBYN Nielson MD 









                Performing Organization Address         City/[facility] Phone

 Number

 

                [place] [address]

[address] 



                INCORPOR[facility]    [address] [facility]                         



Hepatitis B e antigen (2019 10:06 AM CST)





                Component       Value           Ref Range       Performed At

 

                Hep Be Ag (Antigen) NONREACTIVE                     [facility]

[facility]



                                Comment:                        [facility]POR[facility]



                                                                



                                REFERENCE RANGES:NONREACTIVE                

 



                                                                



                                For additional information, please refer to     

            



                                http://wedgies.[employer]/faq/JLR039

                 



                                (This link is being provided for informational/ 

                



                                educational purposes only.)                 



                                                                









                                        Specimen

 

                                        Blood









                          Narrative                 Performed At

 

                                        Performing Lab



                                        [facility] [employer] [facility]



                                         *[employer]



                                        



                           [address] [place] 



                                        [address]



                                        



                           ROBYN Nielson MD 









                Performing Organization Address         City/[facility]/Zipcode Phone

 Number

 

                [employer] [place] [address]

0 



                INCORPORATED    [address] [facility]                         



Hepatitis B surface antigen (2019 10:06 AM CST)





                Component       Value           Ref Range       Performed At

 

                HBsAg Screen    Reactive (A)    Nonreactive     [facility]









                                        Specimen

 

                                        Blood









                Performing Organization Address         City/State/Zipcode Phone

 Number

 

                [facility] [address] [place], [place]

 [id number] [phone]



                CENTER                                          



Pro-time/INR (2019 10:06 AM CST)





                Component       Value           Ref Range       Performed At

 

                Protime         12.9            11.9 - 14.2 seconds [facility]

 

                INR             1.0             <=5.9           [facility]









                                        Specimen

 

                                        Blood









                          Narrative                 Performed At

 

                          Effective 2019: PT Reference Range [facility]



                                        Change



                                        



                                        New: 11.9-14.2Previous: 11.7-14.7



                                        



                                         



                                        



                          RECOMMENDED COUMADIN/WARFARIN INR THERAPY 



                                        RANGES



                                        



                          STANDARD DOSE: 2.0-3.0Includes: 



                          PROPHYLAXIS for venous thrombosis, systemic 



                          embolization; TREATMENT for venous thrombosis 



                                        and/or pulmonary embolus.



                                        



                          HIGH RISK: Target INR is 2.5-3.5 for patients 



                          wiht mechanical heart valves. 









                Performing Organization Address         City/State/Zipcode Phone

 Number

 

                [facility] [address] [place]

 [address] [phone]



                CENTER                                          



after 10/07/2019



Insurance







           Payer      Benefit Plan / Subscriber ID Type       Phone      Address



                      Group                                       

 

           AET[employer] - MEDICARE AETNA MEDICARE HMO xxxxxxxx              [id number]

[address]







           [facility] [facility]   POS PPO                                     [place]



                                                                  [id number]









           Guarantor Name Account Type Relation to Date of    Phone      Billing



                                 Patient    Birth                 Address

 

           Brendan Murray Personal/Family Self       1945 [phone] [address]

[address]



             [place]                                                 (Home)



                                        







                                                       [phone] [place]



                                                       (Work)     [address]

## 2024-08-09 ENCOUNTER — HOSPITAL ENCOUNTER (INPATIENT)
Dept: HOSPITAL 97 - ER | Age: 79
LOS: 2 days | Discharge: HOME | DRG: 684 | End: 2024-08-11
Attending: HOSPITALIST | Admitting: FAMILY MEDICINE
Payer: COMMERCIAL

## 2024-08-09 VITALS — BODY MASS INDEX: 31.8 KG/M2

## 2024-08-09 DIAGNOSIS — Z79.899: ICD-10-CM

## 2024-08-09 DIAGNOSIS — Z79.82: ICD-10-CM

## 2024-08-09 DIAGNOSIS — M10.9: ICD-10-CM

## 2024-08-09 DIAGNOSIS — N40.0: ICD-10-CM

## 2024-08-09 DIAGNOSIS — I12.9: ICD-10-CM

## 2024-08-09 DIAGNOSIS — E78.5: ICD-10-CM

## 2024-08-09 DIAGNOSIS — N17.9: Primary | ICD-10-CM

## 2024-08-09 DIAGNOSIS — D63.1: ICD-10-CM

## 2024-08-09 DIAGNOSIS — K21.9: ICD-10-CM

## 2024-08-09 DIAGNOSIS — N18.2: ICD-10-CM

## 2024-08-09 DIAGNOSIS — I48.91: ICD-10-CM

## 2024-08-09 LAB
ANION GAP SERPL CALC-SCNC: 9.3 MEQ/L (ref 5–15)
BUN BLD-MCNC: 50 MG/DL (ref 7–18)
GLUCOSE SERPLBLD-MCNC: 99 MG/DL (ref 74–106)
HCT VFR BLD CALC: 32.7 % (ref 39.6–49)
HGB BLD-MCNC: 10.7 G/DL (ref 13.6–17.9)
INR BLD: 0.99
LYMPHOCYTES # SPEC AUTO: 1.7 K/UL (ref 0.7–4.9)
MAGNESIUM SERPL-MCNC: 2.2 MG/DL (ref 1.6–2.4)
MCH RBC QN AUTO: 30.5 PG (ref 27–35)
MCHC RBC AUTO-ENTMCNC: 32.7 G/DL (ref 32–36)
MCV RBC: 93.1 FL (ref 80–100)
NRBC # BLD: 0 10*3/UL (ref 0–0)
NRBC BLD AUTO-RTO: 0.3 % (ref 0–0)
NT-PROBNP SERPL-MCNC: 695 PG/ML (ref ?–450)
PMV BLD: 8.8 FL (ref 7.6–11.3)
POTASSIUM SERPL-SCNC: 4.3 MEQ/L (ref 3.5–5.1)
PROTHROMBIN TIME: 11.1 SECONDS (ref 9.4–12.5)
RBC # BLD: 3.51 M/UL (ref 4.33–5.43)
TROPONIN I SERPL HS-MCNC: 19.2 PG/ML (ref ?–58.9)
WBC # BLD AUTO: 5 THOU/UL (ref 4.3–10.9)

## 2024-08-09 PROCEDURE — 85610 PROTHROMBIN TIME: CPT

## 2024-08-09 PROCEDURE — 76770 US EXAM ABDO BACK WALL COMP: CPT

## 2024-08-09 PROCEDURE — 80069 RENAL FUNCTION PANEL: CPT

## 2024-08-09 PROCEDURE — 99285 EMERGENCY DEPT VISIT HI MDM: CPT

## 2024-08-09 PROCEDURE — 84484 ASSAY OF TROPONIN QUANT: CPT

## 2024-08-09 PROCEDURE — 80053 COMPREHEN METABOLIC PANEL: CPT

## 2024-08-09 PROCEDURE — 85025 COMPLETE CBC W/AUTO DIFF WBC: CPT

## 2024-08-09 PROCEDURE — 96374 THER/PROPH/DIAG INJ IV PUSH: CPT

## 2024-08-09 PROCEDURE — 83880 ASSAY OF NATRIURETIC PEPTIDE: CPT

## 2024-08-09 PROCEDURE — 71045 X-RAY EXAM CHEST 1 VIEW: CPT

## 2024-08-09 PROCEDURE — 80048 BASIC METABOLIC PNL TOTAL CA: CPT

## 2024-08-09 PROCEDURE — 83735 ASSAY OF MAGNESIUM: CPT

## 2024-08-09 PROCEDURE — 93005 ELECTROCARDIOGRAM TRACING: CPT

## 2024-08-09 PROCEDURE — 93971 EXTREMITY STUDY: CPT

## 2024-08-09 PROCEDURE — 81001 URINALYSIS AUTO W/SCOPE: CPT

## 2024-08-09 PROCEDURE — 36415 COLL VENOUS BLD VENIPUNCTURE: CPT

## 2024-08-09 RX ADMIN — SODIUM CHLORIDE SCH MLS: 0.9 INJECTION, SOLUTION INTRAVENOUS at 22:15

## 2024-08-09 NOTE — EDPHYS
Physician Documentation                                                                           

 [facility]                                                                 

Name: Brendan Murray                                                                             

Age: 78 yrs                                                                                       

Sex: Male                                                                                         

: 1945                                                                                   

MRN: [id number]                                                                                   

Arrival Date: 2024                                                                          

Time: 16:06                                                                                       

Account#: [id number]                                                                            

Bed 6                                                                                             

Private MD:                                                                                       

ED Physician Chris Azar                                                                      

HPI:                                                                                              

                                                                                             

17:55 This 78 yrs old Black Male presents to ER via Wheelchair with complaints of Abnormal    cp  

      Lab Results - sent by pcp.                                                                  

17:55 Patient presents to ED after being referred by nephrologist, DR Pate for elevated       cp  

      creatinine. No other complaints expressed.                                                  

                                                                                                  

Historical:                                                                                       

- Allergies:                                                                                      

16:24 No Known Allergies;                                                                     hb  

- PMHx:                                                                                           

16:24 Atrial Fib;                                                                             hb  

16:30 GERD; Hypertension; Gout;                                                               hb  

                                                                                                  

- Immunization history:: Adult Immunizations up to date.                                          

- Infectious Disease History:: Denies.                                                            

- Social history:: Smoking status: Patient denies any tobacco usage or history of.                

                                                                                                  

                                                                                                  

ROS:                                                                                              

18:00 Constitutional: Negative for body aches, chills, fever, poor PO intake,                 cp  

18:00 Eyes: Negative for injury, pain, redness, and discharge,                                cp  

18:00 ENT: Negative for drainage from ear(s), ear pain, sore throat, difficulty swallowing,       

      difficulty handling secretions,                                                             

18:00 Cardiovascular: Positive for edema, Negative for chest pain, palpitations,                  

18:00 Respiratory: Negative for cough, shortness of breath, wheezing,                             

18:00 Abdomen/GI: Negative for abdominal pain, vomiting, diarrhea, constipation,                  

18:00 : Negative for urinary symptoms,                                                          

18:00 Skin: Negative for rash,                                                                    

18:00 Neuro: Negative for altered mental status, dizziness, headache, weakness,                   

18:00 All other systems are negative,                                                             

                                                                                                  

Exam:                                                                                             

18:05 Constitutional: The patient appears in no acute distress, alert, awake,                 cp  

      non-diaphoretic, non-toxic, well developed, well nourished,                                 

18:05 Head/Face:  Normocephalic, atraumatic.                                                  cp  

18:05 Eyes: Periorbital structures: appear normal, Conjunctiva: normal, no exudate, no            

      injection, Sclera: no appreciated abnormality, Lids and lashes: appear normal,              

      bilaterally,                                                                                

18:05 ENT: External ear(s): are unremarkable, Nose: is normal, Mouth: Lips: moist, Oral           

      mucosa: pink and intact, moist, Posterior pharynx: Airway: no evidence of obstruction,      

      patent,                                                                                     

18:05 Chest/axilla: Inspection: normal,                                                           

18:05 Cardiovascular: Rate: normal, Rhythm: regular, Edema: moderate lower leg edema with         

      right worse than left, JVD: is not appreciated,                                             

18:05 Respiratory: the patient does not display signs of respiratory distress,  Respirations:     

      normal, no use of accessory muscles, no retractions, labored breathing, is not present,     

      Breath sounds: are clear throughout, no decreased breath sounds, no stridor, no             

      wheezing,                                                                                   

18:05 Abdomen/GI: Exam negative for discomfort, distension, guarding,                             

18:05 Back: pain, is absent,                                                                      

18:05 Neuro: Orientation: to person, place \T\ time. Mentation: is normal, Motor: moves all       

      fours,                                                                                      

                                                                                                  

Vital Signs:                                                                                      

16:21  / 88; Pulse 72; Resp 18; Temp 98.1; Pulse Ox 99% on R/A; Weight 107.05 kg;       hb  

      Height 6 ft. 0 in. ; Pain 4/10;                                                             

18:30  / 109; Pulse 57; Resp 18; Pulse Ox 100% on R/A;                                  tl4 

19:17  / 96; Pulse 58; Resp 17; Temp 97.8; Pulse Ox 100% ; Pain 0/10;                   bm8 

20:38  / 103; Pulse 70; Resp 18; Pulse Ox 100% ;                                        kd4 

16:21 Body Mass Index 32.01 (107.05 kg, 182.88 cm)                                            hb  

16:21 Pain Scale: Adult                                                                       hb  

19:17 Pain Scale: Adult                                                                       bm8 

                                                                                                  

Pedro Coma Score:                                                                               

19:17 Eye Response: spontaneous(4). Motor Response: obeys commands(6). Verbal Response:       bm8 

      oriented(5). Total: 15.                                                                     

                                                                                                  

MDM:                                                                                              

16:33 Patient medically screened.                                                             cp  

20:15 Data reviewed: vital signs, nurses notes, lab test result(s), EKG, radiologic studies,  cp  

      plain films, and as a result, I will admit patient. Care significantly affected by the      

      following chronic conditions: Hypertension, Chronic Kidney Disease. Counseling: I had a     

      detailed discussion with the patient and/or guardian regarding the historical points,       

      exam findings, and any diagnostic results supporting the discharge/admit diagnosis, lab     

      results.                                                                                    

20:15 Management of patient was discussed with the following: Consultant: DR Pate who request cp  

      patient be admitted for elevated creatinine.                                                

                                                                                                  

                                                                                             

17:49 Order name: Basic Metabolic Panel; Complete Time: 19:22                                 cp  

/                                                                                             

19:22 Interpretation: Normal except: ; BUN 50; CRE 3.10; GFR 20.                        cp  

                                                                                             

17:49 Order name: CBC with Diff; Complete Time: 18:43                                         cp  

                                                                                             

18:43 Interpretation: Normal except: RBC 3.51; HGB 10.7; HCT 32.7; RDW 15.8.                  cp  

                                                                                             

17:49 Order name: Magnesium; Complete Time: 19:22                                             cp  

                                                                                             

17:49 Order name: NT PRO-BNP; Complete Time: 19:22                                            cp  

                                                                                             

19:41 Interpretation: Reviewed.                                                               cp  

                                                                                             

17:49 Order name: PT-INR; Complete Time: 19:22                                                cp  

                                                                                             

17:49 Order name: Troponin HS; Complete Time: 19:22                                           cp  

                                                                                             

20:38 Order name: Urinalysis w/ reflexes                                                      EDMS

                                                                                             

20:38 Order name: CBC with Automated Diff                                                     EDMS

                                                                                             

20:38 Order name: CBC with Automated Diff                                                     EDMS

                                                                                             

20:38 Order name: Comprehensive Metabolic Panel                                               EDMS

                                                                                             

20:38 Order name: Comprehensive Metabolic Panel                                               EDMS

                                                                                             

17:49 Order name: XRAY Chest (1 view); Complete Time: 19:22                                   cp  

                                                                                             

17:49 Order name: EKG; Complete Time: 17:50                                                   cp  

                                                                                             

20:38 Order name: CONS Physician Consult                                                      EDMS

                                                                                             

17:49 Order name: Cardiac monitoring; Complete Time: 18:12                                    cp  

                                                                                             

17:49 Order name: EKG - Nurse/Tech; Complete Time: 19:02                                      cp  

                                                                                             

17:49 Order name: IV Saline Lock; Complete Time: 18:27                                        cp  

                                                                                             

17:49 Order name: Labs collected and sent; Complete Time: 18:27                               cp  

                                                                                             

17:49 Order name: O2 Per Protocol; Complete Time: 18:12                                       cp  

                                                                                             

17:49 Order name: O2 Sat Monitoring; Complete Time: 18:12                                     cp  

                                                                                                  

Administered Medications:                                                                         

19:02 Drug: hydrALAZINE IVP 10 mg IVP once Route: IVP; Site: right antecubital;               tl4 

20:57 Follow up: Response: No adverse reaction                                                bm8 

                                                                                                  

                                                                                                  

Disposition Summary:                                                                              

24 20:15                                                                                    

Hospitalization Ordered                                                                           

 Notes:       Hospitalization Status: Inpatient Admission                                           
  cp

      Provider: Balaji Rowe cp  

      Location: Tele[facility] (Inpatient)                                                 cp  

      Condition: Stable                                                                       cp  

      Problem: an ongoing problem                                                             cp  

      Symptoms: have worsened                                                                 cp  

      Bed/Room Type: Standard                                                                 cp  

      Room Assignment: 202(24 20:45)                                                    jb4 

      Diagnosis                                                                                   

        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  

      through stage 4 chronic kidney disease, or unspecified chronic kidney disease               

      Forms:                                                                                      

        - Medication Reconciliation Form                                                      cp  

        - SBAR form                                                                           cp  

        - Leadership Thank You Letter                                                         cp  

Signatures:                                                                                       

Dispatcher [employer]                           EDMS                                                 

Chris Banegas PA PA cp Baxter, Heather, RN                     RN                                                      

Ward Kovacs RN                       RN   jb4                                                  

Babak Honeycutt RN                       RN   tl4                                                  

German Yan RN   bm8                                                  

                                                                                                  

Corrections: (The following items were deleted from the chart)                                    

17:50 17:50 BASIC METABOLIC PANEL+C.LAB.BRZ ordered. EDMS                                     EDMS

17:50 17:50 CBC+H.LAB.BRZ ordered. EDMS                                                       EDMS

17:50 17:50 MAGNESIUM+C.LAB.BRZ ordered. EDMS                                                 EDMS

17:50 17:50 PROBNP+C.LAB.BRZ ordered. EDMS                                                    EDMS

17:50 17:50 PROTIME (+INR)+COAG.LAB.BRZ ordered. EDMS                                         EDMS

17:50 17:50 Troponin High Sensitivity+C.LAB.BRZ ordered. EDMS                                 EDMS

20:45 20:15 cp                                                                                jb4 

08/10                                                                                             

14:19  21:20 Management of patient was discussed with the following: Consultant: DR Rio saavedra  

      who request patient be admitted for elevated creatinine. cp                                 

08/10                                                                                             

14:20  20:15 Management of patient was discussed with the following: Consultant: DR Rio saavedra  

      who request patient be admitted for elevated creatinine. cp                                 

                                                                                                  

**************************************************************************************************

## 2024-08-09 NOTE — ER
Nurse's Notes                                                                                     

 [facility]                                                                 

Name: Brendan Murray                                                                             

Age: 78 yrs                                                                                       

Sex: Male                                                                                         

: 1945                                                                                   

MRN: [id number]                                                                                   

Arrival Date: 2024                                                                          

Time: 16:06                                                                                       

Account#: [id number]                                                                            

Bed 6                                                                                             

Private MD:                                                                                       

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      

  through stage 4 chronic kidney disease, or unspecified chronic kidney disease                   

                                                                                                  

Presentation:                                                                                     

                                                                                             

16:21 Chief complaint: Sent by Dr. Mckeon's office for creat 3.69.                           hb  

16:21 Method Of Arrival: Wheelchair                                                           hb  

16:21 Acuity: RAVEN 3                                                                           hb  

16:22 Coronavirus screen: At this time, the client does not indicate any symptoms associated  hb  

      with coronavirus-19. Ebola Screen: No symptoms or risks identified at this time.            

      Initial Sepsis Screen: Does the patient meet any 2 criteria? No. Patient's initial          

      sepsis screen is negative. Does the patient have a suspected source of infection? No.       

      Patient's initial sepsis screen is negative. Risk Assessment: Do you want to hurt           

      yourself or someone else? Patient reports no desire to harm self or others. Onset of        

      symptoms was 2024.                                                               

                                                                                                  

Historical:                                                                                       

- Allergies:                                                                                      

16:24 No Known Allergies;                                                                     hb  

- PMHx:                                                                                           

16:24 Atrial Fib;                                                                             hb  

16:30 GERD; Hypertension; Gout;                                                               hb  

                                                                                                  

- Immunization history:: Adult Immunizations up to date.                                          

- Infectious Disease History:: Denies.                                                            

- Social history:: Smoking status: Patient denies any tobacco usage or history of.                

                                                                                                  

                                                                                                  

Screenin:31 [facility] ED Fall Risk Assessment (Adult) History of falling in the last 3 months,       tl4 

      including since admission Yes- single mechanical fall (1 pt) Confusion or                   

      Disorientation No (0 pts) Intoxicated or Sedated No (0 pts) Impaired Gait No (0 pts)        

      Mobility Assist Device Used Yes (1 pt) Altered Elimination No (0 pt) Score/Fall Risk        

      Level 3 or more points = High Risk Oriented to surroundings, Maintained a safe              

      environment, Educated pt \T\ family on fall prevention, incl call for assistance when       

      getting out of bed, Assessed \T\ reinforced patient's understanding of fall precautions,    

      Hourly rounding (assess needs \T\ fall precautionary measures) done, Used ambulatory aids   

      as needed (educated on \T\ assisted with), Used gait belt as appropriate. Abuse screen:     

      Denies threats or abuse. Denies injuries from another. Nutritional screening: No            

      deficits noted. Nutritional screening: No deficits noted. Tuberculosis screening: No        

      symptoms or risk factors identified.                                                        

                                                                                                  

Assessment:                                                                                       

18:29 General: Appears in no apparent distress. Behavior is calm, cooperative. Pain: Denies   tl4 

      pain. Neuro: Level of Consciousness is awake, alert, obeys commands, Oriented to            

      person, place, time, situation, Moves all extremities. Full function Speech is normal,      

      Facial symmetry appears normal, Pupils are PERRLA. Cardiovascular: Denies chest pain,       

      palpitations, shortness of breath, Capillary refill < 3 seconds Patient's skin is warm      

      and dry. Rhythm is sinus bradycardia. Respiratory: Airway is patent Respiratory effort      

      is even, unlabored, Respiratory pattern is regular, symmetrical, Breath sounds are          

      clear bilaterally. GI: No signs and/or symptoms were reported involving the                 

      gastrointestinal system. : No signs and/or symptoms were reported regarding the           

      genitourinary system. EENT: No signs and/or symptoms were reported regarding the EENT       

      system. Derm: No signs and/or symptoms reported regarding the dermatologic system.          

      Musculoskeletal: No signs and/or symptoms reported regarding the musculoskeletal system.    

19:17 Reassessment: Patient appears in no apparent distress at this time. Patient and/or      bm8 

      family updated on plan of care and expected duration. Pain level reassessed. Patient is     

      alert, oriented x 3, equal unlabored respirations, skin warm/dry/pink. General: Appears     

      in no apparent distress. comfortable, Behavior is calm, cooperative, appropriate for        

      age. Pain: Denies pain. Neuro: Level of Consciousness is awake, alert, obeys commands,      

      Oriented to person, place, time, situation. Cardiovascular: Denies chest pain,              

      palpitations, shortness of breath, Capillary refill < 3 seconds Patient's skin is warm      

      and dry. Respiratory: Airway is patent Respiratory effort is even, unlabored,               

      Respiratory pattern is regular, symmetrical, Breath sounds are clear bilaterally. GI:       

      No signs and/or symptoms were reported involving the gastrointestinal system. : No        

      signs and/or symptoms were reported regarding the genitourinary system. EENT: No signs      

      and/or symptoms were reported regarding the EENT system. Derm: No signs and/or symptoms     

      reported regarding the dermatologic system. Musculoskeletal: No signs and/or symptoms       

      reported regarding the musculoskeletal system.                                              

                                                                                                  

Vital Signs:                                                                                      

16:21  / 88; Pulse 72; Resp 18; Temp 98.1; Pulse Ox 99% on R/A; Weight 107.05 kg;       hb  

      Height 6 ft. 0 in. ; Pain 4/10;                                                             

18:30  / 109; Pulse 57; Resp 18; Pulse Ox 100% on R/A;                                  tl4 

19:17  / 96; Pulse 58; Resp 17; Temp 97.8; Pulse Ox 100% ; Pain 0/10;                   bm8 

20:38  / 103; Pulse 70; Resp 18; Pulse Ox 100% ;                                        kd4 

16:21 Body Mass Index 32.01 (107.05 kg, 182.88 cm)                                            hb  

16:21 Pain Scale: Adult                                                                       hb  

19:17 Pain Scale: Adult                                                                       bm8 

                                                                                                  

Vitals:                                                                                           

18:30 Cardiac Rhythm Assessment Sinus irineo.                                                  tl4 

19:17 Cardiac Rhythm Assessment Sinus irineo.                                                  bm8 

                                                                                                  

Pedro Coma Score:                                                                               

19:17 Eye Response: spontaneous(4). Motor Response: obeys commands(6). Verbal Response:       bm8 

      oriented(5). Total: 15.                                                                     

                                                                                                  

ED Course:                                                                                        

16:15 Patient arrived in ED.                                                                  ra3 

16:22 Chris Banegas PA is PHCP.                                                                cp  

16:22 Chris Azar MD is Attending Physician.                                             cp  

16:24 Triage completed.                                                                       hb  

16:30 Arm band placed on.                                                                     hb  

17:27 Patient placed in an exam room, on a stretcher.                                         db  

18:27 Basic Metabolic Panel Sent.                                                             tl4 

18:27 CBC with Diff Sent.                                                                     tl4 

18:27 Magnesium Sent.                                                                         tl4 

18:27 NT PRO-BNP Sent.                                                                        tl4 

18:28 Patient has correct armband on for positive identification. Placed in gown. Bed in low  tl4 

      position. Call light in reach. Side rails up X2. Adult w/ patient. Provided Education       

      on: ed process, call bell. Client placed on continuous cardiac and pulse oximetry           

      monitoring. NIBP monitoring applied. Cardiac monitor on. Door closed. Noise minimized.      

      Moved to private room. Warm blanket given. Pillow given.                                    

18:28 PT-INR Sent.                                                                            tl4 

18:28 Troponin HS Sent.                                                                       tl4 

18:28 No provider procedures requiring assistance completed. Initial lab(s) drawn, by me,     tl4 

      sent to lab. Inserted saline lock: 22 gauge in right antecubital area, using aseptic        

      technique. Blood collected. Flushed with 10 mL NS.                                          

18:33 XRAY Chest (1 view) In Process Unspecified.                                             EDMS

19:12 German Yan, RN is Primary Nurse.                                                    bm8 

19:15 EKG done, by ED staff, reviewed by Chris FALCON.                                       tl4 

19:17 IV was discontinued by the patient. with fluids infusing freely, with good blood return.bm8 

20:14 Balaji Rowe MD is Hospitalizing Provider.                                           cp  

21:14 Patient admitted, IV remains in place.                                                  bm8 

                                                                                                  

Administered Medications:                                                                         

19:02 Drug: hydrALAZINE IVP 10 mg IVP once Route: IVP; Site: right antecubital;               tl4 

20:57 Follow up: Response: No adverse reaction                                                bm8 

                                                                                                  

                                                                                                  

Medication:                                                                                       

18:30 VIS not applicable for this client.                                                     tl4 

                                                                                                  

Outcome:                                                                                          

20:15 Decision to Hospitalize by Provider.                                                    cp  

21:13 Admitted to Med/surg accompanied by tech, via wheelchair, room 202,                     bm8 

21:13 Condition: stable                                                                           

21:13 Instructed on the need for admit,                                                           

21:13 Demonstrated understanding of instructions, follow-up care,                                 

21:47 Patient left the ED.                                                                    bm8 

                                                                                                  

Signatures:                                                                                       

Dispatcher [employer]                           EDMS                                                 

Chris Banegas PA PA   cp                                                   

Marilee Samuels RN                     RN                                                      

Carolina Deleon, RN                    RN                                                      

Babak Honeycutt RN                       RN   tl4                                                  

Krystal Negro                                   ra3                                                  

German Yan, RN                      RN   bm8                                                  

Kamala Jaquez, RN                      RN   kd4                                                  

                                                                                                  

Corrections: (The following items were deleted from the chart)                                    

16:24 16:22 Chief complaint: Bit on right elbow by stay dog 2 hours ago, [place] [employer] Case #      hb  

      . hb                                                                                 

16:24 16:22 Method Of Arrival: Ambulatory hb                                                  hb  

16:24 16:22  / 85; Pulse 83bpm; Resp 16bpm; Pulse Ox 98% RA; Temp 97.8F Temporal; 81.65 hb  

      kg; Height 4 ft. 11 in.; BMI: 36.3; Pain 2/10, Adult; hb                                    

16:24 16:22 Acuity: RAVEN 4 hb                                                                  hb  

                                                                                                  

**************************************************************************************************

## 2024-08-09 NOTE — RAD REPORT
EXAM DESCRIPTION:  RAD - Chest Single View - 8/9/2024 6:31 pm

 

CLINICAL HISTORY:  SWELLING

Chest pain.

 

COMPARISON:  <Comparisons>

 

FINDINGS:  Portable technique limits examination quality.

 

Calcified granuloma seen in the right lung base. Also noted calcified right lymph nodes. This pattern
 likely indicates prior granulomatous infection. The lungs are otherwise clear. The heart is normal i
n size. Tortuous thoracic aorta No displaced fractures.

 

IMPRESSION:  No acute intrathoracic process suspected.

## 2024-08-09 NOTE — P.HP
Certification for Inpatient


Patient admitted to: Inpatient


With expected LOS: >2 Midnights


Practitioner: I am a practitioner with admitting privileges, knowledge of 

patient current condition, hospital course, and medical plan of care.


Services: Services provided to patient in accordance with Admission requirements

found in Title 42 Section 412.3 of the Code of Federal Regulations





Patient History


Date of Service: 08/10/24


Reason for admission: JAVIER


History of Present Illness: 








78-year-old male with past medical history of hypertension, hyperlipidemia, 

history of atrial fibrillation, CKD stage II,, GERD, gout who was sent to ER by 

his primary nephrologist Dr. Pate for further monitoring and IV hydration.  

Patient denies any chest pain or shortness of breath.  No fever or chills.  No 

nausea vomiting or diarrhea.  His nephrologist noted that his creatinine is 

creeping up and wanted to start on IV hydration and to get a renal ultrasound 

and was sent over here for further management.  Denies any pain . 


Patient was assessed in the ER and was found to have acute kidney injury on CKD 

stage II and accelerated hypertension and was admitted for further management.  

Nephrology was consulted.


Allergies





No Known Allergies Allergy (Verified 08/09/24 23:29)


   





Home medications list reviewed: Yes


Home Medications: 








Ascorbic Acid [Vitamin C] 1,000 mg PO BID 01/25/24 


Aspirin [Hyacinth Chewable] 81 mg PO BEDTIME 01/25/24 


Atorvastatin Calcium [Lipitor*] 10 mg PO DAILY 01/25/24 


Flecainide Acetate 50 mg PO BEDTIME 01/25/24 


Nebivolol HCl [Bystolic] 10 mg PO DAILY 01/25/24 


Tamsulosin [Flomax] 0.4 mg PO BID 01/25/24 


allopurinoL [Zyloprim] 50 mg PO DAILY 01/25/24 


cloNIDine [Clonidine] 1 patch TD SEECOM 01/25/24 


Amlodipine [Norvasc] 5 mg PO BID 08/09/24 


Losartan Potassium [Cozaar] 25 mg PO DAILY 08/09/24 








- Past Medical/Surgical History


Past Medical History: Reviewed- Non-Contributory


-: Hypertension, hyperlipidemia, history of atrial fibrillation,


Past Surgical History: Reviewed- Non-Contributory


-: Carpal tunnel repair





- Family History


Family History: Reviewed- Non-Contributory





- Social History


Smoking Status: Never smoker





Review of Systems


10-point ROS is otherwise unremarkable





Physical Examination





- Vital Signs


Temperature: 98.1 F


Blood Pressure: 152/88


Pulse: 76


Respirations: 18


Pulse Ox (%): 94





- Physical Exam


General: Alert, In no apparent distress, Oriented x3


HEENT: Atraumatic, Normocephalic


Neck: Supple, No Thyromegaly


Respiratory: Clear to auscultation bilaterally, Normal air movement


Cardiovascular: Regular rate/rhythm, Normal S1 S2


Capillary refill: <2 Seconds


Gastrointestinal: Soft and benign, W/out hepatosplenomegaly


Musculoskeletal: No clubbing, No swelling


Integumentary: No rashes, No breakdown


Neurological: Normal gait, Normal speech, Normal strength at 5/5 x4 extr, 

Cranial nerves 3-12 intact, Normal reflexes 2+


Lymphatics: No axilla or inguinal lymphadenopathy





- Studies


Laboratory Data (last 24 hrs)











  08/09/24 08/09/24 08/09/24





  18:25 18:25 18:25


 


WBC   5.00 


 


Hgb   10.7 L 


 


Hct   32.7 L 


 


Plt Count   184 


 


PT  11.1  


 


INR  0.99  


 


Sodium    140


 


Potassium    4.3


 


BUN    50 H


 


Creatinine    3.10 H


 


Glucose    99


 


Magnesium    2.2











Assessment and Plan





- Plan





Acute kidney injury on CKD stage II


Monitor closely under telemetry


Monitor renal parameters


Electrolytes monitor and replace accordingly


Nephrology consulted


Will start fluid challenge with normal saline


Will also get a renal ultrasound





Accelerated Hypertension


Antihypertensives titrated


Continue home medications and titrate as needed


Hydralazine as needed





Hyperlipidemia


Continue home medications





CKD stage II


Monitor renal parameters


Electrolytes monitor and replace accordingly





Anemia of chronic disease


Monitor H&H closely


No overt bleeding at this time





GI/DVT prophylaxis


Advanced directive full code


Discharge Plan: Home


Plan to discharge in: 48 Hours





- Advance Directives


Does patient have a Living Will: Yes


Does patient have a Durable POA for Healthcare: Yes





- Code Status/Comfort Care


Code Status: Full Code


Time Spent Managing Pts Care (In Minutes): 48

## 2024-08-10 VITALS — OXYGEN SATURATION: 98 %

## 2024-08-10 LAB
ALBUMIN SERPL BCP-MCNC: 3.2 G/DL (ref 3.4–5)
ALBUMIN/GLOB SERPL: 0.9 {RATIO} (ref 1.1–1.8)
ALP SERPL-CCNC: 63 U/L (ref 45–117)
ALT SERPL W P-5'-P-CCNC: 21 U/L (ref 16–61)
ANION GAP SERPL CALC-SCNC: 10.4 MEQ/L (ref 5–15)
AST SERPL W P-5'-P-CCNC: 19 U/L (ref 15–37)
BUN BLD-MCNC: 46 MG/DL (ref 7–18)
GLOBULIN SER CALC-MCNC: 3.6 G/DL (ref 2.3–3.5)
GLUCOSE SERPLBLD-MCNC: 97 MG/DL (ref 74–106)
HCT VFR BLD CALC: 30.5 % (ref 39.6–49)
HGB BLD-MCNC: 10.3 G/DL (ref 13.6–17.9)
LYMPHOCYTES # SPEC AUTO: 1.4 K/UL (ref 0.7–4.9)
MCH RBC QN AUTO: 31.4 PG (ref 27–35)
MCHC RBC AUTO-ENTMCNC: 33.6 G/DL (ref 32–36)
MCV RBC: 93.5 FL (ref 80–100)
NRBC # BLD: 0 10*3/UL (ref 0–0)
NRBC BLD AUTO-RTO: 0.1 % (ref 0–0)
PMV BLD: 9.5 FL (ref 7.6–11.3)
POTASSIUM SERPL-SCNC: 4.4 MEQ/L (ref 3.5–5.1)
RBC # BLD: 3.27 M/UL (ref 4.33–5.43)
UA COMPLETE W REFLEX CULTURE PNL UR: (no result)
UA DIPSTICK W REFLEX MICRO PNL UR: (no result)
WBC # BLD AUTO: 4.6 THOU/UL (ref 4.3–10.9)

## 2024-08-10 RX ADMIN — AMLODIPINE BESYLATE ONE MG: 5 TABLET ORAL at 00:00

## 2024-08-10 RX ADMIN — FLECAINIDE ACETATE ONE MG: 100 TABLET ORAL at 00:00

## 2024-08-10 RX ADMIN — ATORVASTATIN CALCIUM SCH MG: 10 TABLET, FILM COATED ORAL at 09:39

## 2024-08-10 RX ADMIN — ASPIRIN 81 MG SCH: 81 TABLET ORAL at 09:39

## 2024-08-10 RX ADMIN — NEBIVOLOL HYDROCHLORIDE SCH MG: 5 TABLET ORAL at 09:37

## 2024-08-10 RX ADMIN — AMLODIPINE BESYLATE ONE MG: 5 TABLET ORAL at 11:13

## 2024-08-10 RX ADMIN — SODIUM CHLORIDE SCH: 0.9 INJECTION, SOLUTION INTRAVENOUS at 16:35

## 2024-08-10 RX ADMIN — ASPIRIN ONE MG: 81 TABLET, COATED ORAL at 00:01

## 2024-08-10 RX ADMIN — TAMSULOSIN HYDROCHLORIDE SCH MG: 0.4 CAPSULE ORAL at 09:38

## 2024-08-10 RX ADMIN — AMLODIPINE BESYLATE SCH MG: 5 TABLET ORAL at 20:35

## 2024-08-10 RX ADMIN — ENOXAPARIN SODIUM SCH MG: 30 INJECTION SUBCUTANEOUS at 09:39

## 2024-08-10 RX ADMIN — TAMSULOSIN HYDROCHLORIDE ONE MG: 0.4 CAPSULE ORAL at 00:00

## 2024-08-10 RX ADMIN — HYDRALAZINE HYDROCHLORIDE PRN MG: 20 INJECTION INTRAMUSCULAR; INTRAVENOUS at 05:19

## 2024-08-10 RX ADMIN — Medication SCH MG: at 20:34

## 2024-08-10 RX ADMIN — HEPARIN SODIUM SCH UNIT: 5000 INJECTION, SOLUTION INTRAVENOUS; SUBCUTANEOUS at 18:12

## 2024-08-10 RX ADMIN — FLECAINIDE ACETATE SCH MG: 100 TABLET ORAL at 20:34

## 2024-08-10 NOTE — P.PN
Subjective


Date of Service: 08/10/24


Chief Complaint: JAVIER


Patient denies any new complaint.


His blood pressure readings have improved.








Physical Examination





- Vital Signs


Temperature: 97.9 F


Blood Pressure: 159/95


Pulse: 65


Respirations: 12


Pulse Ox (%): 95





- Studies


Laboratory Data (last 24 hrs)











  08/09/24 08/09/24 08/09/24





  18:25 18:25 18:25


 


WBC   5.00 


 


Hgb   10.7 L 


 


Hct   32.7 L 


 


Plt Count   184 


 


PT  11.1  


 


INR  0.99  


 


Sodium    140


 


Potassium    4.3


 


BUN    50 H


 


Creatinine    3.10 H


 


Glucose    99


 


Magnesium    2.2











Assessment And Plan





- Plan


Physical examination


General: Alert and oriented x3, NAD, 


HEENT: Conjunctiva not pale, anicteric sclera


Neck: Supple, no elevated JVD


Heart: Heart sounds 1 and 2 normal, regular rhythm, normal rate, no pedal edema


Lungs: Clear to auscultation bilaterally, adequate breath sounds bilaterally, no

rhonchi or crackles.


Abdomen: Soft, nondistended, nontender, normal bowel sounds.


Extremities: No tenderness, no deformity


Skin: Normal skin turgor, no rash, no nodules or ulcers.


Neuro: No focal motor deficit.  Normal speech.


Psychiatry: Normal mood, no agitation.








Assessment and plan


Acute kidney injury on CKD stage II


Renal function is improving slowly with IV hydration.


Nephrology consulted to manage JAVIER


Continue IV normal saline


renal US.





Accelerated Hypertension


Patient is on cocktail of antihypertensives at home.


All home antihypertensive resumed except losartan given JAVIER


Hydralazine as needed for BP spikes





Hyperlipidemia


Continue home medications





Anemia of chronic disease


Stable








GI/DVT prophylaxis: Heparin SQ


Advanced directive full code

## 2024-08-10 NOTE — RAD REPORT
EXAM DESCRIPTION:  US - Renal Ultrasound-Complete - 8/10/2024 3:19 pm

 

CLINICAL HISTORY:  Acute renal failure

 

COMPARISON:  January 2024

 

FINDINGS:  The right kidney measures 10 cm with an increased echotexture. 5.3 centimeters cyst. Addit
ional smaller cysts

 

The left kidney measures 9 cm with an increased echotexture.

 

Hydronephrosis is not seen.

 

Marked prostatic enlargement. Prostatic tissue abuts base of the bladder.

 

IMPRESSION:  Increased renal echotexture consistent with parenchymal disease

 

Marked prostatic enlargement

## 2024-08-10 NOTE — CON
Date of Consultation:  08/10/2024



Reason For Consultation:  Acute kidney injury.



History Of Present Illness:  This is a 78-year-old male with past medical history of hypertension wit
h hypertensive glomerulosclerosis, baseline creatinine about 2.5, history of GERD and BPH.  The patie
nt was sent by nephrologist, Dr. Mcginnis for JAVIER.  The patient is scheduled for followup with his primar
 care physician.  Labs from this week showed elevated creatinine of 3.5 and his baseline creatinine 
2.5.  The patient denied taking diuretic, NSAIDs, or recent antibiotic.



Past Medical History:  Hypertension and chronic kidney disease.



Past Surgical History:  Carpal tunnel surgery.



Family History:  Noncontributory.



Allergies:  NO KNOWN DRUG ALLERGIES.



Review of Systems:

General:  Denied fever or chills. 

HEENT:  Denied headache or blurred vision. 

Respiratory:  Denies cough or shortness of breath. 

Cardiovascular:  Denied chest pain or palpitation. 

GI:  Denies nausea, vomiting, diarrhea, or constipation. 

:  Denies dysuria, hematuria, increase in frequency or urgency.



Physical Examination:

Vital Signs:  Temperature 97.9, pulse rate 65, blood pressure 159/95. 

General:  Awake and alert, not in distress. 

Neck:  Supple.  No elevated JVD. 

Heart:  Regular rate and rhythm.  Normal S1, S2. 

Chest:  Clear to auscultation bilaterally.  No rales or wheezes. 

Abdomen:  Soft, nontender. 

Extremities:  No edema.



Medications:  Include Tylenol, vitamin C, amlodipine, clonidine, flecainide, and Flomax.



Assessment And Plan:  

1.Acute on chronic kidney disease.  Creatinine improving, possibly due to hydration, need to rule ou
t obstructive uropathy.  The patient's previous ultrasound from January showed 6.7 cm mass projecting
 along the bladder neck.  Avoid NSAID and contrast.  Renally dose medication.

2.Hypertension.

3.Hypertensive chronic kidney disease, baseline creatinine 2.5, GFR 25.

4.Hypertension.  Blood pressure is better controlled.  We will reduce IV fluid rate to 50 mL/h.

5.Anemia of chronic disease.  Hemoglobin is stable. 



Thanks for allowing me to participate in patient's care.  Total time spent 75 minutes 

including documentation, reviewing labs, placing orders, and discussing with the medical team.





INDU/CHEYENNE

DD:  08/10/2024 16:37:10Voice ID:  [id number]

DT:  08/10/2024 19:13:02Report ID:  [id number]

## 2024-08-11 VITALS — SYSTOLIC BLOOD PRESSURE: 158 MMHG | DIASTOLIC BLOOD PRESSURE: 87 MMHG | TEMPERATURE: 97.3 F

## 2024-08-11 LAB
ALBUMIN SERPL BCP-MCNC: 2.9 G/DL (ref 3.4–5)
ANION GAP SERPL CALC-SCNC: 11.5 MEQ/L (ref 5–15)
BUN BLD-MCNC: 42 MG/DL (ref 7–18)
GLUCOSE SERPLBLD-MCNC: 91 MG/DL (ref 74–106)
HCT VFR BLD CALC: 29 % (ref 39.6–49)
HGB BLD-MCNC: 9.5 G/DL (ref 13.6–17.9)
LYMPHOCYTES # SPEC AUTO: 1.4 K/UL (ref 0.7–4.9)
MCH RBC QN AUTO: 30.4 PG (ref 27–35)
MCHC RBC AUTO-ENTMCNC: 32.9 G/DL (ref 32–36)
MCV RBC: 92.3 FL (ref 80–100)
NRBC # BLD: 0 10*3/UL (ref 0–0)
NRBC BLD AUTO-RTO: 0.2 % (ref 0–0)
PMV BLD: 9.2 FL (ref 7.6–11.3)
POTASSIUM SERPL-SCNC: 4.5 MEQ/L (ref 3.5–5.1)
RBC # BLD: 3.14 M/UL (ref 4.33–5.43)
WBC # BLD AUTO: 4.7 THOU/UL (ref 4.3–10.9)

## 2024-08-11 NOTE — P.DS
Admission Date: 08/09/24


Discharge Date: 08/11/24


Disposition: ROUTINE DISCHARGE


Discharge Condition: FAIR


Reason for Admission: JAVIER


Brief History of Present Illness: 


78-year-old male with past medical history of hypertension, hyperlipidemia, 

history of atrial fibrillation, CKD stage III,, GERD, gout who was sent to ER by

his primary nephrologist Dr. Pate due to JAVIER for IV hydration.  Patient any 

symptoms, including vomiting or diarrhea. His nephrologist noted that his 

creatinine is creeping up and wanted to start on IV hydration and to get a renal

ultrasound and was sent over here for further management.  


Patient was assessed in the ER and was found to have acute kidney injury on CKD 

stage III and elevated BP. Patient was admitted for further management.  





Hospital Course: 


Patient admitted to the medical floor and the following medical problems 

addressed:





Acute kidney injury on CKD stage II


Renal function improved with IV hydration.


Nephrology consulted to manage JAVIER, patient was seen by Dr. Yepez.


Patient's serum creatinine has improved to baseline per Dr. Yepez.


Renal US showed no obstructive uropathy but demonstrated markedly enlarged 

prostate





Accelerated Hypertension


Patient is on cocktail of antihypertensives at home.


All home antihypertensive resumed except losartan given JAVIER.


Patient was made aware the losartan was held due to the JAVIER but he declined to 

take it again.


Losartan is replaced with hydralazine.





Hyperlipidemia


Continued home medications





Anemia of chronic disease


Stable





BPH 


Renal ultrasound shows markedly enlarged prostate.


Patient states he follows up with Dr. Woodward for his prostate and saw Dr. Woodward about 1 month ago.


Follow-up with Dr. Woodward as outpatient.


Vital Signs/Physical Exam: 














Temp Pulse Resp BP Pulse Ox


 


 97.3 F   60   12   158/87 H  99 


 


 08/11/24 12:00  08/11/24 12:00  08/11/24 12:00  08/11/24 12:00  08/11/24 12:00








General: Alert, In no apparent distress, Oriented x3


HEENT: Mucous membr. moist/pink, Sclerae nonicteric


Neck: Supple, JVD not distended


Respiratory: Clear to auscultation bilaterally, Normal air movement


Cardiovascular: No edema, Regular rate/rhythm, Normal S1 S2


Gastrointestinal: Normal bowel sounds, Soft and benign, Non-distended, No 

tenderness


Musculoskeletal: No swelling


Integumentary: No rashes, No cyanosis


Neurological: Normal strength at 5/5 x4 extr


Laboratory Data at Discharge: 














WBC  4.70 thou/uL (4.3-10.9)   08/11/24  05:19    


 


Hgb  9.5 g/dL (13.6-17.9)  L  08/11/24  05:19    


 


Hct  29.0 % (39.6-49.0)  L  08/11/24  05:19    


 


Plt Count  156 thou/uL (152-406)   08/11/24  05:19    


 


PT  11.1 SECONDS (9.4-12.5)   08/09/24  18:25    


 


INR  0.99   08/09/24  18:25    


 


Sodium  140 mEq/L (136-145)   08/11/24  05:19    


 


Potassium  4.5 mEq/L (3.5-5.1)   08/11/24  05:19    


 


BUN  42 mg/dL (7-18)  H  08/11/24  05:19    


 


Creatinine  2.73 mg/dL (0.70-1.30)  H  08/11/24  05:19    


 


Glucose  91 mg/dL ()   08/11/24  05:19    


 


Phosphorus  2.9 mg/dL (2.5-4.9)   08/11/24  05:19    


 


Magnesium  2.1 mg/dL (1.6-2.4)   08/10/24  12:36    


 


Total Bilirubin  0.4 mg/dL (0.2-1.0)   08/10/24  05:33    


 


AST  19 U/L (15-37)   08/10/24  05:33    


 


ALT  21 U/L (16-61)   08/10/24  05:33    


 


Alkaline Phosphatase  63 U/L ()   08/10/24  05:33    








Home Medications: 








Ascorbic Acid [Vitamin C*] 1,000 mg PO BID 01/25/24 


Aspirin [Hyacinth Chewable Aspirin] 81 mg PO BEDTIME 01/25/24 


Atorvastatin Calcium [Lipitor*] 10 mg PO DAILY 01/25/24 


Flecainide Acetate 50 mg PO BEDTIME 01/25/24 


Nebivolol HCl [Bystolic] 10 mg PO DAILY 01/25/24 


Tamsulosin [Flomax*] 0.4 mg PO BID 01/25/24 


allopurinoL [Zyloprim*] 50 mg PO DAILY 01/25/24 


cloNIDine [Clonidine] 1 patch TD SEECOM 01/25/24 


Amlodipine [Norvasc*] 5 mg PO BID 08/09/24 


Hydralazine HCl 10 mg PO TID #90 tab 08/11/24 





New Medications: 


Hydralazine HCl 10 mg PO TID #90 tab


Diet: Renal


Activity: Ad cielo


Followup: 


Seamus Rodríguez MD [ACTIVE - CAN ADMIT] - 1 Week


Julio Mckeon MD [Primary Care Provider] - 1-2 Weeks


Time spent managing pt's care (in minutes): 36

## 2024-08-12 NOTE — EKG
Test Date:    2024-08-09               Test Time:    19:10:35

Technician:   TL                                     

                                                     

MEASUREMENT RESULTS:                                       

Intervals:                                           

Rate:         55                                     

RI:           208                                    

QRSD:         108                                    

QT:           422                                    

QTc:          403                                    

Axis:                                                

P:            -2                                     

RI:           208                                    

QRS:          -28                                    

T:            -5                                     

                                                     

INTERPRETIVE STATEMENTS:                                       

                                                     

Sinus bradycardia

Incomplete right bundle branch block

Minimal voltage criteria for LVH, may be normal variant

Nonspecific T wave abnormality

Abnormal ECG

Compared to ECG 10/04/2020 14:29:01

Left ventricular hypertrophy now present

T-wave abnormality now present

First degree AV block no longer present

Left anterior fascicular block no longer present



Electronically Signed On 08-12-24 13:46:57 CDT by Edmund Figueroa